# Patient Record
Sex: FEMALE | Race: BLACK OR AFRICAN AMERICAN | Employment: UNEMPLOYED | URBAN - METROPOLITAN AREA
[De-identification: names, ages, dates, MRNs, and addresses within clinical notes are randomized per-mention and may not be internally consistent; named-entity substitution may affect disease eponyms.]

---

## 2017-01-02 ENCOUNTER — HOME HEALTH ADMISSION (OUTPATIENT)
Dept: HOME HEALTH SERVICES | Facility: HOME HEALTH | Age: 52
End: 2017-01-02
Payer: COMMERCIAL

## 2017-01-04 ENCOUNTER — HOME CARE VISIT (OUTPATIENT)
Dept: SCHEDULING | Facility: HOME HEALTH | Age: 52
End: 2017-01-04
Payer: COMMERCIAL

## 2017-01-04 PROCEDURE — G0299 HHS/HOSPICE OF RN EA 15 MIN: HCPCS

## 2017-01-04 PROCEDURE — 400013 HH SOC

## 2017-01-05 ENCOUNTER — HOME CARE VISIT (OUTPATIENT)
Dept: SCHEDULING | Facility: HOME HEALTH | Age: 52
End: 2017-01-05
Payer: COMMERCIAL

## 2017-01-05 VITALS
HEIGHT: 66 IN | TEMPERATURE: 97 F | WEIGHT: 236 LBS | RESPIRATION RATE: 20 BRPM | OXYGEN SATURATION: 98 % | DIASTOLIC BLOOD PRESSURE: 82 MMHG | HEART RATE: 82 BPM | BODY MASS INDEX: 37.93 KG/M2 | SYSTOLIC BLOOD PRESSURE: 118 MMHG

## 2017-01-05 PROCEDURE — G0151 HHCP-SERV OF PT,EA 15 MIN: HCPCS

## 2017-01-06 ENCOUNTER — HOME CARE VISIT (OUTPATIENT)
Dept: SCHEDULING | Facility: HOME HEALTH | Age: 52
End: 2017-01-06
Payer: COMMERCIAL

## 2017-01-06 VITALS
DIASTOLIC BLOOD PRESSURE: 76 MMHG | OXYGEN SATURATION: 90 % | SYSTOLIC BLOOD PRESSURE: 112 MMHG | HEART RATE: 99 BPM | TEMPERATURE: 98 F

## 2017-01-06 PROCEDURE — G0152 HHCP-SERV OF OT,EA 15 MIN: HCPCS

## 2017-01-10 ENCOUNTER — HOME CARE VISIT (OUTPATIENT)
Dept: SCHEDULING | Facility: HOME HEALTH | Age: 52
End: 2017-01-10
Payer: COMMERCIAL

## 2017-01-10 VITALS — SYSTOLIC BLOOD PRESSURE: 114 MMHG | DIASTOLIC BLOOD PRESSURE: 72 MMHG | OXYGEN SATURATION: 98 % | HEART RATE: 80 BPM

## 2017-01-10 PROCEDURE — G0158 HHC OT ASSISTANT EA 15: HCPCS

## 2017-01-10 PROCEDURE — G0157 HHC PT ASSISTANT EA 15: HCPCS

## 2017-01-11 ENCOUNTER — HOME CARE VISIT (OUTPATIENT)
Dept: SCHEDULING | Facility: HOME HEALTH | Age: 52
End: 2017-01-11
Payer: COMMERCIAL

## 2017-01-11 VITALS
OXYGEN SATURATION: 97 % | HEART RATE: 89 BPM | SYSTOLIC BLOOD PRESSURE: 116 MMHG | TEMPERATURE: 96.7 F | DIASTOLIC BLOOD PRESSURE: 78 MMHG | RESPIRATION RATE: 18 BRPM

## 2017-01-11 VITALS — HEART RATE: 87 BPM | OXYGEN SATURATION: 98 % | DIASTOLIC BLOOD PRESSURE: 80 MMHG | SYSTOLIC BLOOD PRESSURE: 117 MMHG

## 2017-01-11 PROCEDURE — G0299 HHS/HOSPICE OF RN EA 15 MIN: HCPCS

## 2017-01-12 ENCOUNTER — HOME CARE VISIT (OUTPATIENT)
Dept: SCHEDULING | Facility: HOME HEALTH | Age: 52
End: 2017-01-12
Payer: COMMERCIAL

## 2017-01-12 PROCEDURE — G0157 HHC PT ASSISTANT EA 15: HCPCS

## 2017-01-13 ENCOUNTER — HOME CARE VISIT (OUTPATIENT)
Dept: SCHEDULING | Facility: HOME HEALTH | Age: 52
End: 2017-01-13
Payer: COMMERCIAL

## 2017-01-13 VITALS
OXYGEN SATURATION: 96 % | SYSTOLIC BLOOD PRESSURE: 126 MMHG | TEMPERATURE: 98.4 F | HEART RATE: 83 BPM | DIASTOLIC BLOOD PRESSURE: 82 MMHG

## 2017-01-13 PROCEDURE — G0158 HHC OT ASSISTANT EA 15: HCPCS

## 2017-01-13 PROCEDURE — G0299 HHS/HOSPICE OF RN EA 15 MIN: HCPCS

## 2017-01-16 ENCOUNTER — HOME CARE VISIT (OUTPATIENT)
Dept: SCHEDULING | Facility: HOME HEALTH | Age: 52
End: 2017-01-16
Payer: COMMERCIAL

## 2017-01-16 VITALS
DIASTOLIC BLOOD PRESSURE: 82 MMHG | HEART RATE: 83 BPM | SYSTOLIC BLOOD PRESSURE: 128 MMHG | OXYGEN SATURATION: 96 % | RESPIRATION RATE: 17 BRPM | TEMPERATURE: 98.4 F

## 2017-01-16 VITALS — HEART RATE: 88 BPM | SYSTOLIC BLOOD PRESSURE: 123 MMHG | OXYGEN SATURATION: 98 % | DIASTOLIC BLOOD PRESSURE: 66 MMHG

## 2017-01-16 PROCEDURE — G0158 HHC OT ASSISTANT EA 15: HCPCS

## 2017-01-17 ENCOUNTER — HOME CARE VISIT (OUTPATIENT)
Dept: SCHEDULING | Facility: HOME HEALTH | Age: 52
End: 2017-01-17
Payer: COMMERCIAL

## 2017-01-17 VITALS — HEART RATE: 78 BPM | SYSTOLIC BLOOD PRESSURE: 130 MMHG | DIASTOLIC BLOOD PRESSURE: 92 MMHG | OXYGEN SATURATION: 98 %

## 2017-01-17 PROCEDURE — G0162 HHC RN E&M PLAN SVS, 15 MIN: HCPCS

## 2017-01-17 PROCEDURE — G0157 HHC PT ASSISTANT EA 15: HCPCS

## 2017-01-18 VITALS
DIASTOLIC BLOOD PRESSURE: 80 MMHG | OXYGEN SATURATION: 98 % | SYSTOLIC BLOOD PRESSURE: 138 MMHG | HEART RATE: 79 BPM | RESPIRATION RATE: 18 BRPM | TEMPERATURE: 97.8 F

## 2017-01-19 ENCOUNTER — HOME CARE VISIT (OUTPATIENT)
Dept: HOME HEALTH SERVICES | Facility: HOME HEALTH | Age: 52
End: 2017-01-19
Payer: COMMERCIAL

## 2017-01-20 ENCOUNTER — HOME CARE VISIT (OUTPATIENT)
Dept: SCHEDULING | Facility: HOME HEALTH | Age: 52
End: 2017-01-20
Payer: COMMERCIAL

## 2017-01-20 ENCOUNTER — OFFICE VISIT (OUTPATIENT)
Dept: CARDIOLOGY CLINIC | Age: 52
End: 2017-01-20

## 2017-01-20 ENCOUNTER — HOME CARE VISIT (OUTPATIENT)
Dept: HOME HEALTH SERVICES | Facility: HOME HEALTH | Age: 52
End: 2017-01-20
Payer: COMMERCIAL

## 2017-01-20 VITALS
HEIGHT: 66 IN | SYSTOLIC BLOOD PRESSURE: 118 MMHG | OXYGEN SATURATION: 98 % | WEIGHT: 228 LBS | DIASTOLIC BLOOD PRESSURE: 80 MMHG | BODY MASS INDEX: 36.64 KG/M2 | HEART RATE: 81 BPM

## 2017-01-20 DIAGNOSIS — Q21.12 PFO (PATENT FORAMEN OVALE): Primary | ICD-10-CM

## 2017-01-20 DIAGNOSIS — M79.89 LEG SWELLING: ICD-10-CM

## 2017-01-20 DIAGNOSIS — E78.5 HYPERLIPIDEMIA, UNSPECIFIED HYPERLIPIDEMIA TYPE: ICD-10-CM

## 2017-01-20 DIAGNOSIS — E66.9 OBESITY, UNSPECIFIED OBESITY SEVERITY, UNSPECIFIED OBESITY TYPE: ICD-10-CM

## 2017-01-20 PROCEDURE — G0152 HHCP-SERV OF OT,EA 15 MIN: HCPCS

## 2017-01-20 PROCEDURE — G0157 HHC PT ASSISTANT EA 15: HCPCS

## 2017-01-20 NOTE — PROGRESS NOTES
HISTORY OF PRESENT ILLNESS  Winston Leavitt is a 46 y.o. female. HPI    Patient presents for a follow-up office visit. She was admitted to the hospital earlier this year for symptoms concerning for an acute CVA, with symptoms of right-sided facial droop slurred speech and focal deficits of her right upper and lower extremity. As result she underwent emergent TPA administration. However, she then underwent a MRI of her brain the following day which was essentially normal demonstrating no evidence for an acute CVA. No significant cerebral vascular abnormalities. She also underwent an echocardiogram which incidentally found a small patent foramen ovale with a tiny  right to left shunt on Valsalva maneuver only. The etiology of her symptoms were never quite figured out, but felt to be less likely related to an acute CVA. Patient returns today for a  post hospital visit to discuss the finding of her PFO. She complains of intermittent dizziness and exertional shortness of breath, but states the symptoms have been present for months to years. She denies any chest pain, no orthopnea, no PND, leg swelling. No palpitations, or frandy syncope. Past Medical History   Diagnosis Date    Asthma     Back pain with radiation     Cardiac echocardiogram, ltd study 12/29/2016     EF 55-60%. No WMA. Right to left atrial septal shunt suggests PFO. Similar to study of 10/18/16.  Cardiovascular LE venous duplex 10/18/2016     No DVT bilaterally.  Contact dermatitis and other eczema, due to unspecified cause     CTS (carpal tunnel syndrome)     HSV-2 (herpes simplex virus 2) infection     Hypercholesterolemia     Hypothyroidism     Ill-defined condition      Vertigo    L4-L5 disc bulge     Leg swelling     Migraines     Positive PPD, treated     S/P lumbar fusion 1/13/2016     1.  L4-5 bilateral hemilaminotomy, medial facetectomy, foraminotomy, diskectomy L4-5. 2. Transforaminal lumbar interbody fusion with PEEK cage and demineralized bone graft L4-L5 posterolateral fusion. 3. Segmental spinal instrumentation, DePuy Expedium type L4-L5. 01/13/2016 By Dr. Princess Jessica Barraza        Current Outpatient Prescriptions   Medication Sig Dispense Refill    potassium chloride SR (K-TAB) 20 mEq tablet Take 20 mEq by mouth daily.  pravastatin (PRAVACHOL) 80 mg tablet Take 80 mg by mouth daily.  aspirin 81 mg chewable tablet Take 1 Tab by mouth daily. 30 Tab 3    etodolac (LODINE) 300 mg capsule TAKE 1 CAPSULE BY MOUTH THREE TIMES DAILY AS NEEDED FOR PAIN WITH FOOD  2    levothyroxine (SYNTHROID) 50 mcg tablet TAKE 1 TABLET BY MOUTH EVERY DAY  3    cholecalciferol (VITAMIN D3) 1,000 unit cap Take 2,000 Units by mouth daily.  multivitamin (ONE A DAY) tablet Take 1 Tab by mouth daily.  meclizine (ANTIVERT) 25 mg tablet Take 25 mg by mouth daily as needed. 3    butalbital-acetaminophen-caffeine (FIORICET) -40 mg per tablet Take 1 Tab by mouth two (2) times a day.  DULoxetine (CYMBALTA) 60 mg capsule Take 60 mg by mouth nightly.  topiramate (TOPAMAX) 100 mg tablet Take  by mouth two (2) times a day.  albuterol (PROVENTIL, VENTOLIN) 90 mcg/Actuation inhaler Take 2 Puffs by inhalation every six (6) hours as needed. Allergies   Allergen Reactions    Skelaxin [Metaxalone] Other (comments)     jittery      Social History   Substance Use Topics    Smoking status: Never Smoker    Smokeless tobacco: Never Used    Alcohol use No         Review of Systems   Constitutional: Negative for chills, fever and weight loss. HENT: Negative for nosebleeds. Eyes: Negative for blurred vision and double vision. Respiratory: Positive for shortness of breath. Negative for cough and wheezing. Cardiovascular: Negative for chest pain, palpitations, orthopnea, claudication, leg swelling and PND. Gastrointestinal: Negative for abdominal pain, heartburn, nausea and vomiting.    Genitourinary: Negative for dysuria and hematuria. Musculoskeletal: Negative for falls and myalgias. Skin: Negative for rash. Neurological: Positive for dizziness. Negative for focal weakness and headaches. Endo/Heme/Allergies: Does not bruise/bleed easily. Psychiatric/Behavioral: Negative for substance abuse. Visit Vitals    /80    Pulse 81    Ht 5' 6\" (1.676 m)    Wt 103.4 kg (228 lb)    SpO2 98%    BMI 36.8 kg/m2       Physical Exam   Constitutional: She is oriented to person, place, and time. She appears well-developed and well-nourished. HENT:   Head: Normocephalic and atraumatic. Eyes: Conjunctivae are normal.   Neck: Neck supple. No JVD present. Carotid bruit is not present. Cardiovascular: Normal rate, regular rhythm, S1 normal, S2 normal and normal pulses. Exam reveals no gallop. No murmur heard. Pulmonary/Chest: Effort normal and breath sounds normal. She has no wheezes. She has no rales. Abdominal: Soft. Bowel sounds are normal. There is no tenderness. Musculoskeletal: She exhibits no edema. Neurological: She is alert and oriented to person, place, and time. Skin: Skin is warm and dry. EKG: Normal sinus rhythm, normal axis, normal QTc interval, no ST/T wave abnormalites concerning for ischemia. ASSESSMENT and PLAN  Encounter Diagnoses   Name Primary?  PFO (patent foramen ovale) Yes    Leg swelling     Hyperlipidemia, unspecified hyperlipidemia type     Obesity, unspecified obesity severity, unspecified obesity type      Small patent foramen ovale. This was an incidental finding on echocardiogram and had been noted on a prior echocardiogram.  This is unlikely related to her neurological symptoms. This is present in 15-20% of the general population. No indication for further cardiac workup at this time. Dyslipidemia. Patient remains on pravastatin. This is followed by her PCP. Obesity.   Patient was encouraged to try and lose weight with lifestyle modification. Patient can follow-up as needed.

## 2017-01-20 NOTE — PROGRESS NOTES
1. Have you been to the ER, urgent care clinic since your last visit? Hospitalized since your last visit? Yes, 12/28/16 - 1/2/17 for stroke     2. Have you seen or consulted any other health care providers outside of the [facility] since your last visit? Include any pap smears or colon screening.   No

## 2017-01-22 VITALS — DIASTOLIC BLOOD PRESSURE: 89 MMHG | OXYGEN SATURATION: 98 % | HEART RATE: 87 BPM | SYSTOLIC BLOOD PRESSURE: 125 MMHG

## 2017-01-24 VITALS — HEART RATE: 81 BPM | OXYGEN SATURATION: 98 % | SYSTOLIC BLOOD PRESSURE: 132 MMHG | DIASTOLIC BLOOD PRESSURE: 76 MMHG

## 2017-01-25 ENCOUNTER — HOME CARE VISIT (OUTPATIENT)
Dept: SCHEDULING | Facility: HOME HEALTH | Age: 52
End: 2017-01-25
Payer: COMMERCIAL

## 2017-01-25 VITALS — SYSTOLIC BLOOD PRESSURE: 122 MMHG | DIASTOLIC BLOOD PRESSURE: 82 MMHG | HEART RATE: 84 BPM | OXYGEN SATURATION: 98 %

## 2017-01-25 PROCEDURE — G0157 HHC PT ASSISTANT EA 15: HCPCS

## 2017-01-27 ENCOUNTER — HOME CARE VISIT (OUTPATIENT)
Dept: SCHEDULING | Facility: HOME HEALTH | Age: 52
End: 2017-01-27
Payer: COMMERCIAL

## 2017-01-27 VITALS — OXYGEN SATURATION: 96 % | SYSTOLIC BLOOD PRESSURE: 138 MMHG | DIASTOLIC BLOOD PRESSURE: 98 MMHG | HEART RATE: 78 BPM

## 2017-01-27 PROCEDURE — G0157 HHC PT ASSISTANT EA 15: HCPCS

## 2017-01-31 ENCOUNTER — HOME CARE VISIT (OUTPATIENT)
Dept: SCHEDULING | Facility: HOME HEALTH | Age: 52
End: 2017-01-31
Payer: COMMERCIAL

## 2017-01-31 VITALS — HEART RATE: 78 BPM | OXYGEN SATURATION: 98 % | SYSTOLIC BLOOD PRESSURE: 130 MMHG | DIASTOLIC BLOOD PRESSURE: 90 MMHG

## 2017-01-31 PROCEDURE — G0157 HHC PT ASSISTANT EA 15: HCPCS

## 2017-02-02 ENCOUNTER — HOME CARE VISIT (OUTPATIENT)
Dept: SCHEDULING | Facility: HOME HEALTH | Age: 52
End: 2017-02-02
Payer: COMMERCIAL

## 2017-02-02 VITALS — HEART RATE: 79 BPM | DIASTOLIC BLOOD PRESSURE: 60 MMHG | SYSTOLIC BLOOD PRESSURE: 125 MMHG | OXYGEN SATURATION: 98 %

## 2017-02-02 PROCEDURE — G0151 HHCP-SERV OF PT,EA 15 MIN: HCPCS

## 2017-02-13 ENCOUNTER — OFFICE VISIT (OUTPATIENT)
Dept: ORTHOPEDIC SURGERY | Age: 52
End: 2017-02-13

## 2017-02-13 VITALS
TEMPERATURE: 98.2 F | OXYGEN SATURATION: 98 % | HEIGHT: 66 IN | BODY MASS INDEX: 36.64 KG/M2 | DIASTOLIC BLOOD PRESSURE: 81 MMHG | HEART RATE: 84 BPM | RESPIRATION RATE: 18 BRPM | WEIGHT: 228 LBS | SYSTOLIC BLOOD PRESSURE: 125 MMHG

## 2017-02-13 DIAGNOSIS — M54.16 LUMBAR RADICULOPATHY, RIGHT: Primary | ICD-10-CM

## 2017-02-13 RX ORDER — ATORVASTATIN CALCIUM 80 MG/1
TABLET, FILM COATED ORAL
Refills: 2 | COMMUNITY
Start: 2017-01-02 | End: 2017-05-08

## 2017-02-13 RX ORDER — KETOROLAC TROMETHAMINE 15 MG/ML
60 INJECTION, SOLUTION INTRAMUSCULAR; INTRAVENOUS ONCE
Qty: 1 VIAL | Refills: 0
Start: 2017-02-13 | End: 2017-02-13

## 2017-02-13 RX ORDER — METHYLPREDNISOLONE 4 MG/1
TABLET ORAL
Qty: 21 TAB | Refills: 0 | Status: SHIPPED | OUTPATIENT
Start: 2017-02-13 | End: 2017-03-22 | Stop reason: ALTCHOICE

## 2017-02-13 NOTE — PROGRESS NOTES
[address], [address]  [place] [address] [facility]  Phone: [phone]  Fax: [phone]        Yadira Zhou  : 1965  PCP: Cyril Starr MD    PROGRESS NOTE      ASSESSMENT AND PLAN    Yadira was seen today for back pain. Diagnoses and all orders for this visit:    Lumbar radiculopathy, right  -     MRI LUMB SPINE W WO CONT; Future  -     methylPREDNISolone (MEDROL DOSEPACK) 4 mg tablet; Per dose pack instructions  -     KETOROLAC TROMETHAMINE INJ  -     ketorolac (TORADOL) 15 mg/mL soln injection; 4 mL by IntraMUSCular route once for 1 dose. -     THER/PROPH/DIAG INJECTION, SUBCUT/IM    1. Lumbar spine MRI. 2. Rx for MDP. Informed to not take Lodine while taking MDP. May start again after completing Lodine. 3. Continue Lodine and Topamax. 4. Given fall prevention information. Follow-up Disposition: Not on File      HISTORY OF PRESENT ILLNESS  Sarahi Miller is a 46 y.o. female. Pt presents to the office for a f/u visit for low back pain. Pt had TLIF/PLIF at L4-5 in 2016. Pt is a year s/p fusion. Pt was doing well until she had a stroke in 2016. Pt has weakness in her RLE from her stroke. She has been in physical therapy that has improved her weakness mildly but notes that her pain began to increase. Pt has mild speech issues. She reports that she will experience shaking in her RLE since her stroke. Pt presents in a wheelchair due to her weakness and pain. Her pain is so severe that she has been limited in what she is able to do. Pt notes that she also experiences pain near her lymph nodes. No saddle paresthesia. Pt takes Topamax 100 mg BID. Pt takes Lodine 300 mg TID PRN. Has not had negative side effects with MDP. Pt takes Cymbalta 60 mg QHS. Pt denies any recent GI ulcers, bleeds or renal dysfucntion. Denies persistent fevers, chills, weight changes, neurogenic bowel or bladder symptoms.  Pt denies recent ED visits or hospitalizations. Pt has not seen a Neurologist.     Pt was admitted for stroke-like symptoms and was discharged with a diagnosis of complex migraine vs TIA. She had an MRI of the brain that showed no infarct. Pain Assessment  2/13/2017   Location of Pain Back   Location Modifiers -   Severity of Pain 6   Quality of Pain Aching   Quality of Pain Comment NUMBNESS, TINGLING, WEAKNESS   Duration of Pain Persistent   Frequency of Pain Constant   Date Pain First Started -   Aggravating Factors Other (Comment); Walking;Standing   Aggravating Factors Comment PAIN IS CONSTANT   Limiting Behavior Some   Relieving Factors Rest;Elevation   Relieving Factors Comment -   Result of Injury No       PAST MEDICAL HISTORY   Past Medical History   Diagnosis Date    Asthma     Back pain with radiation     Cardiac echocardiogram, ltd study 12/29/2016     EF 55-60%. No WMA. Right to left atrial septal shunt suggests PFO. Similar to study of 10/18/16.  Cardiovascular LE venous duplex 10/18/2016     No DVT bilaterally.  Contact dermatitis and other eczema, due to unspecified cause     CTS (carpal tunnel syndrome)     HSV-2 (herpes simplex virus 2) infection     Hypercholesterolemia     Hypothyroidism     Ill-defined condition      Vertigo    L4-L5 disc bulge     Leg swelling     Migraines     Positive PPD, treated     S/P lumbar fusion 1/13/2016     1. L4-5 bilateral hemilaminotomy, medial facetectomy, foraminotomy, diskectomy L4-5. 2. Transforaminal lumbar interbody fusion with PEEK cage and demineralized bone graft L4-L5 posterolateral fusion. 3. Segmental spinal instrumentation, DePuy Expedium type L4-L5. 01/13/2016 By Dr. Janna Khanna Sciatica        Past Surgical History   Procedure Laterality Date    Hx gyn       partial hysterectomy due to abnormal pap    Hx lumbar laminectomy  01-13-16     L4/5   .       MEDICATIONS    Current Outpatient Prescriptions   Medication Sig Dispense Refill    atorvastatin (LIPITOR) 80 mg tablet TAKE 1 TABLET BY MOUTH NIGHTLY  2    potassium chloride SR (K-TAB) 20 mEq tablet Take 20 mEq by mouth daily.  pravastatin (PRAVACHOL) 80 mg tablet Take 80 mg by mouth daily.  aspirin 81 mg chewable tablet Take 1 Tab by mouth daily. 30 Tab 3    etodolac (LODINE) 300 mg capsule TAKE 1 CAPSULE BY MOUTH THREE TIMES DAILY AS NEEDED FOR PAIN WITH FOOD  2    levothyroxine (SYNTHROID) 50 mcg tablet TAKE 1 TABLET BY MOUTH EVERY DAY  3    cholecalciferol (VITAMIN D3) 1,000 unit cap Take 2,000 Units by mouth daily.  multivitamin (ONE A DAY) tablet Take 1 Tab by mouth daily.  meclizine (ANTIVERT) 25 mg tablet Take 25 mg by mouth daily as needed. 3    butalbital-acetaminophen-caffeine (FIORICET) -40 mg per tablet Take 1 Tab by mouth two (2) times a day.  DULoxetine (CYMBALTA) 60 mg capsule Take 60 mg by mouth nightly.  topiramate (TOPAMAX) 100 mg tablet Take  by mouth two (2) times a day.  albuterol (PROVENTIL, VENTOLIN) 90 mcg/Actuation inhaler Take 2 Puffs by inhalation every six (6) hours as needed.           ALLERGIES  Allergies   Allergen Reactions    Skelaxin [Metaxalone] Other (comments)     jittery          SOCIAL HISTORY    Social History     Social History    Marital status:      Spouse name: N/A    Number of children: N/A    Years of education: N/A     Occupational History    unemployed      long term disability     Social History Main Topics    Smoking status: Never Smoker    Smokeless tobacco: Never Used    Alcohol use No    Drug use: No    Sexual activity: Not on file      Comment: Hysterectomy     Other Topics Concern    Not on file     Social History Narrative       FAMILY HISTORY  Family History   Problem Relation Age of Onset    Diabetes Mother     Elevated Lipids Mother     Hypertension Mother     Cancer Father      pancreatic    Diabetes Sister     Hypertension Sister     Diabetes Brother     Diabetes Daughter     Hypertension Daughter        REVIEW OF SYSTEMS  Review of Systems   Constitutional: Negative for chills, fever and weight loss. Respiratory: Negative for shortness of breath. Cardiovascular: Negative for chest pain. Gastrointestinal: Negative for constipation. Negative for fecal incontinence   Genitourinary: Negative for dysuria. Negative for urinary incontinence   Musculoskeletal:        Per HPI   Skin: Negative for rash. Neurological: Positive for focal weakness. Negative for dizziness, tingling, tremors and headaches. Endo/Heme/Allergies: Does not bruise/bleed easily. Psychiatric/Behavioral: The patient does not have insomnia. PHYSICAL EXAMINATION  Visit Vitals    /81    Pulse 84    Temp 98.2 °F (36.8 °C) (Oral)    Resp 18    Ht 5' 6\" (1.676 m)    Wt 228 lb (103.4 kg)    SpO2 98%    BMI 36.8 kg/m2         Accompanied by other. Constitutional:  Well developed, well nourished, in no acute distress. Psychiatric: Affect and mood are appropriate. Integumentary: No rashes or abrasions noted on exposed areas. Cardiovascular/Peripheral Vascular: Intact l pulses. No peripheral edema is noted. Lymphatic:  No evidence of lymphedema. No cervical lymphadenopathy. SPINE/MUSCULOSKELETAL EXAM    Lumbar spine:  No rash, ecchymosis, or gross obliquity. No fasciculations. No focal atrophy is noted. Tenderness to palpation on the RT at L4-5. Tenderness to palpation at the RT sciatic notch. SI joints non-tender. Trochanters non tender. Sensation grossly intact to light touch. MOTOR:        Biceps  Triceps Deltoids Wrist Ext Wrist Flex Hand Intrin   Right 4/5 4/5 4/5 4/5 4/5 4/5   Left 4/5 4/5 4/5 4/5 4/5 4/5         Hip Flex  Quads Hamstrings Ankle DF EHL Ankle PF   Right 4/5 4/5 4/5 4/5 4/5 4/5   Left +4/5 +4/5 +4/5 +4/5 +4/5 +4/5     DTRs are hypoactive biceps, triceps, brachioradialis, patella, and Achilles. Pt presents in wheelchair. Written by Lindsay Lazaro, as dictated by Herminio Beard MD.    Ms. Delbert Cruz may have a reminder for a \"due or due soon\" health maintenance. I have asked that she contact her primary care provider for follow-up on this health maintenance.

## 2017-02-13 NOTE — MR AVS SNAPSHOT
Visit Information Date & Time Provider Department Dept. Phone Encounter #  
 2/13/2017  3:00  [address], MD 4 [facility], [address] [facility] [phone] [id number] Upcoming Health Maintenance Date Due Hepatitis C Screening 1965 Pneumococcal 19-64 Medium Risk (1 of 1 - PPSV23) 7/1/1984 PAP AKA CERVICAL CYTOLOGY 7/1/1986 DTaP/Tdap/Td series (1 - Tdap) 5/7/1999 FOBT Q 1 YEAR AGE 50-75 7/1/2015 INFLUENZA AGE 9 TO ADULT 8/1/2016 BREAST CANCER SCRN MAMMOGRAM 12/22/2016 Allergies as of 2/13/2017  Review Complete On: 2/13/2017 By: Noe Rubin MD  
  
 Severity Noted Reaction Type Reactions Skelaxin [Metaxalone]  05/06/2010    Other (comments)  
 jittery Current Immunizations  Reviewed on 5/6/2010 Name Date  
 TD Vaccine 5/6/1999 Not reviewed this visit You Were Diagnosed With   
  
 Codes Comments Lumbar radiculopathy, right    -  Primary ICD-10-CM: M54.16 
ICD-9-CM: 724.4 Vitals BP Pulse Temp Resp Height(growth percentile) Weight(growth percentile) 125/81 84 98.2 °F (36.8 °C) (Oral) 18 5' 6\" (1.676 m) 228 lb (103.4 kg) SpO2 BMI OB Status Smoking Status 98% 36.8 kg/m2 Hysterectomy Never Smoker BMI and BSA Data Body Mass Index Body Surface Area  
 36.8 kg/m 2 2.19 m 2 Preferred Pharmacy Pharmacy Name Phone [id number] [address] [address] Your Updated Medication List  
  
   
This list is accurate as of: 2/13/17  4:35 PM.  Always use your most recent med list.  
  
  
  
  
 albuterol 90 mcg/actuation inhaler Commonly known as:  Georgette Wilcox Take 2 Puffs by inhalation every six (6) hours as needed. aspirin 81 mg chewable tablet Take 1 Tab by mouth daily. atorvastatin 80 mg tablet Commonly known as:  LIPITOR  
TAKE 1 TABLET BY MOUTH NIGHTLY  
  
 CYMBALTA 60 mg capsule Generic drug:  DULoxetine Take 60 mg by mouth nightly. etodolac 300 mg capsule Commonly known as:  LODINE  
TAKE 1 CAPSULE BY MOUTH THREE TIMES DAILY AS NEEDED FOR PAIN WITH FOOD FIORICET -40 mg per tablet Generic drug:  butalbital-acetaminophen-caffeine Take 1 Tab by mouth two (2) times a day.  
  
 ketorolac 15 mg/mL Soln injection Commonly known as:  TORADOL  
4 mL by IntraMUSCular route once for 1 dose. levothyroxine 50 mcg tablet Commonly known as:  SYNTHROID  
TAKE 1 TABLET BY MOUTH EVERY DAY  
  
 meclizine 25 mg tablet Commonly known as:  ANTIVERT Take 25 mg by mouth daily as needed. methylPREDNISolone 4 mg tablet Commonly known as:  Brannon Mais Per dose pack instructions  
  
 multivitamin tablet Commonly known as:  ONE A DAY Take 1 Tab by mouth daily. potassium chloride SR 20 mEq tablet Commonly known as:  K-TAB Take 20 mEq by mouth daily. pravastatin 80 mg tablet Commonly known as:  PRAVACHOL Take 80 mg by mouth daily. TOPAMAX 100 mg tablet Generic drug:  topiramate Take  by mouth two (2) times a day. VITAMIN D3 1,000 unit Cap Generic drug:  cholecalciferol Take 2,000 Units by mouth daily. Prescriptions Sent to Pharmacy Refills  
 methylPREDNISolone (MEDROL DOSEPACK) 4 mg tablet 0 Sig: Per dose pack instructions Class: Normal  
 Pharmacy: [id number]- [place]iah Collins, [address] [place] [address] #: [phone] We Performed the Following KETOROLAC TROMETHAMINE INJ [ [facility]] [place] THER/PROPH/DIAG INJECTION, SUBCUT/IM [id number] CPT(R)] To-Do List   
 02/13/2017 Imaging:  MRI LUMB SPINE W WO CONT Referral Information Referral ID Referred By Referred To  
  
 [id number] Sugar Irizarry Not Available Visits Status Start Date End Date 1 New Request 2/13/17 2/13/18  If your referral has a status of pending review or denied, additional information will be sent to support the outcome of this decision. Patient Instructions MyChart Activation Thank you for requesting access to Myo[employer]. Please follow the instructions below to securely access and download your online medical record. MyoScience allows you to send messages to your doctor, view your test results, renew your prescriptions, schedule appointments, and more. How Do I Sign Up? 1. In your internet browser, go to [email or web address] 
2. Click on the First Time User? Click Here link in the Sign In box. You will be redirect to the New Member Sign Up page. 3. Enter your MyoScience Access Code exactly as it appears below. You will not need to use this code after youve completed the sign-up process. If you do not sign up before the expiration date, you must request a new code. MyoScience Access Code: [email or web address] Expires: 3/8/2017 11:34 AM (This is the date your MyoScience access code will ) 4. Enter the last four digits of your Social Security Number (xxxx) and Date of Birth (mm/dd/yyyy) as indicated and click Submit. You will be taken to the next sign-up page. 5. Create a MyoScience ID. This will be your MyoScience login ID and cannot be changed, so think of one that is secure and easy to remember. 6. Create a MyoScience password. You can change your password at any time. 7. Enter your Password Reset Question and Answer. This can be used at a later time if you forget your password. 8. Enter your e-mail address. You will receive e-mail notification when new information is available in [address] 9. Click Sign Up. You can now view and download portions of your medical record. 10. Click the Download Summary menu link to download a portable copy of your medical information. Additional Information If you have questions, please visit the Frequently Asked Questions section of the MyoScience website at [email or web address] Remember, MyChart is NOT to be used for urgent needs. For medical emergencies, dial 911. Preventing Falls: Care Instructions Your Care Instructions Getting around your home safely can be a challenge if you have injuries or health problems that make it easy for you to fall. Loose rugs and furniture in walkways are among the dangers for many older people who have problems walking or who have poor eyesight. People who have conditions such as arthritis, osteoporosis, or dementia also have to be careful not to fall. You can make your home safer with a few simple measures. Follow-up care is a key part of your treatment and safety. Be sure to make and go to all appointments, and call your doctor if you are having problems. It's also a good idea to know your test results and keep a list of the medicines you take. How can you care for yourself at home? Taking care of yourself · You may get dizzy if you do not drink enough water. To prevent dehydration, drink plenty of fluids, enough so that your urine is light yellow or clear like water. Choose water and other caffeine-free clear liquids. If you have kidney, heart, or liver disease and have to limit fluids, talk with your doctor before you increase the amount of fluids you drink. · Exercise regularly to improve your strength, muscle tone, and balance. Walk if you can. Swimming may be a good choice if you cannot walk easily. · Have your vision and hearing checked each year or any time you notice a change. If you have trouble seeing and hearing, you might not be able to avoid objects and could lose your balance. · Know the side effects of the medicines you take. Ask your doctor or pharmacist whether the medicines you take can affect your balance. Sleeping pills or sedatives can affect your balance. · Limit the amount of alcohol you drink. Alcohol can impair your balance and other senses.  
· Ask your doctor whether calluses or corns on your feet need to be removed. If you wear loose-fitting shoes because of calluses or corns, you can lose your balance and fall. · Talk to your doctor if you have numbness in your feet. Preventing falls at home · Remove raised doorway thresholds, throw rugs, and clutter. Repair loose carpet or raised areas in the floor. · Move furniture and electrical cords to keep them out of walking paths. · Use nonskid floor wax, and wipe up spills right away, especially on ceramic tile floors. · If you use a walker or cane, put rubber tips on it. If you use crutches, clean the bottoms of them regularly with an abrasive pad, such as steel wool. · Keep your house well lit, especially [place] [address], and outside walkways. Use night-lights in areas such as hallways and bathrooms. Add extra light switches or use remote switches (such as switches that go on or off when you clap your hands) to make it easier to turn lights on if you have to get up during the night. · Install sturdy handrails on stairways. · Move items in your cabinets so that the things you use a lot are on the lower shelves (about waist level). · Keep a cordless phone and a flashlight with new batteries by your bed. If possible, put a phone in each of the main rooms of your house, or carry a cell phone in case you fall and cannot reach a phone. Or, you can wear a device around your neck or wrist. You push a button that sends a signal for help. · Wear low-heeled shoes that fit well and give your feet good support. Use footwear with nonskid soles. Check the heels and soles of your shoes for wear. Repair or replace worn heels or soles. · Do not wear socks without shoes on wood floors. · Walk on the grass when the sidewalks are slippery. If you live in an area that gets snow and ice in the winter, sprinkle salt on slippery steps and sidewalks. Preventing falls in the bath · Install grab bars and nonskid mats inside and outside your shower or tub and near the toilet and sinks. · Use shower chairs and bath benches. · Use a hand-held shower head that will allow you to sit while showering. · Get into a tub or shower by putting the weaker leg in first. Get out of a tub or shower with your strong side first. 
· Repair loose toilet seats and consider installing a raised toilet seat to make getting on and off the toilet easier. · Keep your bathroom door unlocked while you are in the shower. Where can you learn more? Go to http://leesa-jean carlos.info/. Enter [phone] [id number] [phone] in the search box to learn more about \"Preventing Falls: Care Instructions. \" Current as of: August 4, 2016 Content Version: 11.1 © 1265-5896 [employer]. Care instructions adapted under license by vocaltap (which disclaims liability or warranty for this information). If you have questions about a medical condition or this instruction, always ask your healthcare professional. [place] [address] any warranty or liability for your use of this information. Introducing [facility] & HE[facility]! Eboni Abbasi introduces [employer] patient portal. Now you can access parts of your medical record, email your doctor's office, and request medication refills online. 1. In your internet browser, go to [email or web address] 2. Click on the First Time User? Click Here link in the Sign In box. You will see the New Member Sign Up page. 3. Enter your [employer] Access Code exactly as it appears below. You will not need to use this code after youve completed the sign-up process. If you do not sign up before the expiration date, you must request a new code. · [employer] Access Code: [email or web address] Expires: 3/8/2017 11:34 AM 
 
4. Enter the last four digits of your Social Security Number (xxxx) and Date of Birth (mm/dd/yyyy) as indicated and click Submit. You will be taken to the next sign-up page. 5. Create a CloudGenix ID. This will be your CloudGenix login ID and cannot be changed, so think of one that is secure and easy to remember. 6. Create a CloudGenix password. You can change your password at any time. 7. Enter your Password Reset Question and Answer. This can be used at a later time if you forget your password. 8. Enter your e-mail address. You will receive e-mail notification when new information is available in [address] 9. Click Sign Up. You can now view and download portions of your medical record. 10. Click the Download Summary menu link to download a portable copy of your medical information. If you have questions, please visit the Frequently Asked Questions section of the CloudGenix website. Remember, CloudGenix is NOT to be used for urgent needs. For medical emergencies, dial 911. Now available from your iPhone and Android! Please provide this summary of care documentation to your next provider. Your primary care clinician is listed as [phone] y [phone]. If you have any questions after today's visit, please call [phone].

## 2017-02-13 NOTE — PROGRESS NOTES
TOM ENTERED PER DR. Luis A Padgett AS DOCUMENTED ON BLUE SHEET:MRI LUMBAR W WO CONTRAST FOR INCREASED LOW BACK PAIN AND RIGHT LOWER EXT WEAKNESS; MDP; TORADOL 60 MG IM X 1 NOW. After obtaining consent, and per orders of Dr. Luis A Padgett, injection of TORADOL 60 MG given by Jayce Enriquez LPN. Patient instructed to report any adverse reaction to me immediately. PT TOLERATED WELL AND WITHOUT INCIDENT.

## 2017-02-13 NOTE — PATIENT INSTRUCTIONS
"[employer] Activation    Thank you for requesting access to "[employer]. Please follow the instructions below to securely access and download your online medical record. "[employer] allows you to send messages to your doctor, view your test results, renew your prescriptions, schedule appointments, and more. How Do I Sign Up? 1. In your internet browser, go to [email or web address]  2. Click on the First Time User? Click Here link in the Sign In box. You will be redirect to the New Member Sign Up page. 3. Enter your "[employer] Access Code exactly as it appears below. You will not need to use this code after youve completed the sign-up process. If you do not sign up before the expiration date, you must request a new code. "[employer] Access Code: [email or web address]  Expires: 3/8/2017 11:34 AM (This is the date your "[employer] access code will )    4. Enter the last four digits of your Social Security Number (xxxx) and Date of Birth (mm/dd/yyyy) as indicated and click Submit. You will be taken to the next sign-up page. 5. Create a "[employer] ID. This will be your "[employer] login ID and cannot be changed, so think of one that is secure and easy to remember. 6. Create a "[employer] password. You can change your password at any time. 7. Enter your Password Reset Question and Answer. This can be used at a later time if you forget your password. 8. Enter your e-mail address. You will receive e-mail notification when new information is available in [address] 9. Click Sign Up. You can now view and download portions of your medical record. 10. Click the Download Summary menu link to download a portable copy of your medical information. Additional Information    If you have questions, please visit the Frequently Asked Questions section of the "[employer] website at [email or web address] Luc[employer] [email or web address][employer] [email or web address] Remember, "[employer] is NOT to be used for urgent needs. For medical emergencies, dial 911.          Preventing Falls: Care Instructions  Your Care Instructions  Getting around your home safely can be a challenge if you have injuries or health problems that make it easy for you to fall. Loose rugs and furniture in walkways are among the dangers for many older people who have problems walking or who have poor eyesight. People who have conditions such as arthritis, osteoporosis, or dementia also have to be careful not to fall. You can make your home safer with a few simple measures. Follow-up care is a key part of your treatment and safety. Be sure to make and go to all appointments, and call your doctor if you are having problems. It's also a good idea to know your test results and keep a list of the medicines you take. How can you care for yourself at home? Taking care of yourself  · You may get dizzy if you do not drink enough water. To prevent dehydration, drink plenty of fluids, enough so that your urine is light yellow or clear like water. Choose water and other caffeine-free clear liquids. If you have kidney, heart, or liver disease and have to limit fluids, talk with your doctor before you increase the amount of fluids you drink. · Exercise regularly to improve your strength, muscle tone, and balance. Walk if you can. Swimming may be a good choice if you cannot walk easily. · Have your vision and hearing checked each year or any time you notice a change. If you have trouble seeing and hearing, you might not be able to avoid objects and could lose your balance. · Know the side effects of the medicines you take. Ask your doctor or pharmacist whether the medicines you take can affect your balance. Sleeping pills or sedatives can affect your balance. · Limit the amount of alcohol you drink. Alcohol can impair your balance and other senses. · Ask your doctor whether calluses or corns on your feet need to be removed. If you wear loose-fitting shoes because of calluses or corns, you can lose your balance and fall.   · Talk to your doctor if you have numbness in your feet. Preventing falls at home  · Remove raised doorway thresholds, throw rugs, and clutter. Repair loose carpet or raised areas in the floor. · Move furniture and electrical cords to keep them out of walking paths. · Use nonskid floor wax, and wipe up spills right away, especially on ceramic tile floors. · If you use a walker or cane, put rubber tips on it. If you use crutches, clean the bottoms of them regularly with an abrasive pad, such as steel wool. · Keep your house well lit, especially [facility], and outside walkways. Use night-lights in areas such as hallways and bathrooms. Add extra light switches or use remote switches (such as switches that go on or off when you clap your hands) to make it easier to turn lights on if you have to get up during the night. · Install sturdy handrails on stairways. · Move items in your cabinets so that the things you use a lot are on the lower shelves (about waist level). · Keep a cordless phone and a flashlight with new batteries by your bed. If possible, put a phone in each of the main rooms of your house, or carry a cell phone in case you fall and cannot reach a phone. Or, you can wear a device around your neck or wrist. You push a button that sends a signal for help. · Wear low-heeled shoes that fit well and give your feet good support. Use footwear with nonskid soles. Check the heels and soles of your shoes for wear. Repair or replace worn heels or soles. · Do not wear socks without shoes on wood floors. · Walk on the grass when the sidewalks are slippery. If you live in an area that gets snow and ice in the winter, sprinkle salt on slippery steps and sidewalks. Preventing falls in the bath  · Install grab bars and nonskid mats inside and outside your shower or tub and near the toilet and sinks. · Use shower chairs and bath benches. · Use a hand-held shower head that will allow you to sit while showering.   · Get into a tub or shower by putting the weaker leg in first. Get out of a tub or shower with your strong side first.  · Repair loose toilet seats and consider installing a raised toilet seat to make getting on and off the toilet easier. · Keep your bathroom door unlocked while you are in the shower. Where can you learn more? Go to http://leesa-jean calros.info/. Enter [phone] in the search box to learn more about \"Preventing Falls: Care Instructions. \"  Current as of: August 4, 2016  Content Version: 11.1  © 2450-9794 [employer]. Care instructions adapted under license by DoubleMap (which disclaims liability or warranty for this information). If you have questions about a medical condition or this instruction, always ask your healthcare professional. [address] any warranty or liability for your use of this information.

## 2017-02-22 ENCOUNTER — HOSPITAL ENCOUNTER (OUTPATIENT)
Dept: MRI IMAGING | Age: 52
Discharge: HOME | End: 2017-02-22
Attending: PHYSICAL MEDICINE & REHABILITATION
Payer: COMMERCIAL

## 2017-02-22 VITALS — WEIGHT: 233 LBS | BODY MASS INDEX: 37.61 KG/M2

## 2017-02-22 DIAGNOSIS — M54.16 LUMBAR RADICULOPATHY, RIGHT: ICD-10-CM

## 2017-02-22 LAB — CREAT UR-MCNC: 1 MG/DL (ref 0.6–1.3)

## 2017-02-22 PROCEDURE — 74011250636 HC RX REV CODE- 250/636: Performed by: PHYSICAL MEDICINE & REHABILITATION

## 2017-02-22 PROCEDURE — A9585 GADOBUTROL INJECTION: HCPCS | Performed by: PHYSICAL MEDICINE & REHABILITATION

## 2017-02-22 PROCEDURE — 82565 ASSAY OF CREATININE: CPT

## 2017-02-22 PROCEDURE — 72158 MRI LUMBAR SPINE W/O & W/DYE: CPT

## 2017-02-22 RX ADMIN — GADOBUTROL 10 ML: 604.72 INJECTION INTRAVENOUS at 15:22

## 2017-03-14 ENCOUNTER — OFFICE VISIT (OUTPATIENT)
Dept: NEUROLOGY | Age: 52
End: 2017-03-14

## 2017-03-14 VITALS
OXYGEN SATURATION: 97 % | TEMPERATURE: 99 F | SYSTOLIC BLOOD PRESSURE: 126 MMHG | BODY MASS INDEX: 37.73 KG/M2 | DIASTOLIC BLOOD PRESSURE: 81 MMHG | RESPIRATION RATE: 20 BRPM | HEIGHT: 66 IN | WEIGHT: 234.8 LBS | HEART RATE: 88 BPM

## 2017-03-14 DIAGNOSIS — R29.90 NEUROLOGICAL SYMPTOMS: ICD-10-CM

## 2017-03-14 DIAGNOSIS — G43.711 INTRACTABLE CHRONIC MIGRAINE WITHOUT AURA AND WITH STATUS MIGRAINOSUS: Primary | ICD-10-CM

## 2017-03-14 RX ORDER — TOPIRAMATE 25 MG/1
25 TABLET ORAL 2 TIMES DAILY
Qty: 60 TAB | Refills: 4 | Status: SHIPPED | OUTPATIENT
Start: 2017-03-14 | End: 2017-03-31 | Stop reason: DRUGHIGH

## 2017-03-14 RX ORDER — DOCUSATE SODIUM 100 MG/1
100 CAPSULE, LIQUID FILLED ORAL 2 TIMES DAILY
COMMUNITY
End: 2018-12-17

## 2017-03-14 RX ORDER — FUROSEMIDE 20 MG/1
TABLET ORAL DAILY
Status: ON HOLD | COMMUNITY
End: 2018-07-14 | Stop reason: CLARIF

## 2017-03-14 RX ORDER — METHYLPREDNISOLONE 4 MG/1
TABLET ORAL
Qty: 1 DOSE PACK | Refills: 0 | Status: SHIPPED | OUTPATIENT
Start: 2017-03-14 | End: 2017-03-22 | Stop reason: ALTCHOICE

## 2017-03-14 NOTE — PROGRESS NOTES
Eboni Castañeda is a 46 y.o., right handed female, who has a history of hypercholesterolemia, asthma who comes in complaining of headaches. She's had headaches for now less than 4-5 years. She's been having some form of migraine when she was 24years of age. At first she was able to take OTC medications for the headaches and they would go away. About 5 years ago the headaches became resistant to OTC therapy. She was placed on Topamax and eventually Fioricet in combination that seemed to help with the pain. Typically the pain starts in the occipital region and radiates forward. There's no nausea or vomiting. The pain typically lasts until she takes a nap for 45 minutes. She was getting the pain about 1-2 times per week. The pain in characterized as a throbbing sensation in the back and then front of her head. There's no focal motor or sensory changes. No vision changes, lose of speech. She gets a warning with some tingling of her head just prior to the onset of headache. She might take a Fioricet at the onset of the headache. Currently she complains of a 3 days long headache that just doesn't seem to want to break. Typically the headaches are a 10/10 in intensity. She has been taking the Fioricet at least 2 times daily for the last 3 months. She did present to [facility] in December of 2016 with stroke like symptoms on the right. She had an MRI brain that was negative for any acute CVA. Social History; , lives with her daughter. Disabled from chronic back problems. No tobacco, alcohol    Family History; her mother had diabetes, hypercholesterolemia, hypertension. Father had pancreatic cancer. Sister with diabetes and hypertension brother with diabetes. Daughter with hypertension. Current Outpatient Prescriptions   Medication Sig Dispense Refill    furosemide (LASIX) 20 mg tablet Take  by mouth daily.       docusate sodium (COLACE) 100 mg capsule Take 100 mg by mouth two (2) times a day.  FERROUS SULFATE PO Take 325 mg by mouth.  BUDESONIDE/FORMOTEROL FUMARATE (SYMBICORT IN) Take  by inhalation.  potassium chloride SR (K-TAB) 20 mEq tablet Take 20 mEq by mouth daily.  pravastatin (PRAVACHOL) 80 mg tablet Take 80 mg by mouth daily.  aspirin 81 mg chewable tablet Take 1 Tab by mouth daily. 30 Tab 3    etodolac (LODINE) 300 mg capsule TAKE 1 CAPSULE BY MOUTH THREE TIMES DAILY AS NEEDED FOR PAIN WITH FOOD  2    levothyroxine (SYNTHROID) 50 mcg tablet TAKE 1 TABLET BY MOUTH EVERY DAY  3    cholecalciferol (VITAMIN D3) 1,000 unit cap Take 2,000 Units by mouth daily.  multivitamin (ONE A DAY) tablet Take 1 Tab by mouth daily.  meclizine (ANTIVERT) 25 mg tablet Take 25 mg by mouth daily as needed. 3    butalbital-acetaminophen-caffeine (FIORICET) -40 mg per tablet Take 1 Tab by mouth two (2) times a day.  DULoxetine (CYMBALTA) 60 mg capsule Take 60 mg by mouth nightly.  topiramate (TOPAMAX) 100 mg tablet Take  by mouth two (2) times a day.  albuterol (PROVENTIL, VENTOLIN) 90 mcg/Actuation inhaler Take 2 Puffs by inhalation every six (6) hours as needed.  atorvastatin (LIPITOR) 80 mg tablet TAKE 1 TABLET BY MOUTH NIGHTLY  2    methylPREDNISolone (MEDROL DOSEPACK) 4 mg tablet Per dose pack instructions 21 Tab 0       Past Medical History:   Diagnosis Date    Asthma     Back pain with radiation     Cardiac echocardiogram, [facility] study 12/29/2016    EF 55-60%. No WMA. Right to left atrial septal shunt suggests PFO. Similar to study of 10/18/16.  Cardiovascular LE venous duplex 10/18/2016    No DVT bilaterally.     Contact dermatitis and other eczema, due to unspecified cause     CTS (carpal tunnel syndrome)     HSV-2 (herpes simplex virus 2) infection     Hypercholesterolemia     Hypothyroidism     Ill-defined condition     Vertigo    L4-L5 disc bulge     Leg swelling     Migraines     Positive PPD, treated     S/P lumbar fusion 1/13/2016    1. L4-5 bilateral hemilaminotomy, medial facetectomy, foraminotomy, diskectomy L4-5. 2. Transforaminal lumbar interbody fusion with PEEK cage and demineralized bone graft L4-L5 posterolateral fusion. 3. Segmental spinal instrumentation, DePuy Expedium type L4-L5. 01/13/2016 By Dr. Roberto Carlos Rodriguez Sciatica        Past Surgical History:   Procedure Laterality Date    HX GYN      partial hysterectomy due to abnormal pap    HX LUMBAR LAMINECTOMY  01-13-16    L4/5       Allergies   Allergen Reactions    Skelaxin [Metaxalone] Other (comments)     jittery       Patient Active Problem List   Diagnosis Code    Asthma J45.909    Hyperlipidemia E78.5    Eczema L30.9    Back pain with radiation M54.9    Spinal stenosis, lumbar region, without neurogenic claudication M48.06    Lumbar stenosis M48.06    S/P lumbar fusion Z98.1    Acute right-sided low back pain with sciatica M54.40    Neurological symptoms R29.90    Ischemic stroke (HCC) I63.9    Migraines G43.909    Leg swelling M79.89    Hypothyroidism E03.9    CTS (carpal tunnel syndrome) G56.00    Hypercholesterolemia E78.00    Ill-defined condition R69    L4-L5 disc bulge M51.26    Sciatica M54.30         Review of Systems: she has chronic low back pain, severe today.     As above otherwise 11 point review of systems negative including;   Constitutional no fever or chills  Skin denies rash or itching  HENT  Denies tinnitus, hearing lose  Eyes denies diplopia vision lose  Respiratory denies shortness of breath  Cardiovascular denies chest pain, dyspnea on exertion  Gastrointestinal denies nausea, vomiting, diarrhea, constipation  Genitourinary denies incontinence  Musculoskeletal denies joint pain or swelling  Endocrine denies weight change  Hematology denies easy bruising or bleeding   Neurological as above in HPI      PHYSICAL EXAMINATION:      VITAL SIGNS:    Visit Vitals    /81    Pulse 88    Temp 99 °F (37.2 °C) (Oral)    Resp 20    Ht 5' 6\" (1.676 m)    Wt 106.5 kg (234 lb 12.8 oz)    SpO2 97%    BMI 37.9 kg/m2       GENERAL: The patient is well developed, well nourished, and in no apparent distress. EXTREMITIES: No clubbing, cyanosis, or edema is identified. Pulses 2+ and symmetrical.  Muscle tone is normal.  HEAD:   Ear, nose, and throat appear to be without trauma. The patient is normocephalic. NEUROLOGIC EXAMINATION    MENTAL STATUS: The patient is awake, alert, and oriented x 4. Fund of knowledge is adequate. Speech is fluent and memory appears to be intact, both long and short term. CRANIAL NERVES: II - Visual fields are full to confrontation. Funduscopic examination reveals flat disks bilaterally. Pupils are both 3 mm and briskly reactive to light and accommodation. III, IV, VI - Extraocular movements are intact and there is no nystagmus. V - Facial sensation is intact to pinprick and light touch. VII - Face is symmetrical.   VIII - Hearing is present. IX, X, XII- Palate rises symmetrically. Gag is present. Tongue is in the midline. XI - Shoulder shrugging and head turning intact  MOTOR:  The patient is has a slight bit of weakness throughout the right side but without any drift. Fine finger movements are symmetrical.  Isolated motor group testing 4+/5 strength on the right compared to the left. Tone is normal.  Sensory examination is intact to pinprick, light touch and position sense testing. Reflexes are 2+ and symmetrical. Plantars are down going. Cerebellar examination reveals no gross ataxia or dysmetria. Gait is abnormal she's very antalgic, complaining of pain in the back and using a cane for balance. Final result (Exam End: 12/29/2016  5:18 PM) Open    Study Result   MRI of brain, without and with gadolinium contrast:           INDICATION:     Slurred speech.  Weakness in right side.     Acute ischemic stroke.     Status post TPA.           TECHNIQUE:     Sagittal, axial and coronal multisequence MR images of brain are obtained using  T1 and T2 contrast informations, including diffusion imaging, FLAIR imaging, T2  started individual imaging and gadolinium contrast with intravenous Gadavist  contrast. Intravenous gadolinium contrast has been administered for MRA of neck. Then postcontrast images of brain have been obtained.     COMPARISON STUDY: CT scan of brain on 12/20/2016 and 10/0/15.           FINDINGS:     On the CT scan of brain dated 12/28/2016, there was no evidence of intracranial  hemorrhages.     On current study, on the gradient echo images, there is no evidence of  intracerebral or extra-axial hemorrhages.     On the diffusion images, there is no evidence of acute infarction, acute  ischemic process or restricted diffusion in brain.     On the FLAIR and T2-weighted images, there is no definable focal abnormality in  both cerebral hemispheres, basal ganglia structures of both sides, brainstem, or  in cerebellum. The cerebral ventricles are of normal size, without midline  shift.     With intravenous administration of gadolinium contrast, there is no abnormal  intra-axial or extra-axial enhancement. There is no evidence of intracranial  mass lesion or mass effect.     There is no demonstrable abnormality in sella, suprasellar cistern, both orbits,  or in the IACs and CP angles of both sides.     Study demonstrates signal voids, indicating blood flow in all major intracranial  arteries.     Visualized paranasal sinuses are clear except for a small, about 1 cm size  retention cyst in the lower medial portion of right maxillary sinus.           ------------------------------     IMPRESSION  IMPRESSION:           On the diffusion images, there is no evidence of acute infarction or acute  ischemic process in brain.     There is no definable focal abnormality in cerebrum, cerebellum or in brainstem.   No definable old or subacute infarction in brain.     With intravenous gadolinium contrast, no abnormal enhancement. No evidence of  intracranial mass lesion or mass effect. I have reviewed the above imagines myself. CBC:   Lab Results   Component Value Date/Time    WBC 8.2 01/02/2017 03:08 AM    RBC 4.52 01/02/2017 03:08 AM    HGB 10.7 01/02/2017 03:08 AM    HCT 33.7 01/02/2017 03:08 AM    PLATELET 115 75/38/9455 03:08 AM     BMP:   Lab Results   Component Value Date/Time    Glucose 100 01/02/2017 03:08 AM    Sodium 138 01/02/2017 03:08 AM    Potassium 4.0 01/02/2017 03:08 AM    Chloride 107 01/02/2017 03:08 AM    CO2 23 01/02/2017 03:08 AM    BUN 12 01/02/2017 03:08 AM    Creatinine 0.98 01/02/2017 03:08 AM    Calcium 9.2 01/02/2017 03:08 AM     CMP:   Lab Results   Component Value Date/Time    Glucose 100 01/02/2017 03:08 AM    Sodium 138 01/02/2017 03:08 AM    Potassium 4.0 01/02/2017 03:08 AM    Chloride 107 01/02/2017 03:08 AM    CO2 23 01/02/2017 03:08 AM    BUN 12 01/02/2017 03:08 AM    Creatinine 0.98 01/02/2017 03:08 AM    Calcium 9.2 01/02/2017 03:08 AM    Anion gap 8 01/02/2017 03:08 AM    BUN/Creatinine ratio 12 01/02/2017 03:08 AM    Alk. phosphatase 100 12/30/2016 04:00 AM    Protein, total 7.5 12/30/2016 04:00 AM    Albumin 3.1 12/30/2016 04:00 AM    Globulin 4.4 12/30/2016 04:00 AM    A-G Ratio 0.7 12/30/2016 04:00 AM     Coagulation:   Lab Results   Component Value Date/Time    Prothrombin time 12.3 12/28/2016 08:55 PM    INR 0.9 12/28/2016 08:55 PM     Cardiac markers:   Lab Results   Component Value Date/Time     12/28/2016 08:55 PM    CK-MB Index CANNOT BE CALCULATED 12/28/2016 08:55 PM          Impression: Status migrainosus in this patient in this patient with what appears to be a combination of migraine headache and analgesic rebound headaches. She also has symptoms of right sided weakness but nothing on recent MRI to speak of stroke and a non-anatomical exam.    Plan:  Will try a Medrol Dosepak for the current migraine. Will increase the Topamax from 100 mg BID to 125 mg BID. Told her to avoid the use of OTC analgesics and decrease to no more than 3 -4 pills per week the use of Fioricet. Return here in 2 weeks.

## 2017-03-14 NOTE — COMMUNICATION BODY
Sharyn Hatchet is a 46 y.o., right handed female, who has a history of hypercholesterolemia, asthma who comes in complaining of headaches. She's had headaches for now less than 4-5 years. She's been having some form of migraine when she was 24years of age. At first she was able to take OTC medications for the headaches and they would go away. About 5 years ago the headaches became resistant to OTC therapy. She was placed on Topamax and eventually Fioricet in combination that seemed to help with the pain. Typically the pain starts in the occipital region and radiates forward. There's no nausea or vomiting. The pain typically lasts until she takes a nap for 45 minutes. She was getting the pain about 1-2 times per week. The pain in characterized as a throbbing sensation in the back and then front of her head. There's no focal motor or sensory changes. No vision changes, lose of speech. She gets a warning with some tingling of her head just prior to the onset of headache. She might take a Fioricet at the onset of the headache. Currently she complains of a 3 days long headache that just doesn't seem to want to break. Typically the headaches are a 10/10 in intensity. She has been taking the Fioricet at least 2 times daily for the last 3 months. She did present to [address] [place] [address] in December of 2016 with stroke like symptoms on the right. She had an MRI brain that was negative for any acute CVA. Social History; , lives with her daughter. Disabled from chronic back problems. No tobacco, alcohol    Family History; her mother had diabetes, hypercholesterolemia, hypertension. Father had pancreatic cancer. Sister with diabetes and hypertension brother with diabetes. Daughter with hypertension. Current Outpatient Prescriptions   Medication Sig Dispense Refill    furosemide (LASIX) 20 mg tablet Take  by mouth daily.       docusate sodium (COLACE) 100 mg capsule Take 100 mg by mouth two (2) times a day.  FERROUS SULFATE PO Take 325 mg by mouth.  BUDESONIDE/FORMOTEROL FUMARATE (SYMBICORT IN) Take  by inhalation.  potassium chloride SR (K-TAB) 20 mEq tablet Take 20 mEq by mouth daily.  pravastatin (PRAVACHOL) 80 mg tablet Take 80 mg by mouth daily.  aspirin 81 mg chewable tablet Take 1 Tab by mouth daily. 30 Tab 3    etodolac (LODINE) 300 mg capsule TAKE 1 CAPSULE BY MOUTH THREE TIMES DAILY AS NEEDED FOR PAIN WITH FOOD  2    levothyroxine (SYNTHROID) 50 mcg tablet TAKE 1 TABLET BY MOUTH EVERY DAY  3    cholecalciferol (VITAMIN D3) 1,000 unit cap Take 2,000 Units by mouth daily.  multivitamin (ONE A DAY) tablet Take 1 Tab by mouth daily.  meclizine (ANTIVERT) 25 mg tablet Take 25 mg by mouth daily as needed. 3    butalbital-acetaminophen-caffeine (FIORICET) -40 mg per tablet Take 1 Tab by mouth two (2) times a day.  DULoxetine (CYMBALTA) 60 mg capsule Take 60 mg by mouth nightly.  topiramate (TOPAMAX) 100 mg tablet Take  by mouth two (2) times a day.  albuterol (PROVENTIL, VENTOLIN) 90 mcg/Actuation inhaler Take 2 Puffs by inhalation every six (6) hours as needed.  atorvastatin (LIPITOR) 80 mg tablet TAKE 1 TABLET BY MOUTH NIGHTLY  2    methylPREDNISolone (MEDROL DOSEPACK) 4 mg tablet Per dose pack instructions 21 Tab 0       Past Medical History:   Diagnosis Date    Asthma     Back pain with radiation     Cardiac echocardiogram, [facility] 12/29/2016    EF 55-60%. No WMA. Right to left atrial septal shunt suggests PFO. Similar to study of 10/18/16.  Cardiovascular LE venous duplex 10/18/2016    No DVT bilaterally.     Contact dermatitis and other eczema, due to unspecified cause     CTS (carpal tunnel syndrome)     HSV-2 (herpes simplex virus 2) infection     Hypercholesterolemia     Hypothyroidism     Ill-defined condition     Vertigo    L4-L5 disc bulge     Leg swelling     Migraines     Positive PPD, treated     S/P lumbar fusion 1/13/2016    1. L4-5 bilateral hemilaminotomy, medial facetectomy, foraminotomy, diskectomy L4-5. 2. Transforaminal lumbar interbody fusion with PEEK cage and demineralized bone graft L4-L5 posterolateral fusion. 3. Segmental spinal instrumentation, DePuy Expedium type L4-L5. 01/13/2016 By Dr. Kamar Gonzalez Sciatica        Past Surgical History:   Procedure Laterality Date    HX GYN      partial hysterectomy due to abnormal pap    HX LUMBAR LAMINECTOMY  01-13-16    L4/5       Allergies   Allergen Reactions    Skelaxin [Metaxalone] Other (comments)     jittery       Patient Active Problem List   Diagnosis Code    Asthma J45.909    Hyperlipidemia E78.5    Eczema L30.9    Back pain with radiation M54.9    Spinal stenosis, lumbar region, without neurogenic claudication M48.06    Lumbar stenosis M48.06    S/P lumbar fusion Z98.1    Acute right-sided low back pain with sciatica M54.40    Neurological symptoms R29.90    Ischemic stroke (HCC) I63.9    Migraines G43.909    Leg swelling M79.89    Hypothyroidism E03.9    CTS (carpal tunnel syndrome) G56.00    Hypercholesterolemia E78.00    Ill-defined condition R69    L4-L5 disc bulge M51.26    Sciatica M54.30         Review of Systems: she has chronic low back pain, severe today.     As above otherwise 11 point review of systems negative including;   Constitutional no fever or chills  Skin denies rash or itching  HENT  Denies tinnitus, hearing lose  Eyes denies diplopia vision lose  Respiratory denies shortness of breath  Cardiovascular denies chest pain, dyspnea on exertion  Gastrointestinal denies nausea, vomiting, diarrhea, constipation  Genitourinary denies incontinence  Musculoskeletal denies joint pain or swelling  Endocrine denies weight change  Hematology denies easy bruising or bleeding   Neurological as above in HPI      PHYSICAL EXAMINATION:      VITAL SIGNS:    Visit Vitals    /81    Pulse 88    Temp 99 °F (37.2 °C) (Oral)    Resp 20    Ht 5' 6\" (1.676 m)    Wt 106.5 kg (234 lb 12.8 oz)    SpO2 97%    BMI 37.9 kg/m2       GENERAL: The patient is well developed, well nourished, and in no apparent distress. EXTREMITIES: No clubbing, cyanosis, or edema is identified. Pulses 2+ and symmetrical.  Muscle tone is normal.  HEAD:   Ear, nose, and throat appear to be without trauma. The patient is normocephalic. NEUROLOGIC EXAMINATION    MENTAL STATUS: The patient is awake, alert, and oriented x 4. Fund of knowledge is adequate. Speech is fluent and memory appears to be intact, both long and short term. CRANIAL NERVES: II - Visual fields are full to confrontation. Funduscopic examination reveals flat disks bilaterally. Pupils are both 3 mm and briskly reactive to light and accommodation. III, IV, VI - Extraocular movements are intact and there is no nystagmus. V - Facial sensation is intact to pinprick and light touch. VII - Face is symmetrical.   VIII - Hearing is present. IX, X, XII- Palate rises symmetrically. Gag is present. Tongue is in the midline. XI - Shoulder shrugging and head turning intact  MOTOR:  The patient is has a slight bit of weakness throughout the right side but without any drift. Fine finger movements are symmetrical.  Isolated motor group testing 4+/5 strength on the right compared to the left. Tone is normal.  Sensory examination is intact to pinprick, light touch and position sense testing. Reflexes are 2+ and symmetrical. Plantars are down going. Cerebellar examination reveals no gross ataxia or dysmetria. Gait is abnormal she's very antalgic, complaining of pain in the back and using a cane for balance. Final result (Exam End: 12/29/2016  5:18 PM) Open    Study Result   MRI of brain, without and with gadolinium contrast:           INDICATION:     Slurred speech.  Weakness in right side.     Acute ischemic stroke.     Status post TPA.           TECHNIQUE:     Sagittal, axial and coronal multisequence MR images of brain are obtained using  T1 and T2 contrast informations, including diffusion imaging, FLAIR imaging, T2  started individual imaging and gadolinium contrast with intravenous Gadavist  contrast. Intravenous gadolinium contrast has been administered for MRA of neck. Then postcontrast images of brain have been obtained.     COMPARISON STUDY: CT scan of brain on 12/20/2016 and 10/0/15.           FINDINGS:     On the CT scan of brain dated 12/28/2016, there was no evidence of intracranial  hemorrhages.     On current study, on the gradient echo images, there is no evidence of  intracerebral or extra-axial hemorrhages.     On the diffusion images, there is no evidence of acute infarction, acute  ischemic process or restricted diffusion in brain.     On the FLAIR and T2-weighted images, there is no definable focal abnormality in  both cerebral hemispheres, basal ganglia structures of both sides, brainstem, or  in cerebellum. The cerebral ventricles are of normal size, without midline  shift.     With intravenous administration of gadolinium contrast, there is no abnormal  intra-axial or extra-axial enhancement. There is no evidence of intracranial  mass lesion or mass effect.     There is no demonstrable abnormality in sella, suprasellar cistern, both orbits,  or in the IACs and CP angles of both sides.     Study demonstrates signal voids, indicating blood flow in all major intracranial  arteries.     Visualized paranasal sinuses are clear except for a small, about 1 cm size  retention cyst in the lower medial portion of right maxillary sinus.           ------------------------------     IMPRESSION  IMPRESSION:           On the diffusion images, there is no evidence of acute infarction or acute  ischemic process in brain.     There is no definable focal abnormality in cerebrum, cerebellum or in brainstem.   No definable old or subacute infarction in brain.     With intravenous gadolinium contrast, no abnormal enhancement. No evidence of  intracranial mass lesion or mass effect. I have reviewed the above imagines myself. CBC:   Lab Results   Component Value Date/Time    WBC 8.2 01/02/2017 03:08 AM    RBC 4.52 01/02/2017 03:08 AM    HGB 10.7 01/02/2017 03:08 AM    HCT 33.7 01/02/2017 03:08 AM    PLATELET 567 49/92/7520 03:08 AM     BMP:   Lab Results   Component Value Date/Time    Glucose 100 01/02/2017 03:08 AM    Sodium 138 01/02/2017 03:08 AM    Potassium 4.0 01/02/2017 03:08 AM    Chloride 107 01/02/2017 03:08 AM    CO2 23 01/02/2017 03:08 AM    BUN 12 01/02/2017 03:08 AM    Creatinine 0.98 01/02/2017 03:08 AM    Calcium 9.2 01/02/2017 03:08 AM     CMP:   Lab Results   Component Value Date/Time    Glucose 100 01/02/2017 03:08 AM    Sodium 138 01/02/2017 03:08 AM    Potassium 4.0 01/02/2017 03:08 AM    Chloride 107 01/02/2017 03:08 AM    CO2 23 01/02/2017 03:08 AM    BUN 12 01/02/2017 03:08 AM    Creatinine 0.98 01/02/2017 03:08 AM    Calcium 9.2 01/02/2017 03:08 AM    Anion gap 8 01/02/2017 03:08 AM    BUN/Creatinine ratio 12 01/02/2017 03:08 AM    Alk. phosphatase 100 12/30/2016 04:00 AM    Protein, total 7.5 12/30/2016 04:00 AM    Albumin 3.1 12/30/2016 04:00 AM    Globulin 4.4 12/30/2016 04:00 AM    A-G Ratio 0.7 12/30/2016 04:00 AM     Coagulation:   Lab Results   Component Value Date/Time    Prothrombin time 12.3 12/28/2016 08:55 PM    INR 0.9 12/28/2016 08:55 PM     Cardiac markers:   Lab Results   Component Value Date/Time     12/28/2016 08:55 PM    CK-MB Index CANNOT BE CALCULATED 12/28/2016 08:55 PM          Impression: Status migrainosus in this patient in this patient with what appears to be a combination of migraine headache and analgesic rebound headaches. She also has symptoms of right sided weakness but nothing on recent MRI to speak of stroke and a non-anatomical exam.    Plan:  Will try a Medrol Dosepak for the current migraine. Will increase the Topamax from 100 mg BID to 125 mg BID. Told her to avoid the use of OTC analgesics and decrease to no more than 3 -4 pills per week the use of Fioricet. Return here in 2 weeks.

## 2017-03-14 NOTE — MR AVS SNAPSHOT
Visit Information Date & Time Provider Department Dept. Phone Encounter #  
 3/14/2017  3:00 PM Liane Rincon MD [address] [place] [address] [phone] [id number] Follow-up Instructions Return in about 2 weeks (around 3/28/2017). Follow-up and Disposition History Your Appointments 3/22/2017  3:00 PM  
DIAG TEST F/U with Nona Rea MD  
[facility] Appt Note: MRI F/U Merged with [facility]  
 [address] [facility] [id number] [phone]  
  
   
 [address] [facility] [address] Upcoming Health Maintenance Date Due Hepatitis C Screening 1965 Pneumococcal 19-64 Medium Risk (1 of 1 - PPSV23) 7/1/1984 PAP AKA CERVICAL CYTOLOGY 7/1/1986 DTaP/Tdap/Td series (1 - Tdap) 5/7/1999 FOBT Q 1 YEAR AGE 50-75 7/1/2015 INFLUENZA AGE 9 TO ADULT 8/1/2016 BREAST CANCER SCRN MAMMOGRAM 12/22/2016 Allergies as of 3/14/2017  Review Complete On: 3/14/2017 By: Liane Rincon MD  
  
 Severity Noted Reaction Type Reactions Skelaxin [Metaxalone]  05/06/2010    Other (comments)  
 jittery Current Immunizations  Reviewed on 5/6/2010 Name Date  
 TD Vaccine 5/6/1999 Not reviewed this visit You Were Diagnosed With   
  
 Codes Comments Intractable chronic migraine without aura and with status migrainosus    -  Primary ICD-10-CM: G20.309 ICD-9-CM: 346.73 Neurological symptoms     ICD-10-CM: R29.90 ICD-9-CM: 781.99 Vitals BP Pulse Temp Resp Height(growth percentile) Weight(growth percentile) 126/81 88 99 °F (37.2 °C) (Oral) 20 5' 6\" (1.676 m) 234 lb 12.8 oz (106.5 kg) SpO2 BMI OB Status Smoking Status 97% 37.9 kg/m2 Hysterectomy Never Smoker BMI and BSA Data Body Mass Index Body Surface Area  
 37.9 kg/m 2 2.23 m 2 Preferred Pharmacy Pharmacy Name Phone [facility][id number]rano, [address] [place] [address] Your Updated Medication List  
  
   
This list is accurate as of: 3/14/17  3:55 PM.  Always use your most recent med list.  
  
  
  
  
 albuterol 90 mcg/actuation inhaler Commonly known as:  Silke Kassi Take 2 Puffs by inhalation every six (6) hours as needed. aspirin 81 mg chewable tablet Take 1 Tab by mouth daily. atorvastatin 80 mg tablet Commonly known as:  LIPITOR  
TAKE 1 TABLET BY MOUTH NIGHTLY  
  
 CYMBALTA 60 mg capsule Generic drug:  DULoxetine Take 60 mg by mouth nightly. docusate sodium 100 mg capsule Commonly known as:  Doc Vanessa Take 100 mg by mouth two (2) times a day. etodolac 300 mg capsule Commonly known as:  LODINE  
TAKE 1 CAPSULE BY MOUTH THREE TIMES DAILY AS NEEDED FOR PAIN WITH FOOD FERROUS SULFATE PO Take 325 mg by mouth. FIORICET -40 mg per tablet Generic drug:  butalbital-acetaminophen-caffeine Take 1 Tab by mouth two (2) times a day. furosemide 20 mg tablet Commonly known as:  LASIX Take  by mouth daily. levothyroxine 50 mcg tablet Commonly known as:  SYNTHROID  
TAKE 1 TABLET BY MOUTH EVERY DAY  
  
 meclizine 25 mg tablet Commonly known as:  ANTIVERT Take 25 mg by mouth daily as needed. * methylPREDNISolone 4 mg tablet Commonly known as:  Tiki Cambric Per dose pack instructions * methylPREDNISolone 4 mg tablet Commonly known as:  Tiki Cambric As directed  
  
 multivitamin tablet Commonly known as:  ONE A DAY Take 1 Tab by mouth daily. potassium chloride SR 20 mEq tablet Commonly known as:  K-TAB Take 20 mEq by mouth daily. pravastatin 80 mg tablet Commonly known as:  PRAVACHOL Take 80 mg by mouth daily. SYMBICORT IN Take  by inhalation. * TOPAMAX 100 mg tablet Generic drug:  topiramate Take  by mouth two (2) times a day. * topiramate 25 mg tablet Commonly known as:  TOPAMAX Take 1 Tab by mouth two (2) times a day. Indications: MIGRAINE PREVENTION  
  
 VITAMIN D3 1,000 unit Cap Generic drug:  cholecalciferol Take 2,000 Units by mouth daily. * Notice: This list has 4 medication(s) that are the same as other medications prescribed for you. Read the directions carefully, and ask your doctor or other care provider to review them with you. Prescriptions Sent to Pharmacy Refills  
 topiramate (TOPAMAX) 25 mg tablet 4 Sig: Take 1 Tab by mouth two (2) times a day. Indications: MIGRAINE PREVENTION Class: Normal  
 Pharmacy: [facility]/pharmacy #[id number]- [address], [address] Ph #: [phone] Route: Oral  
 methylPREDNISolone (MEDROL DOSEPACK) 4 mg tablet 0 Sig: As directed Class: Normal  
 Pharmacy: [facility]/pharmacy #[id number]- [address], [address] Ph #: [phone] Follow-up Instructions Return in about 2 weeks (around 3/28/2017). Please provide this summary of care documentation to your next provider. Your primary care clinician is listed as 130 y [phone]. If you have any questions after today's visit, please call [phone].

## 2017-03-14 NOTE — LETTER
3/14/2017 3:49 PM 
 
Patient:  Mar Menchaca YOB: 1965 Date of Visit: 3/14/2017 Dear MD Reji Gil [address] [place] [id number] VIA Facsimile: [phone] 
 : Thank you for referring Ms. Mar Menchaca to me for evaluation/treatment. Below are the relevant portions of my assessment and plan of care. Mar Menchaca is a 46 y.o., right handed female, who has a history of hypercholesterolemia, asthma who comes in complaining of headaches. She's had headaches for now less than 4-5 years. She's been having some form of migraine when she was 24years of age. At first she was able to take OTC medications for the headaches and they would go away. About 5 years ago the headaches became resistant to OTC therapy. She was placed on Topamax and eventually Fioricet in combination that seemed to help with the pain. Typically the pain starts in the occipital region and radiates forward. There's no nausea or vomiting. The pain typically lasts until she takes a nap for 45 minutes. She was getting the pain about 1-2 times per week. The pain in characterized as a throbbing sensation in the back and then front of her head. There's no focal motor or sensory changes. No vision changes, lose of speech. She gets a warning with some tingling of her head just prior to the onset of headache. She might take a Fioricet at the onset of the headache. Currently she complains of a 3 days long headache that just doesn't seem to want to break. Typically the headaches are a 10/10 in intensity. She has been taking the Fioricet at least 2 times daily for the last 3 months. She did present to [facility] in December of 2016 with stroke like symptoms on the right. She had an MRI brain that was negative for any acute CVA. Social History; , lives with her daughter. Disabled from chronic back problems. No tobacco, alcohol Family History; her mother had diabetes, hypercholesterolemia, hypertension. Father had pancreatic cancer. Sister with diabetes and hypertension brother with diabetes. Daughter with hypertension. Current Outpatient Prescriptions Medication Sig Dispense Refill  furosemide (LASIX) 20 mg tablet Take  by mouth daily.  docusate sodium (COLACE) 100 mg capsule Take 100 mg by mouth two (2) times a day.  FERROUS SULFATE PO Take 325 mg by mouth.  BUDESONIDE/FORMOTEROL FUMARATE (SYMBICORT IN) Take  by inhalation.  potassium chloride SR (K-TAB) 20 mEq tablet Take 20 mEq by mouth daily.  pravastatin (PRAVACHOL) 80 mg tablet Take 80 mg by mouth daily.  aspirin 81 mg chewable tablet Take 1 Tab by mouth daily. 30 Tab 3  
 etodolac (LODINE) 300 mg capsule TAKE 1 CAPSULE BY MOUTH THREE TIMES DAILY AS NEEDED FOR PAIN WITH FOOD  2  
 levothyroxine (SYNTHROID) 50 mcg tablet TAKE 1 TABLET BY MOUTH EVERY DAY  3  
 cholecalciferol (VITAMIN D3) 1,000 unit cap Take 2,000 Units by mouth daily.  multivitamin (ONE A DAY) tablet Take 1 Tab by mouth daily.  meclizine (ANTIVERT) 25 mg tablet Take 25 mg by mouth daily as needed. 3  
 butalbital-acetaminophen-caffeine (FIORICET) -40 mg per tablet Take 1 Tab by mouth two (2) times a day.  DULoxetine (CYMBALTA) 60 mg capsule Take 60 mg by mouth nightly.  topiramate (TOPAMAX) 100 mg tablet Take  by mouth two (2) times a day.  albuterol (PROVENTIL, VENTOLIN) 90 mcg/Actuation inhaler Take 2 Puffs by inhalation every six (6) hours as needed.  atorvastatin (LIPITOR) 80 mg tablet TAKE 1 TABLET BY MOUTH NIGHTLY  2  
 methylPREDNISolone (MEDROL DOSEPACK) 4 mg tablet Per dose pack instructions 21 Tab 0 Past Medical History:  
Diagnosis Date  Asthma  Back pain with radiation  Cardiac echocardiogram, [facility] study 12/29/2016 EF 55-60%. No WMA. Right to left atrial septal shunt suggests PFO. Similar to study of 10/18/16.  Cardiovascular LE venous duplex 10/18/2016 No DVT bilaterally.  Contact dermatitis and other eczema, due to unspecified cause  CTS (carpal tunnel syndrome)  HSV-2 (herpes simplex virus 2) infection  Hypercholesterolemia  Hypothyroidism  Ill-defined condition Vertigo  L4-L5 disc bulge  Leg swelling  Migraines  Positive PPD, treated  S/P lumbar fusion 1/13/2016 1. L4-5 bilateral hemilaminotomy, medial facetectomy, foraminotomy, diskectomy L4-5. 2. Transforaminal lumbar interbody fusion with PEEK cage and demineralized bone graft L4-L5 posterolateral fusion. 3. Segmental spinal instrumentation, DePuy Expedium type L4-L5. 01/13/2016 By Dr. Tatum Bales  Sciatica Past Surgical History:  
Procedure Laterality Date  HX GYN    
 partial hysterectomy due to abnormal pap  HX LUMBAR LAMINECTOMY  01-13-16 L4/5 Allergies Allergen Reactions  Skelaxin [Metaxalone] Other (comments)  
  jittery Patient Active Problem List  
Diagnosis Code  Asthma J45.909  Hyperlipidemia E78.5  Eczema L30.9  Back pain with radiation M54.9  Spinal stenosis, lumbar region, without neurogenic claudication M48.06  
 Lumbar stenosis M48.06  
 S/P lumbar fusion Z98.1  Acute right-sided low back pain with sciatica M54.40  Neurological symptoms R29.90  
 Ischemic stroke (HCC) I63.9  Migraines I39.592  Leg swelling M79.89  
 Hypothyroidism E03.9  CTS (carpal tunnel syndrome) G56.00  
 Hypercholesterolemia E78.00  Ill-defined condition R69  
 L4-L5 disc bulge M51.26  
 Sciatica M54.30 Review of Systems: she has chronic low back pain, severe today. As above otherwise 11 point review of systems negative including;  
Constitutional no fever or chills Skin denies rash or itching HENT  Denies tinnitus, hearing lose Eyes denies diplopia vision lose Respiratory denies shortness of breath Cardiovascular denies chest pain, dyspnea on exertion Gastrointestinal denies nausea, vomiting, diarrhea, constipation Genitourinary denies incontinence Musculoskeletal denies joint pain or swelling Endocrine denies weight change Hematology denies easy bruising or bleeding Neurological as above in HPI PHYSICAL EXAMINATION:   
 
VITAL SIGNS:   
Visit Vitals  /81  Pulse 88  Temp 99 °F (37.2 °C) (Oral)  Resp 20  
 Ht 5' 6\" (1.676 m)  Wt 106.5 kg (234 lb 12.8 oz)  SpO2 97%  BMI 37.9 kg/m2 GENERAL: The patient is well developed, well nourished, and in no apparent distress. EXTREMITIES: No clubbing, cyanosis, or edema is identified. Pulses 2+ and symmetrical.  Muscle tone is normal. 
HEAD:   Ear, nose, and throat appear to be without trauma. The patient is normocephalic. NEUROLOGIC EXAMINATION 
 
MENTAL STATUS: The patient is awake, alert, and oriented x 4. Fund of knowledge is adequate. Speech is fluent and memory appears to be intact, both long and short term. CRANIAL NERVES: II  Visual fields are full to confrontation. Funduscopic examination reveals flat disks bilaterally. Pupils are both 3 mm and briskly reactive to light and accommodation. III, IV, VI  Extraocular movements are intact and there is no nystagmus. V  Facial sensation is intact to pinprick and light touch. VII  Face is symmetrical.  
VIII - Hearing is present. IX, X, [address] rises symmetrically. Gag is present. Tongue is in the midline. XI - Shoulder shrugging and head turning intact MOTOR:  The patient is has a slight bit of weakness throughout the right side but without any drift. Fine finger movements are symmetrical.  Isolated motor group testing 4+/5 strength on the right compared to the left. Tone is normal.  Sensory examination is intact to pinprick, light touch and position sense testing.   Reflexes are 2+ and symmetrical. Plantars are down going. Cerebellar examination reveals no gross ataxia or dysmetria. Gait is abnormal she's very antalgic, complaining of pain in the back and using a cane for balance. Final result (Exam End: 12/29/2016  5:18 PM) Open Study Result MRI of brain, without and with gadolinium contrast: 
  
  
  
INDICATION: 
  
Slurred speech. Weakness in right side. 
  
Acute ischemic stroke. 
  
Status post TPA.   
  
  
TECHNIQUE: 
  
Sagittal, axial and coronal multisequence MR images of brain are obtained using T1 and T2 contrast informations, including diffusion imaging, FLAIR imaging, T2 
started individual imaging and gadolinium contrast with intravenous Gadavist 
contrast. Intravenous gadolinium contrast has been administered for MRA of neck. Then postcontrast images of brain have been obtained. 
  
COMPARISON STUDY: CT scan of brain on 12/20/2016 and 10/0/15. 
  
  
  
FINDINGS: 
  
On the CT scan of brain dated 12/28/2016, there was no evidence of intracranial 
hemorrhages. 
  
On current study, on the gradient echo images, there is no evidence of 
intracerebral or extra-axial hemorrhages. 
  
On the diffusion images, there is no evidence of acute infarction, acute 
ischemic process or restricted diffusion in brain. 
  
On the FLAIR and T2-weighted images, there is no definable focal abnormality in 
both cerebral hemispheres, basal ganglia structures of both sides, brainstem, or 
in cerebellum. The cerebral ventricles are of normal size, without midline 
shift. 
  
With intravenous administration of gadolinium contrast, there is no abnormal 
intra-axial or extra-axial enhancement.  There is no evidence of intracranial 
mass lesion or mass effect. 
  
There is no demonstrable abnormality in sella, suprasellar cistern, both orbits, 
or in the IACs and CP angles of both sides. 
  
Study demonstrates signal voids, indicating blood flow in all major intracranial 
arteries. 
  
 Visualized paranasal sinuses are clear except for a small, about 1 cm size 
retention cyst in the lower medial portion of right maxillary sinus. 
  
  
  
------------------------------ 
  
IMPRESSION IMPRESSION: 
  
  
  
On the diffusion images, there is no evidence of acute infarction or acute 
ischemic process in brain. 
  
There is no definable focal abnormality in cerebrum, cerebellum or in brainstem. No definable old or subacute infarction in brain. 
  
With intravenous gadolinium contrast, no abnormal enhancement. No evidence of 
intracranial mass lesion or mass effect. I have reviewed the above imagines myself. CBC:  
Lab Results Component Value Date/Time WBC 8.2 01/02/2017 03:08 AM  
 RBC 4.52 01/02/2017 03:08 AM  
 HGB 10.7 01/02/2017 03:08 AM  
 HCT 33.7 01/02/2017 03:08 AM  
 PLATELET 063 01/41/5919 03:08 AM  
 
BMP:  
Lab Results Component Value Date/Time Glucose 100 01/02/2017 03:08 AM  
 Sodium 138 01/02/2017 03:08 AM  
 Potassium 4.0 01/02/2017 03:08 AM  
 Chloride 107 01/02/2017 03:08 AM  
 CO2 23 01/02/2017 03:08 AM  
 BUN 12 01/02/2017 03:08 AM  
 Creatinine 0.98 01/02/2017 03:08 AM  
 Calcium 9.2 01/02/2017 03:08 AM  
 
CMP:  
Lab Results Component Value Date/Time Glucose 100 01/02/2017 03:08 AM  
 Sodium 138 01/02/2017 03:08 AM  
 Potassium 4.0 01/02/2017 03:08 AM  
 Chloride 107 01/02/2017 03:08 AM  
 CO2 23 01/02/2017 03:08 AM  
 BUN 12 01/02/2017 03:08 AM  
 Creatinine 0.98 01/02/2017 03:08 AM  
 Calcium 9.2 01/02/2017 03:08 AM  
 Anion gap 8 01/02/2017 03:08 AM  
 BUN/Creatinine ratio 12 01/02/2017 03:08 AM  
 Alk. phosphatase 100 12/30/2016 04:00 AM  
 Protein, total 7.5 12/30/2016 04:00 AM  
 Albumin 3.1 12/30/2016 04:00 AM  
 Globulin 4.4 12/30/2016 04:00 AM  
 A-G Ratio 0.7 12/30/2016 04:00 AM  
 
Coagulation:  
Lab Results Component Value Date/Time Prothrombin time 12.3 12/28/2016 08:55 PM  
 INR 0.9 12/28/2016 08:55 PM  
 
Cardiac markers: Lab Results Component Value Date/Time  12/28/2016 08:55 PM  
 CK-MB Index CANNOT BE CALCULATED 12/28/2016 08:55 PM  
  
 
 
Impression: Status migrainosus in this patient in this patient with what appears to be a combination of migraine headache and analgesic rebound headaches. She also has symptoms of right sided weakness but nothing on recent MRI to speak of stroke and a non-anatomical exam. 
 
Plan: Will try a Medrol Dosepak for the current migraine. Will increase the Topamax from 100 mg BID to 125 mg BID. Told her to avoid the use of OTC analgesics and decrease to no more than 3 -4 pills per week the use of Fioricet. Return here in 2 weeks. If you have questions, please do not hesitate to call me. I look forward to following Ms. Zhou along with you.  
 
 
 
Sincerely, 
 
 
Merary Morin MD

## 2017-03-22 ENCOUNTER — OFFICE VISIT (OUTPATIENT)
Dept: ORTHOPEDIC SURGERY | Age: 52
End: 2017-03-22

## 2017-03-22 VITALS
SYSTOLIC BLOOD PRESSURE: 118 MMHG | HEART RATE: 92 BPM | DIASTOLIC BLOOD PRESSURE: 77 MMHG | WEIGHT: 230.8 LBS | OXYGEN SATURATION: 97 % | RESPIRATION RATE: 18 BRPM | TEMPERATURE: 98.2 F | BODY MASS INDEX: 37.09 KG/M2 | HEIGHT: 66 IN

## 2017-03-22 DIAGNOSIS — M51.27 PROTRUSION OF INTERVERTEBRAL DISC OF LUMBOSACRAL REGION: ICD-10-CM

## 2017-03-22 DIAGNOSIS — G89.29 CHRONIC RIGHT SI JOINT PAIN: Primary | ICD-10-CM

## 2017-03-22 DIAGNOSIS — M53.3 CHRONIC RIGHT SI JOINT PAIN: Primary | ICD-10-CM

## 2017-03-22 RX ORDER — ACETAMINOPHEN AND CODEINE PHOSPHATE 300; 30 MG/1; MG/1
TABLET ORAL
Qty: 30 TAB | Refills: 0 | Status: SHIPPED | OUTPATIENT
Start: 2017-03-22 | End: 2017-05-08 | Stop reason: SDUPTHER

## 2017-03-22 RX ORDER — TRAMADOL HYDROCHLORIDE 50 MG/1
TABLET ORAL
Qty: 21 TAB | Refills: 0 | Status: SHIPPED | OUTPATIENT
Start: 2017-03-22 | End: 2017-03-22 | Stop reason: SINTOL

## 2017-03-22 NOTE — PROGRESS NOTES
[address]  [address], [address]  [place], [facility] [address]  Phone: [phone]  Fax: [phone]        Yadira Zhou  : 1965  PCP: Long Mhaoney MD    PROGRESS NOTE      ASSESSMENT AND PLAN    Yadira was seen today for back pain. Diagnoses and all orders for this visit:    Chronic right SI joint pain  -     SCHEDULE SURGERY  -     REFERRAL TO NEUROLOGY  -     acetaminophen-codeine (TYLENOL-CODEINE #3) 300-30 mg per tablet; 1 tab PO Q4-6 hours as needed for pain  1 week supply    Protrusion of intervertebral disc of lumbosacral region R  -     REFERRAL TO NEUROLOGY  -     acetaminophen-codeine (TYLENOL-CODEINE #3) 300-30 mg per tablet; 1 tab PO Q4-6 hours as needed for pain  1 week supply    Other orders  -     Discontinue: traMADol (ULTRAM) 50 mg tablet; One-half to one tablet po qd-tid prn pain  ONE WEEK SUPPLY  Indications: NEUROPATHIC PAIN    1. Continue Lodine and Topamax. 2. Set up for RT SI joint block. 3. Advised pt to consult with Dr. Leo Shirley for peripheral neuropathy. 4. Unable to tolerate Tramadol. 5. Rx for Tylenol #3.   6. Given care instructions for sciatica. Follow-up Disposition:  Return in about 1 month (around 2017). HISTORY OF PRESENT ILLNESS  Marsha Stewart is a 46 y.o. female. Pt presents to the office for a f/u visit for back pain. Last visit pt was sent to have a lumbar spine MRI. Images reviewed with the pt. Pt continues to have back pain. She reports that she is doing a little better as Dr. Leo Shirley gave her another MDP that has relieved some of her pain. Pt consults with Dr. Leo Shirley for migraines. Pt states that her back pain radiates into her RLE. She has been experiencing paresthesia in her bilateral feet. She will sometimes drag her RLE with ambulation. She is unable to sleep at night, unsure why. Pt denies any saddle paresthesia. Pt takes Topamax 125 mg BID.  She has been on this increased dose for a week now. Pt takes Lodine 300 mg TID PRN. Pt takes Cymbalta 60 mg QHS. Pt has had no benefit from Toradol injection. Denies persistent fevers, chills, weight changes, neurogenic bowel or bladder symptoms. Pt denies recent ED visits or hospitalizations. Pt has had a previous TLIF/PLIF at L4-5 in 1/2016. Pain Assessment  3/22/2017   Location of Pain Back   Severity of Pain 3   Quality of Pain Aching   Quality of Pain Comment numbness   Duration of Pain -   Frequency of Pain Constant   Aggravating Factors Bending   Aggravating Factors Comment sitting   Limiting Behavior -   Relieving Factors Rest   Result of Injury -               MRI Results (most recent):    Results from Hospital Encounter encounter on 02/22/17   MRI LUMB SPINE W WO CONT   Narrative EXAM:  MRI LUMB SPINE W WO CONT    INDICATION:   INCREASED LOW BACK PAIN; right lumbar radiculopathy    COMPARISON: MRI lumbar spine 9/3/2015    TECHNIQUE:   MR imaging of the lumbar spine was performed with sagittal T1, T2, STIR;  axial  T1, T2. Patient received 10 mL Gadavist intravenously. Postcontrast axial and  sagittal T1 images obtained. FINDINGS:  Interval posterior lateral fusion L4/L5 with pedicular screws and rods. There is  also bilateral facetectomy hemilaminotomy. Susceptibility artifact mildly  limiting evaluation at this level. Grade 1 anterolisthesis L4 on L5. Otherwise  normal lumbar spine alignment. There is also interval transforaminal lumbar  interbody fusion with interbody disc spacer at L4/L5. Remainder of disc space  heights are maintained and hydrated. Vertebral body heights are normal.  No  suspicious bone marrow lesion or infiltrative bone marrow process. Small 1.3 cm  osseous hemangioma at T12 vertebral body. The conus medullaris is normal in  signal intensity terminating at L1/2 level. Correlation of sagittal and axial images demonstrates the following:    T12/L1:  The spinal canal and neuroforamina are widely patent.     L1/2: No significant disc pathology. The spinal canal and neural foramina are  widely patent. L2/3:  No significant disc pathology. Mild facet and ligamentum hypertrophy. The  spinal canal and neural foramina are widely patent. L3/4:  Mild central disc protrusion. Mild facet and ligamentum hypertrophy. No  central canal stenosis. Mild foraminal stenosis. L4/5:  Interbody and posterolateral fusion. No recurrent or residual disc  herniation. Widely patent central canal. No foraminal stenosis. There is  enhancing fibrosis at the right lateral epidural space and at the posterior  aspect of the right proximal foraminal L4 nerve root. L5/S1:  Perhaps tiny right subarticular/foraminal protrusion without nerve  impingement. Moderate facet hypertrophy. No central canal stenosis. Mild  proximal foraminal stenosis due to facet hypertrophy. No incidental abnormality in the partially imaged retroperitoneal structures. Impression IMPRESSION:      1. Interval posterior and transforaminal interbody fusion at L4/5. Interval  decompression of the central canal and foramina. No recurrent or residual disc  herniation.  -Mild expected right lateral epidural fibrosis at this level.  -No high-grade central canal or foraminal stenosis at any level. 2. Perhaps tiny L5/S1 subarticular/foraminal protrusion but no nerve  impingement. Moderate facet arthrosis at this level. 3. Mild L3/4 central disc protrusion. PAST MEDICAL HISTORY   Past Medical History:   Diagnosis Date    Asthma     Back pain with radiation     Cardiac echocardiogram, [facility] study 12/29/2016    EF 55-60%. No WMA. Right to left atrial septal shunt suggests PFO. Similar to study of 10/18/16.  Cardiovascular LE venous duplex 10/18/2016    No DVT bilaterally.     Contact dermatitis and other eczema, due to unspecified cause     CTS (carpal tunnel syndrome)     HSV-2 (herpes simplex virus 2) infection     Hypercholesterolemia  Hypothyroidism     Ill-defined condition     Vertigo    L4-L5 disc bulge     Leg swelling     Migraines     Positive PPD, treated     S/P lumbar fusion 1/13/2016    1. L4-5 bilateral hemilaminotomy, medial facetectomy, foraminotomy, diskectomy L4-5. 2. Transforaminal lumbar interbody fusion with PEEK cage and demineralized bone graft L4-L5 posterolateral fusion. 3. Segmental spinal instrumentation, DePuy Expedium type L4-L5. 01/13/2016 By Dr. Neris Geller Sciatica        Past Surgical History:   Procedure Laterality Date    HX GYN      partial hysterectomy due to abnormal pap    HX LUMBAR LAMINECTOMY  01-13-16    L4/5   . MEDICATIONS    Current Outpatient Prescriptions   Medication Sig Dispense Refill    furosemide (LASIX) 20 mg tablet Take  by mouth daily.  docusate sodium (COLACE) 100 mg capsule Take 100 mg by mouth two (2) times a day.  FERROUS SULFATE PO Take 325 mg by mouth.  BUDESONIDE/FORMOTEROL FUMARATE (SYMBICORT IN) Take  by inhalation.  topiramate (TOPAMAX) 25 mg tablet Take 1 Tab by mouth two (2) times a day. Indications: MIGRAINE PREVENTION 60 Tab 4    atorvastatin (LIPITOR) 80 mg tablet TAKE 1 TABLET BY MOUTH NIGHTLY  2    potassium chloride SR (K-TAB) 20 mEq tablet Take 20 mEq by mouth daily.  pravastatin (PRAVACHOL) 80 mg tablet Take 80 mg by mouth daily.  aspirin 81 mg chewable tablet Take 1 Tab by mouth daily. 30 Tab 3    etodolac (LODINE) 300 mg capsule TAKE 1 CAPSULE BY MOUTH THREE TIMES DAILY AS NEEDED FOR PAIN WITH FOOD  2    levothyroxine (SYNTHROID) 50 mcg tablet TAKE 1 TABLET BY MOUTH EVERY DAY  3    cholecalciferol (VITAMIN D3) 1,000 unit cap Take 2,000 Units by mouth daily.  multivitamin (ONE A DAY) tablet Take 1 Tab by mouth daily.  meclizine (ANTIVERT) 25 mg tablet Take 25 mg by mouth daily as needed. 3    butalbital-acetaminophen-caffeine (FIORICET) -40 mg per tablet Take 1 Tab by mouth two (2) times a day.       DULoxetine (CYMBALTA) 60 mg capsule Take 60 mg by mouth nightly.  topiramate (TOPAMAX) 100 mg tablet Take  by mouth two (2) times a day.  albuterol (PROVENTIL, VENTOLIN) 90 mcg/Actuation inhaler Take 2 Puffs by inhalation every six (6) hours as needed.  methylPREDNISolone (MEDROL DOSEPACK) 4 mg tablet As directed 1 Dose Pack 0    methylPREDNISolone (MEDROL DOSEPACK) 4 mg tablet Per dose pack instructions 21 Tab 0        ALLERGIES  Allergies   Allergen Reactions    Skelaxin [Metaxalone] Other (comments)     jittery          SOCIAL HISTORY    Social History     Social History    Marital status:      Spouse name: N/A    Number of children: N/A    Years of education: N/A     Occupational History    unemployed      long term disability     Social History Main Topics    Smoking status: Never Smoker    Smokeless tobacco: Never Used    Alcohol use No    Drug use: No    Sexual activity: Not on file      Comment: Hysterectomy     Other Topics Concern    Not on file     Social History Narrative       FAMILY HISTORY  Family History   Problem Relation Age of Onset    Diabetes Mother     Elevated Lipids Mother     Hypertension Mother     Cancer Father      pancreatic    Diabetes Sister     Hypertension Sister     Diabetes Brother     Diabetes Daughter     Hypertension Daughter        REVIEW OF SYSTEMS  Review of Systems   Constitutional: Negative for chills, fever and weight loss. Respiratory: Negative for shortness of breath. Cardiovascular: Negative for chest pain. Gastrointestinal: Negative for constipation. Negative for fecal incontinence   Genitourinary: Negative for dysuria. Negative for urinary incontinence   Musculoskeletal:        Per HPI   Skin: Negative for rash. Neurological: Positive for tingling and focal weakness. Negative for dizziness, tremors and headaches. Endo/Heme/Allergies: Does not bruise/bleed easily.    Psychiatric/Behavioral: The patient has insomnia. PHYSICAL EXAMINATION  Visit Vitals    /77    Pulse 92    Temp 98.2 °F (36.8 °C) (Oral)    Resp 18    Ht 5' 6\" (1.676 m)    Wt 230 lb 12.8 oz (104.7 kg)    SpO2 97%    BMI 37.25 kg/m2         Accompanied by self. Constitutional:  Well developed, well nourished, in no acute distress. Psychiatric: Affect and mood are appropriate. Integumentary: No rashes or abrasions noted on exposed areas. Cardiovascular/Peripheral Vascular: Intact l pulses. No peripheral edema is noted. Lymphatic:  No evidence of lymphedema. No cervical lymphadenopathy. SPINE/MUSCULOSKELETAL EXAM    Lumbar spine:  No rash, ecchymosis, or gross obliquity. No fasciculations. No focal atrophy is noted. .Tenderness to palpation of L4-5. No tenderness to palpation at the sciatic notch. SI joints non-tender. Trochanters non tender. Sensation grossly intact to light touch. MOTOR:       Hip Flex  Quads Hamstrings Ankle DF EHL Ankle PF   Right +4/5 +4/5 +4/5 4/5 +4/5 +4/5   Left +4/5 +4/5 +4/5 +4/5 +4/5 +4/5       Straight Leg raise negative. Ambulation with single point cane. Written by Abeba Pepe, as dictated by Estephania Connelly MD.    Ms. Chritsiano Wolfe may have a reminder for a \"due or due soon\" health maintenance. I have asked that she contact her primary care provider for follow-up on this health maintenance.

## 2017-03-22 NOTE — PATIENT INSTRUCTIONS
Sciatica: Care Instructions  Your Care Instructions    Sciatica (say \"vci-JM-up-kuh\") is an irritation of one of the sciatic nerves, which come from the spinal cord in the lower back. The sciatic nerves and their branches extend down through the buttock to the foot. Sciatica can develop when an injured disc in the back presses against a spinal nerve root. Its main symptom is pain, numbness, or weakness that is often worse in the leg or foot than in the back. Sciatica often will improve and go away with time. Early treatment usually includes medicines and exercises to relieve pain. Follow-up care is a key part of your treatment and safety. Be sure to make and go to all appointments, and call your doctor if you are having problems. It's also a good idea to know your test results and keep a list of the medicines you take. How can you care for yourself at home? · Take pain medicines exactly as directed. ¨ If the doctor gave you a prescription medicine for pain, take it as prescribed. ¨ If you are not taking a prescription pain medicine, ask your doctor if you can take an over-the-counter medicine. · Use heat or ice to relieve pain. ¨ To apply heat, put a warm water bottle, heating pad set on low, or warm cloth on your back. Do not go to sleep with a heating pad on your skin. ¨ To use ice, put ice or a cold pack on the area for 10 to 20 minutes at a time. Put a thin cloth between the ice and your skin. · Avoid sitting if possible, unless it feels better than standing. · Alternate lying down with short walks. Increase your walking distance as you are able to without making your symptoms worse. · Do not do anything that makes your symptoms worse. When should you call for help? Call 911 anytime you think you may need emergency care. For example, call if:  · You are unable to move a leg at all.   Call your doctor now or seek immediate medical care if:  · You have new or worse symptoms in your legs or buttocks. Symptoms may include:  ¨ Numbness or tingling. ¨ Weakness. ¨ Pain. · You lose bladder or bowel control. Watch closely for changes in your health, and be sure to contact your doctor if:  · You are not getting better as expected. Where can you learn more? Go to http://leesa-jean carlos.info/. Enter [phone] in the search box to learn more about \"Sciatica: Care Instructions. \"  Current as of: May 23, 2016  Content Version: 11.1  © 8812-2577 [employer]. Care instructions adapted under license by [employer] (which disclaims liability or warranty for this information). If you have questions about a medical condition or this instruction, always ask your healthcare professional. [address] any warranty or liability for your use of this information.

## 2017-03-31 ENCOUNTER — OFFICE VISIT (OUTPATIENT)
Dept: NEUROLOGY | Age: 52
End: 2017-03-31

## 2017-03-31 VITALS
WEIGHT: 233.4 LBS | BODY MASS INDEX: 37.51 KG/M2 | SYSTOLIC BLOOD PRESSURE: 142 MMHG | RESPIRATION RATE: 14 BRPM | DIASTOLIC BLOOD PRESSURE: 90 MMHG | HEART RATE: 92 BPM | HEIGHT: 66 IN | OXYGEN SATURATION: 98 % | TEMPERATURE: 98.9 F

## 2017-03-31 DIAGNOSIS — G43.711 INTRACTABLE CHRONIC MIGRAINE WITHOUT AURA AND WITH STATUS MIGRAINOSUS: Primary | ICD-10-CM

## 2017-03-31 DIAGNOSIS — R29.90 NEUROLOGICAL SYMPTOMS: ICD-10-CM

## 2017-03-31 RX ORDER — TOPIRAMATE 50 MG/1
50 TABLET, FILM COATED ORAL 2 TIMES DAILY
Qty: 60 TAB | Refills: 6 | Status: SHIPPED | OUTPATIENT
Start: 2017-03-31 | End: 2017-04-17 | Stop reason: SDUPTHER

## 2017-03-31 NOTE — MR AVS SNAPSHOT
Visit Information Date & Time Provider Department Dept. Phone Encounter #  
 3/31/2017  3:40 PM Elvia Thornton, [address] [phone] [id number] Follow-up Instructions Return in about 3 weeks (around 4/21/2017). Follow-up and Disposition History Your Appointments 3/31/2017  3:40 PM  
Follow Up with Elvia Thornton MD  
[address] [place] [address] Appt Note: 2wk f/u; pt confirmed [address] [facility] [id number]  
[phone]  
  
   
 [place] [id number] 4/17/2017  9:00 AM  
Follow Up with Elvia Thornton MD  
[address] [place] [address]) Appt Note: 3 week [address] [facility] [id number]  
[phone]  
  
    
 5/8/2017  3:00 PM  
Follow Up with Noe Rubin MD  
[facility] ([facility]) Appt Note: 1 MO BLOCK FU; SHEN 4/18/17  
 [address] [facility] [address]  
[phone]  
  
   
 [address] [facility] [address] Upcoming Health Maintenance Date Due Hepatitis C Screening 1965 Pneumococcal 19-64 Medium Risk (1 of 1 - PPSV23) 7/1/1984 PAP AKA CERVICAL CYTOLOGY 7/1/1986 DTaP/Tdap/Td series (1 - Tdap) 5/7/1999 FOBT Q 1 YEAR AGE 50-75 7/1/2015 INFLUENZA AGE 9 TO ADULT 8/1/2016 BREAST CANCER SCRN MAMMOGRAM 12/22/2016 Allergies as of 3/31/2017  Review Complete On: 3/31/2017 By: Frank Sandres LPN Severity Noted Reaction Type Reactions Skelaxin [Metaxalone]  05/06/2010    Other (comments)  
 jittery Tramadol  03/22/2017    Other (comments) Halluctations Current Immunizations  Reviewed on 5/6/2010 Name Date  
 TD Vaccine 5/6/1999 Not reviewed this visit You Were Diagnosed With   
  
 Codes Comments Intractable chronic migraine without aura and with status migrainosus    -  Primary ICD-10-CM: W46.713 ICD-9-CM: 346.73 Neurological symptoms     ICD-10-CM: R29.90 ICD-9-CM: 781.99 Vitals BP Pulse Temp Resp Height(growth percentile) Weight(growth percentile) 142/90 (BP 1 Location: Left arm, BP Patient Position: Sitting) 92 98.9 °F (37.2 °C) (Oral) 14 5' 6\" (1.676 m) 233 lb 6.4 oz (105.9 kg) SpO2 BMI OB Status Smoking Status 98% 37.67 kg/m2 Hysterectomy Never Smoker Vitals History BMI and BSA Data Body Mass Index Body Surface Area  
 37.67 kg/m 2 2.22 m 2 Preferred Pharmacy Pharmacy Name Phone [id number], [address] [place] [address] Your Updated Medication List  
  
   
This list is accurate as of: 3/31/17  3:24 PM.  Always use your most recent med list.  
  
  
  
  
 acetaminophen-codeine 300-30 mg per tablet Commonly known as:  TYLENOL-CODEINE #3  
1 tab PO Q4-6 hours as needed for pain 1 week supply  
  
 albuterol 90 mcg/actuation inhaler Commonly known as:  [place] Sandra Take 2 Puffs by inhalation every six (6) hours as needed. aspirin 81 mg chewable tablet Take 1 Tab by mouth daily. atorvastatin 80 mg tablet Commonly known as:  LIPITOR  
TAKE 1 TABLET BY MOUTH NIGHTLY  
  
 CYMBALTA 60 mg capsule Generic drug:  DULoxetine Take 60 mg by mouth nightly. docusate sodium 100 mg capsule Commonly known as:  [place] Lass Take 100 mg by mouth two (2) times a day. etodolac 300 mg capsule Commonly known as:  LODINE  
TAKE 1 CAPSULE BY MOUTH THREE TIMES DAILY AS NEEDED FOR PAIN WITH FOOD FERROUS SULFATE PO Take 325 mg by mouth. FIORICET -40 mg per tablet Generic drug:  butalbital-acetaminophen-caffeine Take 1 Tab by mouth two (2) times a day. furosemide 20 mg tablet Commonly known as:  LASIX Take  by mouth daily. levothyroxine 50 mcg tablet Commonly known as:  SYNTHROID  
TAKE 1 TABLET BY MOUTH EVERY DAY  
  
 meclizine 25 mg tablet Commonly known as:  ANTIVERT Take 25 mg by mouth daily as needed. multivitamin tablet Commonly known as:  ONE A DAY Take 1 Tab by mouth daily. potassium chloride SR 20 mEq tablet Commonly known as:  K-TAB Take 20 mEq by mouth daily. pravastatin 80 mg tablet Commonly known as:  PRAVACHOL Take 80 mg by mouth daily. SYMBICORT IN Take  by inhalation. * TOPAMAX 100 mg tablet Generic drug:  topiramate Take  by mouth two (2) times a day. * topiramate 50 mg tablet Commonly known as:  TOPAMAX Take 1 Tab by mouth two (2) times a day. Indications: MIGRAINE PREVENTION  
  
 VITAMIN D3 1,000 unit Cap Generic drug:  cholecalciferol Take 2,000 Units by mouth daily. * Notice: This list has 2 medication(s) that are the same as other medications prescribed for you. Read the directions carefully, and ask your doctor or other care provider to review them with you. Prescriptions Sent to Pharmacy Refills  
 topiramate (TOPAMAX) 50 mg tablet 6 Sig: Take 1 Tab by mouth two (2) times a day. Indications: MIGRAINE PREVENTION Class: Normal  
 Pharmacy: [id number]- [address] #: [phone] Route: Oral  
  
Follow-up Instructions Return in about 3 weeks (around 4/21/2017). Please provide this summary of care documentation to your next provider. Your primary care clinician is listed as Barry Felix. If you have any questions after today's visit, please call [phone].

## 2017-03-31 NOTE — PROGRESS NOTES
Re:  Yadira Zhou,Follow up visit     3/31/2017 3:12 PM    SSN: [id number]    Subjective: Tanisha Hillman returns for follow up of her headaches. The headaches are still present, but much less intense. She still has a daily headaches, but also has decreased her use of Fioricet to 1 every other day down from 2 per day. No side effects witht he increase in Topamax dosing. Medications:    Current Outpatient Prescriptions   Medication Sig Dispense Refill    acetaminophen-codeine (TYLENOL-CODEINE #3) 300-30 mg per tablet 1 tab PO Q4-6 hours as needed for pain  1 week supply 30 Tab 0    furosemide (LASIX) 20 mg tablet Take  by mouth daily.  docusate sodium (COLACE) 100 mg capsule Take 100 mg by mouth two (2) times a day.  FERROUS SULFATE PO Take 325 mg by mouth.  BUDESONIDE/FORMOTEROL FUMARATE (SYMBICORT IN) Take  by inhalation.  topiramate (TOPAMAX) 25 mg tablet Take 1 Tab by mouth two (2) times a day. Indications: MIGRAINE PREVENTION 60 Tab 4    atorvastatin (LIPITOR) 80 mg tablet TAKE 1 TABLET BY MOUTH NIGHTLY  2    potassium chloride SR (K-TAB) 20 mEq tablet Take 20 mEq by mouth daily.  pravastatin (PRAVACHOL) 80 mg tablet Take 80 mg by mouth daily.  aspirin 81 mg chewable tablet Take 1 Tab by mouth daily. 30 Tab 3    etodolac (LODINE) 300 mg capsule TAKE 1 CAPSULE BY MOUTH THREE TIMES DAILY AS NEEDED FOR PAIN WITH FOOD  2    levothyroxine (SYNTHROID) 50 mcg tablet TAKE 1 TABLET BY MOUTH EVERY DAY  3    cholecalciferol (VITAMIN D3) 1,000 unit cap Take 2,000 Units by mouth daily.  multivitamin (ONE A DAY) tablet Take 1 Tab by mouth daily.  meclizine (ANTIVERT) 25 mg tablet Take 25 mg by mouth daily as needed. 3    butalbital-acetaminophen-caffeine (FIORICET) -40 mg per tablet Take 1 Tab by mouth two (2) times a day.  DULoxetine (CYMBALTA) 60 mg capsule Take 60 mg by mouth nightly.       topiramate (TOPAMAX) 100 mg tablet Take  by mouth two (2) times a day.  albuterol (PROVENTIL, VENTOLIN) 90 mcg/Actuation inhaler Take 2 Puffs by inhalation every six (6) hours as needed. Vital signs:    Visit Vitals    /90 (BP 1 Location: Left arm, BP Patient Position: Sitting)    Pulse 92    Temp 98.9 °F (37.2 °C) (Oral)    Resp 14    Ht 5' 6\" (1.676 m)    Wt 105.9 kg (233 lb 6.4 oz)    SpO2 98%    BMI 37.67 kg/m2       Review of Systems:   As above otherwise 11 point review of systems negative including;   Constitutional no fever or chills  Skin denies rash or itching  HEENT  Denies tinnitus, hearing lose  Eyes denies diplopia vision lose  Respiratory denies sortness of breath  Cardiovascular denies chest pain, dyspnea on exertion  Gastrointestinal denies nausea, vomiting, diarrhea, constipation  Genitourinary denies incontinence  Musculoskeletal denies joint pain or swelling  Endocrine denies weight change  Hematology denies easy bruising or bleeding   Neurological as above in HPI      Patient Active Problem List   Diagnosis Code    Asthma J45.909    Hyperlipidemia E78.5    Eczema L30.9    Back pain with radiation M54.9    Spinal stenosis, lumbar region, without neurogenic claudication M48.06    Lumbar stenosis M48.06    S/P lumbar fusion Z98.1    Acute right-sided low back pain with sciatica M54.40    Neurological symptoms R29.90    Ischemic stroke (HCC) I63.9    Migraines G43.909    Leg swelling M79.89    Hypothyroidism E03.9    CTS (carpal tunnel syndrome) G56.00    Hypercholesterolemia E78.00    Ill-defined condition R69    L4-L5 disc bulge M51.26    Sciatica M54.30         Objective: The patient is awake, alert, and oriented x 4. Fund of knowledge is adequate. Speech is fluent and memory is intact. Cranial Nerves: II - Visual fields are full to confrontation. III, IV, VI - Extraocular movements are intact. There is no nystagmus. V - Facial sensation is intact to pinprick.   VII - Face is symmetrical.  VIII - Hearing is present. IX, X, XII - Palate is symmetrical.   XI - Shoulder shrugging and head turning intact  Motor: The patient moves all four limbs fairly well and symmetrically. Tone is normal. Reflexes are 2+ and symmetrical. Plantars are down going. Gait is normal.  CBC:   Lab Results   Component Value Date/Time    WBC 8.2 01/02/2017 03:08 AM    RBC 4.52 01/02/2017 03:08 AM    HGB 10.7 01/02/2017 03:08 AM    HCT 33.7 01/02/2017 03:08 AM    PLATELET 499 58/41/5000 03:08 AM     BMP:   Lab Results   Component Value Date/Time    Glucose 100 01/02/2017 03:08 AM    Sodium 138 01/02/2017 03:08 AM    Potassium 4.0 01/02/2017 03:08 AM    Chloride 107 01/02/2017 03:08 AM    CO2 23 01/02/2017 03:08 AM    BUN 12 01/02/2017 03:08 AM    Creatinine 0.98 01/02/2017 03:08 AM    Calcium 9.2 01/02/2017 03:08 AM     CMP:   Lab Results   Component Value Date/Time    Glucose 100 01/02/2017 03:08 AM    Sodium 138 01/02/2017 03:08 AM    Potassium 4.0 01/02/2017 03:08 AM    Chloride 107 01/02/2017 03:08 AM    CO2 23 01/02/2017 03:08 AM    BUN 12 01/02/2017 03:08 AM    Creatinine 0.98 01/02/2017 03:08 AM    Calcium 9.2 01/02/2017 03:08 AM    Anion gap 8 01/02/2017 03:08 AM    BUN/Creatinine ratio 12 01/02/2017 03:08 AM    Alk. phosphatase 100 12/30/2016 04:00 AM    Protein, total 7.5 12/30/2016 04:00 AM    Albumin 3.1 12/30/2016 04:00 AM    Globulin 4.4 12/30/2016 04:00 AM    A-G Ratio 0.7 12/30/2016 04:00 AM     Coagulation:   Lab Results   Component Value Date/Time    Prothrombin time 12.3 12/28/2016 08:55 PM    INR 0.9 12/28/2016 08:55 PM     Cardiac markers:   Lab Results   Component Value Date/Time     12/28/2016 08:55 PM    CK-MB Index CANNOT BE CALCULATED 12/28/2016 08:55 PM       Assessment:  Migraine headaches, now resolving status migrainosis as well as analgesic rebound headaches in this patient who has risk factors including life long headaches. Plan: Will bump the Topamax to 150 mg BID. Return here in 3 weeks. Sincerely,        Nury Haines.  Sen Villarreal M.D.

## 2017-03-31 NOTE — LETTER
3/31/2017 3:20 PM 
 
Patient:  Aston Khanna YOB: 1965 Date of Visit: 3/31/2017 Dear MD Reji Trent [address] [place] [id number] VIA Facsimile: [phone] 
 : Thank you for referring Ms. Aston Khanna to me for evaluation/treatment. Below are the relevant portions of my assessment and plan of care. Re:  Yadira Zhou,Follow up visit     3/31/2017 3:12 PM 
 
SSN: [id number] Subjective: Aston Khanna returns for follow up of her headaches. The headaches are still present, but much less intense. She still has a daily headaches, but also has decreased her use of Fioricet to 1 every other day down from 2 per day. No side effects witht he increase in Topamax dosing. Medications:   
Current Outpatient Prescriptions Medication Sig Dispense Refill  acetaminophen-codeine (TYLENOL-CODEINE #3) 300-30 mg per tablet 1 tab PO Q4-6 hours as needed for pain 1 week supply 30 Tab 0  
 furosemide (LASIX) 20 mg tablet Take  by mouth daily.  docusate sodium (COLACE) 100 mg capsule Take 100 mg by mouth two (2) times a day.  FERROUS SULFATE PO Take 325 mg by mouth.  BUDESONIDE/FORMOTEROL FUMARATE (SYMBICORT IN) Take  by inhalation.  topiramate (TOPAMAX) 25 mg tablet Take 1 Tab by mouth two (2) times a day. Indications: MIGRAINE PREVENTION 60 Tab 4  
 atorvastatin (LIPITOR) 80 mg tablet TAKE 1 TABLET BY MOUTH NIGHTLY  2  
 potassium chloride SR (K-TAB) 20 mEq tablet Take 20 mEq by mouth daily.  pravastatin (PRAVACHOL) 80 mg tablet Take 80 mg by mouth daily.  aspirin 81 mg chewable tablet Take 1 Tab by mouth daily. 30 Tab 3  
 etodolac (LODINE) 300 mg capsule TAKE 1 CAPSULE BY MOUTH THREE TIMES DAILY AS NEEDED FOR PAIN WITH FOOD  2  
 levothyroxine (SYNTHROID) 50 mcg tablet TAKE 1 TABLET BY MOUTH EVERY DAY  3  
 cholecalciferol (VITAMIN D3) 1,000 unit cap Take 2,000 Units by mouth daily.  multivitamin (ONE A DAY) tablet Take 1 Tab by mouth daily.  meclizine (ANTIVERT) 25 mg tablet Take 25 mg by mouth daily as needed. 3  
 butalbital-acetaminophen-caffeine (FIORICET) -40 mg per tablet Take 1 Tab by mouth two (2) times a day.  DULoxetine (CYMBALTA) 60 mg capsule Take 60 mg by mouth nightly.  topiramate (TOPAMAX) 100 mg tablet Take  by mouth two (2) times a day.  albuterol (PROVENTIL, VENTOLIN) 90 mcg/Actuation inhaler Take 2 Puffs by inhalation every six (6) hours as needed. Vital signs:   
Visit Vitals  /90 (BP 1 Location: Left arm, BP Patient Position: Sitting)  Pulse 92  Temp 98.9 °F (37.2 °C) (Oral)  Resp 14  
 Ht 5' 6\" (1.676 m)  Wt 105.9 kg (233 lb 6.4 oz)  SpO2 98%  BMI 37.67 kg/m2 Review of Systems: As above otherwise 11 point review of systems negative including;  
Constitutional no fever or chills Skin denies rash or itching HEENT  Denies tinnitus, hearing lose Eyes denies diplopia vision lose Respiratory denies sortness of breath Cardiovascular denies chest pain, dyspnea on exertion Gastrointestinal denies nausea, vomiting, diarrhea, constipation Genitourinary denies incontinence Musculoskeletal denies joint pain or swelling Endocrine denies weight change Hematology denies easy bruising or bleeding Neurological as above in HPI Patient Active Problem List  
Diagnosis Code  Asthma J45.909  Hyperlipidemia E78.5  Eczema L30.9  Back pain with radiation M54.9  Spinal stenosis, lumbar region, without neurogenic claudication M48.06  
 Lumbar stenosis M48.06  
 S/P lumbar fusion Z98.1  Acute right-sided low back pain with sciatica M54.40  Neurological symptoms R29.90  
 Ischemic stroke (HCC) I63.9  Migraines S94.705  Leg swelling M79.89  
 Hypothyroidism E03.9  CTS (carpal tunnel syndrome) G56.00  
 Hypercholesterolemia E78.00  Ill-defined condition R69  
  L4-L5 disc bulge M51.26  
 Sciatica M54.30 Objective: The patient is awake, alert, and oriented x 4. Fund of knowledge is adequate. Speech is fluent and memory is intact. Cranial Nerves: II  Visual fields are full to confrontation. III, IV, VI  Extraocular movements are intact. There is no nystagmus. V  Facial sensation is intact to pinprick. VII  Face is symmetrical.  VIII - Hearing is present. IX, X, XII  Palate is symmetrical.   XI - Shoulder shrugging and head turning intact Motor: The patient moves all four limbs fairly well and symmetrically. Tone is normal. Reflexes are 2+ and symmetrical. Plantars are down going. Gait is normal. 
CBC:  
Lab Results Component Value Date/Time WBC 8.2 01/02/2017 03:08 AM  
 RBC 4.52 01/02/2017 03:08 AM  
 HGB 10.7 01/02/2017 03:08 AM  
 HCT 33.7 01/02/2017 03:08 AM  
 PLATELET 827 35/76/1190 03:08 AM  
 
BMP:  
Lab Results Component Value Date/Time Glucose 100 01/02/2017 03:08 AM  
 Sodium 138 01/02/2017 03:08 AM  
 Potassium 4.0 01/02/2017 03:08 AM  
 Chloride 107 01/02/2017 03:08 AM  
 CO2 23 01/02/2017 03:08 AM  
 BUN 12 01/02/2017 03:08 AM  
 Creatinine 0.98 01/02/2017 03:08 AM  
 Calcium 9.2 01/02/2017 03:08 AM  
 
CMP:  
Lab Results Component Value Date/Time Glucose 100 01/02/2017 03:08 AM  
 Sodium 138 01/02/2017 03:08 AM  
 Potassium 4.0 01/02/2017 03:08 AM  
 Chloride 107 01/02/2017 03:08 AM  
 CO2 23 01/02/2017 03:08 AM  
 BUN 12 01/02/2017 03:08 AM  
 Creatinine 0.98 01/02/2017 03:08 AM  
 Calcium 9.2 01/02/2017 03:08 AM  
 Anion gap 8 01/02/2017 03:08 AM  
 BUN/Creatinine ratio 12 01/02/2017 03:08 AM  
 Alk. phosphatase 100 12/30/2016 04:00 AM  
 Protein, total 7.5 12/30/2016 04:00 AM  
 Albumin 3.1 12/30/2016 04:00 AM  
 Globulin 4.4 12/30/2016 04:00 AM  
 A-G Ratio 0.7 12/30/2016 04:00 AM  
 
Coagulation:  
Lab Results Component Value Date/Time  Prothrombin time 12.3 12/28/2016 08:55 PM  
 INR 0.9 12/28/2016 08:55 PM  
 
 Cardiac markers:  
Lab Results Component Value Date/Time  12/28/2016 08:55 PM  
 CK-MB Index CANNOT BE CALCULATED 12/28/2016 08:55 PM  
 
 
Assessment:  Migraine headaches, now resolving status migrainosis as well as analgesic rebound headaches in this patient who has risk factors including life long headaches. Plan: Will bump the Topamax to 150 mg BID. Return here in 3 weeks. Sincerely, 
 
 
 
Serenity Davis. Christos Pop M.D. If you have questions, please do not hesitate to call me. I look forward to following Ms. Zhou along with you.  
 
 
 
Sincerely, 
 
 
Kaylin Farmer MD

## 2017-03-31 NOTE — COMMUNICATION BODY
Re:  Yadira Zhou,Follow up visit     3/31/2017 3:12 PM    SSN: [id number]    Subjective: Jamel Martini returns for follow up of her headaches. The headaches are still present, but much less intense. She still has a daily headaches, but also has decreased her use of Fioricet to 1 every other day down from 2 per day. No side effects witht he increase in Topamax dosing. Medications:    Current Outpatient Prescriptions   Medication Sig Dispense Refill    acetaminophen-codeine (TYLENOL-CODEINE #3) 300-30 mg per tablet 1 tab PO Q4-6 hours as needed for pain  1 week supply 30 Tab 0    furosemide (LASIX) 20 mg tablet Take  by mouth daily.  docusate sodium (COLACE) 100 mg capsule Take 100 mg by mouth two (2) times a day.  FERROUS SULFATE PO Take 325 mg by mouth.  BUDESONIDE/FORMOTEROL FUMARATE (SYMBICORT IN) Take  by inhalation.  topiramate (TOPAMAX) 25 mg tablet Take 1 Tab by mouth two (2) times a day. Indications: MIGRAINE PREVENTION 60 Tab 4    atorvastatin (LIPITOR) 80 mg tablet TAKE 1 TABLET BY MOUTH NIGHTLY  2    potassium chloride SR (K-TAB) 20 mEq tablet Take 20 mEq by mouth daily.  pravastatin (PRAVACHOL) 80 mg tablet Take 80 mg by mouth daily.  aspirin 81 mg chewable tablet Take 1 Tab by mouth daily. 30 Tab 3    etodolac (LODINE) 300 mg capsule TAKE 1 CAPSULE BY MOUTH THREE TIMES DAILY AS NEEDED FOR PAIN WITH FOOD  2    levothyroxine (SYNTHROID) 50 mcg tablet TAKE 1 TABLET BY MOUTH EVERY DAY  3    cholecalciferol (VITAMIN D3) 1,000 unit cap Take 2,000 Units by mouth daily.  multivitamin (ONE A DAY) tablet Take 1 Tab by mouth daily.  meclizine (ANTIVERT) 25 mg tablet Take 25 mg by mouth daily as needed. 3    butalbital-acetaminophen-caffeine (FIORICET) -40 mg per tablet Take 1 Tab by mouth two (2) times a day.  DULoxetine (CYMBALTA) 60 mg capsule Take 60 mg by mouth nightly.       topiramate (TOPAMAX) 100 mg tablet Take  by mouth two (2) times a day.  albuterol (PROVENTIL, VENTOLIN) 90 mcg/Actuation inhaler Take 2 Puffs by inhalation every six (6) hours as needed. Vital signs:    Visit Vitals    /90 (BP 1 Location: Left arm, BP Patient Position: Sitting)    Pulse 92    Temp 98.9 °F (37.2 °C) (Oral)    Resp 14    Ht 5' 6\" (1.676 m)    Wt 105.9 kg (233 lb 6.4 oz)    SpO2 98%    BMI 37.67 kg/m2       Review of Systems:   As above otherwise 11 point review of systems negative including;   Constitutional no fever or chills  Skin denies rash or itching  HEENT  Denies tinnitus, hearing lose  Eyes denies diplopia vision lose  Respiratory denies sortness of breath  Cardiovascular denies chest pain, dyspnea on exertion  Gastrointestinal denies nausea, vomiting, diarrhea, constipation  Genitourinary denies incontinence  Musculoskeletal denies joint pain or swelling  Endocrine denies weight change  Hematology denies easy bruising or bleeding   Neurological as above in HPI      Patient Active Problem List   Diagnosis Code    Asthma J45.909    Hyperlipidemia E78.5    Eczema L30.9    Back pain with radiation M54.9    Spinal stenosis, lumbar region, without neurogenic claudication M48.06    Lumbar stenosis M48.06    S/P lumbar fusion Z98.1    Acute right-sided low back pain with sciatica M54.40    Neurological symptoms R29.90    Ischemic stroke (HCC) I63.9    Migraines G43.909    Leg swelling M79.89    Hypothyroidism E03.9    CTS (carpal tunnel syndrome) G56.00    Hypercholesterolemia E78.00    Ill-defined condition R69    L4-L5 disc bulge M51.26    Sciatica M54.30         Objective: The patient is awake, alert, and oriented x 4. Fund of knowledge is adequate. Speech is fluent and memory is intact. Cranial Nerves: II - Visual fields are full to confrontation. III, IV, VI - Extraocular movements are intact. There is no nystagmus. V - Facial sensation is intact to pinprick.   VII - Face is symmetrical.  VIII - Hearing is present. IX, X, XII - Palate is symmetrical.   XI - Shoulder shrugging and head turning intact  Motor: The patient moves all four limbs fairly well and symmetrically. Tone is normal. Reflexes are 2+ and symmetrical. Plantars are down going. Gait is normal.  CBC:   Lab Results   Component Value Date/Time    WBC 8.2 01/02/2017 03:08 AM    RBC 4.52 01/02/2017 03:08 AM    HGB 10.7 01/02/2017 03:08 AM    HCT 33.7 01/02/2017 03:08 AM    PLATELET 441 07/14/2491 03:08 AM     BMP:   Lab Results   Component Value Date/Time    Glucose 100 01/02/2017 03:08 AM    Sodium 138 01/02/2017 03:08 AM    Potassium 4.0 01/02/2017 03:08 AM    Chloride 107 01/02/2017 03:08 AM    CO2 23 01/02/2017 03:08 AM    BUN 12 01/02/2017 03:08 AM    Creatinine 0.98 01/02/2017 03:08 AM    Calcium 9.2 01/02/2017 03:08 AM     CMP:   Lab Results   Component Value Date/Time    Glucose 100 01/02/2017 03:08 AM    Sodium 138 01/02/2017 03:08 AM    Potassium 4.0 01/02/2017 03:08 AM    Chloride 107 01/02/2017 03:08 AM    CO2 23 01/02/2017 03:08 AM    BUN 12 01/02/2017 03:08 AM    Creatinine 0.98 01/02/2017 03:08 AM    Calcium 9.2 01/02/2017 03:08 AM    Anion gap 8 01/02/2017 03:08 AM    BUN/Creatinine ratio 12 01/02/2017 03:08 AM    Alk. phosphatase 100 12/30/2016 04:00 AM    Protein, total 7.5 12/30/2016 04:00 AM    Albumin 3.1 12/30/2016 04:00 AM    Globulin 4.4 12/30/2016 04:00 AM    A-G Ratio 0.7 12/30/2016 04:00 AM     Coagulation:   Lab Results   Component Value Date/Time    Prothrombin time 12.3 12/28/2016 08:55 PM    INR 0.9 12/28/2016 08:55 PM     Cardiac markers:   Lab Results   Component Value Date/Time     12/28/2016 08:55 PM    CK-MB Index CANNOT BE CALCULATED 12/28/2016 08:55 PM       Assessment:  Migraine headaches, now resolving status migrainosis as well as analgesic rebound headaches in this patient who has risk factors including life long headaches. Plan: Will bump the Topamax to 150 mg BID. Return here in 3 weeks. Sincerely,        Hannah Escobar.  Long Mallory M.D.

## 2017-04-17 ENCOUNTER — OFFICE VISIT (OUTPATIENT)
Dept: NEUROLOGY | Age: 52
End: 2017-04-17

## 2017-04-17 VITALS
DIASTOLIC BLOOD PRESSURE: 82 MMHG | BODY MASS INDEX: 37.03 KG/M2 | RESPIRATION RATE: 14 BRPM | HEIGHT: 66 IN | HEART RATE: 84 BPM | TEMPERATURE: 98.8 F | OXYGEN SATURATION: 97 % | WEIGHT: 230.4 LBS | SYSTOLIC BLOOD PRESSURE: 122 MMHG

## 2017-04-17 DIAGNOSIS — G43.711 INTRACTABLE CHRONIC MIGRAINE WITHOUT AURA AND WITH STATUS MIGRAINOSUS: Primary | ICD-10-CM

## 2017-04-17 RX ORDER — TOPIRAMATE 50 MG/1
50 TABLET, FILM COATED ORAL 2 TIMES DAILY
Qty: 60 TAB | Refills: 6 | Status: SHIPPED | OUTPATIENT
Start: 2017-04-17 | End: 2017-08-14 | Stop reason: SDUPTHER

## 2017-04-17 RX ORDER — TOPIRAMATE 100 MG/1
100 TABLET, FILM COATED ORAL 2 TIMES DAILY
Qty: 60 TAB | Refills: 6 | Status: SHIPPED | OUTPATIENT
Start: 2017-04-17 | End: 2017-08-14 | Stop reason: SDUPTHER

## 2017-04-17 NOTE — MR AVS SNAPSHOT
Visit Information Date & Time Provider Department Dept. Phone Encounter #  
 4/17/2017  9:00 AM Dmitri Moeller, [address] [phone] [id number] Follow-up Instructions Return in about 3 months (around 7/17/2017). Follow-up and Disposition History Your Appointments 5/8/2017  3:00 PM  
Follow Up with Lloyd Hagen MD  
[facility] ([facility]) Appt Note: 1 MO BLOCK FU; SHEN 4/18/17  
 [address] [facility] [address]  
[phone]  
  
   
 [address] [facility] [address] Upcoming Health Maintenance Date Due Hepatitis C Screening 1965 Pneumococcal 19-64 Medium Risk (1 of 1 - PPSV23) 7/1/1984 PAP AKA CERVICAL CYTOLOGY 7/1/1986 DTaP/Tdap/Td series (1 - Tdap) 5/7/1999 FOBT Q 1 YEAR AGE 50-75 7/1/2015 INFLUENZA AGE 9 TO ADULT 8/1/2016 BREAST CANCER SCRN MAMMOGRAM 12/22/2016 Allergies as of 4/17/2017  Review Complete On: 4/17/2017 By: Dmitri Moeller MD  
  
 Severity Noted Reaction Type Reactions Skelaxin [Metaxalone]  05/06/2010    Other (comments)  
 jittery Tramadol  03/22/2017    Other (comments) Halluctations Current Immunizations  Reviewed on 5/6/2010 Name Date  
 TD Vaccine 5/6/1999 Not reviewed this visit You Were Diagnosed With   
  
 Codes Comments Intractable chronic migraine without aura and with status migrainosus    -  Primary ICD-10-CM: Z80.199 ICD-9-CM: 346.73 Vitals BP Pulse Temp Resp Height(growth percentile) Weight(growth percentile) 122/82 (BP 1 Location: Left arm, BP Patient Position: Sitting) 84 98.8 °F (37.1 °C) (Oral) 14 5' 6\" (1.676 m) 230 lb 6.4 oz (104.5 kg) SpO2 BMI OB Status Smoking Status 97% 37.19 kg/m2 Hysterectomy Never Smoker Vitals History BMI and BSA Data  Body Mass Index Body Surface Area  
 37.19 kg/m 2 2.21 m 2  
  
  
 Preferred Pharmacy Pharmacy Name Phone [facility] #[id number]- [place] A[place]as [address] Your Updated Medication List  
  
   
This list is accurate as of: 4/17/17  9:38 AM.  Always use your most recent med list.  
  
  
  
  
 acetaminophen-codeine 300-30 mg per tablet Commonly known as:  TYLENOL-CODEINE #3  
1 tab PO Q4-6 hours as needed for pain 1 week supply  
  
 albuterol 90 mcg/actuation inhaler Commonly known as:  Zetta Salix Take 2 Puffs by inhalation every six (6) hours as needed. aspirin 81 mg chewable tablet Take 1 Tab by mouth daily. atorvastatin 80 mg tablet Commonly known as:  LIPITOR  
TAKE 1 TABLET BY MOUTH NIGHTLY  
  
 CYMBALTA 60 mg capsule Generic drug:  DULoxetine Take 60 mg by mouth nightly. docusate sodium 100 mg capsule Commonly known as:  Evalee Dinning Take 100 mg by mouth two (2) times a day. etodolac 300 mg capsule Commonly known as:  LODINE  
TAKE 1 CAPSULE BY MOUTH THREE TIMES DAILY AS NEEDED FOR PAIN WITH FOOD FERROUS SULFATE PO Take 325 mg by mouth. FIORICET -40 mg per tablet Generic drug:  butalbital-acetaminophen-caffeine Take 1 Tab by mouth two (2) times a day. furosemide 20 mg tablet Commonly known as:  LASIX Take  by mouth daily. levothyroxine 50 mcg tablet Commonly known as:  SYNTHROID  
TAKE 1 TABLET BY MOUTH EVERY DAY  
  
 meclizine 25 mg tablet Commonly known as:  ANTIVERT Take 25 mg by mouth daily as needed. multivitamin tablet Commonly known as:  ONE A DAY Take 1 Tab by mouth daily. potassium chloride SR 20 mEq tablet Commonly known as:  K-TAB Take 20 mEq by mouth daily. pravastatin 80 mg tablet Commonly known as:  PRAVACHOL Take 80 mg by mouth daily. SYMBICORT IN Take  by inhalation. * topiramate 50 mg tablet Commonly known as:  TOPAMAX Take 1 Tab by mouth two (2) times a day. Indications: MIGRAINE PREVENTION  
  
 * topiramate 100 mg tablet Commonly known as:  TOPAMAX Take 1 Tab by mouth two (2) times a day. Indications: MIGRAINE PREVENTION  
  
 VITAMIN D3 1,000 unit Cap Generic drug:  cholecalciferol Take 2,000 Units by mouth daily. * Notice: This list has 2 medication(s) that are the same as other medications prescribed for you. Read the directions carefully, and ask your doctor or other care provider to review them with you. Prescriptions Sent to Pharmacy Refills  
 topiramate (TOPAMAX) 50 mg tablet 6 Sig: Take 1 Tab by mouth two (2) times a day. Indications: MIGRAINE PREVENTION Class: Normal  
 Pharmacy: [facility]/pharmacy #[id number] #: [phone] Route: Oral  
 topiramate (TOPAMAX) 100 mg tablet 6 Sig: Take 1 Tab by mouth two (2) times a day. Indications: MIGRAINE PREVENTION Class: Normal  
 Pharmacy: [facility]/pharmacy #[id number] [address] #: [phone] Route: Oral  
  
Follow-up Instructions Return in about 3 months (around 7/17/2017). Please provide this summary of care documentation to your next provider. Your primary care clinician is listed as [phone]. If you have any questions after today's visit, please call [phone].

## 2017-04-17 NOTE — PROGRESS NOTES
Re:  Yadira Zhou,Follow up visit     4/17/2017 9:28 AM      SSN: [id number]    Subjective: Robi Ronquillo returns for follow up of her headaches. She's actually been headache free for the last 2 weeks. She's feeling great, no apparent side effects with the Topamax. She's going to the pain clinic for her back pain. Medications:    Current Outpatient Prescriptions   Medication Sig Dispense Refill    topiramate (TOPAMAX) 50 mg tablet Take 1 Tab by mouth two (2) times a day. Indications: MIGRAINE PREVENTION 60 Tab 6    acetaminophen-codeine (TYLENOL-CODEINE #3) 300-30 mg per tablet 1 tab PO Q4-6 hours as needed for pain  1 week supply 30 Tab 0    furosemide (LASIX) 20 mg tablet Take  by mouth daily.  BUDESONIDE/FORMOTEROL FUMARATE (SYMBICORT IN) Take  by inhalation.  atorvastatin (LIPITOR) 80 mg tablet TAKE 1 TABLET BY MOUTH NIGHTLY  2    potassium chloride SR (K-TAB) 20 mEq tablet Take 20 mEq by mouth daily.  pravastatin (PRAVACHOL) 80 mg tablet Take 80 mg by mouth daily.  aspirin 81 mg chewable tablet Take 1 Tab by mouth daily. 30 Tab 3    etodolac (LODINE) 300 mg capsule TAKE 1 CAPSULE BY MOUTH THREE TIMES DAILY AS NEEDED FOR PAIN WITH FOOD  2    levothyroxine (SYNTHROID) 50 mcg tablet TAKE 1 TABLET BY MOUTH EVERY DAY  3    cholecalciferol (VITAMIN D3) 1,000 unit cap Take 2,000 Units by mouth daily.  multivitamin (ONE A DAY) tablet Take 1 Tab by mouth daily.  meclizine (ANTIVERT) 25 mg tablet Take 25 mg by mouth daily as needed. 3    butalbital-acetaminophen-caffeine (FIORICET) -40 mg per tablet Take 1 Tab by mouth two (2) times a day.  DULoxetine (CYMBALTA) 60 mg capsule Take 60 mg by mouth nightly.  topiramate (TOPAMAX) 100 mg tablet Take  by mouth two (2) times a day.  docusate sodium (COLACE) 100 mg capsule Take 100 mg by mouth two (2) times a day.  FERROUS SULFATE PO Take 325 mg by mouth.       albuterol (PROVENTIL, VENTOLIN) 90 mcg/Actuation inhaler Take 2 Puffs by inhalation every six (6) hours as needed. Vital signs:    Visit Vitals    /82 (BP 1 Location: Left arm, BP Patient Position: Sitting)    Pulse 84    Temp 98.8 °F (37.1 °C) (Oral)    Resp 14    Ht 5' 6\" (1.676 m)    Wt 104.5 kg (230 lb 6.4 oz)    SpO2 97%    BMI 37.19 kg/m2       Review of Systems:   As above otherwise 11 point review of systems negative including;   Constitutional no fever or chills  Skin denies rash or itching  HEENT  Denies tinnitus, hearing lose  Eyes denies diplopia vision lose  Respiratory denies sortness of breath  Cardiovascular denies chest pain, dyspnea on exertion  Gastrointestinal denies nausea, vomiting, diarrhea, constipation  Genitourinary denies incontinence  Musculoskeletal denies joint pain or swelling  Endocrine denies weight change  Hematology denies easy bruising or bleeding   Neurological as above in HPI      Patient Active Problem List   Diagnosis Code    Asthma J45.909    Hyperlipidemia E78.5    Eczema L30.9    Back pain with radiation M54.9    Spinal stenosis, lumbar region, without neurogenic claudication M48.06    Lumbar stenosis M48.06    S/P lumbar fusion Z98.1    Acute right-sided low back pain with sciatica M54.40    Neurological symptoms R29.90    Ischemic stroke (HCC) I63.9    Migraines G43.909    Leg swelling M79.89    Hypothyroidism E03.9    CTS (carpal tunnel syndrome) G56.00    Hypercholesterolemia E78.00    Ill-defined condition R69    L4-L5 disc bulge M51.26    Sciatica M54.30         Objective: The patient is awake, alert, and oriented x 4. Fund of knowledge is adequate. Speech is fluent and memory is intact. Cranial Nerves: II - Visual fields are full to confrontation. III, IV, VI - Extraocular movements are intact. There is no nystagmus. V - Facial sensation is intact to pinprick. VII - Face is symmetrical.  VIII - Hearing is present.   IX, X, XII - Palate is symmetrical.   XI - Shoulder shrugging and head turning intact  Motor: The patient has a mild degree of right hemiparesis, both arm and leg about 4+/5. Tone is normal. Reflexes are 2+ and symmetrical. Plantars are down going. Gait is antalgic, limping off of the right leg. CBC:   Lab Results   Component Value Date/Time    WBC 8.2 01/02/2017 03:08 AM    RBC 4.52 01/02/2017 03:08 AM    HGB 10.7 01/02/2017 03:08 AM    HCT 33.7 01/02/2017 03:08 AM    PLATELET 448 61/25/9022 03:08 AM     BMP:   Lab Results   Component Value Date/Time    Glucose 100 01/02/2017 03:08 AM    Sodium 138 01/02/2017 03:08 AM    Potassium 4.0 01/02/2017 03:08 AM    Chloride 107 01/02/2017 03:08 AM    CO2 23 01/02/2017 03:08 AM    BUN 12 01/02/2017 03:08 AM    Creatinine 0.98 01/02/2017 03:08 AM    Calcium 9.2 01/02/2017 03:08 AM     CMP:   Lab Results   Component Value Date/Time    Glucose 100 01/02/2017 03:08 AM    Sodium 138 01/02/2017 03:08 AM    Potassium 4.0 01/02/2017 03:08 AM    Chloride 107 01/02/2017 03:08 AM    CO2 23 01/02/2017 03:08 AM    BUN 12 01/02/2017 03:08 AM    Creatinine 0.98 01/02/2017 03:08 AM    Calcium 9.2 01/02/2017 03:08 AM    Anion gap 8 01/02/2017 03:08 AM    BUN/Creatinine ratio 12 01/02/2017 03:08 AM    Alk. phosphatase 100 12/30/2016 04:00 AM    Protein, total 7.5 12/30/2016 04:00 AM    Albumin 3.1 12/30/2016 04:00 AM    Globulin 4.4 12/30/2016 04:00 AM    A-G Ratio 0.7 12/30/2016 04:00 AM     Coagulation:   Lab Results   Component Value Date/Time    Prothrombin time 12.3 12/28/2016 08:55 PM    INR 0.9 12/28/2016 08:55 PM     Cardiac markers:   Lab Results   Component Value Date/Time     12/28/2016 08:55 PM    CK-MB Index CANNOT BE CALCULATED 12/28/2016 08:55 PM       Assessment:  Migraine headaches, now resolving status migrainosis as well as analgesic rebound headaches in this patient who has risk factors including life long headaches. Plan: Will continue the Topamax to 150 mg BID.  Return here in 3 months. Sincerely,        Theoplis Brisk.  Augustine Dutton M.D.

## 2017-04-17 NOTE — COMMUNICATION BODY
Re:  Yadira Zhou,Follow up visit     4/17/2017 9:28 AM      SSN: [id number]    Subjective: Hank Richard returns for follow up of her headaches. She's actually been headache free for the last 2 weeks. She's feeling great, no apparent side effects with the Topamax. She's going to the pain clinic for her back pain. Medications:    Current Outpatient Prescriptions   Medication Sig Dispense Refill    topiramate (TOPAMAX) 50 mg tablet Take 1 Tab by mouth two (2) times a day. Indications: MIGRAINE PREVENTION 60 Tab 6    acetaminophen-codeine (TYLENOL-CODEINE #3) 300-30 mg per tablet 1 tab PO Q4-6 hours as needed for pain  1 week supply 30 Tab 0    furosemide (LASIX) 20 mg tablet Take  by mouth daily.  BUDESONIDE/FORMOTEROL FUMARATE (SYMBICORT IN) Take  by inhalation.  atorvastatin (LIPITOR) 80 mg tablet TAKE 1 TABLET BY MOUTH NIGHTLY  2    potassium chloride SR (K-TAB) 20 mEq tablet Take 20 mEq by mouth daily.  pravastatin (PRAVACHOL) 80 mg tablet Take 80 mg by mouth daily.  aspirin 81 mg chewable tablet Take 1 Tab by mouth daily. 30 Tab 3    etodolac (LODINE) 300 mg capsule TAKE 1 CAPSULE BY MOUTH THREE TIMES DAILY AS NEEDED FOR PAIN WITH FOOD  2    levothyroxine (SYNTHROID) 50 mcg tablet TAKE 1 TABLET BY MOUTH EVERY DAY  3    cholecalciferol (VITAMIN D3) 1,000 unit cap Take 2,000 Units by mouth daily.  multivitamin (ONE A DAY) tablet Take 1 Tab by mouth daily.  meclizine (ANTIVERT) 25 mg tablet Take 25 mg by mouth daily as needed. 3    butalbital-acetaminophen-caffeine (FIORICET) -40 mg per tablet Take 1 Tab by mouth two (2) times a day.  DULoxetine (CYMBALTA) 60 mg capsule Take 60 mg by mouth nightly.  topiramate (TOPAMAX) 100 mg tablet Take  by mouth two (2) times a day.  docusate sodium (COLACE) 100 mg capsule Take 100 mg by mouth two (2) times a day.  FERROUS SULFATE PO Take 325 mg by mouth.       albuterol (PROVENTIL, VENTOLIN) 90 mcg/Actuation inhaler Take 2 Puffs by inhalation every six (6) hours as needed. Vital signs:    Visit Vitals    /82 (BP 1 Location: Left arm, BP Patient Position: Sitting)    Pulse 84    Temp 98.8 °F (37.1 °C) (Oral)    Resp 14    Ht 5' 6\" (1.676 m)    Wt 104.5 kg (230 lb 6.4 oz)    SpO2 97%    BMI 37.19 kg/m2       Review of Systems:   As above otherwise 11 point review of systems negative including;   Constitutional no fever or chills  Skin denies rash or itching  HEENT  Denies tinnitus, hearing lose  Eyes denies diplopia vision lose  Respiratory denies sortness of breath  Cardiovascular denies chest pain, dyspnea on exertion  Gastrointestinal denies nausea, vomiting, diarrhea, constipation  Genitourinary denies incontinence  Musculoskeletal denies joint pain or swelling  Endocrine denies weight change  Hematology denies easy bruising or bleeding   Neurological as above in HPI      Patient Active Problem List   Diagnosis Code    Asthma J45.909    Hyperlipidemia E78.5    Eczema L30.9    Back pain with radiation M54.9    Spinal stenosis, lumbar region, without neurogenic claudication M48.06    Lumbar stenosis M48.06    S/P lumbar fusion Z98.1    Acute right-sided low back pain with sciatica M54.40    Neurological symptoms R29.90    Ischemic stroke (HCC) I63.9    Migraines G43.909    Leg swelling M79.89    Hypothyroidism E03.9    CTS (carpal tunnel syndrome) G56.00    Hypercholesterolemia E78.00    Ill-defined condition R69    L4-L5 disc bulge M51.26    Sciatica M54.30         Objective: The patient is awake, alert, and oriented x 4. Fund of knowledge is adequate. Speech is fluent and memory is intact. Cranial Nerves: II - Visual fields are full to confrontation. III, IV, VI - Extraocular movements are intact. There is no nystagmus. V - Facial sensation is intact to pinprick. VII - Face is symmetrical.  VIII - Hearing is present.   IX, X, XII - Palate is symmetrical.   XI - Shoulder shrugging and head turning intact  Motor: The patient has a mild degree of right hemiparesis, both arm and leg about 4+/5. Tone is normal. Reflexes are 2+ and symmetrical. Plantars are down going. Gait is antalgic, limping off of the right leg. CBC:   Lab Results   Component Value Date/Time    WBC 8.2 01/02/2017 03:08 AM    RBC 4.52 01/02/2017 03:08 AM    HGB 10.7 01/02/2017 03:08 AM    HCT 33.7 01/02/2017 03:08 AM    PLATELET 871 97/73/4980 03:08 AM     BMP:   Lab Results   Component Value Date/Time    Glucose 100 01/02/2017 03:08 AM    Sodium 138 01/02/2017 03:08 AM    Potassium 4.0 01/02/2017 03:08 AM    Chloride 107 01/02/2017 03:08 AM    CO2 23 01/02/2017 03:08 AM    BUN 12 01/02/2017 03:08 AM    Creatinine 0.98 01/02/2017 03:08 AM    Calcium 9.2 01/02/2017 03:08 AM     CMP:   Lab Results   Component Value Date/Time    Glucose 100 01/02/2017 03:08 AM    Sodium 138 01/02/2017 03:08 AM    Potassium 4.0 01/02/2017 03:08 AM    Chloride 107 01/02/2017 03:08 AM    CO2 23 01/02/2017 03:08 AM    BUN 12 01/02/2017 03:08 AM    Creatinine 0.98 01/02/2017 03:08 AM    Calcium 9.2 01/02/2017 03:08 AM    Anion gap 8 01/02/2017 03:08 AM    BUN/Creatinine ratio 12 01/02/2017 03:08 AM    Alk. phosphatase 100 12/30/2016 04:00 AM    Protein, total 7.5 12/30/2016 04:00 AM    Albumin 3.1 12/30/2016 04:00 AM    Globulin 4.4 12/30/2016 04:00 AM    A-G Ratio 0.7 12/30/2016 04:00 AM     Coagulation:   Lab Results   Component Value Date/Time    Prothrombin time 12.3 12/28/2016 08:55 PM    INR 0.9 12/28/2016 08:55 PM     Cardiac markers:   Lab Results   Component Value Date/Time     12/28/2016 08:55 PM    CK-MB Index CANNOT BE CALCULATED 12/28/2016 08:55 PM       Assessment:  Migraine headaches, now resolving status migrainosis as well as analgesic rebound headaches in this patient who has risk factors including life long headaches. Plan: Will continue the Topamax to 150 mg BID.  Return here in 3 months. Sincerely,        Alexander Ross.  Will Aguillon M.D.

## 2017-04-17 NOTE — LETTER
4/17/2017 9:35 AM 
 
Patient:  Tomas YOB: 1965 Date of Visit: 4/17/2017 Dear MD Reji Escamilla [address] [place] [id number] VIA Facsimile: [phone] 
 : Thank you for referring Ms. Marin to me for evaluation/treatment. Below are the relevant portions of my assessment and plan of care. Re:  Yadira Zhou,Follow up visit     4/17/2017 9:28 AM 
 
 
SSN: [id number] Subjective: Tomas returns for follow up of her headaches. She's actually been headache free for the last 2 weeks. She's feeling great, no apparent side effects with the Topamax. She's going to the pain clinic for her back pain. Medications:   
Current Outpatient Prescriptions Medication Sig Dispense Refill  topiramate (TOPAMAX) 50 mg tablet Take 1 Tab by mouth two (2) times a day. Indications: MIGRAINE PREVENTION 60 Tab 6  
 acetaminophen-codeine (TYLENOL-CODEINE #3) 300-30 mg per tablet 1 tab PO Q4-6 hours as needed for pain 1 week supply 30 Tab 0  
 furosemide (LASIX) 20 mg tablet Take  by mouth daily.  BUDESONIDE/FORMOTEROL FUMARATE (SYMBICORT IN) Take  by inhalation.  atorvastatin (LIPITOR) 80 mg tablet TAKE 1 TABLET BY MOUTH NIGHTLY  2  
 potassium chloride SR (K-TAB) 20 mEq tablet Take 20 mEq by mouth daily.  pravastatin (PRAVACHOL) 80 mg tablet Take 80 mg by mouth daily.  aspirin 81 mg chewable tablet Take 1 Tab by mouth daily. 30 Tab 3  
 etodolac (LODINE) 300 mg capsule TAKE 1 CAPSULE BY MOUTH THREE TIMES DAILY AS NEEDED FOR PAIN WITH FOOD  2  
 levothyroxine (SYNTHROID) 50 mcg tablet TAKE 1 TABLET BY MOUTH EVERY DAY  3  
 cholecalciferol (VITAMIN D3) 1,000 unit cap Take 2,000 Units by mouth daily.  multivitamin (ONE A DAY) tablet Take 1 Tab by mouth daily.  meclizine (ANTIVERT) 25 mg tablet Take 25 mg by mouth daily as needed.   3  
 butalbital-acetaminophen-caffeine (FIORICET) -40 mg per tablet Take 1 Tab by mouth two (2) times a day.  DULoxetine (CYMBALTA) 60 mg capsule Take 60 mg by mouth nightly.  topiramate (TOPAMAX) 100 mg tablet Take  by mouth two (2) times a day.  docusate sodium (COLACE) 100 mg capsule Take 100 mg by mouth two (2) times a day.  FERROUS SULFATE PO Take 325 mg by mouth.  albuterol (PROVENTIL, VENTOLIN) 90 mcg/Actuation inhaler Take 2 Puffs by inhalation every six (6) hours as needed. Vital signs:   
Visit Vitals  /82 (BP 1 Location: Left arm, BP Patient Position: Sitting)  Pulse 84  Temp 98.8 °F (37.1 °C) (Oral)  Resp 14  
 Ht 5' 6\" (1.676 m)  Wt 104.5 kg (230 lb 6.4 oz)  SpO2 97%  BMI 37.19 kg/m2 Review of Systems: As above otherwise 11 point review of systems negative including;  
Constitutional no fever or chills Skin denies rash or itching HEENT  Denies tinnitus, hearing lose Eyes denies diplopia vision lose Respiratory denies sortness of breath Cardiovascular denies chest pain, dyspnea on exertion Gastrointestinal denies nausea, vomiting, diarrhea, constipation Genitourinary denies incontinence Musculoskeletal denies joint pain or swelling Endocrine denies weight change Hematology denies easy bruising or bleeding Neurological as above in HPI Patient Active Problem List  
Diagnosis Code  Asthma J45.909  Hyperlipidemia E78.5  Eczema L30.9  Back pain with radiation M54.9  Spinal stenosis, lumbar region, without neurogenic claudication M48.06  
 Lumbar stenosis M48.06  
 S/P lumbar fusion Z98.1  Acute right-sided low back pain with sciatica M54.40  Neurological symptoms R29.90  
 Ischemic stroke (HCC) I63.9  Migraines S08.605  Leg swelling M79.89  
 Hypothyroidism E03.9  CTS (carpal tunnel syndrome) G56.00  
 Hypercholesterolemia E78.00  Ill-defined condition R69  
 L4-L5 disc bulge M51.26  
 Sciatica M54.30 Objective: The patient is awake, alert, and oriented x 4. Fund of knowledge is adequate. Speech is fluent and memory is intact. Cranial Nerves: II  Visual fields are full to confrontation. III, IV, VI  Extraocular movements are intact. There is no nystagmus. V  Facial sensation is intact to pinprick. VII  Face is symmetrical.  VIII - Hearing is present. IX, X, XII  Palate is symmetrical.   XI - Shoulder shrugging and head turning intact Motor: The patient has a mild degree of right hemiparesis, both arm and leg about 4+/5. Tone is normal. Reflexes are 2+ and symmetrical. Plantars are down going. Gait is antalgic, limping off of the right leg. CBC:  
Lab Results Component Value Date/Time WBC 8.2 01/02/2017 03:08 AM  
 RBC 4.52 01/02/2017 03:08 AM  
 HGB 10.7 01/02/2017 03:08 AM  
 HCT 33.7 01/02/2017 03:08 AM  
 PLATELET 893 15/22/1283 03:08 AM  
 
BMP:  
Lab Results Component Value Date/Time Glucose 100 01/02/2017 03:08 AM  
 Sodium 138 01/02/2017 03:08 AM  
 Potassium 4.0 01/02/2017 03:08 AM  
 Chloride 107 01/02/2017 03:08 AM  
 CO2 23 01/02/2017 03:08 AM  
 BUN 12 01/02/2017 03:08 AM  
 Creatinine 0.98 01/02/2017 03:08 AM  
 Calcium 9.2 01/02/2017 03:08 AM  
 
CMP:  
Lab Results Component Value Date/Time Glucose 100 01/02/2017 03:08 AM  
 Sodium 138 01/02/2017 03:08 AM  
 Potassium 4.0 01/02/2017 03:08 AM  
 Chloride 107 01/02/2017 03:08 AM  
 CO2 23 01/02/2017 03:08 AM  
 BUN 12 01/02/2017 03:08 AM  
 Creatinine 0.98 01/02/2017 03:08 AM  
 Calcium 9.2 01/02/2017 03:08 AM  
 Anion gap 8 01/02/2017 03:08 AM  
 BUN/Creatinine ratio 12 01/02/2017 03:08 AM  
 Alk. phosphatase 100 12/30/2016 04:00 AM  
 Protein, total 7.5 12/30/2016 04:00 AM  
 Albumin 3.1 12/30/2016 04:00 AM  
 Globulin 4.4 12/30/2016 04:00 AM  
 A-G Ratio 0.7 12/30/2016 04:00 AM  
 
Coagulation:  
Lab Results Component Value Date/Time  Prothrombin time 12.3 12/28/2016 08:55 PM  
 INR 0.9 12/28/2016 08:55 PM  
 
 Cardiac markers:  
Lab Results Component Value Date/Time  12/28/2016 08:55 PM  
 CK-MB Index CANNOT BE CALCULATED 12/28/2016 08:55 PM  
 
 
Assessment:  Migraine headaches, now resolving status migrainosis as well as analgesic rebound headaches in this patient who has risk factors including life long headaches. Plan: Will continue the Topamax to 150 mg BID. Return here in 3 months. Sincerely, 
 
 
 
Mickey Ahumada. Mili Nj M.D. If you have questions, please do not hesitate to call me. I look forward to following Ms. Zhou along with you.  
 
 
 
Sincerely, 
 
 
Aram Camp MD

## 2017-04-18 ENCOUNTER — HOSPITAL ENCOUNTER (OUTPATIENT)
Age: 52
Setting detail: OUTPATIENT SURGERY
Discharge: HOME | End: 2017-04-18
Attending: PHYSICAL MEDICINE & REHABILITATION | Admitting: PHYSICAL MEDICINE & REHABILITATION
Payer: COMMERCIAL

## 2017-04-18 ENCOUNTER — APPOINTMENT (OUTPATIENT)
Dept: GENERAL RADIOLOGY | Age: 52
End: 2017-04-18
Attending: PHYSICAL MEDICINE & REHABILITATION
Payer: COMMERCIAL

## 2017-04-18 VITALS
DIASTOLIC BLOOD PRESSURE: 77 MMHG | HEIGHT: 66 IN | OXYGEN SATURATION: 98 % | SYSTOLIC BLOOD PRESSURE: 120 MMHG | HEART RATE: 77 BPM | TEMPERATURE: 98 F | RESPIRATION RATE: 18 BRPM | WEIGHT: 230 LBS | BODY MASS INDEX: 36.96 KG/M2

## 2017-04-18 PROCEDURE — 74011250636 HC RX REV CODE- 250/636

## 2017-04-18 PROCEDURE — 74011636320 HC RX REV CODE- 636/320

## 2017-04-18 PROCEDURE — 74011000250 HC RX REV CODE- 250

## 2017-04-18 PROCEDURE — 76010000009 HC PAIN MGT 0 TO 30 MIN PROC: Performed by: PHYSICAL MEDICINE & REHABILITATION

## 2017-04-18 PROCEDURE — 74011250637 HC RX REV CODE- 250/637: Performed by: PHYSICAL MEDICINE & REHABILITATION

## 2017-04-18 RX ORDER — DEXAMETHASONE SODIUM PHOSPHATE 100 MG/10ML
INJECTION INTRAMUSCULAR; INTRAVENOUS AS NEEDED
Status: DISCONTINUED | OUTPATIENT
Start: 2017-04-18 | End: 2017-04-18 | Stop reason: HOSPADM

## 2017-04-18 RX ORDER — DIAZEPAM 5 MG/1
2.5-1 TABLET ORAL ONCE
Status: COMPLETED | OUTPATIENT
Start: 2017-04-18 | End: 2017-04-18

## 2017-04-18 RX ORDER — LIDOCAINE HYDROCHLORIDE 10 MG/ML
INJECTION, SOLUTION EPIDURAL; INFILTRATION; INTRACAUDAL; PERINEURAL AS NEEDED
Status: DISCONTINUED | OUTPATIENT
Start: 2017-04-18 | End: 2017-04-18 | Stop reason: HOSPADM

## 2017-04-18 RX ADMIN — DIAZEPAM 10 MG: 5 TABLET ORAL at 12:40

## 2017-04-18 NOTE — DISCHARGE INSTRUCTIONS
[facility]   ([place])  Dr. Ksenia Richter, Dr. Angelika Elam, Dr. London Class not drive a car, operate heavy machinery or dangerous equipment for 24 hours. * Activity as tolerated; rest for the remainder of the day. * Resume pre-block medications including those for your family doctor. * Do not drink alcoholic beverages for 24 hours. Alcohol and the medications you have received may interact and cause an adverse reaction. * You may feel better this evening and worse tomorrow, as the numbing medications wears off and the steroid has yet to begin to work. After 48 hrs the steroid should begin to release bringing you relief. * You may shower this evening and remove any bandages. * Avoid hot tubs and heating pads for 24 hours. You may use cold packs on the procedure site as tolerated for the first 24 hours. * If a headache develops, drink plenty of fluids and rest.  Take over the counter medications for headache if needed. If the headache continues longer than 24 hours, call MD at the [address] [place] [address]. [phone]    * Continue taking pain medications as needed. * You may resume your regular diet if tolerated. Otherwise, start with sips of water and advance slowly. * If Diabetic: check your blood sugar three times a day for the next 3 days. If your sugar is greater than 300 call your family doctor. If your sugar is greater than 400, have someone transport you to the nearest Emergency Room. * If you experience any of the following problems, Please Call the [address] [place] [address] at [phone].         * Shortness of Breath    * Fever of 101 or higher    * Nausea / Vomiting    * Severe Headache    * Weakness or numbness in arms or legs that is not      resolving    * Prolonged increase in pain greater than 4 days      DISCHARGE SUMMARY from Nurse      PATIENT INSTRUCTIONS:    After oral sedation, for 24 hours or while taking prescription Narcotics:  · Limit your activities  · Do not drive and operate hazardous machinery  · Do not make important personal or business decisions  · Do  not drink alcoholic beverages  · If you have not urinated within 8 hours after discharge, please contact your surgeon on call. Report the following to your surgeon:  · Excessive pain, swelling, redness or odor of or around the surgical area  · Temperature over 101  · Nausea and vomiting lasting longer than 4 hours or if unable to take medications  · Any signs of decreased circulation or nerve impairment to extremity: change in color, persistent  numbness, tingling, coldness or increase pain  · Any questions            What to do at Home:  Recommended activity: Activity as tolerated, NO DRIVING FOR 12 Hours post injection          *  Please give a list of your current medications to your Primary Care Provider. *  Please update this list whenever your medications are discontinued, doses are      changed, or new medications (including over-the-counter products) are added. *  Please carry medication information at all times in case of emergency situations. These are general instructions for a healthy lifestyle:    No smoking/ No tobacco products/ Avoid exposure to second hand smoke    Surgeon General's Warning:  Quitting smoking now greatly reduces serious risk to your health. Obesity, smoking, and sedentary lifestyle greatly increases your risk for illness    A healthy diet, regular physical exercise & weight monitoring are important for maintaining a healthy lifestyle    You may be retaining fluid if you have a history of heart failure or if you experience any of the following symptoms:  Weight gain of 3 pounds or more overnight or 5 pounds in a week, increased swelling in our hands or feet or shortness of breath while lying flat in bed.   Please call your doctor as soon as you notice any of these symptoms; do not wait until your next office visit. Recognize signs and symptoms of STROKE:    F-face looks uneven    A-arms unable to move or move unevenly    S-speech slurred or non-existent    T-time-call 911 as soon as signs and symptoms begin-DO NOT go       Back to bed or wait to see if you get better-TIME IS BRAIN.

## 2017-04-18 NOTE — H&P (VIEW-ONLY)
[address]  [address], [address]  [place], [facility] [address]  Phone: [phone]  Fax: [phone]        Yadira Zhou  : 1965  PCP: Citlalli Franco MD    PROGRESS NOTE      ASSESSMENT AND PLAN    Yadira was seen today for back pain. Diagnoses and all orders for this visit:    Chronic right SI joint pain  -     SCHEDULE SURGERY  -     REFERRAL TO NEUROLOGY  -     acetaminophen-codeine (TYLENOL-CODEINE #3) 300-30 mg per tablet; 1 tab PO Q4-6 hours as needed for pain  1 week supply    Protrusion of intervertebral disc of lumbosacral region R  -     REFERRAL TO NEUROLOGY  -     acetaminophen-codeine (TYLENOL-CODEINE #3) 300-30 mg per tablet; 1 tab PO Q4-6 hours as needed for pain  1 week supply    Other orders  -     Discontinue: traMADol (ULTRAM) 50 mg tablet; One-half to one tablet po qd-tid prn pain  ONE WEEK SUPPLY  Indications: NEUROPATHIC PAIN    1. Continue Lodine and Topamax. 2. Set up for RT SI joint block. 3. Advised pt to consult with Dr. Ward aVng for peripheral neuropathy. 4. Unable to tolerate Tramadol. 5. Rx for Tylenol #3.   6. Given care instructions for sciatica. Follow-up Disposition:  Return in about 1 month (around 2017). HISTORY OF PRESENT ILLNESS  Sussy Farley is a 46 y.o. female. Pt presents to the office for a f/u visit for back pain. Last visit pt was sent to have a lumbar spine MRI. Images reviewed with the pt. Pt continues to have back pain. She reports that she is doing a little better as Dr. Ward Vang gave her another MDP that has relieved some of her pain. Pt consults with Dr. Ward Vang for migraines. Pt states that her back pain radiates into her RLE. She has been experiencing paresthesia in her bilateral feet. She will sometimes drag her RLE with ambulation. She is unable to sleep at night, unsure why. Pt denies any saddle paresthesia. Pt takes Topamax 125 mg BID.  She has been on this increased dose for a week now. Pt takes Lodine 300 mg TID PRN. Pt takes Cymbalta 60 mg QHS. Pt has had no benefit from Toradol injection. Denies persistent fevers, chills, weight changes, neurogenic bowel or bladder symptoms. Pt denies recent ED visits or hospitalizations. Pt has had a previous TLIF/PLIF at L4-5 in 1/2016. Pain Assessment  3/22/2017   Location of Pain Back   Severity of Pain 3   Quality of Pain Aching   Quality of Pain Comment numbness   Duration of Pain -   Frequency of Pain Constant   Aggravating Factors Bending   Aggravating Factors Comment sitting   Limiting Behavior -   Relieving Factors Rest   Result of Injury -               MRI Results (most recent):    Results from Hospital Encounter encounter on 02/22/17   MRI LUMB SPINE W WO CONT   Narrative EXAM:  MRI LUMB SPINE W WO CONT    INDICATION:   INCREASED LOW BACK PAIN; right lumbar radiculopathy    COMPARISON: MRI lumbar spine 9/3/2015    TECHNIQUE:   MR imaging of the lumbar spine was performed with sagittal T1, T2, STIR;  axial  T1, T2. Patient received 10 mL Gadavist intravenously. Postcontrast axial and  sagittal T1 images obtained. FINDINGS:  Interval posterior lateral fusion L4/L5 with pedicular screws and rods. There is  also bilateral facetectomy hemilaminotomy. Susceptibility artifact mildly  limiting evaluation at this level. Grade 1 anterolisthesis L4 on L5. Otherwise  normal lumbar spine alignment. There is also interval transforaminal lumbar  interbody fusion with interbody disc spacer at L4/L5. Remainder of disc space  heights are maintained and hydrated. Vertebral body heights are normal.  No  suspicious bone marrow lesion or infiltrative bone marrow process. Small 1.3 cm  osseous hemangioma at T12 vertebral body. The conus medullaris is normal in  signal intensity terminating at L1/2 level. Correlation of sagittal and axial images demonstrates the following:    T12/L1:  The spinal canal and neuroforamina are widely patent.     L1/2: No significant disc pathology. The spinal canal and neural foramina are  widely patent. L2/3:  No significant disc pathology. Mild facet and ligamentum hypertrophy. The  spinal canal and neural foramina are widely patent. L3/4:  Mild central disc protrusion. Mild facet and ligamentum hypertrophy. No  central canal stenosis. Mild foraminal stenosis. L4/5:  Interbody and posterolateral fusion. No recurrent or residual disc  herniation. Widely patent central canal. No foraminal stenosis. There is  enhancing fibrosis at the right lateral epidural space and at the posterior  aspect of the right proximal foraminal L4 nerve root. L5/S1:  Perhaps tiny right subarticular/foraminal protrusion without nerve  impingement. Moderate facet hypertrophy. No central canal stenosis. Mild  proximal foraminal stenosis due to facet hypertrophy. No incidental abnormality in the partially imaged retroperitoneal structures. Impression IMPRESSION:      1. Interval posterior and transforaminal interbody fusion at L4/5. Interval  decompression of the central canal and foramina. No recurrent or residual disc  herniation.  -Mild expected right lateral epidural fibrosis at this level.  -No high-grade central canal or foraminal stenosis at any level. 2. Perhaps tiny L5/S1 subarticular/foraminal protrusion but no nerve  impingement. Moderate facet arthrosis at this level. 3. Mild L3/4 central disc protrusion. PAST MEDICAL HISTORY   Past Medical History:   Diagnosis Date    Asthma     Back pain with radiation     Cardiac echocardiogram, [facility] study 12/29/2016    EF 55-60%. No WMA. Right to left atrial septal shunt suggests PFO. Similar to study of 10/18/16.  Cardiovascular LE venous duplex 10/18/2016    No DVT bilaterally.     Contact dermatitis and other eczema, due to unspecified cause     CTS (carpal tunnel syndrome)     HSV-2 (herpes simplex virus 2) infection     Hypercholesterolemia  Hypothyroidism     Ill-defined condition     Vertigo    L4-L5 disc bulge     Leg swelling     Migraines     Positive PPD, treated     S/P lumbar fusion 1/13/2016    1. L4-5 bilateral hemilaminotomy, medial facetectomy, foraminotomy, diskectomy L4-5. 2. Transforaminal lumbar interbody fusion with PEEK cage and demineralized bone graft L4-L5 posterolateral fusion. 3. Segmental spinal instrumentation, DePuy Expedium type L4-L5. 01/13/2016 By Dr. Jose R Garcia Sciatica        Past Surgical History:   Procedure Laterality Date    HX GYN      partial hysterectomy due to abnormal pap    HX LUMBAR LAMINECTOMY  01-13-16    L4/5   . MEDICATIONS    Current Outpatient Prescriptions   Medication Sig Dispense Refill    furosemide (LASIX) 20 mg tablet Take  by mouth daily.  docusate sodium (COLACE) 100 mg capsule Take 100 mg by mouth two (2) times a day.  FERROUS SULFATE PO Take 325 mg by mouth.  BUDESONIDE/FORMOTEROL FUMARATE (SYMBICORT IN) Take  by inhalation.  topiramate (TOPAMAX) 25 mg tablet Take 1 Tab by mouth two (2) times a day. Indications: MIGRAINE PREVENTION 60 Tab 4    atorvastatin (LIPITOR) 80 mg tablet TAKE 1 TABLET BY MOUTH NIGHTLY  2    potassium chloride SR (K-TAB) 20 mEq tablet Take 20 mEq by mouth daily.  pravastatin (PRAVACHOL) 80 mg tablet Take 80 mg by mouth daily.  aspirin 81 mg chewable tablet Take 1 Tab by mouth daily. 30 Tab 3    etodolac (LODINE) 300 mg capsule TAKE 1 CAPSULE BY MOUTH THREE TIMES DAILY AS NEEDED FOR PAIN WITH FOOD  2    levothyroxine (SYNTHROID) 50 mcg tablet TAKE 1 TABLET BY MOUTH EVERY DAY  3    cholecalciferol (VITAMIN D3) 1,000 unit cap Take 2,000 Units by mouth daily.  multivitamin (ONE A DAY) tablet Take 1 Tab by mouth daily.  meclizine (ANTIVERT) 25 mg tablet Take 25 mg by mouth daily as needed. 3    butalbital-acetaminophen-caffeine (FIORICET) -40 mg per tablet Take 1 Tab by mouth two (2) times a day.       DULoxetine (CYMBALTA) 60 mg capsule Take 60 mg by mouth nightly.  topiramate (TOPAMAX) 100 mg tablet Take  by mouth two (2) times a day.  albuterol (PROVENTIL, VENTOLIN) 90 mcg/Actuation inhaler Take 2 Puffs by inhalation every six (6) hours as needed.  methylPREDNISolone (MEDROL DOSEPACK) 4 mg tablet As directed 1 Dose Pack 0    methylPREDNISolone (MEDROL DOSEPACK) 4 mg tablet Per dose pack instructions 21 Tab 0        ALLERGIES  Allergies   Allergen Reactions    Skelaxin [Metaxalone] Other (comments)     jittery          SOCIAL HISTORY    Social History     Social History    Marital status:      Spouse name: N/A    Number of children: N/A    Years of education: N/A     Occupational History    unemployed      long term disability     Social History Main Topics    Smoking status: Never Smoker    Smokeless tobacco: Never Used    Alcohol use No    Drug use: No    Sexual activity: Not on file      Comment: Hysterectomy     Other Topics Concern    Not on file     Social History Narrative       FAMILY HISTORY  Family History   Problem Relation Age of Onset    Diabetes Mother     Elevated Lipids Mother     Hypertension Mother     Cancer Father      pancreatic    Diabetes Sister     Hypertension Sister     Diabetes Brother     Diabetes Daughter     Hypertension Daughter        REVIEW OF SYSTEMS  Review of Systems   Constitutional: Negative for chills, fever and weight loss. Respiratory: Negative for shortness of breath. Cardiovascular: Negative for chest pain. Gastrointestinal: Negative for constipation. Negative for fecal incontinence   Genitourinary: Negative for dysuria. Negative for urinary incontinence   Musculoskeletal:        Per HPI   Skin: Negative for rash. Neurological: Positive for tingling and focal weakness. Negative for dizziness, tremors and headaches. Endo/Heme/Allergies: Does not bruise/bleed easily.    Psychiatric/Behavioral: The patient has insomnia. PHYSICAL EXAMINATION  Visit Vitals    /77    Pulse 92    Temp 98.2 °F (36.8 °C) (Oral)    Resp 18    Ht 5' 6\" (1.676 m)    Wt 230 lb 12.8 oz (104.7 kg)    SpO2 97%    BMI 37.25 kg/m2         Accompanied by self. Constitutional:  Well developed, well nourished, in no acute distress. Psychiatric: Affect and mood are appropriate. Integumentary: No rashes or abrasions noted on exposed areas. Cardiovascular/Peripheral Vascular: Intact l pulses. No peripheral edema is noted. Lymphatic:  No evidence of lymphedema. No cervical lymphadenopathy. SPINE/MUSCULOSKELETAL EXAM    Lumbar spine:  No rash, ecchymosis, or gross obliquity. No fasciculations. No focal atrophy is noted. .Tenderness to palpation of L4-5. No tenderness to palpation at the sciatic notch. SI joints non-tender. Trochanters non tender. Sensation grossly intact to light touch. MOTOR:       Hip Flex  Quads Hamstrings Ankle DF EHL Ankle PF   Right +4/5 +4/5 +4/5 4/5 +4/5 +4/5   Left +4/5 +4/5 +4/5 +4/5 +4/5 +4/5       Straight Leg raise negative. Ambulation with single point cane. Written by Em Elliott, as dictated by Fernando Rick MD.    Ms. Edmund Robertson may have a reminder for a \"due or due soon\" health maintenance. I have asked that she contact her primary care provider for follow-up on this health maintenance.

## 2017-04-18 NOTE — PROCEDURES
Procedure Note    Patient Name: Garr Mcardle    Date of Procedure: April 18, 2017    Preoperative Diagnosis:Sacroiliitis    Post Operative Diagnosis: same    Procedure: SI Joint Injection, Intra-articular and extra-articular - Unilateral  right    Consent: Informed consent was obtained prior to the procedure. The patient was given the opportunity to ask questions regarding the procedure and its associated risks. In addition to the potential risks associated with the procedure itself, the patient was informed both verbally and in writing of potential side effects of the use of corticosteroids. The patient appeared to comprehend the informed consent and desired to have the procedure perfored. Procedure: The patient was placed in the prone position on the flouroscopy table and the back was prepped and draped in the usual sterile manner. After local Lidocaine 1% infiltration, a #22 gauge spinal needle was then advanced to lie within the Sacroiliac Joint. Yes a small amount of Isovue was used to confirm placement, no vascular uptake was identified. A total of 30 mg of Dexamethasone  and 5 ml of Lidocaine was introduced in and around the joint. The injection area was cleaned and bandaids applied. No excessive bleeding was noted. Patient dressed and was discharged to home with instructions. Discussion:  The patient tolerated the procedure well. Violaceous discoloration (hematoma ?) noted at lumbar incision prior to procedure.         Jacque Guerrero MD  April 18, 2017

## 2017-04-18 NOTE — INTERVAL H&P NOTE
H&P Update: Marely Henderson was seen and briefly examined. Taking Lodine, low risk procedure, ok to continue. History and physical has been reviewed.   There have been no significant clinical changes since the completion of the originally dated History and Physical.    Signed By: Laila Boss MD     April 18, 2017 1:16 PM

## 2017-05-08 ENCOUNTER — OFFICE VISIT (OUTPATIENT)
Dept: ORTHOPEDIC SURGERY | Age: 52
End: 2017-05-08

## 2017-05-08 ENCOUNTER — TELEPHONE (OUTPATIENT)
Dept: ORTHOPEDIC SURGERY | Age: 52
End: 2017-05-08

## 2017-05-08 VITALS
TEMPERATURE: 98.5 F | DIASTOLIC BLOOD PRESSURE: 74 MMHG | WEIGHT: 233 LBS | HEART RATE: 92 BPM | BODY MASS INDEX: 37.45 KG/M2 | SYSTOLIC BLOOD PRESSURE: 127 MMHG | RESPIRATION RATE: 18 BRPM | HEIGHT: 66 IN | OXYGEN SATURATION: 98 %

## 2017-05-08 DIAGNOSIS — M51.27 PROTRUSION OF INTERVERTEBRAL DISC OF LUMBOSACRAL REGION: ICD-10-CM

## 2017-05-08 DIAGNOSIS — M53.3 CHRONIC RIGHT SI JOINT PAIN: ICD-10-CM

## 2017-05-08 DIAGNOSIS — R29.898 RIGHT LEG WEAKNESS: Primary | ICD-10-CM

## 2017-05-08 DIAGNOSIS — G89.29 CHRONIC RIGHT SI JOINT PAIN: ICD-10-CM

## 2017-05-08 RX ORDER — ACETAMINOPHEN AND CODEINE PHOSPHATE 300; 30 MG/1; MG/1
TABLET ORAL
Qty: 30 TAB | Refills: 0 | Status: SHIPPED | OUTPATIENT
Start: 2017-05-08 | End: 2018-06-25 | Stop reason: SDUPTHER

## 2017-05-08 NOTE — PROGRESS NOTES
[address]  [address], [address]  [place], [address] [place] [address] Street  Phone: [phone]  Fax: [phone]        Yadira Zhou  : 1965  PCP: Maverick Ulloa MD    PROGRESS NOTE      ASSESSMENT AND PLAN    Yadira was seen today for back pain. Diagnoses and all orders for this visit:    Right leg weakness  Comments:  multifactorial  Orders:  -     EMG ONE EXTREMITY LOWER RT; Future  -     REFERRAL TO PHYSICAL THERAPY    Chronic right SI joint pain  -     acetaminophen-codeine (TYLENOL-CODEINE #3) 300-30 mg per tablet; 1 tab PO Q4-6 hours as needed for pain  1 week supply  -     REFERRAL TO PHYSICAL THERAPY    Protrusion of intervertebral disc of lumbosacral region R  -     acetaminophen-codeine (TYLENOL-CODEINE #3) 300-30 mg per tablet; 1 tab PO Q4-6 hours as needed for pain  1 week supply  -     REFERRAL TO PHYSICAL THERAPY    1. EMG of RLE evaluate for progressive weakness with tremor. 2. Continue Topamax and Tylenol #3. 3. Referral to physical therapy. 4. Given fall prevention information. Activity as tolerated. Follow-up Disposition:  Return for emg f/u. HISTORY OF PRESENT ILLNESS  Marely Henderson is a 46 y.o. female. Pt presents to the office for a f/u visit for back pain. Pt underwent a RT SI joint block 2 weeks ago. Last visit she was referred to neurology and given a small Rx for Tylenol #3. Pt continues to have back pain. Pt reports that she has tremors in her BLE, R>L, when she stands. Pt reports that her back has been painful and tender. She notes that her leg will shake as if they will give out on her. Her pain radiates into her RLE. She has intermittent paresthesia in her RLE with standing. She notes that since her stroke, her RLE symptoms have not gotten better. Her stroke was in 2016. She had surgery a year ago and notes that her RLE pain never went away after her surgery.  She notes that the shaking in her RLE has been getting worse. She has consulted with Dr. Nicanor Reece and is being treated for her migraines but not for the tremor in her leg. Dr. Andres Elizabeth notes indicate migraine equivalent rather than CVA. She denies any saddle paresthesia. Pt is on Topamax 150 mg qD which helps her migraines. She takes Tylenol #3 PRN. Pt denies any dizziness, confusion, uncontrolled constipation, and cravings due to controlled substances. Denies persistent fevers, chills, weight changes, neurogenic bowel or bladder symptoms. Pt denies recent ED visits or hospitalizations. PMHx of lumbar laminectomy L4-5. Pain Assessment  5/8/2017   Location of Pain Back   Severity of Pain 5   Quality of Pain Aching   Quality of Pain Comment -   Duration of Pain Persistent   Frequency of Pain Constant   Aggravating Factors Standing;Walking   Aggravating Factors Comment -   Limiting Behavior Some   Relieving Factors Rest   Result of Injury No       PAST MEDICAL HISTORY   Past Medical History:   Diagnosis Date    Asthma     Back pain with radiation     Cardiac echocardiogram, [facility] study 12/29/2016    EF 55-60%. No WMA. Right to left atrial septal shunt suggests PFO. Similar to study of 10/18/16.  Cardiovascular LE venous duplex 10/18/2016    No DVT bilaterally.  Contact dermatitis and other eczema, due to unspecified cause     CTS (carpal tunnel syndrome)     HSV-2 (herpes simplex virus 2) infection     Hypercholesterolemia     Hypothyroidism     Ill-defined condition     Vertigo    L4-L5 disc bulge     Leg swelling     Migraines     Positive PPD, treated     S/P lumbar fusion 1/13/2016    1. L4-5 bilateral hemilaminotomy, medial facetectomy, foraminotomy, diskectomy L4-5. 2. Transforaminal lumbar interbody fusion with PEEK cage and demineralized bone graft L4-L5 posterolateral fusion.  3. Segmental spinal instrumentation, DePuy Expedium type L4-L5. 01/13/2016 By Dr. Rashida Cook Stroke [facility])     12/2016       Past Surgical History: Procedure Laterality Date    HX GYN      partial hysterectomy due to abnormal pap    HX LUMBAR LAMINECTOMY  01-13-16    L4/5   . MEDICATIONS      Current Outpatient Prescriptions   Medication Sig Dispense Refill    acetaminophen-codeine (TYLENOL-CODEINE #3) 300-30 mg per tablet 1 tab PO Q4-6 hours as needed for pain  1 week supply 30 Tab 0    topiramate (TOPAMAX) 50 mg tablet Take 1 Tab by mouth two (2) times a day. Indications: MIGRAINE PREVENTION 60 Tab 6    topiramate (TOPAMAX) 100 mg tablet Take 1 Tab by mouth two (2) times a day. Indications: MIGRAINE PREVENTION 60 Tab 6    furosemide (LASIX) 20 mg tablet Take  by mouth daily.  docusate sodium (COLACE) 100 mg capsule Take 100 mg by mouth two (2) times a day.  FERROUS SULFATE PO Take 325 mg by mouth.  potassium chloride SR (K-TAB) 20 mEq tablet Take 20 mEq by mouth daily.  pravastatin (PRAVACHOL) 80 mg tablet Take 80 mg by mouth daily.  etodolac (LODINE) 300 mg capsule TAKE 1 CAPSULE BY MOUTH THREE TIMES DAILY AS NEEDED FOR PAIN WITH FOOD  2    levothyroxine (SYNTHROID) 50 mcg tablet TAKE 1 TABLET BY MOUTH EVERY DAY  3    cholecalciferol (VITAMIN D3) 1,000 unit cap Take 2,000 Units by mouth daily.  meclizine (ANTIVERT) 25 mg tablet Take 25 mg by mouth daily as needed. 3    butalbital-acetaminophen-caffeine (FIORICET) -40 mg per tablet Take 1 Tab by mouth two (2) times a day.  DULoxetine (CYMBALTA) 60 mg capsule Take 60 mg by mouth nightly.           ALLERGIES    Allergies   Allergen Reactions    Skelaxin [Metaxalone] Other (comments)     jittery    Tramadol Other (comments)     Halluctations          SOCIAL HISTORY    Social History     Social History    Marital status:      Spouse name: N/A    Number of children: N/A    Years of education: N/A     Occupational History    unemployed      long term disability     Social History Main Topics    Smoking status: Never Smoker    Smokeless tobacco: Never Used    Alcohol use No    Drug use: No    Sexual activity: Not on file      Comment: Hysterectomy     Other Topics Concern    Not on file     Social History Narrative       FAMILY HISTORY    Family History   Problem Relation Age of Onset    Diabetes Mother     Elevated Lipids Mother     Hypertension Mother     Cancer Father      pancreatic    Diabetes Sister     Hypertension Sister     Diabetes Brother     Diabetes Daughter     Hypertension Daughter        REVIEW OF SYSTEMS  Review of Systems   Constitutional: Negative for chills, fever and weight loss. Respiratory: Negative for shortness of breath. Cardiovascular: Negative for chest pain. Gastrointestinal: Negative for constipation. Negative for fecal incontinence   Genitourinary: Negative for dysuria. Negative for urinary incontinence   Musculoskeletal:        Per HPI   Skin: Negative for rash. Neurological: Positive for focal weakness. Negative for dizziness, tingling, tremors and headaches. Endo/Heme/Allergies: Does not bruise/bleed easily. Psychiatric/Behavioral: The patient does not have insomnia. PHYSICAL EXAMINATION  Visit Vitals    /74    Pulse 92    Temp 98.5 °F (36.9 °C) (Oral)    Resp 18    Ht 5' 6\" (1.676 m)    Wt 233 lb (105.7 kg)    SpO2 98%    BMI 37.61 kg/m2         Accompanied by family member. Constitutional:  Well developed, well nourished, in [address]. Psychiatric: Affect is blunted  Integumentary: No rashes or abrasions noted on exposed areas. Cardiovascular/Peripheral Vascular: Intact l pulses. No peripheral edema is noted. Lymphatic:  No evidence of lymphedema. No cervical lymphadenopathy. SPINE/MUSCULOSKELETAL EXAM    Lumbar spine:  No rash, ecchymosis, or gross obliquity. No fasciculations. No focal atrophy is noted. Tenderness to palpation L4-5. Tenderness to palpation of RT sciatic notch. SI joints non-tender. Trochanters non tender.       Sensation grossly intact to light touch. MOTOR:       Hip Flex  Quads Hamstrings Ankle DF EHL Ankle PF   Right 4/5 4/5 4/5 4/5 4/5 4/5   Left +4/5 +4/5 +4/5 +4/5 +4/5 +4/5       Straight Leg raise + on the Rt at 45 degrees. Pt presents to the office in wheelchair. Written by Breonna Mejia, as dictated by Juan Steward MD.    I, Dr. Juan Stewadr MD, confirm that all documentation is accurate. Ms. Thom Lafleur may have a reminder for a \"due or due soon\" health maintenance. I have asked that she contact her primary care provider for follow-up on this health maintenance.

## 2017-05-08 NOTE — MR AVS SNAPSHOT
Visit Information Date & Time Provider Department Dept. Phone Encounter #  
 5/8/2017  3:00  [address],  [facility], [address] [facility] [phone] Follow-up Instructions Return for emg f/u. Your Appointments 6/2/2017 11:00 AM  
EMG with Heather Carranza MD  
[facility] [address]) Appt Note: Dr. Isidra Arambula and notes in cc  
 [facility] [address] [facility] [address] [phone]  
  
   
 [facility] [address] 7/17/2017  9:40 AM  
Follow Up with Heather Carranza MD  
[facility] [address]) Appt Note: 3mon f/u  
 [address] [facility] [address] [facility] [address] [phone]  
  
   
 [facility] [address] Upcoming Health Maintenance Date Due Hepatitis C Screening 1965 Pneumococcal 19-64 Medium Risk (1 of 1 - PPSV23) 7/1/1984 PAP AKA CERVICAL CYTOLOGY 7/1/1986 DTaP/Tdap/Td series (1 - Tdap) 5/7/1999 FOBT Q 1 YEAR AGE 50-75 7/1/2015 BREAST CANCER SCRN MAMMOGRAM 12/22/2016 INFLUENZA AGE 9 TO ADULT 8/1/2017 Allergies as of 5/8/2017  Review Complete On: 5/8/2017 By: Noe Rubin MD  
  
 Severity Noted Reaction Type Reactions Skelaxin [Metaxalone]  05/06/2010    Other (comments)  
 jittery Tramadol  03/22/2017    Other (comments) Halluctations Current Immunizations  Reviewed on 5/6/2010 Name Date  
 TD Vaccine 5/6/1999 Not reviewed this visit You Were Diagnosed With   
  
 Codes Comments Right leg weakness    -  Primary ICD-10-CM: R29.898 ICD-9-CM: 729.89 Chronic right SI joint pain     ICD-10-CM: M53.3, G89.29 ICD-9-CM: 724.6, 338.29 Protrusion of intervertebral disc of lumbosacral region     ICD-10-CM: M51.27 ICD-9-CM: 722.10 Vitals BP Pulse Temp Resp Height(growth percentile) Weight(growth percentile) 127/74 92 98.5 °F (36.9 °C) (Oral) 18 5' 6\" (1.676 m) 233 lb (105.7 kg) SpO2 BMI OB Status Smoking Status 98% 37.61 kg/m2 Hysterectomy Never Smoker BMI and BSA Data Body Mass Index Body Surface Area  
 37.61 kg/m 2 2.22 m 2 Preferred Pharmacy Pharmacy Name Phone [id number] [place], [address] [place] [address] Your Updated Medication List  
  
   
This list is accurate as of: 5/8/17  4:06 PM.  Always use your most recent med list.  
  
  
  
  
 acetaminophen-codeine 300-30 mg per tablet Commonly known as:  TYLENOL-CODEINE #3  
1 tab PO Q4-6 hours as needed for pain 1 week supply CYMBALTA 60 mg capsule Generic drug:  DULoxetine Take 60 mg by mouth nightly. docusate sodium 100 mg capsule Commonly known as:  Estephanie Ehrich Take 100 mg by mouth two (2) times a day. etodolac 300 mg capsule Commonly known as:  LODINE  
TAKE 1 CAPSULE BY MOUTH THREE TIMES DAILY AS NEEDED FOR PAIN WITH FOOD FERROUS SULFATE PO Take 325 mg by mouth. FIORICET -40 mg per tablet Generic drug:  butalbital-acetaminophen-caffeine Take 1 Tab by mouth two (2) times a day. furosemide 20 mg tablet Commonly known as:  LASIX Take  by mouth daily. levothyroxine 50 mcg tablet Commonly known as:  SYNTHROID  
TAKE 1 TABLET BY MOUTH EVERY DAY  
  
 meclizine 25 mg tablet Commonly known as:  ANTIVERT Take 25 mg by mouth daily as needed. potassium chloride SR 20 mEq tablet Commonly known as:  K-TAB Take 20 mEq by mouth daily. pravastatin 80 mg tablet Commonly known as:  PRAVACHOL Take 80 mg by mouth daily. * topiramate 50 mg tablet Commonly known as:  TOPAMAX Take 1 Tab by mouth two (2) times a day. Indications: MIGRAINE PREVENTION  
  
 * topiramate 100 mg tablet Commonly known as:  TOPAMAX Take 1 Tab by mouth two (2) times a day.  Indications: MIGRAINE PREVENTION  
  
 VITAMIN D3 1,000 unit Cap Generic drug:  cholecalciferol Take 2,000 Units by mouth daily. * Notice: This list has 2 medication(s) that are the same as other medications prescribed for you. Read the directions carefully, and ask your doctor or other care provider to review them with you. Prescriptions Printed Refills  
 acetaminophen-codeine (TYLENOL-CODEINE #3) 300-30 mg per tablet 0 Si tab PO Q4-6 hours as needed for pain 1 week supply Class: Print We Performed the Following REFERRAL TO PHYSICAL THERAPY [WHS81 Custom] Comments:  
 Eval/Treat LBP/RLE Weakness/tremors. Follow-up Instructions Return for emg f/u. To-Do List   
 05/15/2017 Neurology:  EMG ONE EXTREMITY LOWER RT Referral Information Referral ID Referred By Referred To  
  
 [id number] Palmer Camera Not Available Visits Status Start Date End Date 1 New Request 17 If your referral has a status of pending review or denied, additional information will be sent to support the outcome of this decision. Referral ID Referred By Referred To  
 [id number] Palmer Camera Not Available Visits Status Start Date End Date 1 New Request 17 If your referral has a status of pending review or denied, additional information will be sent to support the outcome of this decision. Patient Instructions Preventing Falls: Care Instructions Your Care Instructions Getting around your home safely can be a challenge if you have injuries or health problems that make it easy for you to fall. Loose rugs and furniture in walkways are among the dangers for many older people who have problems walking or who have poor eyesight. People who have conditions such as arthritis, osteoporosis, or dementia also have to be careful not to fall. You can make your home safer with a few simple measures. Follow-up care is a key part of your treatment and safety. Be sure to make and go to all appointments, and call your doctor if you are having problems. It's also a good idea to know your test results and keep a list of the medicines you take. How can you care for yourself at home? Taking care of yourself · You may get dizzy if you do not drink enough water. To prevent dehydration, drink plenty of fluids, enough so that your urine is light yellow or clear like water. Choose water and other caffeine-free clear liquids. If you have kidney, heart, or liver disease and have to limit fluids, talk with your doctor before you increase the amount of fluids you drink. · Exercise regularly to improve your strength, muscle tone, and balance. Walk if you can. Swimming may be a good choice if you cannot walk easily. · Have your vision and hearing checked each year or any time you notice a change. If you have trouble seeing and hearing, you might not be able to avoid objects and could lose your balance. · Know the side effects of the medicines you take. Ask your doctor or pharmacist whether the medicines you take can affect your balance. Sleeping pills or sedatives can affect your balance. · Limit the amount of alcohol you drink. Alcohol can impair your balance and other senses. · Ask your doctor whether calluses or corns on your feet need to be removed. If you wear loose-fitting shoes because of calluses or corns, you can lose your balance and fall. · Talk to your doctor if you have numbness in your feet. Preventing falls at home · Remove raised doorway thresholds, throw rugs, and clutter. Repair loose carpet or raised areas in the floor. · Move furniture and electrical cords to keep them out of walking paths. · Use nonskid floor wax, and wipe up spills right away, especially on ceramic tile floors. · If you use a walker or cane, put rubber tips on it.  If you use crutches, clean the bottoms of them regularly with an abrasive pad, such as steel wool. · Keep your house well lit, especially Catheline Penta, and outside walkways. Use night-lights in areas such as hallways and bathrooms. Add extra light switches or use remote switches (such as switches that go on or off when you clap your hands) to make it easier to turn lights on if you have to get up during the night. · Install sturdy handrails on stairways. · Move items in your cabinets so that the things you use a lot are on the lower shelves (about waist level). · Keep a cordless phone and a flashlight with new batteries by your bed. If possible, put a phone in each of the main rooms of your house, or carry a cell phone in case you fall and cannot reach a phone. Or, you can wear a device around your neck or wrist. You push a button that sends a signal for help. · Wear low-heeled shoes that fit well and give your feet good support. Use footwear with nonskid soles. Check the heels and soles of your shoes for wear. Repair or replace worn heels or soles. · Do not wear socks without shoes on wood floors. · Walk on the grass when the sidewalks are slippery. If you live in an area that gets snow and ice in the winter, sprinkle salt on slippery steps and sidewalks. Preventing falls in the bath · Install grab bars and nonskid mats inside and outside your shower or tub and near the toilet and sinks. · Use shower chairs and bath benches. · Use a hand-held shower head that will allow you to sit while showering. · Get into a tub or shower by putting the weaker leg in first. Get out of a tub or shower with your strong side first. 
· Repair loose toilet seats and consider installing a raised toilet seat to make getting on and off the toilet easier. · Keep your bathroom door unlocked while you are in the shower. Where can you learn more? Go to http://leesa-jean carlos.info/. Enter [phone] in the search box to learn more about \"Preventing Falls: Care Instructions. \" Current as of: August 4, 2016 Content Version: 11.2 © 2913-1955 Last [employer], [employer]. Care instructions adapted under license by [employer] (which disclaims liability or warranty for this information). If you have questions about a medical condition or this instruction, always ask your healthcare professional. [place] [address] any warranty or liability for your use of this information. Please provide this summary of care documentation to your next provider. Your primary care clinician is listed as [phone]. If you have any questions after today's visit, please call [phone].

## 2017-05-08 NOTE — PATIENT INSTRUCTIONS

## 2017-05-12 ENCOUNTER — HOSPITAL ENCOUNTER (OUTPATIENT)
Dept: PHYSICAL THERAPY | Age: 52
Discharge: HOME | End: 2017-05-12
Payer: COMMERCIAL

## 2017-05-12 PROCEDURE — 97163 PT EVAL HIGH COMPLEX 45 MIN: CPT

## 2017-05-12 PROCEDURE — 97110 THERAPEUTIC EXERCISES: CPT
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
## 2017-05-15 ENCOUNTER — HOSPITAL ENCOUNTER (OUTPATIENT)
Dept: PHYSICAL THERAPY | Age: 52
Discharge: HOME | End: 2017-05-15
Payer: COMMERCIAL

## 2017-05-15 PROCEDURE — 97110 THERAPEUTIC EXERCISES: CPT

## 2017-05-15 NOTE — PROGRESS NOTES
PT DAILY TREATMENT NOTE     Patient Name: Marely Henderson  Date:5/15/2017  : 1965  [x]  Patient  Verified  Payor: Esdras Han / Plan: Nick Teresa / Product Type: DANIEL /    In time:102  Out time:145  Total Treatment Time (min): 43  Visit #: 2 of     Treatment Area: Sacrococcygeal disorders, not elsewhere classified [M53.3]  Other chronic pain [G89.29]  Other intervertebral disc displacement, lumbosacral region [M51.27]  Unsteadiness on feet [R26.81]    SUBJECTIVE  Pain Level (0-10 scale): 0/10  Any medication changes, allergies to medications, adverse drug reactions, diagnosis change, or new procedure performed?: [x] No    [] Yes (see summary sheet for update)  Subjective functional status/changes:   [] No changes reported  Pt stated that she has no pain, but her R low back spasms sometimes    OBJECTIVE    Modality rationale: decrease pain and increase tissue extensibility to improve the patients ability to increase ease with ADLs   Min Type Additional Details    [] Estim:  []Unatt       []IFC  []Premod                        []Other:  []w/ice   []w/heat  Position:  Location:    [] Estim: []Att    []TENS instruct  []NMES                    []Other:  []w/US   []w/ice   []w/heat  Position:  Location:    []  Traction: [] Cervical       []Lumbar                       [] Prone          []Supine                       []Intermittent   []Continuous Lbs:  [] before manual  [] after manual    []  Ultrasound: []Continuous   [] Pulsed                           []1MHz   []3MHz W/cm2:  Location:    []  Iontophoresis with dexamethasone         Location: [] Take home patch   [] In clinic   10 []  Ice     [x]  heat  []  Ice massage  []  Laser   []  Anodyne Position:seated  Location:low back    []  Laser with stim  []  Other:  Position:  Location:    []  Vasopneumatic Device Pressure:       [] lo [] med [] hi   Temperature: [] lo [] med [] hi   [x] Skin assessment post-treatment:  [x]intact []redness- no adverse reaction    []redness  adverse reaction:     33 min Therapeutic Exercise:  [x] See flow sheet :   Rationale: increase ROM and increase strength to improve the patients ability to increase ease with ADLs    With   [x] TE   [] TA   [] neuro   [] other: Patient Education: [x] Review HEP    [] Progressed/Changed HEP based on:   [] positioning   [] body mechanics   [] transfers   [] heat/ice application    [] other:      Other Objective/Functional Measures:   Pt had tremors with TB hip abduction and heel raises  Needs HOB raised for supine exercises     Pain Level (0-10 scale) post treatment: 0/10    ASSESSMENT/Changes in Function:   Initiated exercises today per flow sheet. Pt reported being compliant with HEP. Pt put forth good effort with all exercises    Patient will continue to benefit from skilled PT services to address functional mobility deficits, address ROM deficits, address strength deficits and address imbalance/dizziness to attain remaining goals. [x]  See Plan of Care  []  See progress note/recertification  []  See Discharge Summary         Progress towards goals / Updated goals:  Short Term Goals: To be accomplished in 1-2 weeks:  1. Patient will be ind and compliant with HEP 1-2x/day to increase ease with ADLs. Goal met. 5/15/17  2. Patient will maintain standing balance on floor with EO and feet shoulder width apart for 20 sec without LOB to increase safety with standing activities. Long Term Goals: To be accomplished in 4 weeks:  1. Patient will improve FOTO to at least 42 to assist with return to PLOF. 2. Patient will perform 3 sit to stand tranfers with SBA to increase safety with getting in/out of car. 3. Patient will report avg pain no more then 3/10 to increase ease with household management.   4. Patient will ambulate 200 feet in clinic with [address] with SBA to increase safety with home navigation     PLAN  []  Upgrade activities as tolerated     [x]  Continue plan of care  []  Update interventions per flow sheet       []  Discharge due to:_  []  Other:_      Shonda Lehman, MANNIE 5/15/2017  1:14 PM    Future Appointments  Date Time Provider Renata Anahy   5/17/2017 3:00 PM Shonda Lehman PTA [facility] [address]   5/23/2017 1:00 PM Ana Villanueva, PT [id number] [address]   5/25/2017 2:00 PM Shonda Lehman PTA [facility] [address]   5/30/2017 2:30 PM Shonda Lehman PTA [facility] [address]   6/1/2017 2:30 PM Shonda Lehman PTA [facility] [address]   6/2/2017 11:00 AM Brady Davis  [facility]   6/6/2017 1:30 PM Shonda Lehman, PTA [facility] [address]   6/8/2017 11:00 AM Ana Villanueva, PT [id number] [address]   6/13/2017 1:00 PM Shonda Lehman PTA [facility] [address]   6/15/2017 11:30 AM Ana Villanueva, PT IUDXLLY [address]   6/21/2017 11:00 AM Jessica Alegre  [address] 7/17/2017 9:40 AM Brady Davis  [facility]

## 2017-05-17 ENCOUNTER — HOSPITAL ENCOUNTER (OUTPATIENT)
Dept: PHYSICAL THERAPY | Age: 52
Discharge: HOME | End: 2017-05-17
Payer: COMMERCIAL

## 2017-05-17 PROCEDURE — 97110 THERAPEUTIC EXERCISES: CPT

## 2017-05-17 NOTE — PROGRESS NOTES
PT DAILY TREATMENT NOTE     Patient Name: David Marinelli  Date:2017  : 1965  [x]  Patient  Verified  Payor: Yong Wilson / Plan: Thomas Guardian / Product Type: DANIEL /    In time:313  Out time:346  Total Treatment Time (min): 33  Visit #: 3 of     Treatment Area: Sacrococcygeal disorders, not elsewhere classified [M53.3]  Other chronic pain [G89.29]  Other intervertebral disc displacement, lumbosacral region [M51.27]  Unsteadiness on feet [R26.81]    SUBJECTIVE  Pain Level (0-10 scale): 6/10  Any medication changes, allergies to medications, adverse drug reactions, diagnosis change, or new procedure performed?: [x] No    [] Yes (see summary sheet for update)  Subjective functional status/changes:   [] No changes reported  Pt stated that she is having a bad day today because of the heat    OBJECTIVE    33 min Therapeutic Exercise:  [x] See flow sheet :   Rationale: increase ROM and increase strength to improve the patients ability to increase ease with ADLs    With   [x] TE   [] TA   [] neuro   [] other: Patient Education: [x] Review HEP    [] Progressed/Changed HEP based on:   [] positioning   [] body mechanics   [] transfers   [] heat/ice application    [] other:      Other Objective/Functional Measures:   Pt had significant difficulty trying to stand today and was very fatigued 2* heat  Needed to be brought back to the gym in a WC  Only performed seated exercises in the [facility] today  Pt had some increase in low back pain with seated exercises     Pain Level (0-10 scale) post treatment: 5/10    ASSESSMENT/Changes in Function:   Pt is making minimal progress toward goals. Pt cont with intermittent significant pain levels in her low back.  Pt has no been able to perform any standing exercises in therapy as of yet due to fatigue    Patient will continue to benefit from skilled PT services to modify and progress therapeutic interventions, address functional mobility deficits, address ROM deficits and address strength deficits to attain remaining goals. [x]  See Plan of Care  []  See progress note/recertification  []  See Discharge Summary         Progress towards goals / Updated goals:  Short Term Goals: To be accomplished in 1-2 weeks:  1. Patient will be ind and compliant with HEP 1-2x/day to increase ease with ADLs. Goal met. 5/15/17  2. Patient will maintain standing balance on floor with EO and feet shoulder width apart for 20 sec without LOB to increase safety with standing activities. Long Term Goals: To be accomplished in 4 weeks:  1. Patient will improve FOTO to at least 42 to assist with return to PLOF. 2. Patient will perform 3 sit to stand tranfers with SBA to increase safety with getting in/out of car. 3. Patient will report avg pain no more then 3/10 to increase ease with household management.   4. Patient will ambulate 200 feet in clinic with [address] with SBA to increase safety with home navigation     PLAN  []  Upgrade activities as tolerated     [x]  Continue plan of care  []  Update interventions per flow sheet       []  Discharge due to:_  []  Other:_      Nathalie Gaming PTA 5/17/2017  3:07 PM    Future Appointments  Date Time Provider Renata Higginbotham   5/23/2017 1:00 PM Chantal Walker, PT [facility]   5/25/2017 2:00 PM Nathalie Gaming, PTA [facility]   5/30/2017 2:30 PM Nathalie Gaming, PTA [id number] [facility]   6/1/2017 2:30 PM Ntahalie Gaming, [facility]   6/2/2017 11:00 AM Mariposa Ross  [facility]   6/6/2017 1:30 PM Nathalie Gaming, PTA [facility]   6/8/2017 11:00 AM Chantal Walker, PT [id number] [facility]   6/13/2017 1:00 PM Nathalie Gaming, PTA [facility]   6/15/2017 11:30 AM Chantal Walker, PT [id number] [facility]   6/21/2017 11:00 AM Jessica Hutchins  [address]   7/17/2017 9:40 AM Mariposa Ross  [facility]

## 2017-05-23 ENCOUNTER — HOSPITAL ENCOUNTER (OUTPATIENT)
Dept: PHYSICAL THERAPY | Age: 52
Discharge: HOME | End: 2017-05-23
Payer: COMMERCIAL

## 2017-05-23 PROCEDURE — 97110 THERAPEUTIC EXERCISES: CPT

## 2017-05-23 NOTE — PROGRESS NOTES
PT DAILY TREATMENT NOTE     Patient Name: Kartik Hylton  Date:2017  : 1965  [x]  Patient  Verified  Payor: Chocogustavo Fabians / Plan: Hina Mercer / Product Type: DANIEL /    In time:104  Out time:140  Total Treatment Time (min): 36  Visit #: 4 of     Treatment Area: Sacrococcygeal disorders, not elsewhere classified [M53.3]  Other chronic pain [G89.29]  Other intervertebral disc displacement, lumbosacral region [M51.27]  Unsteadiness on feet [R26.81]    SUBJECTIVE  Pain Level (0-10 scale): 6/10 Right LS. Any medication changes, allergies to medications, adverse drug reactions, diagnosis change, or new procedure performed?: [x] No    [] Yes (see summary sheet for update)  Subjective functional status/changes:   [] No changes reported  Reports today is not a good day because of weather. Reported she had a spinning dizziness last night when she turned to the right.   OBJECTIVE    Modality rationale: decrease pain to improve the patients ability to ease with ADL's   Min Type Additional Details    [] Estim:  []Unatt       []IFC  []Premod                        []Other:  []w/ice   []w/heat  Position:  Location:    [] Estim: []Att    []TENS instruct  []NMES                    []Other:  []w/US   []w/ice   []w/heat  Position:  Location:    []  Traction: [] Cervical       []Lumbar                       [] Prone          []Supine                       []Intermittent   []Continuous Lbs:  [] before manual  [] after manual    []  Ultrasound: []Continuous   [] Pulsed                           []1MHz   []3MHz W/cm2:  Location:    []  Iontophoresis with dexamethasone         Location: [] Take home patch   [] In clinic   10 []  Ice     [x]  heat  []  Ice massage  []  Laser   []  Anodyne Position:seated  Location:LS    []  Laser with stim  []  Other:  Position:  Location:    []  Vasopneumatic Device Pressure:       [] lo [] med [] hi   Temperature: [] lo [] med [] hi   [] Skin assessment post-treatment:  []intact []redness- no adverse reaction    []redness  adverse reaction:     26 min Therapeutic Exercise:  [] See flow sheet :   Rationale: increase ROM, increase strength and improve coordination to improve the patients ability to ease with Ambulation        With   [] TE   [] TA   [] neuro   [] other: Patient Education: [x] Review HEP    [] Progressed/Changed HEP based on:   [] positioning   [] body mechanics   [] transfers   [] heat/ice application    [] other:      Other Objective/Functional Measures: left weight shift was observed during sitting ex's. Pt was rocking back and forth to ease LS tension. LE Tremors observed during LAQ on right. Pt required rest breaks during LAQ. Hallpike moshe test was performed and was negative bilaterally. Pain Level (0-10 scale) post treatment: 4/10    ASSESSMENT/Changes in Function: Limited progress made in therapy so far. Poor tolerance to ex's. Pt reported pain to her right LS throughout today's session. Pt reported she was treated for dizziness and had positive results. Patient will continue to benefit from skilled PT services to modify and progress therapeutic interventions, address functional mobility deficits, address ROM deficits, address strength deficits, analyze and address soft tissue restrictions, analyze and cue movement patterns, analyze and modify body mechanics/ergonomics, assess and modify postural abnormalities, address imbalance/dizziness and instruct in home and community integration to attain remaining goals. []  See Plan of Care  []  See progress note/recertification  []  See Discharge Summary         Progress towards goals / Updated goals:  Short Term Goals: To be accomplished in 1-2 weeks:  1. Patient will be ind and compliant with HEP 1-2x/day to increase ease with ADLs. Goal met. 5/15/17  2. Patient will maintain standing balance on floor with EO and feet shoulder width apart for 20 sec without LOB to increase safety with standing activities.    Long Term Goals: To be accomplished in 4 weeks:  1. Patient will improve FOTO to at least 42 to assist with return to PLOF. 2. Patient will perform 3 sit to stand tranfers with SBA to increase safety with getting in/out of car. 3. Patient will report avg pain no more then 3/10 to increase ease with household management.   4. Patient will ambulate 200 feet in clinic with [address] with SBA to increase safety with home navigation        PLAN  []  Upgrade activities as tolerated     [x]  Continue plan of care  []  Update interventions per flow sheet       []  Discharge due to:_  []  Other:_      Sandor Lemon, PT 5/23/2017  1:12 PM    Future Appointments  Date Time Provider Renata Higginbotham   5/25/2017 2:00 PM Kevin Ocampo PTA [facility]   5/30/2017 2:30 PM Kevin Ocampo PTA [facility]   6/1/2017 2:30 PM Kevin Ocamop PTA [facility]   6/2/2017 11:00 AM July Das  [facility]   6/6/2017 1:30 PM Kevin Ocampo PTA [facility]   6/8/2017 11:00 AM Sandor Lemon, PT [id number] [facility]   6/13/2017 1:00 PM Kevin Ocampo PTA [facility]   6/15/2017 11:30 AM Sandor Lemon, PT [id number] [facility]   6/21/2017 11:00 AM Jessica Tomlin  [address]   7/17/2017 9:40 AM July Das  [facility]

## 2017-05-30 ENCOUNTER — HOSPITAL ENCOUNTER (OUTPATIENT)
Dept: PHYSICAL THERAPY | Age: 52
Discharge: HOME | End: 2017-05-30
Payer: COMMERCIAL

## 2017-05-30 PROCEDURE — 97110 THERAPEUTIC EXERCISES: CPT

## 2017-05-30 NOTE — PROGRESS NOTES
PT DAILY TREATMENT NOTE     Patient Name: Clementine Daniel  Date:2017  : 1965  [x]  Patient  Verified  Payor: Arun Low / Plan: Tayo Hussein / Product Type: DANIEL /    In time:235  Out time:301  Total Treatment Time (min): 26  Visit #: 5 of     Treatment Area: Sacrococcygeal disorders, not elsewhere classified [M53.3]  Other chronic pain [G89.29]  Other intervertebral disc displacement, lumbosacral region [M51.27]  Unsteadiness on feet [R26.81]    SUBJECTIVE  Pain Level (0-10 scale): 0/10  Any medication changes, allergies to medications, adverse drug reactions, diagnosis change, or new procedure performed?: [x] No    [] Yes (see summary sheet for update)  Subjective functional status/changes:   [] No changes reported  Pt stated that she is doing well today    OBJECTIVE    26 min Therapeutic Exercise:  [x] See flow sheet :   Rationale: increase ROM and increase strength to improve the patients ability to increase ease with ADLs    With   [x] TE   [] TA   [] neuro   [] other: Patient Education: [x] Review HEP    [] Progressed/Changed HEP based on:   [] positioning   [] body mechanics   [] transfers   [] heat/ice application    [] other:      Other Objective/Functional Measures:   Pt had no difficulty with ambulation today, had no tremors with walking or during exercises  No wheel chair needed today, pt amb to and from the waiting area without difficulty, but was CGA  No complaint of pain with exercises     Pain Level (0-10 scale) post treatment: 0/10    ASSESSMENT/Changes in Function:   Pt is slowly progressing toward goals. Pt had minimal difficulty with sit to stand transfers today. No pain when before or after session. Pt cont to ambulate with [facility]    Patient will continue to benefit from skilled PT services to modify and progress therapeutic interventions, address functional mobility deficits, address ROM deficits and address strength deficits to attain remaining goals.      [x]  See Plan of Care  []  See progress note/recertification  []  See Discharge Summary         Progress towards goals / Updated goals:  Short Term Goals: To be accomplished in 1-2 weeks:  1. Patient will be ind and compliant with HEP 1-2x/day to increase ease with ADLs. Goal met. 5/15/17  2. Patient will maintain standing balance on floor with EO and feet shoulder width apart for 20 sec without LOB to increase safety with standing activities. Long Term Goals: To be accomplished in 4 weeks:  1. Patient will improve FOTO to at least 42 to assist with return to PLOF. 2. Patient will perform 3 sit to stand tranfers with SBA to increase safety with getting in/out of car. 3. Patient will report avg pain no more then 3/10 to increase ease with household management.   4. Patient will ambulate 200 feet in clinic with [facility] with SBA to increase safety with home navigation     PLAN  []  Upgrade activities as tolerated     [x]  Continue plan of care  []  Update interventions per flow sheet       []  Discharge due to:_  []  Other:_      Jean-Paul Finch PTA 5/30/2017  2:39 PM    Future Appointments  Date Time Provider Renata Higginbotham   6/1/2017 2:30 PM Jean-Paul Finch PTA [facility]   6/2/2017 11:00 AM Griselda Wright  [facility]   6/6/2017 1:30 PM Jean-Paul Finch PTA [facility]   6/8/2017 11:00 AM Eleazar Horner, PT [id number] [facility]   6/13/2017 1:00 PM Jean-Paul Finch PTA [facility]   6/15/2017 11:30 AM Eleazar Horner, PT [id number] [facility]   6/21/2017 11:00 AM Jessica Chery  [address]   7/17/2017 9:40 AM Griselda Wright  [facility]

## 2017-06-01 ENCOUNTER — HOSPITAL ENCOUNTER (OUTPATIENT)
Dept: PHYSICAL THERAPY | Age: 52
Discharge: HOME | End: 2017-06-01
Payer: COMMERCIAL

## 2017-06-01 PROCEDURE — 97110 THERAPEUTIC EXERCISES: CPT

## 2017-06-01 NOTE — PROGRESS NOTES
PT DAILY TREATMENT NOTE     Patient Name: Clementine Daniel  Date:2017  : 1965  [x]  Patient  Verified  Payor: Arun Low / Plan: Tayo Hussein / Product Type: DANIEL /    In time:230  Out time:306  Total Treatment Time (min): 36  Visit #: 6 of     Treatment Area: Sacrococcygeal disorders, not elsewhere classified [M53.3]  Other chronic pain [G89.29]  Other intervertebral disc displacement, lumbosacral region [M51.27]  Unsteadiness on feet [R26.81]    SUBJECTIVE  Pain Level (0-10 scale): 0/10  Any medication changes, allergies to medications, adverse drug reactions, diagnosis change, or new procedure performed?: [x] No    [] Yes (see summary sheet for update)  Subjective functional status/changes:   [] No changes reported  Pt reported that she is shaky today as she has no eaten all day    OBJECTIVE    36 min Therapeutic Exercise:  [x] See flow sheet :   Rationale: increase ROM and increase strength to improve the patients ability to increase ease with ADLs    With   [x] TE   [] TA   [] neuro   [] other: Patient Education: [x] Review HEP    [] Progressed/Changed HEP based on:   [] positioning   [] body mechanics   [] transfers   [] heat/ice application    [] other:      Other Objective/Functional Measures:   Pt was given juice and peanut butter crackers as she has had nothing to eat all day  Pt has some increased pain in R knee with getting onto the mat table  Pt uses good form with exercises, but is very slow    Pain Level (0-10 scale) post treatment: 0/10    ASSESSMENT/Changes in Function:   Pt cont to slowly progress toward goals. Pt has difficulty with sit to stand transfers. Pt cont to have tremors which cause potential fall risk. Cont to use SPC with ambulation.  Pt had no reported pain for the past few visits    Patient will continue to benefit from skilled PT services to modify and progress therapeutic interventions, address functional mobility deficits, address ROM deficits and address strength deficits to attain remaining goals. [x]  See Plan of Care  []  See progress note/recertification  []  See Discharge Summary         Progress towards goals / Updated goals:  Short Term Goals: To be accomplished in 1-2 weeks:  1. Patient will be ind and compliant with HEP 1-2x/day to increase ease with ADLs. Goal met. 5/15/17  2. Patient will maintain standing balance on floor with EO and feet shoulder width apart for 20 sec without LOB to increase safety with standing activities. Long Term Goals: To be accomplished in 4 weeks:  1. Patient will improve FOTO to at least 42 to assist with return to PLOF. Progressing. Increased from 30 to 36. 6/1/17  2. Patient will perform 3 sit to stand tranfers with SBA to increase safety with getting in/out of car. 3. Patient will report avg pain no more then 3/10 to increase ease with household management. Progressing. Pt has had no pain for the past 2 visits. 6/1/17    4.  Patient will ambulate 200 feet in clinic with [facility] with SBA to increase safety with home navigation     PLAN  []  Upgrade activities as tolerated     [x]  Continue plan of care  []  Update interventions per flow sheet       []  Discharge due to:_  []  Other:_      Marilyn Quintero PTA 6/1/2017  2:56 PM    Future Appointments  Date Time Provider Renata Herzogi   6/2/2017 11:00 AM Amanda Garcia  E [facility]   6/6/2017 1:30 PM Marilyn Quintero PTA [id number]   6/8/2017 11:00 AM Yokasta LEE [facility]   6/13/2017 1:00 PM Marilyn Quintero PTA [facility]   6/15/2017 11:30 AM Marv Perdue, PT [facility]   6/21/2017 11:00 AM Jessica Alvarado  [address]   7/17/2017 9:40 AM Amanda Garcia  E [facility]

## 2017-06-02 ENCOUNTER — OFFICE VISIT (OUTPATIENT)
Dept: NEUROLOGY | Age: 52
End: 2017-06-02

## 2017-06-02 DIAGNOSIS — M54.31 SCIATICA OF RIGHT SIDE: ICD-10-CM

## 2017-06-02 DIAGNOSIS — G43.711 INTRACTABLE CHRONIC MIGRAINE WITHOUT AURA AND WITH STATUS MIGRAINOSUS: Primary | ICD-10-CM

## 2017-06-02 NOTE — PROGRESS NOTES
[address], [address]  [phone]      [address]    Neurophysiology Report      Patient: Yazmin Vogel     ID: [id number] Physician: Natasha George. Santiago Najera MD   Gender: Female Ref Phys: Shreya Powell MD   Handedness:      Study Date: June 2, 2017         Patient History: Radiating from her back for better than 20 years. Recent laminectomy with no help. On brief exam she has weakness throughout the entire right leg. Sensation is intact to pinprick.       Nerve Conduction Studies  Anti Sensory Summary Table     Stim Site NR Peak (ms) Norm Peak (ms) O-P Amp (µV) Norm O-P Amp Dist (cm) Delbert (m/s)   Left Sural Anti Sensory (Ankle)   Calf    4.0 <4.4 3.5 >6.0 14.0 43.8   Site 2    4.0  2.7      Right Sural Anti Sensory (Ankle)   Calf    3.9 <4.4 6.0 >6.0 14.0 45.2   Site 2    4.0  5.4      Site 3    4.0  4.9        Motor Summary Table     Stim Site NR Onset (ms) Norm Onset (ms) O-P Amp (mV) Norm O-P Amp Dist (cm) Delbert (m/s) Norm Delbert (m/s)   Left Peroneal Motor (EDB)   Ankle    3.1 <6.5 2.6 >2.0 33.0 45.2 >44   Fibula (Head)    10.4  1.6       Right Peroneal Motor (EDB)   Ankle    3.6 <6.5 3.0 >2.0 38.0 55.9 >44   Fibula (Head)    10.4  1.8       Left Tibial Motor (Abd Arndt Brev)   Ankle    4.8 <5.8 4.7 >4.0 43.0 66.2 >41   Knee    11.3  5.1       Right Tibial Motor (Abd Arndt Brev)   Ankle    4.3 <5.8 4.1 >4.0 42.0 57.5 >41   Knee    11.6  1.7           EMG     Side Muscle Nerve Root Ins Act Fibs Psw Fasc Amp Dur Poly Recrt Int Reg Proffer Comment   Right AntTibialis Dp Br Fibular L4-5 Nml Nml Nml None Nml Nml 0 Nml Nml    Right MedGastroc   Nml Nml Nml None Nml Nml 0 Nml 50%    Right VastusMed Femoral L2-4 Nml Nml Nml None Nml Nml 0 Nml Nml    Right BicepsFemS Sciatic L5-S1 Nml Nml Nml None Nml Nml 0 Nml Nml    Right ExtHallLong Dp Br Fibular L5, S1 Nml Nml Nml None Nml Nml 0 Nml Nml    Right Lumbo Parasp Up Rami L1-2 Nml Nml Nml          Right Lumbo Parasp Mid Rami L3-4 Nml Nml Nml          Right Lumbo Parasp Low Rami L5-S1 Nml Nml Nml            NCS/EMG FINDINGS:     Evaluation of the Left peroneal motor, the Right peroneal motor, the Left tibial motor, the Right tibial motor, the Left sural sensory, and the Right sural sensory nerves were unremarkable. INTERPRETATION: This was a mildly abnormal nerve conduction and EMG study showing it to be some slowing in the right tibial nerve motor action potentials which may indicate a mild sciatic neuropathy on the right side. This 59-year-old woman has had pain in her right leg      ___________________________  Natasha Dies.  Santiago Najera MD          Waveforms:                [facility]  OFFICE PROCEDURE PROGRESS NOTE        Chart reviewed for the following:   Indy Dawkins MD, have reviewed the History, Physical and updated the Allergic reactions for [address] performed immediately prior to start of procedure:   Indy Dawkins MD, have performed the following reviews on [facility] prior to the start of the procedure:            * Patient was identified by name and date of birth   * Agreement on procedure being performed was verified  * Risks and Benefits explained to the patient  * Procedure site verified and marked as necessary  * Patient was positioned for comfort  * Consent was signed and verified     Time: 1:16 PM    Date of procedure: 6/2/2017    Procedure performed by:  Matty Randhawa MD    Provider assisted by: Mary Kate Bolaños     Patient assisted by: self    How tolerated by patient: tolerated the procedure well with no complications    Post Procedural Pain Scale: 0 - No Hurt    Comments: none

## 2017-06-02 NOTE — COMMUNICATION BODY
[facility]  [address] [facility] [address], [address]  [phone]      [address]    Neurophysiology Report      Patient: Alexei Jaeger     ID: [id number] Physician: Jennifer Lugo. Taylor Kelley MD   Gender: Female Ref Phys: Jaz Boyd MD   Handedness:      Study Date: June 2, 2017         Patient History: Radiating from her back for better than 20 years. Recent laminectomy with no help. On brief exam she has weakness throughout the entire right leg. Sensation is intact to pinprick.       Nerve Conduction Studies  Anti Sensory Summary Table     Stim Site NR Peak (ms) Norm Peak (ms) O-P Amp (µV) Norm O-P Amp Dist (cm) Delbert (m/s)   Left Sural Anti Sensory (Ankle)   Calf    4.0 <4.4 3.5 >6.0 14.0 43.8   Site 2    4.0  2.7      Right Sural Anti Sensory (Ankle)   Calf    3.9 <4.4 6.0 >6.0 14.0 45.2   Site 2    4.0  5.4      Site 3    4.0  4.9        Motor Summary Table     Stim Site NR Onset (ms) Norm Onset (ms) O-P Amp (mV) Norm O-P Amp Dist (cm) Delbert (m/s) Norm Delbert (m/s)   Left Peroneal Motor (EDB)   Ankle    3.1 <6.5 2.6 >2.0 33.0 45.2 >44   Fibula (Head)    10.4  1.6       Right Peroneal Motor (EDB)   Ankle    3.6 <6.5 3.0 >2.0 38.0 55.9 >44   Fibula (Head)    10.4  1.8       Left Tibial Motor (Abd Arndt Brev)   Ankle    4.8 <5.8 4.7 >4.0 43.0 66.2 >41   Knee    11.3  5.1       Right Tibial Motor (Abd Arndt Brev)   Ankle    4.3 <5.8 4.1 >4.0 42.0 57.5 >41   Knee    11.6  1.7           EMG     Side Muscle Nerve Root Ins Act Fibs Psw Fasc Amp Dur Poly Recrt Int Chaka Anderson Comment   Right AntTibialis Dp Br Fibular L4-5 Nml Nml Nml None Nml Nml 0 Nml Nml    Right MedGastroc   Nml Nml Nml None Nml Nml 0 Nml 50%    Right VastusMed Femoral L2-4 Nml Nml Nml None Nml Nml 0 Nml Nml    Right BicepsFemS Sciatic L5-S1 Nml Nml Nml None Nml Nml 0 Nml Nml    Right ExtHallLong Dp Br Fibular L5, S1 Nml Nml Nml None Nml Nml 0 Nml Nml    Right Lumbo Parasp Up Rami L1-2 Nml Nml Nml          Right Lumbo Parasp Mid Rami L3-4 Nml Nml Nml          Right Lumbo Parasp Low Rami L5-S1 Nml Nml Nml            NCS/EMG FINDINGS:     Evaluation of the Left peroneal motor, the Right peroneal motor, the Left tibial motor, the Right tibial motor, the Left sural sensory, and the Right sural sensory nerves were unremarkable. INTERPRETATION: This was a mildly abnormal nerve conduction and EMG study showing it to be some slowing in the right tibial nerve motor action potentials which may indicate a mild sciatic neuropathy on the right side. This 59-year-old woman has had pain in her right leg      ___________________________  Iris Roes.  Devaughn Gardner MD          Waveforms:

## 2017-06-02 NOTE — LETTER
6/2/2017 1:17 PM 
 
Patient:  Garr Mcardle YOB: 1965 Date of Visit: 6/2/2017 Dear MD [address] [facility] [address] VIA Facsimile: [phone] [facility] [address], MD 
[address] [facility] [address] VIA In Basket 
 : Thank you for referring Ms. Garr Mcardle to me for evaluation/treatment. Below are the relevant portions of my assessment and plan of care. [facility] [address], [address] 
[phone]      [facility] Neurophysiology Report Patient: Mague Dickson ID: [id number] Physician: Narayan Anna. Tank Nuñez MD  
Gender: Female Ref Phys: Gemma Andres MD  
Handedness:     
Study Date: June 2, 2017 Patient History: Radiating from her back for better than 20 years. Recent laminectomy with no help. On brief exam she has weakness throughout the entire right leg. Sensation is intact to pinprick. Nerve Conduction Studies Anti Sensory Summary Table Stim Site NR Peak (ms) Norm Peak (ms) O-P Amp (µV) Norm O-P Amp Dist (cm) Delbert (m/s) Left Sural Anti Sensory (Ankle) Calf    4.0 <4.4 3.5 >6.0 14.0 43.8 Site 2    4.0  2.7 Right Sural Anti Sensory (Ankle) Calf    3.9 <4.4 6.0 >6.0 14.0 45.2 Site 2    4.0  5.4 Site 3    4.0  4.9 Motor Summary Table Stim Site NR Onset (ms) Norm Onset (ms) O-P Amp (mV) Norm O-P Amp Dist (cm) Delbert (m/s) Norm Delbert (m/s) Left Peroneal Motor (EDB) Ankle    3.1 <6.5 2.6 >2.0 33.0 45.2 >44 Fibula (Head)    10.4  1.6 Right Peroneal Motor (EDB) Ankle    3.6 <6.5 3.0 >2.0 38.0 55.9 >44 Fibula (Head)    10.4  1.8 Left Tibial Motor (Abd Santacruz) Ankle    4.8 <5.8 4.7 >4.0 43.0 66.2 >41 Knee    11.3  5.1 Right Tibial Motor (Abd Santacruz) Ankle    4.3 <5.8 4.1 >4.0 42.0 57.5 >41 Knee    11.6  1.7 EMG  Side Muscle Nerve Root Ins Act Fibs Psw Fasc Amp Dur Poly Recrt Int Millie Conway Comment Right AntTibialis Dp Br Fibular L4-5 Nml Nml Nml None Nml Nml 0 Nml Nml Right MedGastroc   Nml Nml Nml None Nml Nml 0 Nml 50% Right VastusMed Femoral L2-4 Nml Nml Nml None Nml Nml 0 Nml Nml Right BicepsFemS Sciatic L5-S1 Nml Nml Nml None Nml Nml 0 Nml Nml Right ExtHallLong Dp Br Fibular L5, S1 Nml Nml Nml None Nml Nml 0 Nml Nml Right Lumbo Parasp Up Rami L1-2 Nml Nml Nml Right Lumbo Parasp Mid Rami L3-4 Nml Nml Nml Right Lumbo Parasp Low Rami L5-S1 Nml Nml Nml NCS/EMG FINDINGS: 
 
? Evaluation of the Left peroneal motor, the Right peroneal motor, the Left tibial motor, the Right tibial motor, the Left sural sensory, and the Right sural sensory nerves were unremarkable. INTERPRETATION: This was a mildly abnormal nerve conduction and EMG study showing it to be some slowing in the right tibial nerve motor action potentials which may indicate a mild sciatic neuropathy on the right side. This 63-year-old woman has had pain in her right leg 
 
 
___________________________ Jayde Kelley MD 
 
 
 
 
Waveforms: If you have questions, please do not hesitate to call me. I look forward to following MsNicholas Abelardo along with you.  
 
 
 
Sincerely, 
 
 
Rosalind Alonso MD

## 2017-06-06 ENCOUNTER — APPOINTMENT (OUTPATIENT)
Dept: PHYSICAL THERAPY | Age: 52
End: 2017-06-06
Payer: COMMERCIAL

## 2017-06-12 ENCOUNTER — HOSPITAL ENCOUNTER (OUTPATIENT)
Dept: PHYSICAL THERAPY | Age: 52
Discharge: HOME | End: 2017-06-12
Payer: COMMERCIAL

## 2017-06-12 PROCEDURE — 97140 MANUAL THERAPY 1/> REGIONS: CPT

## 2017-06-12 PROCEDURE — 97110 THERAPEUTIC EXERCISES: CPT

## 2017-06-12 NOTE — PROGRESS NOTES
[facility] - Gautam Marshall  [address] [facility]  [phone] fax    Physical Therapy Discharge Summary    Patient name: Rama Batres Start of Care:  17   Referral source: Steve Galindo MD : 1965   Medical/Treatment Diagnosis: Sacrococcygeal disorders, not elsewhere classified [M53.3]  Other chronic pain [G89.29]  Other intervertebral disc displacement, lumbosacral region [M51.27]  Unsteadiness on feet [R26.81] Onset Date:      Prior Hospitalization: see medical history Provider#: [id number]   Medications: Verified on Patient Summary List    Comorbidities:  Back pain- Lumbar Fusion in 2015, BMI over 30, Depression, headaches, prior surgery, Stroke- dec 2016, asthma, thyroid issues   Prior Level of Function: Patient was using a SPC and walker prior to stroke. Visits from Start of Care: 7    Missed Visits: 3    Reporting Period : 17 to 17    Summary of Care:  Goal: Patient will improve FOTO to at least 42 to assist with return to PLOF. Status at last note/certification: 30  Status at discharge: not met    Goal: Patient will perform 3 sit to stand tranfers with SBA to increase safety with getting in/out of car. Status at last note/certification: unable   Status at discharge: not met    Goal: Patient will report avg pain no more then 3/10 to increase ease with household management. Status at last note/certification:   Status at discharge: not met    Goal: Patient will ambulate 200 feet in clinic with [facility] with SBA to increase safety with home navigation   Status at last note/certification: ambulates with SPC  Status at discharge: not met    Pt. Made limited progress with physical therapy. She attended 7 visits with no significant change in her symptoms. She continues to have increased pain with sit to stand transfers and with ambulation. She continues to have decreased R LE strength. She reports compliance with her HEP. Pt. Will be D/C at this time secondary to lack of progress towards goals.        ASSESSMENT/RECOMMENDATIONS:  [x]Discontinue therapy: []Patient has reached or is progressing toward set goals      []Patient is non-compliant or has abdicated      [x]Due to lack of appreciable progress towards set goals    Cipriano Cisneros, PT 6/12/2017 3:59 PM

## 2017-06-13 ENCOUNTER — APPOINTMENT (OUTPATIENT)
Dept: PHYSICAL THERAPY | Age: 52
End: 2017-06-13
Payer: COMMERCIAL

## 2017-06-15 ENCOUNTER — APPOINTMENT (OUTPATIENT)
Dept: PHYSICAL THERAPY | Age: 52
End: 2017-06-15
Payer: COMMERCIAL

## 2017-06-21 ENCOUNTER — OFFICE VISIT (OUTPATIENT)
Dept: ORTHOPEDIC SURGERY | Age: 52
End: 2017-06-21

## 2017-06-21 VITALS
RESPIRATION RATE: 18 BRPM | HEIGHT: 66 IN | OXYGEN SATURATION: 99 % | TEMPERATURE: 98.1 F | SYSTOLIC BLOOD PRESSURE: 119 MMHG | WEIGHT: 230.4 LBS | HEART RATE: 91 BPM | BODY MASS INDEX: 37.03 KG/M2 | DIASTOLIC BLOOD PRESSURE: 73 MMHG

## 2017-06-21 DIAGNOSIS — G57.01 SCIATIC NEUROPATHY, RIGHT: Primary | Chronic | ICD-10-CM

## 2017-06-21 DIAGNOSIS — M96.1 LUMBAR POST-LAMINECTOMY SYNDROME: ICD-10-CM

## 2017-06-21 PROBLEM — G57.00 SCIATIC NEUROPATHY: Chronic | Status: ACTIVE | Noted: 2017-06-21

## 2017-06-21 RX ORDER — ACETAMINOPHEN AND CODEINE PHOSPHATE 300; 30 MG/1; MG/1
1 TABLET ORAL
Qty: 30 TAB | Refills: 1 | Status: SHIPPED | OUTPATIENT
Start: 2017-06-21 | End: 2018-06-25 | Stop reason: SDUPTHER

## 2017-06-21 RX ORDER — KETOROLAC TROMETHAMINE 30 MG/ML
60 INJECTION, SOLUTION INTRAMUSCULAR; INTRAVENOUS ONCE
Qty: 2 ML | Refills: 0
Start: 2017-06-21 | End: 2017-06-21

## 2017-06-21 NOTE — PROGRESS NOTES
RISHIB ENTERED PER DR. Taya Jose AS DOCUMENTED ON BLUE SHEET: TYLENOL #3 1 TAB PO QHS PRN SEVERE PAIN #30 RF 1; REFERRAL TO [facility]; TORADOL 60MG IM X 1 NOW. After obtaining consent, and per orders of Dr. Taya Jose, injection of TORADOL 60 MG given by Spenser Salazar LPN. Patient instructed to report any adverse reaction to me immediately. PT TOLERATED WELL AND WITHOUT INCIDENT.

## 2017-06-21 NOTE — PATIENT INSTRUCTIONS
Neuropathic Pain: Care Instructions  Your Care Instructions  Neuropathic pain is caused by pressure on or damage to your nerves. It's often simply called nerve pain. Some people feel this type of pain all the time. For others, it comes and goes. Diabetes, shingles, or an injury can cause nerve pain. Many people say the pain feels sharp, burning, or stabbing. But some people feel it as a dull ache. In some cases, it makes your skin very sensitive. So touch, pressure, and other sensations that did not hurt before may now cause pain. It's important to know that this kind of pain is real and can affect your quality of life. It's also important to know that treatment can help. Treatment includes pain medicines, exercise, and physical therapy. Medicines can help reduce the number of pain signals that travel over the nerves. This can make the painful areas less sensitive. It can also help you sleep better and improve your mood. But medicines are only one part of successful treatment. Most people do best with more than one kind of treatment. Your doctor may recommend that you try cognitive-behavioral therapy and stress management. Or, if needed, you may decide to try to quit smoking, lower your blood pressure, or better control blood sugar. These kinds of healthy changes can also make a difference. If you feel that your treatment is not working, talk to your doctor. And be sure to tell your doctor if you think you might be depressed or anxious. These are common problems that can also be treated. Follow-up care is a key part of your treatment and safety. Be sure to make and go to all appointments, and call your doctor if you are having problems. It's also a good idea to know your test results and keep a list of the medicines you take. How can you care for yourself at home? · Be safe with medicines. Read and follow all instructions on the label.   ¨ If the doctor gave you a prescription medicine for pain, take it as prescribed. ¨ If you are not taking a prescription pain medicine, ask your doctor if you can take an over-the-counter medicine. · Save hard tasks for days when you have less pain. Follow a hard task with an easy task. And remember to take breaks. · Relax, and reduce stress. You may want to try deep breathing or meditation. These can help. · Keep moving. Gentle, daily exercise can help reduce pain. Your doctor or physical therapist can tell you what type of exercise is best for you. This may include walking, swimming, and stationary biking. It may also include stretches and range-of-motion exercises. · Try heat, cold packs, and massage. · Get enough sleep. Constant pain can make you more tired. If the pain makes it hard to sleep, talk with your doctor. · Think positively. Your thoughts can affect your pain. Do fun things to distract yourself from the pain. See a movie, read a book, listen to music, or spend time with a friend. · Keep a pain diary. Try to write down how strong your pain is and what it feels like. Also try to notice and write down how your moods, thoughts, sleep, activities, and medicine affect your pain. These notes can help you and your doctor find the best ways to treat your pain. Reducing constipation caused by pain medicine  Pain medicines often cause constipation. To reduce constipation:  · Include fruits, vegetables, beans, and whole grains in your diet each day. These foods are high in fiber. · Drink plenty of fluids, enough so that your urine is light yellow or clear like water. If you have kidney, heart, or liver disease and have to limit fluids, talk with your doctor before you increase the amount of fluids you drink. · Get some exercise every day. Build up slowly to 30 to 60 minutes a day on 5 or more days of the week. · Take a fiber supplement, such as Citrucel or Metamucil, every day if needed. Read and follow all instructions on the label.   · Schedule time each day for a bowel movement. Having a daily routine may help. Take your time and do not strain when having a bowel movement. · Ask your doctor about a laxative. The goal is to have one easy bowel movement every 1 to 2 days. Do not let constipation go untreated for more than 3 days. When should you call for help? Call your doctor now or seek immediate medical care if:  · You feel sad, anxious, or hopeless for more than a few days. This could mean you are depressed. Depression is common in people who have a lot of pain. But it can be treated. · You have trouble with bowel movements, such as:  ¨ No bowel movement in 3 days. ¨ Blood in the anal area, in your stool, or on the toilet paper. ¨ Diarrhea for more than 24 hours. Watch closely for changes in your health, and be sure to contact your doctor if:  · Your pain is getting worse. · You can't sleep because of pain. · You are very worried or anxious about your pain. · You have trouble taking your pain medicine. · You have any concerns about your pain medicine or its side effects. · You have vomiting or cramps for more than 2 hours. Where can you learn more? Go to http://leesa-jean carlos.info/. Enter R927 in the search box to learn more about \"Neuropathic Pain: Care Instructions. \"  Current as of: October 14, 2016  Content Version: 11.3  © 6344-1785 [employer]. Care instructions adapted under license by [employer] (which disclaims liability or warranty for this information). If you have questions about a medical condition or this instruction, always ask your healthcare professional. [place] [address] any warranty or liability for your use of this information.

## 2017-06-21 NOTE — PROGRESS NOTES
[address], [address]  [place], [facility] [address]  Phone: [phone]  Fax: [phone]        Kathy Zhou  : 1965  PCP: Heron Damon MD    PROGRESS NOTE      ASSESSMENT AND PLAN    [address] was seen today for back pain. Diagnoses and all orders for this visit:    Sciatic neuropathy, right,  by EMG '17  -     acetaminophen-codeine (TYLENOL-CODEINE #3) 300-30 mg per tablet; Take 1 Tab by mouth nightly as needed for Pain (SEVERE PAIN). Max Daily Amount: 1 Tab.  -     REFERRAL TO PAIN MANAGEMENT  -     ketorolac tromethamine (TORADOL) 60 mg/2 mL soln; 2 mL by IntraMUSCular route once for 1 dose. -     KETOROLAC TROMETHAMINE INJ (Qty 4)  -     THER/PROPH/DIAG INJECTION, SUBCUT/IM    Lumbar post-laminectomy syndrome    1. Continue Cymbalta, Topamax, and Tylenol #3.   2. Referral to Pain Management for further management. 3. Released from spine for routine f/u.   4. Given care instructions for neuropathic pain. Follow-up Disposition:  Return if symptoms worsen or fail to improve. HISTORY OF PRESENT ILLNESS  Suma Hendricks is a 46 y.o. female. Pt presents to the office for a f/u visit for back pain and RT leg weakness. Last visit she was sent for an EMG of RLE and physical therapy. Reports PT made her worse. Her pain starts in her RT buttock and radiates down her leg. She denies any symptoms in her LLE. Pt continues to have weakness in her RLE. She notes that she has a short standing and walking tolerance. Spinal injections do not give her any benefit. She will also have tremors in her leg with standing. No saddle paresthesia. She takes Topamax 150 mg BID for migraines. She takes Cymbalta 60 mg QHS. Pt does not find any relief in her sciatic pain from her current medication regimen. She takes Tylenol #3 PRN, mainly QHS. She is unable to tolerate Tramadol as it causes her to hallucinate.  Pt denies any dizziness, confusion, uncontrolled constipation, and cravings due to controlled substances. She has not been to Pain Management before. Denies persistent fevers, chills, weight changes, neurogenic bowel or bladder symptoms. Pt denies recent ED visits or hospitalizations. Pt denies any recent GI ulcers, bleeds or renal dysfucntion. EMG from Dr. Teena Kilpatrick 6/2/17 reviewed:  INTERPRETATION: This was a mildly abnormal nerve conduction and EMG study showing it to be some slowing in the right tibial nerve motor action potentials which may indicate a mild sciatic neuropathy on the right side. This 51-year-old woman has had pain in her right leg    Pain Assessment  6/21/2017   Location of Pain Back;Leg   Location Modifiers Right;Left   Severity of Pain 7   Quality of Pain Aching   Quality of Pain Comment numbness, tingling   Duration of Pain -   Frequency of Pain Constant   Date Pain First Started -   Aggravating Factors Walking;Standing;Bending;Kneeling   Aggravating Factors Comment -   Limiting Behavior -   Relieving Factors Nothing   Relieving Factors Comment -   Result of Injury -       PAST MEDICAL HISTORY   Past Medical History:   Diagnosis Date    Asthma     Back pain with radiation     Cardiac echocardiogram, ltd study 12/29/2016    EF 55-60%. No WMA. Right to left atrial septal shunt suggests PFO. Similar to study of 10/18/16.  Cardiovascular LE venous duplex 10/18/2016    No DVT bilaterally.  Contact dermatitis and other eczema, due to unspecified cause     CTS (carpal tunnel syndrome)     HSV-2 (herpes simplex virus 2) infection     Hypercholesterolemia     Hypothyroidism     Ill-defined condition     Vertigo    L4-L5 disc bulge     Leg swelling     Migraines     Positive PPD, treated     S/P lumbar fusion 1/13/2016    1.  L4-5 bilateral hemilaminotomy, medial facetectomy, foraminotomy, diskectomy L4-5. 2. Transforaminal lumbar interbody fusion with PEEK cage and demineralized bone graft L4-L5 posterolateral fusion. 3. Segmental spinal instrumentation, DePuy Expedium type L4-L5. 01/13/2016 By Dr. Ciara Underwood     EMG '17 , mild sciatic EMG findings    Stroke [facility])     12/2016       Past Surgical History:   Procedure Laterality Date    HX GYN      partial hysterectomy due to abnormal pap    HX LUMBAR LAMINECTOMY  01-13-16    L4/5   . MEDICATIONS      Current Outpatient Prescriptions   Medication Sig Dispense Refill    acetaminophen-codeine (TYLENOL-CODEINE #3) 300-30 mg per tablet Take 1 Tab by mouth nightly as needed for Pain (SEVERE PAIN). Max Daily Amount: 1 Tab. 30 Tab 1    ketorolac tromethamine (TORADOL) 60 mg/2 mL soln 2 mL by IntraMUSCular route once for 1 dose. 2 mL 0    acetaminophen-codeine (TYLENOL-CODEINE #3) 300-30 mg per tablet 1 tab PO Q4-6 hours as needed for pain  1 week supply 30 Tab 0    topiramate (TOPAMAX) 50 mg tablet Take 1 Tab by mouth two (2) times a day. Indications: MIGRAINE PREVENTION 60 Tab 6    topiramate (TOPAMAX) 100 mg tablet Take 1 Tab by mouth two (2) times a day. Indications: MIGRAINE PREVENTION 60 Tab 6    furosemide (LASIX) 20 mg tablet Take  by mouth daily.  docusate sodium (COLACE) 100 mg capsule Take 100 mg by mouth two (2) times a day.  FERROUS SULFATE PO Take 325 mg by mouth.  potassium chloride SR (K-TAB) 20 mEq tablet Take 20 mEq by mouth daily.  pravastatin (PRAVACHOL) 80 mg tablet Take 80 mg by mouth daily.  etodolac (LODINE) 300 mg capsule TAKE 1 CAPSULE BY MOUTH THREE TIMES DAILY AS NEEDED FOR PAIN WITH FOOD  2    levothyroxine (SYNTHROID) 50 mcg tablet TAKE 1 TABLET BY MOUTH EVERY DAY  3    cholecalciferol (VITAMIN D3) 1,000 unit cap Take 2,000 Units by mouth daily.  meclizine (ANTIVERT) 25 mg tablet Take 25 mg by mouth daily as needed. 3    butalbital-acetaminophen-caffeine (FIORICET) -40 mg per tablet Take 1 Tab by mouth two (2) times a day.       DULoxetine (CYMBALTA) 60 mg capsule Take 60 mg by mouth nightly. ALLERGIES    Allergies   Allergen Reactions    Skelaxin [Metaxalone] Other (comments)     jittery    Tramadol Other (comments)     Halluctations          SOCIAL HISTORY    Social History     Social History    Marital status:      Spouse name: N/A    Number of children: N/A    Years of education: N/A     Occupational History    unemployed      long term disability     Social History Main Topics    Smoking status: Never Smoker    Smokeless tobacco: Never Used    Alcohol use No    Drug use: No    Sexual activity: Not on file      Comment: Hysterectomy     Other Topics Concern    Not on file     Social History Narrative     Social History Narrative      Problem Relation Age of Onset    Diabetes Mother     Elevated Lipids Mother     Hypertension Mother     Cancer Father      pancreatic    Diabetes Sister     Hypertension Sister     Diabetes Brother     Diabetes Daughter     Hypertension Daughter        REVIEW OF SYSTEMS  Review of Systems   Constitutional: Negative for chills, fever and weight loss. Respiratory: Negative for shortness of breath. Cardiovascular: Negative for chest pain. Gastrointestinal: Negative for constipation. Negative for fecal incontinence   Genitourinary: Negative for dysuria. Negative for urinary incontinence   Musculoskeletal:        Per HPI   Skin: Negative for rash. Neurological: Positive for tingling, tremors and focal weakness. Negative for dizziness and headaches. Endo/Heme/Allergies: Does not bruise/bleed easily. Psychiatric/Behavioral: The patient does not have insomnia. PHYSICAL EXAMINATION  Visit Vitals    /73    Pulse 91    Temp 98.1 °F (36.7 °C) (Oral)    Resp 18    Ht 5' 6\" (1.676 m)    Wt 230 lb 6.4 oz (104.5 kg)    SpO2 99%    BMI 37.19 kg/m2         Accompanied by self. Constitutional:  Well developed, well nourished, in no acute distress.    Psychiatric: Affect blunted  Integumentary: No rashes or abrasions noted on exposed areas. Cardiovascular/Peripheral Vascular: Intact l pulses. No peripheral edema is noted. Lymphatic:  No evidence of lymphedema. No cervical lymphadenopathy. SPINE/MUSCULOSKELETAL EXAM    Lumbar spine:  No rash, ecchymosis, or gross obliquity. No fasciculations. No focal atrophy is noted. TTP R at the sciatic notch. SI joints non-tender. Trochanters non tender. Diminished sensation to light touch RT diffusely of RLE. MOTOR:       Hip Flex  Quads Hamstrings Ankle DF EHL Ankle PF   Right 4/5 4/5 4/5 4/5 4/5 4/5   Left +4/5 +4/5 +4/5 +4/5 +4/5 +4/5       Ambulation with walker. Written by Robert Dempsey, as dictated by Seven Beckett MD.    I, Dr. Seven Beckett MD, confirm that all documentation is accurate. Ms. Alen Cmapbell may have a reminder for a \"due or due soon\" health maintenance. I have asked that she contact her primary care provider for follow-up on this health maintenance.

## 2017-07-02 ENCOUNTER — HOSPITAL ENCOUNTER (EMERGENCY)
Age: 52
Discharge: HOME | End: 2017-07-02
Attending: EMERGENCY MEDICINE
Payer: COMMERCIAL

## 2017-07-02 VITALS
HEART RATE: 103 BPM | TEMPERATURE: 99.5 F | SYSTOLIC BLOOD PRESSURE: 120 MMHG | OXYGEN SATURATION: 98 % | DIASTOLIC BLOOD PRESSURE: 98 MMHG | RESPIRATION RATE: 16 BRPM

## 2017-07-02 DIAGNOSIS — J45.21 MILD INTERMITTENT ASTHMA WITH ACUTE EXACERBATION: Primary | ICD-10-CM

## 2017-07-02 PROCEDURE — 74011000250 HC RX REV CODE- 250: Performed by: EMERGENCY MEDICINE

## 2017-07-02 PROCEDURE — 94640 AIRWAY INHALATION TREATMENT: CPT

## 2017-07-02 PROCEDURE — 99284 EMERGENCY DEPT VISIT MOD MDM: CPT

## 2017-07-02 PROCEDURE — 77030029684 HC NEB SM VOL KT MONA -A

## 2017-07-02 PROCEDURE — 74011250636 HC RX REV CODE- 250/636

## 2017-07-02 PROCEDURE — 74011250636 HC RX REV CODE- 250/636: Performed by: EMERGENCY MEDICINE

## 2017-07-02 RX ORDER — ALBUTEROL SULFATE 90 UG/1
1 AEROSOL, METERED RESPIRATORY (INHALATION)
Qty: 1 INHALER | Refills: 0 | Status: SHIPPED | OUTPATIENT
Start: 2017-07-02

## 2017-07-02 RX ORDER — MIDAZOLAM HYDROCHLORIDE 1 MG/ML
INJECTION, SOLUTION INTRAMUSCULAR; INTRAVENOUS
Status: DISCONTINUED
Start: 2017-07-02 | End: 2017-07-02 | Stop reason: HOSPADM

## 2017-07-02 RX ORDER — FENTANYL CITRATE 50 UG/ML
INJECTION, SOLUTION INTRAMUSCULAR; INTRAVENOUS
Status: DISCONTINUED
Start: 2017-07-02 | End: 2017-07-02 | Stop reason: HOSPADM

## 2017-07-02 RX ORDER — IPRATROPIUM BROMIDE AND ALBUTEROL SULFATE 2.5; .5 MG/3ML; MG/3ML
3 SOLUTION RESPIRATORY (INHALATION)
Status: COMPLETED | OUTPATIENT
Start: 2017-07-02 | End: 2017-07-02

## 2017-07-02 RX ADMIN — IPRATROPIUM BROMIDE AND ALBUTEROL SULFATE 3 ML: 2.5; .5 SOLUTION RESPIRATORY (INHALATION) at 18:15

## 2017-07-02 RX ADMIN — DEXAMETHASONE INTENSOL 10 MG: 1 SOLUTION, CONCENTRATE ORAL at 19:08

## 2017-07-02 NOTE — ED NOTES
Assumed care of patient from [place]. Pt is AOX4; pt is in gown, on stretcher, on monitor. Pt has family bedside. Pt is in no distress at this time. Pt has productive cough. Pt is awaiting further evaluation from MD. Pt has side rails up and call bell within reach.

## 2017-07-02 NOTE — ED PROVIDER NOTES
HPI Comments: 6:16 PM Tomas is a 46 y.o. female with a hx of Asthama, HCL, and CVA who presents to the ED c/o SOB x 3 days. She is also complaining of cough and abd pain secondary to cough. The pt states that her sx feel like an asthma attack. Pt notes that she was using her home inhaler with no relief. No other complaints or concerns at this time. PCP:  Ata Cantrell MD      The history is provided by the patient. Past Medical History:   Diagnosis Date    Asthma     Back pain with radiation     Cardiac echocardiogram, ltd study 12/29/2016    EF 55-60%. No WMA. Right to left atrial septal shunt suggests PFO. Similar to study of 10/18/16.  Cardiovascular LE venous duplex 10/18/2016    No DVT bilaterally.  Contact dermatitis and other eczema, due to unspecified cause     CTS (carpal tunnel syndrome)     HSV-2 (herpes simplex virus 2) infection     Hypercholesterolemia     Hypothyroidism     Ill-defined condition     Vertigo    L4-L5 disc bulge     Leg swelling     Migraines     Positive PPD, treated     S/P lumbar fusion 1/13/2016    1. L4-5 bilateral hemilaminotomy, medial facetectomy, foraminotomy, diskectomy L4-5. 2. Transforaminal lumbar interbody fusion with PEEK cage and demineralized bone graft L4-L5 posterolateral fusion.  3. Segmental spinal instrumentation, DePuy Expedium type L4-L5. 01/13/2016 By Dr. Rico Kingsley     EMG '17 , mild sciatic EMG findings    Stroke [facility])     12/2016       Past Surgical History:   Procedure Laterality Date    HX GYN      partial hysterectomy due to abnormal pap    HX LUMBAR LAMINECTOMY  01-13-16    L4/5         Family History:   Problem Relation Age of Onset    Diabetes Mother     Elevated Lipids Mother     Hypertension Mother     Cancer Father      pancreatic    Diabetes Sister     Hypertension Sister     Diabetes Brother     Diabetes Daughter     Hypertension Daughter        Social History     Social History  Marital status:      Spouse name: N/A    Number of children: N/A    Years of education: N/A     Occupational History    unemployed      long term disability     Social History Main Topics    Smoking status: Never Smoker    Smokeless tobacco: Never Used    Alcohol use No    Drug use: No    Sexual activity: Not on file      Comment: Hysterectomy     Other Topics Concern    Not on file     Social History Narrative         ALLERGIES: Skelaxin [metaxalone] and Tramadol    Review of Systems   Respiratory: Positive for cough and shortness of breath. Gastrointestinal: Positive for abdominal pain. All other systems reviewed and are negative. There were no vitals filed for this visit. Physical Exam   Constitutional: She is oriented to person, place, and time. She appears well-developed. HENT:   Head: Normocephalic and atraumatic. Eyes: EOM are normal. Pupils are equal, round, and reactive to light. Neck: Normal range of motion. Neck supple. Cardiovascular: Normal rate, regular rhythm and normal heart sounds. Exam reveals no friction rub. No murmur heard. Pulmonary/Chest: Effort normal and breath sounds normal. No respiratory distress. She has no wheezes. Abdominal: Soft. She exhibits no distension. There is no tenderness. There is no rebound and no guarding. Musculoskeletal: Normal range of motion. Neurological: She is alert and oriented to person, place, and time. Skin: Skin is warm and dry. Psychiatric: She has a normal mood and affect. Her behavior is normal. Thought content normal.        MDM  Number of Diagnoses or Management Options  Mild intermittent asthma with acute exacerbation:   Diagnosis management comments: 46year old female presents with asthm exacerbation x3 days. Given neb and steroids; feeling better will d/c   No steroid rx; will  Give 1 dose decadron.      ED Course       Procedures    SCRIBE ATTESTATION STATEMENT  Documented by: Nanda Cornejo Jez scribing for, and in the presence of, Mady Estrella MD 07/02/17 6:20 PM     Signed by: Tye Rodriguez, 07/02/17 6:20 PM     PROVIDER ATTESTATION STATEMENT  I personally performed the services described in the documentation, reviewed the documentation, as recorded by the scribe in my presence, and it accurately and completely records my words and actions.   Mady Estrella MD

## 2017-07-02 NOTE — ED TRIAGE NOTES
PT pulled out of family vehicle, daughter stating Lia Joe mom is having an asthma attack\", Ax4, labored and tachypneic, no wheezes ausculated, patient states she has been having difficulty breathing x3 days and has been using her home inhaler but \"not working\"

## 2017-07-02 NOTE — DISCHARGE INSTRUCTIONS

## 2017-07-03 NOTE — ED NOTES
Reviewed DC instructions with patient. Pt verbalized an understanding. Pt left ED with 1 prescription. Pt instructed to follow up with PCP this week. Pt signed paper DC instructions; RN placed in scan bin. Pt left ED with no distress noted. Pt ambulated to waiting room. Pt left ED with family and all belongings.

## 2017-07-23 ENCOUNTER — HOSPITAL ENCOUNTER (EMERGENCY)
Age: 52
Discharge: HOME | End: 2017-07-23
Attending: EMERGENCY MEDICINE | Admitting: EMERGENCY MEDICINE
Payer: COMMERCIAL

## 2017-07-23 ENCOUNTER — APPOINTMENT (OUTPATIENT)
Dept: GENERAL RADIOLOGY | Age: 52
End: 2017-07-23
Attending: EMERGENCY MEDICINE
Payer: COMMERCIAL

## 2017-07-23 VITALS
BODY MASS INDEX: 36.96 KG/M2 | DIASTOLIC BLOOD PRESSURE: 43 MMHG | OXYGEN SATURATION: 99 % | HEART RATE: 98 BPM | RESPIRATION RATE: 26 BRPM | WEIGHT: 230 LBS | TEMPERATURE: 99 F | HEIGHT: 66 IN | SYSTOLIC BLOOD PRESSURE: 105 MMHG

## 2017-07-23 DIAGNOSIS — J20.9 ACUTE BRONCHITIS, UNSPECIFIED ORGANISM: Primary | ICD-10-CM

## 2017-07-23 LAB
ALBUMIN SERPL BCP-MCNC: 3.3 G/DL (ref 3.4–5)
ALBUMIN/GLOB SERPL: 0.7 {RATIO} (ref 0.8–1.7)
ALP SERPL-CCNC: 117 U/L (ref 45–117)
ALT SERPL-CCNC: 19 U/L (ref 13–56)
ANION GAP BLD CALC-SCNC: 7 MMOL/L (ref 3–18)
AST SERPL W P-5'-P-CCNC: 15 U/L (ref 15–37)
BASOPHILS # BLD AUTO: 0 K/UL (ref 0–0.06)
BASOPHILS # BLD: 1 % (ref 0–2)
BILIRUB SERPL-MCNC: 0.3 MG/DL (ref 0.2–1)
BUN SERPL-MCNC: 16 MG/DL (ref 7–18)
BUN/CREAT SERPL: 14 (ref 12–20)
CALCIUM SERPL-MCNC: 8.7 MG/DL (ref 8.5–10.1)
CHLORIDE SERPL-SCNC: 109 MMOL/L (ref 100–108)
CO2 SERPL-SCNC: 25 MMOL/L (ref 21–32)
CREAT SERPL-MCNC: 1.11 MG/DL (ref 0.6–1.3)
DIFFERENTIAL METHOD BLD: ABNORMAL
EOSINOPHIL # BLD: 0.2 K/UL (ref 0–0.4)
EOSINOPHIL NFR BLD: 2 % (ref 0–5)
ERYTHROCYTE [DISTWIDTH] IN BLOOD BY AUTOMATED COUNT: 15.1 % (ref 11.6–14.5)
GLOBULIN SER CALC-MCNC: 4.6 G/DL (ref 2–4)
GLUCOSE SERPL-MCNC: 107 MG/DL (ref 74–99)
HCT VFR BLD AUTO: 36.5 % (ref 35–45)
HGB BLD-MCNC: 11.7 G/DL (ref 12–16)
LYMPHOCYTES # BLD AUTO: 51 % (ref 21–52)
LYMPHOCYTES # BLD: 4.4 K/UL (ref 0.9–3.6)
MCH RBC QN AUTO: 23.4 PG (ref 24–34)
MCHC RBC AUTO-ENTMCNC: 32.1 G/DL (ref 31–37)
MCV RBC AUTO: 73 FL (ref 74–97)
MONOCYTES # BLD: 0.8 K/UL (ref 0.05–1.2)
MONOCYTES NFR BLD AUTO: 10 % (ref 3–10)
NEUTS SEG # BLD: 3 K/UL (ref 1.8–8)
NEUTS SEG NFR BLD AUTO: 36 % (ref 40–73)
PLATELET # BLD AUTO: 314 K/UL (ref 135–420)
PMV BLD AUTO: 10.3 FL (ref 9.2–11.8)
POTASSIUM SERPL-SCNC: 3.8 MMOL/L (ref 3.5–5.5)
PROT SERPL-MCNC: 7.9 G/DL (ref 6.4–8.2)
RBC # BLD AUTO: 5 M/UL (ref 4.2–5.3)
SODIUM SERPL-SCNC: 141 MMOL/L (ref 136–145)
WBC # BLD AUTO: 8.3 K/UL (ref 4.6–13.2)

## 2017-07-23 PROCEDURE — 74011000250 HC RX REV CODE- 250: Performed by: EMERGENCY MEDICINE

## 2017-07-23 PROCEDURE — 71020 XR CHEST PA LAT: CPT

## 2017-07-23 PROCEDURE — 80053 COMPREHEN METABOLIC PANEL: CPT | Performed by: EMERGENCY MEDICINE

## 2017-07-23 PROCEDURE — 77030029684 HC NEB SM VOL KT MONA -A

## 2017-07-23 PROCEDURE — 96374 THER/PROPH/DIAG INJ IV PUSH: CPT

## 2017-07-23 PROCEDURE — 94640 AIRWAY INHALATION TREATMENT: CPT

## 2017-07-23 PROCEDURE — 85025 COMPLETE CBC W/AUTO DIFF WBC: CPT | Performed by: EMERGENCY MEDICINE

## 2017-07-23 PROCEDURE — 74011250636 HC RX REV CODE- 250/636: Performed by: EMERGENCY MEDICINE

## 2017-07-23 PROCEDURE — 99284 EMERGENCY DEPT VISIT MOD MDM: CPT

## 2017-07-23 RX ORDER — PREDNISONE 50 MG/1
50 TABLET ORAL DAILY
Qty: 5 TAB | Refills: 0 | Status: SHIPPED | OUTPATIENT
Start: 2017-07-23 | End: 2017-07-28

## 2017-07-23 RX ORDER — IPRATROPIUM BROMIDE AND ALBUTEROL SULFATE 2.5; .5 MG/3ML; MG/3ML
3 SOLUTION RESPIRATORY (INHALATION) ONCE
Status: COMPLETED | OUTPATIENT
Start: 2017-07-23 | End: 2017-07-23

## 2017-07-23 RX ORDER — HYDROCODONE POLISTIREX AND CHLORPHENIRAMINE POLISTIREX 10; 8 MG/5ML; MG/5ML
5 SUSPENSION, EXTENDED RELEASE ORAL
Qty: 60 ML | Refills: 0 | Status: SHIPPED | OUTPATIENT
Start: 2017-07-23 | End: 2018-06-25 | Stop reason: ALTCHOICE

## 2017-07-23 RX ADMIN — IPRATROPIUM BROMIDE AND ALBUTEROL SULFATE 3 ML: .5; 3 SOLUTION RESPIRATORY (INHALATION) at 10:34

## 2017-07-23 RX ADMIN — METHYLPREDNISOLONE SODIUM SUCCINATE 125 MG: 125 INJECTION, POWDER, FOR SOLUTION INTRAMUSCULAR; INTRAVENOUS at 10:34

## 2017-07-23 NOTE — ED PROVIDER NOTES
HPI Comments: 10:23 AM Brittany Freeman is a 46 y.o. female w/ hx of stroke, and asthma who presents to the ED c/o SOB. Pt states that she has been using her inhaler every 4 hrs and as needed w/ no relief. Pt also c/o productive cough w/ yellow purulence, and decreased appetite. Pt's sx have been present for 2 weeks. Pt has tried OTC Alkaseltzer and Theraflu w/ no relief. Pt denies fever, n/v, and urinary sx. Pt has no other sx or complaints. Past Medical History:   Diagnosis Date    Asthma     Back pain with radiation     Cardiac echocardiogram, ltd study 12/29/2016    EF 55-60%. No WMA. Right to left atrial septal shunt suggests PFO. Similar to study of 10/18/16.  Cardiovascular LE venous duplex 10/18/2016    No DVT bilaterally.  Contact dermatitis and other eczema, due to unspecified cause     CTS (carpal tunnel syndrome)     HSV-2 (herpes simplex virus 2) infection     Hypercholesterolemia     Hypothyroidism     Ill-defined condition     Vertigo    L4-L5 disc bulge     Leg swelling     Migraines     Positive PPD, treated     S/P lumbar fusion 1/13/2016    1. L4-5 bilateral hemilaminotomy, medial facetectomy, foraminotomy, diskectomy L4-5. 2. Transforaminal lumbar interbody fusion with PEEK cage and demineralized bone graft L4-L5 posterolateral fusion.  3. Segmental spinal instrumentation, DePuy Expedium type L4-L5. 01/13/2016 By Dr. Carolyne Barcenas     EMG '17 , mild sciatic EMG findings    Stroke [facility])     12/2016       Past Surgical History:   Procedure Laterality Date    HX GYN      partial hysterectomy due to abnormal pap    HX LUMBAR LAMINECTOMY  01-13-16    L4/5         Family History:   Problem Relation Age of Onset    Diabetes Mother     Elevated Lipids Mother     Hypertension Mother     Cancer Father      pancreatic    Diabetes Sister     Hypertension Sister     Diabetes Brother     Diabetes Daughter     Hypertension Daughter        Social History     Social History    Marital status:      Spouse name: N/A    Number of children: N/A    Years of education: N/A     Occupational History    unemployed      long term disability     Social History Main Topics    Smoking status: Never Smoker    Smokeless tobacco: Never Used    Alcohol use No    Drug use: No    Sexual activity: Not on file      Comment: Hysterectomy     Other Topics Concern    Not on file     Social History Narrative         ALLERGIES: Skelaxin [metaxalone] and Tramadol    Review of Systems   Constitutional: Positive for appetite change. Negative for chills, fatigue, fever and unexpected weight change. HENT: Negative for congestion and rhinorrhea. Respiratory: Positive for cough and shortness of breath. Negative for chest tightness. Cardiovascular: Negative for chest pain, palpitations and leg swelling. Gastrointestinal: Negative for abdominal pain, nausea and vomiting. Genitourinary: Negative for dysuria. Musculoskeletal: Negative for back pain. Skin: Negative for rash. Neurological: Negative for dizziness and weakness. Psychiatric/Behavioral: The patient is not nervous/anxious. All other systems reviewed and are negative. Vitals:    07/23/17 1002   BP: 122/80   Pulse: 98   Resp: 26   Temp: 99 °F (37.2 °C)   SpO2: 96%   Weight: 104.3 kg (230 lb)   Height: 5' 6\" (1.676 m)            Physical Exam   Constitutional: She is oriented to person, place, and time. She appears well-developed and well-nourished. Non-toxic appearance. She does not have a sickly appearance. She does not appear ill. No distress. HENT:   Head: Normocephalic and atraumatic. Mouth/Throat: Oropharynx is clear and moist. No oropharyngeal exudate. Eyes: Conjunctivae and EOM are normal. Pupils are equal, round, and reactive to light. No scleral icterus. Neck: Normal range of motion. Neck supple. No hepatojugular reflux and no JVD present. No tracheal deviation present. No thyromegaly present. Cardiovascular: Normal rate, regular rhythm, S1 normal, S2 normal, normal heart sounds, intact distal pulses and normal pulses. Exam reveals no gallop, no S3 and no S4. No murmur heard. Pulses:       Radial pulses are 2+ on the right side, and 2+ on the left side. Dorsalis pedis pulses are 2+ on the right side, and 2+ on the left side. Pulmonary/Chest: Effort normal. No accessory muscle usage. No respiratory distress. She has no decreased breath sounds. She has no wheezes. She has no rhonchi. She has rales in the right lower field and the left lower field. Abdominal: Soft. Normal appearance and bowel sounds are normal. She exhibits no distension and no mass. There is no hepatosplenomegaly. There is no tenderness. There is no rigidity, no rebound, no guarding, no CVA tenderness, no tenderness at McBurney's point and negative Redman's sign. Musculoskeletal: Normal range of motion. She exhibits no edema or tenderness. Strength 5/5 throughout    Lymphadenopathy:        Head (right side): No submental, no submandibular, no preauricular and no occipital adenopathy present. Head (left side): No submental, no submandibular, no preauricular and no occipital adenopathy present. She has no cervical adenopathy. Right: No supraclavicular adenopathy present. Left: No supraclavicular adenopathy present. Neurological: She is alert and oriented to person, place, and time. She has normal strength and normal reflexes. She is not disoriented. No cranial nerve deficit or sensory deficit. Coordination and gait normal. GCS eye subscore is 4. GCS verbal subscore is 5. GCS motor subscore is 6. Grossly intact    Skin: Skin is warm, dry and intact. No rash noted. She is not diaphoretic. Psychiatric: She has a normal mood and affect. Her speech is normal and behavior is normal. Judgment and thought content normal. Cognition and memory are normal.   Nursing note and vitals reviewed. MDM  Number of Diagnoses or Management Options  Acute bronchitis, unspecified organism:     ED Course       Procedures    Labs essentially normal. Chest X-Ray showed No acute process. 12:36 PM 7/23/2017    I have reassessed the patient. Patient is feeling better. Patient will be prescribed prednisone and continue using albuterol inhalers. Patient was discharge in stable condition. Patient is to return to emergency department if any new or worsening condition. Scribe Attestation:   I, Jesse Brice, am scribing for and in the presence of Princess Ady DO on this day 07/23/17 at 10:23 AM   yenifer Springer    Provider Attestation:  I personally performed the services described in the documentation, reviewed the documentation, as recorded by the scribe in my presence, and it accurately and completely records my words and actions.   Woodrow Tillman DO. 10:23 AM      Signed by: Yenifer Springer, 10:23 AM

## 2017-07-23 NOTE — ED NOTES
PT c/o  Shortness of breath lasting 2 weeks, lung sounds clear, RR 18 even unlabored, family at bedside, safety intact, will continue to monitor

## 2017-07-23 NOTE — DISCHARGE INSTRUCTIONS
Bronchitis: Care Instructions  Your Care Instructions    Bronchitis is inflammation of the bronchial tubes, which carry air to the lungs. The tubes swell and produce mucus, or phlegm. The mucus and inflamed bronchial tubes make you cough. You may have trouble breathing. Most cases of bronchitis are caused by viruses like those that cause colds. Antibiotics usually do not help and they may be harmful. Bronchitis usually develops rapidly and lasts about 2 to 3 weeks in otherwise healthy people. Follow-up care is a key part of your treatment and safety. Be sure to make and go to all appointments, and call your doctor if you are having problems. It's also a good idea to know your test results and keep a list of the medicines you take. How can you care for yourself at home? · Take all medicines exactly as prescribed. Call your doctor if you think you are having a problem with your medicine. · Get some extra rest.  · Take an over-the-counter pain medicine, such as acetaminophen (Tylenol), ibuprofen (Advil, Motrin), or naproxen (Aleve) to reduce fever and relieve body aches. Read and follow all instructions on the label. · Do not take two or more pain medicines at the same time unless the doctor told you to. Many pain medicines have acetaminophen, which is Tylenol. Too much acetaminophen (Tylenol) can be harmful. · Take an over-the-counter cough medicine that contains dextromethorphan to help quiet a dry, hacking cough so that you can sleep. Avoid cough medicines that have more than one active ingredient. Read and follow all instructions on the label. · Breathe moist air from a humidifier, hot shower, or sink filled with hot water. The heat and moisture will thin mucus so you can cough it out. · Do not smoke. Smoking can make bronchitis worse. If you need help quitting, talk to your doctor about stop-smoking programs and medicines. These can increase your chances of quitting for good.   When should you call for help? Call 911 anytime you think you may need emergency care. For example, call if:  · You have severe trouble breathing. Call your doctor now or seek immediate medical care if:  · You have new or worse trouble breathing. · You cough up dark brown or bloody mucus (sputum). · You have a new or higher fever. · You have a new rash. Watch closely for changes in your health, and be sure to contact your doctor if:  · You cough more deeply or more often, especially if you notice more mucus or a change in the color of your mucus. · You are not getting better as expected. Where can you learn more? Go to http://leesa-jean carlos.info/. Enter H333 in the search box to learn more about \"Bronchitis: Care Instructions. \"  Current as of: March 25, 2017  Content Version: 11.3  © 0648-5713 [employer]. Care instructions adapted under license by [employer] (which disclaims liability or warranty for this information). If you have questions about a medical condition or this instruction, always ask your healthcare professional. [address] any warranty or liability for your use of this information.

## 2017-07-23 NOTE — ED TRIAGE NOTES
Patient to ED with c/o cough and SOB x 2 weeks, no relief with inhalers. Ambulatory on arrival. Allergies reviewed. Denies any fever.

## 2017-07-23 NOTE — ED NOTES
I have reviewed discharge instructions with the patient. The patient verbalized understanding. Discharge medications reviewed with patient and appropriate educational materials and side effects teaching were provided. Patient armband removed and shredded.  Paper copy signed

## 2017-08-14 ENCOUNTER — OFFICE VISIT (OUTPATIENT)
Dept: NEUROLOGY | Age: 52
End: 2017-08-14

## 2017-08-14 VITALS
WEIGHT: 235.6 LBS | BODY MASS INDEX: 37.86 KG/M2 | HEART RATE: 97 BPM | TEMPERATURE: 98.9 F | OXYGEN SATURATION: 97 % | DIASTOLIC BLOOD PRESSURE: 78 MMHG | HEIGHT: 66 IN | SYSTOLIC BLOOD PRESSURE: 112 MMHG | RESPIRATION RATE: 12 BRPM

## 2017-08-14 DIAGNOSIS — G43.711 INTRACTABLE CHRONIC MIGRAINE WITHOUT AURA AND WITH STATUS MIGRAINOSUS: ICD-10-CM

## 2017-08-14 RX ORDER — TOPIRAMATE 100 MG/1
100 TABLET, FILM COATED ORAL 2 TIMES DAILY
Qty: 60 TAB | Refills: 6 | Status: SHIPPED | OUTPATIENT
Start: 2017-08-14 | End: 2018-12-17

## 2017-08-14 RX ORDER — TOPIRAMATE 50 MG/1
50 TABLET, FILM COATED ORAL 2 TIMES DAILY
Qty: 60 TAB | Refills: 6 | Status: SHIPPED | OUTPATIENT
Start: 2017-08-14 | End: 2019-03-15

## 2017-08-14 NOTE — MR AVS SNAPSHOT
Visit Information Date & Time Provider Department Dept. Phone Encounter #  
 8/14/2017  3:40 PM Ki Steen [facility] [phone] [id number] Follow-up Instructions Return in about 6 months (around 2/14/2018). Follow-up and Disposition History Your Appointments 8/15/2017 10:30 AM  
Office Visit with [facility] EDUC CLASS [facility] ([facility] SCHEDULING) Appt Note: [id number] [place] [address]  
[phone] Rosy Renner 1348 [id number]  
  
    
 2/14/2018  1:40 PM  
Follow Up with Ki Steen MD  
[facility]) Appt Note: 6mon f/u  
 [address] [id number] [phone]  
  
   
 [place] [id number] Upcoming Health Maintenance Date Due Hepatitis C Screening 1965 Pneumococcal 19-64 Medium Risk (1 of 1 - PPSV23) 7/1/1984 PAP AKA CERVICAL CYTOLOGY 7/1/1986 DTaP/Tdap/Td series (1 - Tdap) 5/7/1999 FOBT Q 1 YEAR AGE 50-75 7/1/2015 BREAST CANCER SCRN MAMMOGRAM 12/22/2016 INFLUENZA AGE 9 TO ADULT 8/1/2017 Allergies as of 8/14/2017  Review Complete On: 8/14/2017 By: Jovanna Broussard LPN Severity Noted Reaction Type Reactions Skelaxin [Metaxalone]  05/06/2010    Other (comments)  
 jittery Tramadol  03/22/2017    Other (comments) Halluctations Current Immunizations  Reviewed on 5/6/2010 Name Date  
 TD Vaccine 5/6/1999 Not reviewed this visit You Were Diagnosed With   
  
 Codes Comments Intractable chronic migraine without aura and with status migrainosus     ICD-10-CM: K46.958 ICD-9-CM: 346.73 Vitals BP Pulse Temp Resp Height(growth percentile) Weight(growth percentile)  112/78 (BP 1 Location: Left arm, BP Patient Position: Sitting) 97 98.9 °F (37.2 °C) (Oral) 12 5' 6\" (1.676 m) 235 lb 9.6 oz (106.9 kg) SpO2 BMI OB Status Smoking Status 97% 38.03 kg/m2 Hysterectomy Never Smoker Vitals History BMI and BSA Data Body Mass Index Body Surface Area 38.03 kg/m 2 2.23 m 2 Preferred Pharmacy Pharmacy Name Phone [facility]/PHARMACY #[id number]- Dionicio Meza, [address] Your Updated Medication List  
  
   
This list is accurate as of: 8/14/17  4:06 PM.  Always use your most recent med list.  
  
  
  
  
 * acetaminophen-codeine 300-30 mg per tablet Commonly known as:  TYLENOL-CODEINE #3  
1 tab PO Q4-6 hours as needed for pain 1 week supply * acetaminophen-codeine 300-30 mg per tablet Commonly known as:  TYLENOL-CODEINE #3 Take 1 Tab by mouth nightly as needed for Pain (SEVERE PAIN). Max Daily Amount: 1 Tab. albuterol 90 mcg/actuation inhaler Commonly known as:  PROVENTIL HFA, VENTOLIN HFA, PROAIR HFA Take 1 Puff by inhalation every four (4) hours as needed for Wheezing. chlorpheniramine-HYDROcodone 10-8 mg/5 mL suspension Commonly known as:  Sherrilee Stain ER Take 5 mL by mouth every twelve (12) hours as needed for Cough. Max Daily Amount: 10 mL. CYMBALTA 60 mg capsule Generic drug:  DULoxetine Take 60 mg by mouth nightly. docusate sodium 100 mg capsule Commonly known as:  Mahala Hirsch Take 100 mg by mouth two (2) times a day. etodolac 300 mg capsule Commonly known as:  LODINE  
TAKE 1 CAPSULE BY MOUTH THREE TIMES DAILY AS NEEDED FOR PAIN WITH FOOD FERROUS SULFATE PO Take 325 mg by mouth. FIORICET -40 mg per tablet Generic drug:  butalbital-acetaminophen-caffeine Take 1 Tab by mouth two (2) times a day. furosemide 20 mg tablet Commonly known as:  LASIX Take  by mouth daily. levothyroxine 50 mcg tablet Commonly known as:  SYNTHROID  
TAKE 1 TABLET BY MOUTH EVERY DAY  
  
 meclizine 25 mg tablet Commonly known as:  ANTIVERT Take 25 mg by mouth daily as needed. potassium chloride SR 20 mEq tablet Commonly known as:  K-TAB Take 20 mEq by mouth daily. pravastatin 80 mg tablet Commonly known as:  PRAVACHOL Take 80 mg by mouth daily. * topiramate 50 mg tablet Commonly known as:  TOPAMAX Take 1 Tab by mouth two (2) times a day. Indications: MIGRAINE PREVENTION  
  
 * topiramate 100 mg tablet Commonly known as:  TOPAMAX Take 1 Tab by mouth two (2) times a day. Indications: MIGRAINE PREVENTION  
  
 VITAMIN D3 1,000 unit Cap Generic drug:  cholecalciferol Take 2,000 Units by mouth daily. * Notice: This list has 4 medication(s) that are the same as other medications prescribed for you. Read the directions carefully, and ask your doctor or other care provider to review them with you. Prescriptions Sent to Pharmacy Refills  
 topiramate (TOPAMAX) 50 mg tablet 6 Sig: Take 1 Tab by mouth two (2) times a day. Indications: MIGRAINE PREVENTION Class: Normal  
 Pharmacy: [facility]/pharmacy #[id number]- Ronald Beltran, [address] [facility] Ph #: [phone] Route: Oral  
 topiramate (TOPAMAX) 100 mg tablet 6 Sig: Take 1 Tab by mouth two (2) times a day. Indications: MIGRAINE PREVENTION Class: Normal  
 Pharmacy: [facility]/pharmacy #[id number]- Ronald Beltran, [address] [facility] Ph #: [phone] Route: Oral  
  
Follow-up Instructions Return in about 6 months (around 2/14/2018). To-Do List   
 08/17/2017 9:30 AM  
  Appointment with BENJAMIN PARADA at [address] [place] [address] ([phone]) PAYMENT  For Non-Medicare patients - $15.00 will be collected from you at the time of your exam.  You will be billed $35.00 from the reading [facility].   OUTSIDE FILMS  - Any outside films related to the study being scheduled should be brought with you on the day of the exam. If this cannot be done there may be a delay in the reading of the study. MEDICATIONS  - Patient must bring a complete list of all medications currently taking to include prescriptions, over-the-counter meds, herbals, vitamins & any dietary supplements  GENERAL INSTRUCTIONS  - On the day of your exam do not use any bath powder, deodorant or lotions on the armpit area. -Tenderness of breasts may cause an increase of discomfort during procedure. If you are experiencing breast tenderness on the day of your appointment and would like to reschedule, please call [phone]. Please provide this summary of care documentation to your next provider. Your primary care clinician is listed as [phone]. If you have any questions after today's visit, please call [phone].

## 2017-08-14 NOTE — LETTER
8/14/2017 4:00 PM 
 
Patient:  Enma Olmstead YOB: 1965 Date of Visit: 8/14/2017 Dear MD Reji Jean [address] [place] [address] VIA Facsimile: [phone] 
 : Thank you for referring Ms. Enma Olmstead to me for evaluation/treatment. Below are the relevant portions of my assessment and plan of care. Re:  Yadira Zhou,Follow up visit     8/14/2017 3:57 PM 
 
 
SSN: [id number] Subjective: Enma Olmstead returns for follow up of her headaches. She's actually been headache free for the last 3 months. She's feeling great, no apparent side effects with the Topamax. She's going to the pain clinic for her back and right leg pain. Medications:   
Current Outpatient Prescriptions Medication Sig Dispense Refill  chlorpheniramine-HYDROcodone (TUSSIONEX PENNKINETIC ER) 10-8 mg/5 mL suspension Take 5 mL by mouth every twelve (12) hours as needed for Cough. Max Daily Amount: 10 mL. 60 mL 0  
 albuterol (PROVENTIL HFA, VENTOLIN HFA, PROAIR HFA) 90 mcg/actuation inhaler Take 1 Puff by inhalation every four (4) hours as needed for Wheezing. 1 Inhaler 0  
 acetaminophen-codeine (TYLENOL-CODEINE #3) 300-30 mg per tablet Take 1 Tab by mouth nightly as needed for Pain (SEVERE PAIN). Max Daily Amount: 1 Tab. 30 Tab 1  
 acetaminophen-codeine (TYLENOL-CODEINE #3) 300-30 mg per tablet 1 tab PO Q4-6 hours as needed for pain 1 week supply 30 Tab 0  
 topiramate (TOPAMAX) 50 mg tablet Take 1 Tab by mouth two (2) times a day. Indications: MIGRAINE PREVENTION 60 Tab 6  
 topiramate (TOPAMAX) 100 mg tablet Take 1 Tab by mouth two (2) times a day. Indications: MIGRAINE PREVENTION 60 Tab 6  furosemide (LASIX) 20 mg tablet Take  by mouth daily.  docusate sodium (COLACE) 100 mg capsule Take 100 mg by mouth two (2) times a day.  FERROUS SULFATE PO Take 325 mg by mouth.  potassium chloride SR (K-TAB) 20 mEq tablet Take 20 mEq by mouth daily.  pravastatin (PRAVACHOL) 80 mg tablet Take 80 mg by mouth daily.  etodolac (LODINE) 300 mg capsule TAKE 1 CAPSULE BY MOUTH THREE TIMES DAILY AS NEEDED FOR PAIN WITH FOOD  2  
 levothyroxine (SYNTHROID) 50 mcg tablet TAKE 1 TABLET BY MOUTH EVERY DAY  3  
 cholecalciferol (VITAMIN D3) 1,000 unit cap Take 2,000 Units by mouth daily.  meclizine (ANTIVERT) 25 mg tablet Take 25 mg by mouth daily as needed. 3  
 butalbital-acetaminophen-caffeine (FIORICET) -40 mg per tablet Take 1 Tab by mouth two (2) times a day.  DULoxetine (CYMBALTA) 60 mg capsule Take 60 mg by mouth nightly. Vital signs:   
Visit Vitals  /78 (BP 1 Location: Left arm, BP Patient Position: Sitting)  Pulse 97  Temp 98.9 °F (37.2 °C) (Oral)  Resp 12  Ht 5' 6\" (1.676 m)  Wt 106.9 kg (235 lb 9.6 oz)  SpO2 97%  BMI 38.03 kg/m2 Review of Systems: As above otherwise 11 point review of systems negative including;  
Constitutional no fever or chills Skin denies rash or itching HEENT  Denies tinnitus, hearing lose Eyes denies diplopia vision lose Respiratory denies sortness of breath Cardiovascular denies chest pain, dyspnea on exertion Gastrointestinal denies nausea, vomiting, diarrhea, constipation Genitourinary denies incontinence Musculoskeletal denies joint pain or swelling Endocrine denies weight change Hematology denies easy bruising or bleeding Neurological as above in HPI Patient Active Problem List  
Diagnosis Code  Asthma J45.909  Hyperlipidemia E78.5  Eczema L30.9  Back pain with radiation M54.9  Spinal stenosis, lumbar region, without neurogenic claudication M48.06  
 Lumbar stenosis M48.06  
 S/P lumbar fusion Z98.1  Acute right-sided low back pain with sciatica M54.40  Neurological symptoms R29.90  
 Ischemic stroke (HCC) I63.9  Migraines R93.994  Leg swelling M79.89  
 Hypothyroidism E03.9  CTS (carpal tunnel syndrome) G56.00  
 Hypercholesterolemia E78.00  Ill-defined condition R69  
 L4-L5 disc bulge M51.26  
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 Fund of knowledge is adequate. Speech is fluent and memory is intact. Cranial Nerves: II  Visual fields are full to confrontation. III, IV, VI  Extraocular movements are intact. There is no nystagmus. V  Facial sensation is intact to pinprick. VII  Face is symmetrical.  VIII - Hearing is present. IX, X, XII  Palate is symmetrical.   XI - Shoulder shrugging and head turning intact Motor: The patient has a mild degree of right hemiparesis, both arm and leg about 4+/5. Tone is normal. Reflexes are 2+ and symmetrical. Plantars are down going. Gait is antalgic, limping off of the right leg. CBC:  
Lab Results Component Value Date/Time WBC 8.3 07/23/2017 10:18 AM  
 RBC 5.00 07/23/2017 10:18 AM  
 HGB 11.7 07/23/2017 10:18 AM  
 HCT 36.5 07/23/2017 10:18 AM  
 PLATELET 411 33/22/4743 10:18 AM  
 
BMP:  
Lab Results Component Value Date/Time Glucose 107 07/23/2017 10:18 AM  
 Sodium 141 07/23/2017 10:18 AM  
 Potassium 3.8 07/23/2017 10:18 AM  
 Chloride 109 07/23/2017 10:18 AM  
 CO2 25 07/23/2017 10:18 AM  
 BUN 16 07/23/2017 10:18 AM  
 Creatinine 1.11 07/23/2017 10:18 AM  
 Calcium 8.7 07/23/2017 10:18 AM  
 
CMP:  
Lab Results Component Value Date/Time Glucose 107 07/23/2017 10:18 AM  
 Sodium 141 07/23/2017 10:18 AM  
 Potassium 3.8 07/23/2017 10:18 AM  
 Chloride 109 07/23/2017 10:18 AM  
 CO2 25 07/23/2017 10:18 AM  
 BUN 16 07/23/2017 10:18 AM  
 Creatinine 1.11 07/23/2017 10:18 AM  
 Calcium 8.7 07/23/2017 10:18 AM  
 Anion gap 7 07/23/2017 10:18 AM  
 BUN/Creatinine ratio 14 07/23/2017 10:18 AM  
 Alk.  phosphatase 117 07/23/2017 10:18 AM  
 Protein, total 7.9 07/23/2017 10:18 AM  
 Albumin 3.3 07/23/2017 10:18 AM  
 Globulin 4.6 07/23/2017 10:18 AM  
 A-G Ratio 0.7 07/23/2017 10:18 AM  
 
Coagulation:  
Lab Results Component Value Date/Time Prothrombin time 12.3 12/28/2016 08:55 PM  
 INR 0.9 12/28/2016 08:55 PM  
 
Cardiac markers:  
Lab Results Component Value Date/Time  12/28/2016 08:55 PM  
 CK-MB Index CANNOT BE CALCULATED 12/28/2016 08:55 PM  
 
 
Assessment:  Migraine headaches, now resolving status migrainosis as well as analgesic rebound headaches in this patient who has risk factors including life long headaches. Plan: Will continue the Topamax to 150 mg BID. Return here in 6 months. Sincerely, 
 
 
 
Reggie Brice. Kathleen Vora M.D. If you have questions, please do not hesitate to call me. I look forward to following Ms. Zhou along with you.  
 
 
 
Sincerely, 
 
 
Britany Murdock MD

## 2017-08-14 NOTE — PROGRESS NOTES
Re:  Yadira Zhou,Follow up visit     8/14/2017 3:57 PM      SSN: [id number]    Subjective: Katie Ly returns for follow up of her headaches. She's actually been headache free for the last 3 months. She's feeling great, no apparent side effects with the Topamax. She's going to the pain clinic for her back and right leg pain. Medications:    Current Outpatient Prescriptions   Medication Sig Dispense Refill    chlorpheniramine-HYDROcodone (TUSSIONEX PENNKINETIC ER) 10-8 mg/5 mL suspension Take 5 mL by mouth every twelve (12) hours as needed for Cough. Max Daily Amount: 10 mL. 60 mL 0    albuterol (PROVENTIL HFA, VENTOLIN HFA, PROAIR HFA) 90 mcg/actuation inhaler Take 1 Puff by inhalation every four (4) hours as needed for Wheezing. 1 Inhaler 0    acetaminophen-codeine (TYLENOL-CODEINE #3) 300-30 mg per tablet Take 1 Tab by mouth nightly as needed for Pain (SEVERE PAIN). Max Daily Amount: 1 Tab. 30 Tab 1    acetaminophen-codeine (TYLENOL-CODEINE #3) 300-30 mg per tablet 1 tab PO Q4-6 hours as needed for pain  1 week supply 30 Tab 0    topiramate (TOPAMAX) 50 mg tablet Take 1 Tab by mouth two (2) times a day. Indications: MIGRAINE PREVENTION 60 Tab 6    topiramate (TOPAMAX) 100 mg tablet Take 1 Tab by mouth two (2) times a day. Indications: MIGRAINE PREVENTION 60 Tab 6    furosemide (LASIX) 20 mg tablet Take  by mouth daily.  docusate sodium (COLACE) 100 mg capsule Take 100 mg by mouth two (2) times a day.  FERROUS SULFATE PO Take 325 mg by mouth.  potassium chloride SR (K-TAB) 20 mEq tablet Take 20 mEq by mouth daily.  pravastatin (PRAVACHOL) 80 mg tablet Take 80 mg by mouth daily.  etodolac (LODINE) 300 mg capsule TAKE 1 CAPSULE BY MOUTH THREE TIMES DAILY AS NEEDED FOR PAIN WITH FOOD  2    levothyroxine (SYNTHROID) 50 mcg tablet TAKE 1 TABLET BY MOUTH EVERY DAY  3    cholecalciferol (VITAMIN D3) 1,000 unit cap Take 2,000 Units by mouth daily.       meclizine (ANTIVERT) 25 mg tablet Take 25 mg by mouth daily as needed. 3    butalbital-acetaminophen-caffeine (FIORICET) -40 mg per tablet Take 1 Tab by mouth two (2) times a day.  DULoxetine (CYMBALTA) 60 mg capsule Take 60 mg by mouth nightly. Vital signs:    Visit Vitals    /78 (BP 1 Location: Left arm, BP Patient Position: Sitting)    Pulse 97    Temp 98.9 °F (37.2 °C) (Oral)    Resp 12    Ht 5' 6\" (1.676 m)    Wt 106.9 kg (235 lb 9.6 oz)    SpO2 97%    BMI 38.03 kg/m2       Review of Systems:   As above otherwise 11 point review of systems negative including;   Constitutional no fever or chills  Skin denies rash or itching  HEENT  Denies tinnitus, hearing lose  Eyes denies diplopia vision lose  Respiratory denies sortness of breath  Cardiovascular denies chest pain, dyspnea on exertion  Gastrointestinal denies nausea, vomiting, diarrhea, constipation  Genitourinary denies incontinence  Musculoskeletal denies joint pain or swelling  Endocrine denies weight change  Hematology denies easy bruising or bleeding   Neurological as above in HPI      Patient Active Problem List   Diagnosis Code    Asthma J45.909    Hyperlipidemia E78.5    Eczema L30.9    Back pain with radiation M54.9    Spinal stenosis, lumbar region, without neurogenic claudication M48.06    Lumbar stenosis M48.06    S/P lumbar fusion Z98.1    Acute right-sided low back pain with sciatica M54.40    Neurological symptoms R29.90    Ischemic stroke (HCC) I63.9    Migraines G43.909    Leg swelling M79.89    Hypothyroidism E03.9    CTS (carpal tunnel syndrome) G56.00    Hypercholesterolemia E78.00    Ill-defined condition R69    L4-L5 disc bulge M51.26    Sciatica M54.30    Sciatic neuropathy G57.00         Objective: The patient is awake, alert, and oriented x 4. Fund of knowledge is adequate. Speech is fluent and memory is intact. Cranial Nerves: II - Visual fields are full to confrontation.   III, IV, VI - Extraocular movements are intact. There is no nystagmus. V - Facial sensation is intact to pinprick. VII - Face is symmetrical.  VIII - Hearing is present. IX, X, XII - Palate is symmetrical.   XI - Shoulder shrugging and head turning intact  Motor: The patient has a mild degree of right hemiparesis, both arm and leg about 4+/5. Tone is normal. Reflexes are 2+ and symmetrical. Plantars are down going. Gait is antalgic, limping off of the right leg. CBC:   Lab Results   Component Value Date/Time    WBC 8.3 07/23/2017 10:18 AM    RBC 5.00 07/23/2017 10:18 AM    HGB 11.7 07/23/2017 10:18 AM    HCT 36.5 07/23/2017 10:18 AM    PLATELET 639 04/92/6620 10:18 AM     BMP:   Lab Results   Component Value Date/Time    Glucose 107 07/23/2017 10:18 AM    Sodium 141 07/23/2017 10:18 AM    Potassium 3.8 07/23/2017 10:18 AM    Chloride 109 07/23/2017 10:18 AM    CO2 25 07/23/2017 10:18 AM    BUN 16 07/23/2017 10:18 AM    Creatinine 1.11 07/23/2017 10:18 AM    Calcium 8.7 07/23/2017 10:18 AM     CMP:   Lab Results   Component Value Date/Time    Glucose 107 07/23/2017 10:18 AM    Sodium 141 07/23/2017 10:18 AM    Potassium 3.8 07/23/2017 10:18 AM    Chloride 109 07/23/2017 10:18 AM    CO2 25 07/23/2017 10:18 AM    BUN 16 07/23/2017 10:18 AM    Creatinine 1.11 07/23/2017 10:18 AM    Calcium 8.7 07/23/2017 10:18 AM    Anion gap 7 07/23/2017 10:18 AM    BUN/Creatinine ratio 14 07/23/2017 10:18 AM    Alk.  phosphatase 117 07/23/2017 10:18 AM    Protein, total 7.9 07/23/2017 10:18 AM    Albumin 3.3 07/23/2017 10:18 AM    Globulin 4.6 07/23/2017 10:18 AM    A-G Ratio 0.7 07/23/2017 10:18 AM     Coagulation:   Lab Results   Component Value Date/Time    Prothrombin time 12.3 12/28/2016 08:55 PM    INR 0.9 12/28/2016 08:55 PM     Cardiac markers:   Lab Results   Component Value Date/Time     12/28/2016 08:55 PM    CK-MB Index CANNOT BE CALCULATED 12/28/2016 08:55 PM       Assessment:  Migraine headaches, now resolving status migrainosis as well as analgesic rebound headaches in this patient who has risk factors including life long headaches. Plan: Will continue the Topamax to 150 mg BID. Return here in 6 months. Sincerely,        Ayan Alonso.  Rashid Fisher M.D.

## 2017-08-14 NOTE — COMMUNICATION BODY
Re:  Yadira Zhou,Follow up visit     8/14/2017 3:57 PM      SSN: [id number]    Subjective: Dellis Councilman returns for follow up of her headaches. She's actually been headache free for the last 3 months. She's feeling great, no apparent side effects with the Topamax. She's going to the pain clinic for her back and right leg pain. Medications:    Current Outpatient Prescriptions   Medication Sig Dispense Refill    chlorpheniramine-HYDROcodone (TUSSIONEX PENNKINETIC ER) 10-8 mg/5 mL suspension Take 5 mL by mouth every twelve (12) hours as needed for Cough. Max Daily Amount: 10 mL. 60 mL 0    albuterol (PROVENTIL HFA, VENTOLIN HFA, PROAIR HFA) 90 mcg/actuation inhaler Take 1 Puff by inhalation every four (4) hours as needed for Wheezing. 1 Inhaler 0    acetaminophen-codeine (TYLENOL-CODEINE #3) 300-30 mg per tablet Take 1 Tab by mouth nightly as needed for Pain (SEVERE PAIN). Max Daily Amount: 1 Tab. 30 Tab 1    acetaminophen-codeine (TYLENOL-CODEINE #3) 300-30 mg per tablet 1 tab PO Q4-6 hours as needed for pain  1 week supply 30 Tab 0    topiramate (TOPAMAX) 50 mg tablet Take 1 Tab by mouth two (2) times a day. Indications: MIGRAINE PREVENTION 60 Tab 6    topiramate (TOPAMAX) 100 mg tablet Take 1 Tab by mouth two (2) times a day. Indications: MIGRAINE PREVENTION 60 Tab 6    furosemide (LASIX) 20 mg tablet Take  by mouth daily.  docusate sodium (COLACE) 100 mg capsule Take 100 mg by mouth two (2) times a day.  FERROUS SULFATE PO Take 325 mg by mouth.  potassium chloride SR (K-TAB) 20 mEq tablet Take 20 mEq by mouth daily.  pravastatin (PRAVACHOL) 80 mg tablet Take 80 mg by mouth daily.  etodolac (LODINE) 300 mg capsule TAKE 1 CAPSULE BY MOUTH THREE TIMES DAILY AS NEEDED FOR PAIN WITH FOOD  2    levothyroxine (SYNTHROID) 50 mcg tablet TAKE 1 TABLET BY MOUTH EVERY DAY  3    cholecalciferol (VITAMIN D3) 1,000 unit cap Take 2,000 Units by mouth daily.       meclizine (ANTIVERT) 25 mg tablet Take 25 mg by mouth daily as needed. 3    butalbital-acetaminophen-caffeine (FIORICET) -40 mg per tablet Take 1 Tab by mouth two (2) times a day.  DULoxetine (CYMBALTA) 60 mg capsule Take 60 mg by mouth nightly. Vital signs:    Visit Vitals    /78 (BP 1 Location: Left arm, BP Patient Position: Sitting)    Pulse 97    Temp 98.9 °F (37.2 °C) (Oral)    Resp 12    Ht 5' 6\" (1.676 m)    Wt 106.9 kg (235 lb 9.6 oz)    SpO2 97%    BMI 38.03 kg/m2       Review of Systems:   As above otherwise 11 point review of systems negative including;   Constitutional no fever or chills  Skin denies rash or itching  HEENT  Denies tinnitus, hearing lose  Eyes denies diplopia vision lose  Respiratory denies sortness of breath  Cardiovascular denies chest pain, dyspnea on exertion  Gastrointestinal denies nausea, vomiting, diarrhea, constipation  Genitourinary denies incontinence  Musculoskeletal denies joint pain or swelling  Endocrine denies weight change  Hematology denies easy bruising or bleeding   Neurological as above in HPI      Patient Active Problem List   Diagnosis Code    Asthma J45.909    Hyperlipidemia E78.5    Eczema L30.9    Back pain with radiation M54.9    Spinal stenosis, lumbar region, without neurogenic claudication M48.06    Lumbar stenosis M48.06    S/P lumbar fusion Z98.1    Acute right-sided low back pain with sciatica M54.40    Neurological symptoms R29.90    Ischemic stroke (HCC) I63.9    Migraines G43.909    Leg swelling M79.89    Hypothyroidism E03.9    CTS (carpal tunnel syndrome) G56.00    Hypercholesterolemia E78.00    Ill-defined condition R69    L4-L5 disc bulge M51.26    Sciatica M54.30    Sciatic neuropathy G57.00         Objective: The patient is awake, alert, and oriented x 4. Fund of knowledge is adequate. Speech is fluent and memory is intact. Cranial Nerves: II - Visual fields are full to confrontation.   III, IV, VI - Extraocular movements are intact. There is no nystagmus. V - Facial sensation is intact to pinprick. VII - Face is symmetrical.  VIII - Hearing is present. IX, X, XII - Palate is symmetrical.   XI - Shoulder shrugging and head turning intact  Motor: The patient has a mild degree of right hemiparesis, both arm and leg about 4+/5. Tone is normal. Reflexes are 2+ and symmetrical. Plantars are down going. Gait is antalgic, limping off of the right leg. CBC:   Lab Results   Component Value Date/Time    WBC 8.3 07/23/2017 10:18 AM    RBC 5.00 07/23/2017 10:18 AM    HGB 11.7 07/23/2017 10:18 AM    HCT 36.5 07/23/2017 10:18 AM    PLATELET 222 85/86/2529 10:18 AM     BMP:   Lab Results   Component Value Date/Time    Glucose 107 07/23/2017 10:18 AM    Sodium 141 07/23/2017 10:18 AM    Potassium 3.8 07/23/2017 10:18 AM    Chloride 109 07/23/2017 10:18 AM    CO2 25 07/23/2017 10:18 AM    BUN 16 07/23/2017 10:18 AM    Creatinine 1.11 07/23/2017 10:18 AM    Calcium 8.7 07/23/2017 10:18 AM     CMP:   Lab Results   Component Value Date/Time    Glucose 107 07/23/2017 10:18 AM    Sodium 141 07/23/2017 10:18 AM    Potassium 3.8 07/23/2017 10:18 AM    Chloride 109 07/23/2017 10:18 AM    CO2 25 07/23/2017 10:18 AM    BUN 16 07/23/2017 10:18 AM    Creatinine 1.11 07/23/2017 10:18 AM    Calcium 8.7 07/23/2017 10:18 AM    Anion gap 7 07/23/2017 10:18 AM    BUN/Creatinine ratio 14 07/23/2017 10:18 AM    Alk.  phosphatase 117 07/23/2017 10:18 AM    Protein, total 7.9 07/23/2017 10:18 AM    Albumin 3.3 07/23/2017 10:18 AM    Globulin 4.6 07/23/2017 10:18 AM    A-G Ratio 0.7 07/23/2017 10:18 AM     Coagulation:   Lab Results   Component Value Date/Time    Prothrombin time 12.3 12/28/2016 08:55 PM    INR 0.9 12/28/2016 08:55 PM     Cardiac markers:   Lab Results   Component Value Date/Time     12/28/2016 08:55 PM    CK-MB Index CANNOT BE CALCULATED 12/28/2016 08:55 PM       Assessment:  Migraine headaches, now resolving status migrainosis as well as analgesic rebound headaches in this patient who has risk factors including life long headaches. Plan: Will continue the Topamax to 150 mg BID. Return here in 6 months. Sincerely,        Ayan Alonso.  Rashid Fisher M.D.

## 2017-08-15 ENCOUNTER — OFFICE VISIT (OUTPATIENT)
Dept: PAIN MANAGEMENT | Age: 52
End: 2017-08-15

## 2017-08-15 DIAGNOSIS — Z71.89 COUNSELING AND COORDINATION OF CARE: Primary | ICD-10-CM

## 2017-08-15 NOTE — PROGRESS NOTES
Ms. Addy Connor attended the Education Class facilitated by Teresa Mascorro LPN. Objectives of this class are to review practice policies, protocols and the Controlled Substances Consent and Treatment Agreement, discuss \"what if\" scenarios, introduce staff, and provide an opportunity for questions and answers. Ultimately, goals for this class are for Ms. Zhou to:    · Be educated - Learn as much as possible about her pain and related treatment, our policies and the Controlled Substances Agreement. · Be responsible - Follow the providers advice regarding treatment recommendations, medications, and prescription information. · Be confident - Better manage her pain and return to a more functional lifestyle. At least 45 minutes was spent with the patient in face-to-face contact today.

## 2017-08-17 ENCOUNTER — HOSPITAL ENCOUNTER (OUTPATIENT)
Dept: MAMMOGRAPHY | Age: 52
Discharge: HOME | End: 2017-08-17
Attending: FAMILY MEDICINE
Payer: COMMERCIAL

## 2017-08-17 DIAGNOSIS — Z12.31 VISIT FOR SCREENING MAMMOGRAM: ICD-10-CM

## 2017-08-17 PROCEDURE — 77063 BREAST TOMOSYNTHESIS BI: CPT

## 2017-09-18 ENCOUNTER — OFFICE VISIT (OUTPATIENT)
Dept: PAIN MANAGEMENT | Age: 52
End: 2017-09-18

## 2017-09-18 VITALS
DIASTOLIC BLOOD PRESSURE: 75 MMHG | TEMPERATURE: 98.2 F | WEIGHT: 235 LBS | SYSTOLIC BLOOD PRESSURE: 118 MMHG | HEART RATE: 95 BPM | RESPIRATION RATE: 22 BRPM | BODY MASS INDEX: 37.93 KG/M2

## 2017-09-18 DIAGNOSIS — Z79.899 ENCOUNTER FOR LONG-TERM (CURRENT) USE OF HIGH-RISK MEDICATION: ICD-10-CM

## 2017-09-18 DIAGNOSIS — Z98.890 HISTORY OF LUMBAR SURGERY: ICD-10-CM

## 2017-09-18 DIAGNOSIS — G89.4 CHRONIC PAIN SYNDROME: ICD-10-CM

## 2017-09-18 DIAGNOSIS — M54.41 CHRONIC MIDLINE LOW BACK PAIN WITH RIGHT-SIDED SCIATICA: Primary | ICD-10-CM

## 2017-09-18 DIAGNOSIS — M51.26 PROTRUSION OF LUMBAR INTERVERTEBRAL DISC: ICD-10-CM

## 2017-09-18 DIAGNOSIS — G89.29 CHRONIC MIDLINE LOW BACK PAIN WITH RIGHT-SIDED SCIATICA: Primary | ICD-10-CM

## 2017-09-18 LAB
ALCOHOL UR POC: NORMAL
AMPHETAMINES UR POC: NEGATIVE
BARBITURATES UR POC: NEGATIVE
BENZODIAZEPINES UR POC: NEGATIVE
BUPRENORPHINE UR POC: NORMAL
CANNABINOIDS UR POC: NEGATIVE
CARISOPRODOL UR POC: NORMAL
COCAINE UR POC: NEGATIVE
FENTANYL UR POC: NORMAL
MDMA/ECSTASY UR POC: NEGATIVE
METHADONE UR POC: NEGATIVE
METHAMPHETAMINE UR POC: NEGATIVE
METHYLPHENIDATE UR POC: NEGATIVE
OPIATES UR POC: NEGATIVE
OXYCODONE UR POC: NEGATIVE
PHENCYCLIDINE UR POC: NORMAL
PROPOXYPHENE UR POC: NORMAL
TRAMADOL UR POC: NORMAL
TRICYCLICS UR POC: NEGATIVE

## 2017-09-18 RX ORDER — ASPIRIN 81 MG/1
TABLET ORAL DAILY
COMMUNITY
End: 2018-07-16

## 2017-09-18 RX ORDER — OXYCODONE AND ACETAMINOPHEN 5; 325 MG/1; MG/1
1 TABLET ORAL
Qty: 60 TAB | Refills: 0 | Status: SHIPPED | OUTPATIENT
Start: 2017-09-18 | End: 2018-06-25 | Stop reason: ALTCHOICE

## 2017-09-18 RX ORDER — NALOXONE HYDROCHLORIDE 4 MG/.1ML
SPRAY NASAL
Qty: 1 EACH | Refills: 0 | Status: SHIPPED | OUTPATIENT
Start: 2017-09-18 | End: 2022-07-23

## 2017-09-18 RX ORDER — BUDESONIDE AND FORMOTEROL FUMARATE DIHYDRATE 160; 4.5 UG/1; UG/1
2 AEROSOL RESPIRATORY (INHALATION) 2 TIMES DAILY
COMMUNITY

## 2017-09-18 NOTE — PROGRESS NOTES
HISTORY OF PRESENT ILLNESS  Jesse Bower is a 46 y.o. female. HPI Comments: New patient  Referred by spine center clinic, by Dr. Kristi Evans for pain management, low back pain. Visit survey reviewed  On the pain diagram the patient indicates midline low back pain with symptoms into right leg. The least amount of pain 8 out of 10  Greatest pain 9 out of 10  Average pain 7 out of 10  I have reviewed the listed medications on the visit survey which includes Cymbalta 60 mg, Etodolac, Topamax, Tylenol No. 3.  The patient has tried oral nonsteroidal anti-inflammatory drugs and may use these as directed by primary care physician. I have also reviewed the listed medications in the medical records and on the prescription monitoring program.  -Chief complaint, chronic, midline low back pain. Present for over 3 years. Frequently during the day and frequently during the night. Almost constant. Increases with extended walking or sitting or standing. Symptoms through the right buttock region towards the lateral right thigh, intermittent, frequent. She has worked with physical therapy and a chiropractor. She has tried injections. Injection-perform recently, she reports no benefit. She did have 1 lumbar spine surgery. She did follow-up with the surgeon. The surgeon's office, the spine center clinic referred the patient to our pain clinic. She has tried Celebrex, Robaxin, Cymbalta, tramadol, Flexeril, Vicodin, Skelaxin, Motrin, Tylenol. Tylenol with Codeine leaves her sleepy. It was prescribed, only one at night and therefore she is only using one at night. He reports side effects with tramadol. She reports that the hydrocodone cough syrup also left her sleepy. Side effects with Skelaxin. No side effects with Robaxin but she does not remember if it was helpful.  -Radiologist impression, MRI of the lumbar spine with and without contrast, 2/22/17- interval posterior and transforaminal interbody fusion L4-5. Decompression central canal and foramina. No recurrent disc herniation. Mild right lateral epidural fibrosis at this level. No high-grade central canal or foraminal stenosis. Perhaps tiny L5-S1 subarticular/foraminal protrusion but no nerve impingement. Moderate facet arthrosis at this level. Mild L3-4 central disc protrusion.  -The patient did report, significant relief of pain and symptoms after the surgery for about 3 months. Then, similar pain returned and has persisted. -Progress note from the referring physician, 6/21/17. The note indicates that the patient was diagnosed with right sciatic neuropathy, that she had electromyography testing performed in the recent past.  They were prescribing Cymbalta, Topamax, Tylenol 3 and referred the patient for pain management. Electromyography testing indicated some slowing of the right tibial nerve motor action potentials which was felt to possibly support-\"mild sciatic neuropathy on the right side\". History of lumbar spine surgery, January 2016, Dr. Luisa Jerez previous progress note indicates that they had tried a right sacroiliac joint injection. Viktoria Barillas. Prescription monitoring program reviewed. --There are only 4 prescriptions over the past year. 3 prescriptions-Tylenol 3. One prescription hydrocodone suspension. The last prescription for Tylenol No. 3 was in July. The patient  should keep track of total Tylenol intake and make sure liver function tests have been checked with primary care physician.    -opioid risk tool has been reviewed, and will be scanned. Total score--3    -The available medical record/information has been reviewed including notes from the referring physician's office.     -New patient survey reviewed and will be scanned. Please see this form for more information concerning PMH,FH,SH, and ROS. The majority of today's visit was spent counseling and coordinating care. Total visit time was greater than 60 minutes.         - Preliminary urine screen reviewed. - Additional time was spent reviewing medical records,interpreting data and educating the patient.                              -Discussing Dxes,condition and treatment options,possible side effects/risks/complications. Review of Systems   Constitutional: Positive for malaise/fatigue. Negative for chills and fever. HENT: Positive for hearing loss and tinnitus. Negative for congestion and nosebleeds. Eyes: Negative for pain and discharge. Poor vision   Respiratory: Positive for shortness of breath. Negative for cough and hemoptysis. Asthma, bronchitis    Cardiovascular: Positive for leg swelling. Negative for chest pain and palpitations. Gastrointestinal: Negative for heartburn, nausea and vomiting. Genitourinary: Negative for dysuria and urgency. Musculoskeletal: Positive for back pain. Negative for falls. Difficulty walking    Skin: Positive for itching. Negative for rash. Neurological: Positive for dizziness, tingling and headaches. Negative for focal weakness and seizures. Vertigo, imbalance   Endo/Heme/Allergies: Negative for environmental allergies. Does not bruise/bleed easily. Psychiatric/Behavioral: Negative for substance abuse and suicidal ideas. The patient has insomnia. Physical Exam   Constitutional: She appears well-developed and well-nourished. She is cooperative. She does not have a sickly appearance. HENT:   Head: Normocephalic and atraumatic. Right Ear: External ear normal. No drainage. Left Ear: External ear normal. No drainage. Nose: Nose normal.   Mouth/Throat: No oropharyngeal exudate. Eyes: Lids are normal. Right eye exhibits no discharge. Left eye exhibits no discharge. Right conjunctiva has no hemorrhage. Left conjunctiva has no hemorrhage. Pupils are equal.   Neck: Neck supple. No tracheal deviation present. No thyroid mass present. Cardiovascular: Normal rate and regular rhythm.     No murmur heard.  Pulmonary/Chest: Effort normal and breath sounds normal. No respiratory distress. Musculoskeletal:        Lumbar back: She exhibits decreased range of motion and tenderness. She exhibits no spasm. Neurological: She is alert. She has normal reflexes. No cranial nerve deficit. Slow, stiff gait with cane. She reports she has been using the cane for ambulation ever since the migraine equivalent occurred. Mild weakness throughout the right leg proximally and distally. Question of full effort being given. Normal strength left leg. Grossly normal light touch sensation and deep tendon reflexes lower extremities. No tenderness to hips. Mild tenderness right sacroiliac joint region. Negative seated straight leg raise test bilaterally   Skin: Skin is intact. No abrasion and no rash noted. She is not diaphoretic. No cyanosis. Psychiatric: Her speech is normal. Her mood appears anxious. Her affect is not angry. She is not agitated and not aggressive. Thought content is not paranoid. Cognition and memory are not impaired. She does not express inappropriate judgment. She does not exhibit a depressed mood. She expresses no suicidal ideation. Nursing note and vitals reviewed. ASSESSMENT and PLAN  Encounter Diagnoses   Name Primary?  Chronic midline low back pain with right-sided sciatica Yes    Encounter for long-term (current) use of high-risk medication     Chronic pain syndrome     History of lumbar surgery     Protrusion of lumbar intervertebral disc    1. Medications are prescribed with the objective of pain relief and improved physical and psychosocial function in this patient. (improve quality of life)  2. The patient has been counseled  on proper use of prescribed medications. 3. The patient has been counseled  about chronic medical conditions and their relationship to anxiety and depression.   4. Reviewed with patient self-help tools, home exercise, and lifestyle changes to assist the patient in self-management of symptoms. 5. Advised patient to have a primary care provider to continue care for health maintenance and general medical conditions and support for referral to specialty care as needed. 6. Reviewed with patient the treatment plan, goals of treatment plan, and limitations of treatment plan, to include the potential for side effects from medications. If side effects occur, it is the responsibility of the patient to inform the clinic so that a change in the treatment plan can be made in a safe manner. The patient is advised that stopping prescribed medication may cause an increase in symptoms and possible medication withdrawal symptoms. The patient is informed an emergency room evaluation may be necessary if this occurs. DISPOSITION: The patients condition and plan were discussed at length and all questions were answered. -Dragon medical dictation software was used for portions of this report. Unintended errors may occur. The patient was given time to ask questions today. Risk and benefits of opioids and alternative treatments reviewed. Because the patient's current regimen places him/her at increased risk for possible overdose, a prescription for naloxone nasal spray is being provided. I discussed use of Narcan with the patient. In addition, the patient will receive the Narcan instruction sheet. The patient understands that this medication is only to be used in the setting of a possible overdose and that inadvertent use of this medication could precipitate overt withdrawal.    The patient has been educated on opioids and chronic pain by myself, our staff and the formal education class.      -Follow-up 1 month  Side effects with codeine and Vicodin.   Will try Percocet 5 mg 1 twice a day as needed  She should continue the Cymbalta 60 mg once a day, prescribed by primary care physician  She may use oral nonsteroidal anti-inflammatory drugs as directed by primary care physician  She will let us know if she would like to retry physical therapy or injections in the future. I believe she is still following up with neurology clinic concerning right leg weakness, diagnosed as migraine equivalent.   She should consider gentle exercise in the water on a regular basis but would need clearance from her spine surgeon and neurologist first.

## 2017-09-18 NOTE — MR AVS SNAPSHOT
Visit Information Date & Time Provider Department Dept. Phone Encounter #  
 9/18/2017 12:30 PM Elliott Dumont MD [facility] [phone] Follow-up Instructions Return in about 1 month (around 10/18/2017). Your Appointments 2/14/2018  1:40 PM  
Follow Up with Dhruv Clark MD  
[facility] [address]) Appt Note: 6mon f/u  
 [address] [address] [phone]  
  
   
 [place] [id number] Upcoming Health Maintenance Date Due Hepatitis C Screening 1965 Pneumococcal 19-64 Medium Risk (1 of 1 - PPSV23) 7/1/1984 PAP AKA CERVICAL CYTOLOGY 7/1/1986 DTaP/Tdap/Td series (1 - Tdap) 5/7/1999 FOBT Q 1 YEAR AGE 50-75 7/1/2015 INFLUENZA AGE 9 TO ADULT 8/1/2017 BREAST CANCER SCRN MAMMOGRAM 8/17/2019 Allergies as of 9/18/2017  Review Complete On: 9/18/2017 By: Elliott Dumont MD  
  
 Severity Noted Reaction Type Reactions Skelaxin [Metaxalone]  05/06/2010    Other (comments)  
 jittery Tramadol  03/22/2017    Other (comments) Halluctations Current Immunizations  Reviewed on 5/6/2010 Name Date  
 TD Vaccine 5/6/1999 Not reviewed this visit You Were Diagnosed With   
  
 Codes Comments Encounter for long-term (current) use of high-risk medication    -  Primary ICD-10-CM: M46.291 ICD-9-CM: V58.69 Vitals BP Pulse Temp Resp Weight(growth percentile) BMI  
 118/75 95 98.2 °F (36.8 °C) 22 235 lb (106.6 kg) 37.93 kg/m2 OB Status Smoking Status Hysterectomy Never Smoker BMI and BSA Data Body Mass Index Body Surface Area  
 37.93 kg/m 2 2.23 m 2 Preferred Pharmacy Pharmacy Name Phone [phone], [address] [place] [address] Your Updated Medication List  
  
   
 This list is accurate as of: 9/18/17  1:18 PM.  Always use your most recent med list.  
  
  
  
  
 * acetaminophen-codeine 300-30 mg per tablet Commonly known as:  TYLENOL-CODEINE #3  
1 tab PO Q4-6 hours as needed for pain 1 week supply * acetaminophen-codeine 300-30 mg per tablet Commonly known as:  TYLENOL-CODEINE #3 Take 1 Tab by mouth nightly as needed for Pain (SEVERE PAIN). Max Daily Amount: 1 Tab. albuterol 90 mcg/actuation inhaler Commonly known as:  PROVENTIL HFA, VENTOLIN HFA, PROAIR HFA Take 1 Puff by inhalation every four (4) hours as needed for Wheezing. aspirin delayed-release 81 mg tablet Take  by mouth daily. chlorpheniramine-HYDROcodone 10-8 mg/5 mL suspension Commonly known as:  Wing Christen ER Take 5 mL by mouth every twelve (12) hours as needed for Cough. Max Daily Amount: 10 mL. CYMBALTA 60 mg capsule Generic drug:  DULoxetine Take 60 mg by mouth nightly. docusate sodium 100 mg capsule Commonly known as:  Leeland Galloway Take 100 mg by mouth two (2) times a day. etodolac 300 mg capsule Commonly known as:  LODINE  
TAKE 1 CAPSULE BY MOUTH THREE TIMES DAILY AS NEEDED FOR PAIN WITH FOOD FERROUS SULFATE PO Take 325 mg by mouth. FIORICET -40 mg per tablet Generic drug:  butalbital-acetaminophen-caffeine Take 1 Tab by mouth two (2) times a day. furosemide 20 mg tablet Commonly known as:  LASIX Take  by mouth daily. levothyroxine 50 mcg tablet Commonly known as:  SYNTHROID  
TAKE 1 TABLET BY MOUTH EVERY DAY  
  
 meclizine 25 mg tablet Commonly known as:  ANTIVERT Take 25 mg by mouth daily as needed. potassium chloride SR 20 mEq tablet Commonly known as:  K-TAB Take 20 mEq by mouth daily. pravastatin 80 mg tablet Commonly known as:  PRAVACHOL Take 80 mg by mouth daily. SYMBICORT 160-4.5 mcg/actuation HFA inhaler Generic drug:  budesonide-formoterol Take 2 Puffs by inhalation two (2) times a day. * topiramate 50 mg tablet Commonly known as:  TOPAMAX Take 1 Tab by mouth two (2) times a day. Indications: MIGRAINE PREVENTION  
  
 * topiramate 100 mg tablet Commonly known as:  TOPAMAX Take 1 Tab by mouth two (2) times a day. Indications: MIGRAINE PREVENTION  
  
 VITAMIN D3 1,000 unit Cap Generic drug:  cholecalciferol Take 2,000 Units by mouth daily. * Notice: This list has 4 medication(s) that are the same as other medications prescribed for you. Read the directions carefully, and ask your doctor or other care provider to review them with you. We Performed the Following AMB POC DRUG SCREEN () [ \[facility] [place] [facility]\""] DRUG SCREEN [[id number] Custom] Follow-up Instructions Return in about 1 month (around 10/18/2017). Please provide this summary of care documentation to your next provider. Your primary care clinician is listed as [phone]. If you have any questions after today's visit, please call [phone].

## 2017-10-04 ENCOUNTER — TELEPHONE (OUTPATIENT)
Dept: PAIN MANAGEMENT | Age: 52
End: 2017-10-04

## 2017-10-04 NOTE — TELEPHONE ENCOUNTER
Patient called and stated that she had sleep study she received Xanax 0.5 mg she was given only 2 pills  for her sleep study on Saturday. She called to report to report the medications yesterday but there was a phone issue.

## 2017-10-06 ENCOUNTER — HOSPITAL ENCOUNTER (OUTPATIENT)
Dept: LAB | Age: 52
Discharge: HOME | End: 2017-10-06

## 2017-10-06 LAB — SENTARA SPECIMEN COL,SENBCF: NORMAL

## 2017-10-06 PROCEDURE — 99001 SPECIMEN HANDLING PT-LAB: CPT | Performed by: PSYCHIATRY & NEUROLOGY

## 2017-10-10 ENCOUNTER — OFFICE VISIT (OUTPATIENT)
Dept: PAIN MANAGEMENT | Age: 52
End: 2017-10-10

## 2017-10-10 VITALS
TEMPERATURE: 97.7 F | WEIGHT: 235 LBS | RESPIRATION RATE: 20 BRPM | HEART RATE: 78 BPM | BODY MASS INDEX: 37.93 KG/M2 | SYSTOLIC BLOOD PRESSURE: 110 MMHG | DIASTOLIC BLOOD PRESSURE: 77 MMHG

## 2017-10-10 DIAGNOSIS — Z98.1 S/P LUMBAR FUSION: ICD-10-CM

## 2017-10-10 DIAGNOSIS — M54.41 CHRONIC MIDLINE LOW BACK PAIN WITH RIGHT-SIDED SCIATICA: Primary | ICD-10-CM

## 2017-10-10 DIAGNOSIS — Z98.890 HISTORY OF LUMBAR SURGERY: ICD-10-CM

## 2017-10-10 DIAGNOSIS — M51.26 PROTRUSION OF LUMBAR INTERVERTEBRAL DISC: ICD-10-CM

## 2017-10-10 DIAGNOSIS — M48.061 SPINAL STENOSIS, LUMBAR REGION, WITHOUT NEUROGENIC CLAUDICATION: ICD-10-CM

## 2017-10-10 DIAGNOSIS — M48.061 SPINAL STENOSIS OF LUMBAR REGION, UNSPECIFIED WHETHER NEUROGENIC CLAUDICATION PRESENT: ICD-10-CM

## 2017-10-10 DIAGNOSIS — G89.29 CHRONIC MIDLINE LOW BACK PAIN WITH RIGHT-SIDED SCIATICA: Primary | ICD-10-CM

## 2017-10-10 DIAGNOSIS — M54.9 BACK PAIN WITH RADIATION: ICD-10-CM

## 2017-10-10 DIAGNOSIS — G89.4 CHRONIC PAIN SYNDROME: ICD-10-CM

## 2017-10-10 RX ORDER — ACETAMINOPHEN AND CODEINE PHOSPHATE 300; 30 MG/1; MG/1
TABLET ORAL
Qty: 120 TAB | Refills: 1 | Status: SHIPPED | OUTPATIENT
Start: 2017-10-10 | End: 2017-12-22 | Stop reason: SDUPTHER

## 2017-10-10 NOTE — PROGRESS NOTES
Nursing Notes    Patient presents to the office today in follow-up. Patient rates her pain at 2/10 on the numerical pain scale. Reviewed medications with counts as follows:    Rx Date filled Qty Dispensed Pill Count Last Dose Short     Percocet 5/325mg 09/18/17 60 25 This am  No                                       Comments:     POC UDS was not performed in office today    Any new labs or imaging since last appointment? YES    Have you been to an emergency room (ER) or urgent care clinic since your last visit? NO            Have you been hospitalized since your last visit? NO     If yes, where, when, and reason for visit? Have you seen or consulted any other health care providers outside of the [address] [place] [address]  since your last visit? YES     If yes, where, when, and reason for visit? somnologist       HM deferred to pcp.

## 2017-10-10 NOTE — PROGRESS NOTES
HISTORY OF PRESENT ILLNESS  Eboni Castañeda is a 46 y.o. female. HPI Comments: Visit survey reviewed  Chief complaint- chronic pain syndrome-low back pain  She has been using Percocet 5 mg twice a day as needed. She feels very strongly this is leaving her sleepy during the morning after she takes a pill. She reports she is nowhere near as sleepy when she does not take a Percocet. She also uses one at night but this is before going to sleep. She remembers Herberth Keller also left her sleepy. When she use Tylenol with codeine, she only used it at night therefore does not know if it leaves her sleepy. She will stop the Percocet. Start Tylenol 3, 1 or 2, twice a day as needed. I am hopeful that will not leave her sleepy. Once again I emphasized the importance of alternative, non-opioid treatments. Emphasizing massage, yoga, exercise in the water. She reports when the weather was warmer she was swimming in an outdoor pool. I have strongly encouraged her to join a fitness center with a pool. Medication continues to help improve quality of life. Medications reviewed including risk and benefits. Recent average level of pain-2    Measurement of clinical outcome for chronic pain patient/ SPAASMS Score Card-            Information reviewed and will be scanned. Total score today-5      Review of Systems   Constitutional: Positive for malaise/fatigue. Negative for chills and fever. HENT: Positive for hearing loss and tinnitus. Negative for congestion and nosebleeds. Eyes: Negative for pain and discharge. Poor vision   Respiratory: Positive for shortness of breath. Negative for cough and hemoptysis. Asthma, bronchitis    Cardiovascular: Positive for leg swelling. Negative for chest pain and palpitations. Gastrointestinal: Negative for heartburn, nausea and vomiting. Genitourinary: Negative for dysuria and urgency. Musculoskeletal: Positive for back pain. Negative for falls. Difficulty walking    Skin: Positive for itching. Negative for rash. Neurological: Positive for dizziness, tingling and headaches. Negative for focal weakness and seizures. Vertigo, imbalance   Endo/Heme/Allergies: Negative for environmental allergies. Does not bruise/bleed easily. Psychiatric/Behavioral: Negative for substance abuse and suicidal ideas. The patient has insomnia. Physical Exam   Constitutional: She appears well-developed and well-nourished. She is cooperative. She does not have a sickly appearance. HENT:   Head: Normocephalic and atraumatic. Right Ear: External ear normal. No drainage. Left Ear: External ear normal. No drainage. Nose: Nose normal.   Eyes: Lids are normal. Right eye exhibits no discharge. Left eye exhibits no discharge. Right conjunctiva has no hemorrhage. Left conjunctiva has no hemorrhage. Neck: Neck supple. No tracheal deviation present. No thyroid mass present. Pulmonary/Chest: Effort normal. No respiratory distress. Neurological: She is alert. No cranial nerve deficit. Skin: Skin is intact. No rash noted. Psychiatric: Her speech is normal. Her affect is not angry. She does not express inappropriate judgment. Nursing note and vitals reviewed. ASSESSMENT and PLAN  Encounter Diagnoses   Name Primary?  Chronic midline low back pain with right-sided sciatica Yes    Chronic pain syndrome     History of lumbar surgery     Protrusion of lumbar intervertebral disc     Spinal stenosis of lumbar region, unspecified whether neurogenic claudication present     S/P lumbar fusion     Spinal stenosis, lumbar region, without neurogenic claudication     Back pain with radiation    No indicators for recent medication misuse.  reviewed. Pain Meds and Quality Of Life have been reviewed. Nonpharmacologic therapy and non-opioid pharmacologic therapy were considered.   If opioid therapy is prescribed, this is only if the expected benefits are anticipated to outweigh risks. Possible changes to treatment plan considered. Support/education given as needed. Today-medications are as listed. changes to medications. Follow up -- 2 months.

## 2017-10-10 NOTE — MR AVS SNAPSHOT
Visit Information Date & Time Provider Department Dept. Phone Encounter #  
 10/10/2017  9:30 AM Brooke Martinez MD [facility] [phone] Follow-up Instructions Return in about 2 months (around 12/10/2017). Follow-up and Disposition History Your Appointments 2/14/2018  1:40 PM  
Follow Up with Kevin Mishra MD  
[facility]) Appt Note: 6mon f/u  
 [address] [address]  
[phone]  
  
   
 [facility] [address] Upcoming Health Maintenance Date Due Hepatitis C Screening 1965 Pneumococcal 19-64 Medium Risk (1 of 1 - PPSV23) 7/1/1984 PAP AKA CERVICAL CYTOLOGY 7/1/1986 DTaP/Tdap/Td series (1 - Tdap) 5/7/1999 FOBT Q 1 YEAR AGE 50-75 7/1/2015 INFLUENZA AGE 9 TO ADULT 8/1/2017 BREAST CANCER SCRN MAMMOGRAM 8/17/2019 Allergies as of 10/10/2017  Review Complete On: 10/10/2017 By: Brooke Maritnez MD  
  
 Severity Noted Reaction Type Reactions Skelaxin [Metaxalone]  05/06/2010    Other (comments)  
 jittery Tramadol  03/22/2017    Other (comments) Halluctations Current Immunizations  Reviewed on 5/6/2010 Name Date  
 TD Vaccine 5/6/1999 Not reviewed this visit You Were Diagnosed With   
  
 Codes Comments Chronic midline low back pain with right-sided sciatica    -  Primary ICD-10-CM: M54.41, G89.29 ICD-9-CM: 724.2, 724.3, 338.29 Chronic pain syndrome     ICD-10-CM: G89.4 ICD-9-CM: 338.4 History of lumbar surgery     ICD-10-CM: Z98.890 ICD-9-CM: V15.29 Protrusion of lumbar intervertebral disc     ICD-10-CM: M51.26 
ICD-9-CM: 722.10 Spinal stenosis of lumbar region, unspecified whether neurogenic claudication present     ICD-10-CM: M48.061 
ICD-9-CM: 724.02 S/P lumbar fusion     ICD-10-CM: Z98.1 ICD-9-CM: V45.4 Spinal stenosis, lumbar region, without neurogenic claudication     ICD-10-CM: M48.061 
ICD-9-CM: 724.02 Back pain with radiation     ICD-10-CM: M54.9 ICD-9-CM: 724.5 Vitals BP Pulse Temp Resp Weight(growth percentile) BMI  
 110/77 78 97.7 °F (36.5 °C) 20 235 lb (106.6 kg) 37.93 kg/m2 OB Status Smoking Status Hysterectomy Never Smoker Vitals History BMI and BSA Data Body Mass Index Body Surface Area  
 37.93 kg/m 2 2.23 m 2 Preferred Pharmacy Pharmacy Name Phone [id number] [place] [facility], [address] [place] [address] Your Updated Medication List  
  
   
This list is accurate as of: 10/10/17 10:15 AM.  Always use your most recent med list.  
  
  
  
  
 * acetaminophen-codeine 300-30 mg per tablet Commonly known as:  TYLENOL-CODEINE #3  
1 tab PO Q4-6 hours as needed for pain 1 week supply * acetaminophen-codeine 300-30 mg per tablet Commonly known as:  TYLENOL-CODEINE #3 Take 1 Tab by mouth nightly as needed for Pain (SEVERE PAIN). Max Daily Amount: 1 Tab. * acetaminophen-codeine 300-30 mg per tablet Commonly known as:  TYLENOL #3  
1-2  Bid  prn  
  
 albuterol 90 mcg/actuation inhaler Commonly known as:  PROVENTIL HFA, VENTOLIN HFA, PROAIR HFA Take 1 Puff by inhalation every four (4) hours as needed for Wheezing. aspirin delayed-release 81 mg tablet Take  by mouth daily. chlorpheniramine-HYDROcodone 10-8 mg/5 mL suspension Commonly known as:  Julane Roz ER Take 5 mL by mouth every twelve (12) hours as needed for Cough. Max Daily Amount: 10 mL. CYMBALTA 60 mg capsule Generic drug:  DULoxetine Take 60 mg by mouth nightly. docusate sodium 100 mg capsule Commonly known as:  Maretta Larsen Take 100 mg by mouth two (2) times a day. etodolac 300 mg capsule Commonly known as:  LODINE  
TAKE 1 CAPSULE BY MOUTH THREE TIMES DAILY AS NEEDED FOR PAIN WITH FOOD FERROUS SULFATE PO Take 325 mg by mouth. FIORICET -40 mg per tablet Generic drug:  butalbital-acetaminophen-caffeine Take 1 Tab by mouth two (2) times a day. furosemide 20 mg tablet Commonly known as:  LASIX Take  by mouth daily. levothyroxine 50 mcg tablet Commonly known as:  SYNTHROID  
TAKE 1 TABLET BY MOUTH EVERY DAY  
  
 meclizine 25 mg tablet Commonly known as:  ANTIVERT Take 25 mg by mouth daily as needed. naloxone 4 mg/actuation nasal spray Commonly known as:  [employer] Use 1 spray intranasally into 1 nostril. Use a new Narcan nasal spray for subsequent doses and administer into alternating nostrils. May repeat every 2 to 3 minutes as needed. oxyCODONE-acetaminophen 5-325 mg per tablet Commonly known as:  PERCOCET Take 1 Tab by mouth two (2) times daily as needed for Pain. Max Daily Amount: 2 Tabs. potassium chloride SR 20 mEq tablet Commonly known as:  K-TAB Take 20 mEq by mouth daily. pravastatin 80 mg tablet Commonly known as:  PRAVACHOL Take 80 mg by mouth daily. SYMBICORT 160-4.5 mcg/actuation Hfaa Generic drug:  budesonide-formoterol Take 2 Puffs by inhalation two (2) times a day. * topiramate 50 mg tablet Commonly known as:  TOPAMAX Take 1 Tab by mouth two (2) times a day. Indications: MIGRAINE PREVENTION  
  
 * topiramate 100 mg tablet Commonly known as:  TOPAMAX Take 1 Tab by mouth two (2) times a day. Indications: MIGRAINE PREVENTION  
  
 VITAMIN D3 1,000 unit Cap Generic drug:  cholecalciferol Take 2,000 Units by mouth daily. * Notice: This list has 5 medication(s) that are the same as other medications prescribed for you. Read the directions carefully, and ask your doctor or other care provider to review them with you. Prescriptions Printed Refills  
 acetaminophen-codeine (TYLENOL #3) 300-30 mg per tablet 1 Si-2  Bid  prn Class: Print Follow-up Instructions Return in about 2 months (around 12/10/2017). Please provide this summary of care documentation to your next provider. Your primary care clinician is listed as 130 y [phone]. If you have any questions after today's visit, please call [phone].

## 2017-12-22 ENCOUNTER — OFFICE VISIT (OUTPATIENT)
Dept: PAIN MANAGEMENT | Age: 52
End: 2017-12-22

## 2017-12-22 VITALS
RESPIRATION RATE: 16 BRPM | DIASTOLIC BLOOD PRESSURE: 71 MMHG | HEIGHT: 66 IN | HEART RATE: 88 BPM | TEMPERATURE: 99.1 F | WEIGHT: 235 LBS | BODY MASS INDEX: 37.77 KG/M2 | SYSTOLIC BLOOD PRESSURE: 124 MMHG

## 2017-12-22 DIAGNOSIS — Z98.890 HISTORY OF LUMBAR SURGERY: ICD-10-CM

## 2017-12-22 DIAGNOSIS — M48.061 SPINAL STENOSIS OF LUMBAR REGION, UNSPECIFIED WHETHER NEUROGENIC CLAUDICATION PRESENT: ICD-10-CM

## 2017-12-22 DIAGNOSIS — M54.41 CHRONIC MIDLINE LOW BACK PAIN WITH RIGHT-SIDED SCIATICA: ICD-10-CM

## 2017-12-22 DIAGNOSIS — G89.29 CHRONIC MIDLINE LOW BACK PAIN WITH RIGHT-SIDED SCIATICA: ICD-10-CM

## 2017-12-22 DIAGNOSIS — G89.4 CHRONIC PAIN SYNDROME: ICD-10-CM

## 2017-12-22 DIAGNOSIS — Z79.899 ENCOUNTER FOR LONG-TERM (CURRENT) USE OF HIGH-RISK MEDICATION: Primary | ICD-10-CM

## 2017-12-22 LAB
ALCOHOL UR POC: NORMAL
AMPHETAMINES UR POC: NORMAL
BARBITURATES UR POC: NORMAL
BENZODIAZEPINES UR POC: NORMAL
BUPRENORPHINE UR POC: NORMAL
CANNABINOIDS UR POC: NORMAL
CARISOPRODOL UR POC: NORMAL
COCAINE UR POC: NORMAL
FENTANYL UR POC: NORMAL
MDMA/ECSTASY UR POC: NORMAL
METHADONE UR POC: NORMAL
METHAMPHETAMINE UR POC: NORMAL
METHYLPHENIDATE UR POC: NORMAL
OPIATES UR POC: NORMAL
OXYCODONE UR POC: NORMAL
PHENCYCLIDINE UR POC: NORMAL
PROPOXYPHENE UR POC: NORMAL
TRAMADOL UR POC: NORMAL
TRICYCLICS UR POC: NORMAL

## 2017-12-22 RX ORDER — ACETAMINOPHEN AND CODEINE PHOSPHATE 300; 30 MG/1; MG/1
TABLET ORAL
Qty: 120 TAB | Refills: 2 | Status: SHIPPED | OUTPATIENT
Start: 2017-12-22 | End: 2018-03-26 | Stop reason: SDUPTHER

## 2017-12-22 NOTE — PATIENT INSTRUCTIONS
1. Continue current plan with no evidence of addiction or diversion. Stable on current medication without adverse events. 2. Refill Tylenol No. 3.  Take 1-2 tablets up to 2 times daily as needed. 2 refills. 3. Naloxone 4 mg nasal spray for opioid induced respiratory depression emergency only. 4. Discussed risks of addiction, dependency, and opioid induced hyperalgesia.    5. Return to clinic in 3 months

## 2017-12-22 NOTE — MR AVS SNAPSHOT
Visit Information Date & Time Provider Department Dept. Phone Encounter #  
 12/22/2017  8:40 AM LIYZ Baron [address] [facility] [phone] Follow-up Instructions Return in about 3 months (around 3/22/2018). Your Appointments 2/14/2018  1:40 PM  
Follow Up with Yenifer Gunderson MD  
[address]) Appt Note: 6mon f/u  
 [address] [place] [address] [phone]  
  
   
 [email or web address] [id number] Upcoming Health Maintenance Date Due Hepatitis C Screening 1965 Pneumococcal 19-64 Medium Risk (1 of 1 - PPSV23) 7/1/1984 PAP AKA CERVICAL CYTOLOGY 7/1/1986 DTaP/Tdap/Td series (1 - Tdap) 5/7/1999 FOBT Q 1 YEAR AGE 50-75 7/1/2015 Influenza Age 5 to Adult 8/1/2017 Allergies as of 12/22/2017  Review Complete On: 12/22/2017 By: LIZY Baron Severity Noted Reaction Type Reactions Skelaxin [Metaxalone]  05/06/2010    Other (comments)  
 jittery Tramadol  03/22/2017    Other (comments) Halluctations Current Immunizations  Reviewed on 5/6/2010 Name Date  
 TD Vaccine 5/6/1999 Not reviewed this visit You Were Diagnosed With   
  
 Codes Comments Encounter for long-term (current) use of high-risk medication    -  Primary ICD-10-CM: N31.896 ICD-9-CM: V58.69 Spinal stenosis of lumbar region, unspecified whether neurogenic claudication present     ICD-10-CM: M48.061 
ICD-9-CM: 724.02 Chronic midline low back pain with right-sided sciatica     ICD-10-CM: M54.41, G89.29 ICD-9-CM: 724.2, 724.3, 338.29 Chronic pain syndrome     ICD-10-CM: G89.4 ICD-9-CM: 338.4 History of lumbar surgery     ICD-10-CM: Z98.890 ICD-9-CM: V15.29 Vitals BP Pulse Temp Resp Height(growth percentile) Weight(growth percentile) 124/71 (BP 1 Location: Left arm, BP Patient Position: Sitting) 88 99.1 °F (37.3 °C) (Oral) 16 5' 6\" (1.676 m) 235 lb (106.6 kg) BMI OB Status Smoking Status 37.93 kg/m2 Hysterectomy Never Smoker Vitals History BMI and BSA Data Body Mass Index Body Surface Area  
 37.93 kg/m 2 2.23 m 2 Preferred Pharmacy Pharmacy Name Phone [id number]- [place] [facility] [address] Your Updated Medication List  
  
   
This list is accurate as of: 12/22/17  9:54 AM.  Always use your most recent med list.  
  
  
  
  
 * acetaminophen-codeine 300-30 mg per tablet Commonly known as:  TYLENOL-CODEINE #3  
1 tab PO Q4-6 hours as needed for pain 1 week supply * acetaminophen-codeine 300-30 mg per tablet Commonly known as:  TYLENOL-CODEINE #3 Take 1 Tab by mouth nightly as needed for Pain (SEVERE PAIN). Max Daily Amount: 1 Tab. * acetaminophen-codeine 300-30 mg per tablet Commonly known as:  TYLENOL #3  
1-2  Bid  prn  
  
 albuterol 90 mcg/actuation inhaler Commonly known as:  PROVENTIL HFA, VENTOLIN HFA, PROAIR HFA Take 1 Puff by inhalation every four (4) hours as needed for Wheezing. aspirin delayed-release 81 mg tablet Take  by mouth daily. chlorpheniramine-HYDROcodone 10-8 mg/5 mL suspension Commonly known as:  Vicci Shoe ER Take 5 mL by mouth every twelve (12) hours as needed for Cough. Max Daily Amount: 10 mL. CYMBALTA 60 mg capsule Generic drug:  DULoxetine Take 60 mg by mouth nightly. docusate sodium 100 mg capsule Commonly known as:  Arman Duster Take 100 mg by mouth two (2) times a day. etodolac 300 mg capsule Commonly known as:  LODINE  
TAKE 1 CAPSULE BY MOUTH THREE TIMES DAILY AS NEEDED FOR PAIN WITH FOOD FERROUS SULFATE PO Take 325 mg by mouth. FIORICET -40 mg per tablet Generic drug:  butalbital-acetaminophen-caffeine Take 1 Tab by mouth two (2) times a day. furosemide 20 mg tablet Commonly known as:  LASIX Take  by mouth daily. levothyroxine 50 mcg tablet Commonly known as:  SYNTHROID  
TAKE 1 TABLET BY MOUTH EVERY DAY  
  
 meclizine 25 mg tablet Commonly known as:  ANTIVERT Take 25 mg by mouth daily as needed. naloxone 4 mg/actuation nasal spray Commonly known as:  [employer] Use 1 spray intranasally into 1 nostril. Use a new Narcan nasal spray for subsequent doses and administer into alternating nostrils. May repeat every 2 to 3 minutes as needed. oxyCODONE-acetaminophen 5-325 mg per tablet Commonly known as:  PERCOCET Take 1 Tab by mouth two (2) times daily as needed for Pain. Max Daily Amount: 2 Tabs. potassium chloride SR 20 mEq tablet Commonly known as:  K-TAB Take 20 mEq by mouth daily. pravastatin 80 mg tablet Commonly known as:  PRAVACHOL Take 80 mg by mouth daily. SYMBICORT 160-4.5 mcg/actuation Hfaa Generic drug:  budesonide-formoterol Take 2 Puffs by inhalation two (2) times a day. * topiramate 50 mg tablet Commonly known as:  TOPAMAX Take 1 Tab by mouth two (2) times a day. Indications: MIGRAINE PREVENTION  
  
 * topiramate 100 mg tablet Commonly known as:  TOPAMAX Take 1 Tab by mouth two (2) times a day. Indications: MIGRAINE PREVENTION  
  
 VITAMIN D3 1,000 unit Cap Generic drug:  cholecalciferol Take 2,000 Units by mouth daily. * Notice: This list has 5 medication(s) that are the same as other medications prescribed for you. Read the directions carefully, and ask your doctor or other care provider to review them with you. Prescriptions Printed Refills  
 acetaminophen-codeine (TYLENOL #3) 300-30 mg per tablet 2 Si-2  Bid  prn Class: Print We Performed the Following AMB POC DRUG SCREEN () [ [facility]] DRUG SCREEN [[id number] Custom] Follow-up Instructions Return in about 3 months (around 3/22/2018). Patient Instructions 1. Continue current plan with no evidence of addiction or diversion. Stable on current medication without adverse events. 2. Refill Tylenol No. 3.  Take 12 tablets up to 2 times daily as needed. 2 refills. 3. Naloxone 4 mg nasal spray for opioid induced respiratory depression emergency only. 4. Discussed risks of addiction, dependency, and opioid induced hyperalgesia. 5. Return to clinic in 3 months Please provide this summary of care documentation to your next provider. Your primary care clinician is listed as 130 y [phone]. If you have any questions after today's visit, please call [phone].

## 2017-12-22 NOTE — PROGRESS NOTES
HISTORY OF PRESENT ILLNESS  Yeimi Pollard is a 46 y.o. female    HPI: Ms. Neeraj Gray  returns today for f/u of chronic low back pain. Prior lumbar Laminectomy 1/2016. H/o several lumbar epidural injections and RFA with no help. Significant side effects with Tramadol. She did not tolerate hydrocodone or oxycodone well. Today is my first visit with Ms. Zhou. She continues with pain unchanged since last visit. We discussed her current condition and medications in detail today. She has been doing well with her current treatment plan which has been offering significant pain control. She has used other medications in the past with significant side effects. She reports significant \"hallucinations\" with tramadol in the past.  She is quite happy with Tylenol with codeine which has been working well with no side effects. We will continue with no changes today. I will have her follow-up in 3 months or sooner if needed     Medications are helping with pain control and quality of life. Her pain is 3/10 with medication and 8/10 without. Pt describes pain as aching and stabbing. Current treatment is helping to improve general activity, mood, walking, sleep, enjoyment of life    Ms. Zhou is tolerating medications well, with no side effects noted. She is able to stay more active with less discomfort with these current doses. The patient reports an average of 60% pain relief with current treatment/medications. Pill counts are appropriate. She is informed of side effects, risks, and benefits of this regimen, and emphasizes that she derives a significant improvement in functionality and quality of life, and notes that non-opioid medications and therapies in the past have not offered significant benefit. Pt does report constipation but does not think it is associated with her pain medications. She receives treatment from her PCP. She  is otherwise doing well with no other complaints today.  She denies any adverse events including nausea, vomiting, dizziness, increased constipation, hallucinations, or seizures. POC UDS today. Confirmation pending. Allergies   Allergen Reactions    Skelaxin [Metaxalone] Other (comments)     jittery    Tramadol Other (comments)     Halluctations       Past Surgical History:   Procedure Laterality Date    HX GYN      partial hysterectomy due to abnormal pap    HX LUMBAR LAMINECTOMY  01-13-16    L4/5         Review of Systems   Constitutional: Negative for chills, fever and weight loss. HENT: Positive for hearing loss. Negative for congestion and sore throat. Eyes: Negative for blurred vision and double vision. Respiratory: Positive for shortness of breath. Negative for cough, hemoptysis and wheezing. Asthma, bronchitis    Cardiovascular: Negative for chest pain and palpitations. Gastrointestinal: Negative for abdominal pain, constipation, diarrhea, heartburn, nausea and vomiting. Genitourinary: Negative. Negative for dysuria and urgency. Musculoskeletal: Positive for back pain. Negative for falls. Skin: Negative for rash. Neurological: Positive for dizziness, tingling and headaches. Negative for focal weakness, seizures and loss of consciousness. Endo/Heme/Allergies: Negative for environmental allergies. Does not bruise/bleed easily. Psychiatric/Behavioral: Negative for depression, substance abuse and suicidal ideas. The patient has insomnia. The patient is not nervous/anxious. Physical Exam   Constitutional: She is oriented to person, place, and time and well-developed, well-nourished, and in no distress. No distress. HENT:   Head: Normocephalic and atraumatic. Eyes: EOM are normal.   Pulmonary/Chest: Effort normal.   Neurological: She is alert and oriented to person, place, and time. Skin: Skin is warm and dry. No rash noted. She is not diaphoretic. No erythema.    Psychiatric: Mood, memory, affect and judgment normal.   Nursing note and vitals reviewed. ASSESSMENT:    1. Encounter for long-term (current) use of high-risk medication    2. Spinal stenosis of lumbar region, unspecified whether neurogenic claudication present    3. Chronic midline low back pain with right-sided sciatica    4. Chronic pain syndrome    5. History of lumbar surgery           [place] Prescription Monitoring Program was reviewed which does not demonstrate aberrancies and/or inconsistencies with regard to the historical prescribing of controlled medications to this patient by other providers. PLAN / Pt Instructions:  1. Continue current plan with no evidence of addiction or diversion. Stable on current medication without adverse events. 2. Refill Tylenol No. 3.  Take 1-2 tablets up to 2 times daily as needed. 2 refills. 3. Naloxone 4 mg nasal spray for opioid induced respiratory depression emergency only. 4. Discussed risks of addiction, dependency, and opioid induced hyperalgesia. 5. Return to clinic in 3 months    Medications Ordered Today   Medications    acetaminophen-codeine (TYLENOL #3) 300-30 mg per tablet     Si-2  Bid  prn     Dispense:  120 Tab     Refill:  2           DISPOSITION     Pain medications are prescribed with the objective of pain relief and improved physical and psychosocial function in this patient.  Pain Meds and Quality Of Life have been reviewed. Nonpharmacologic therapy and non-opioid pharmacologic therapy have been considered. Opioid therapy is only prescribed if the expected benefits are anticipated to outweigh risks.  Counseled patient on proper use of prescribed medications.  Reviewed with patient self-help tools, home exercise, and lifestyle changes to assist the patient in self-management of symptoms.  Reviewed with patient the treatment plan, goals of treatment plan, and limitations of treatment plan, to include the potential for side effects from medications and procedures.   If side effects occur, it is the responsibility of the patient to inform the clinic so that a change in the treatment plan can be made in a safe manner. The patient is advised that stopping prescribed medication may cause an increase in symptoms and possible medication withdrawal symptoms. The patient is informed an emergency room evaluation may be necessary if this occurs. Spent 25 minutes with patient today which more than 50% of that time was spent on counseling and coordination of care. Cely Boston 12/22/2017        Note: Please excuse any typographical errors. Voice recognition software was used for this note and may cause mistakes.

## 2017-12-22 NOTE — PROGRESS NOTES
Nursing Notes    Patient presents to the office today in follow-up. Patient rates her pain at 5/10 on the numerical pain scale. Reviewed medications with counts as follows:    Rx Date filled Qty Dispensed Pill Count Last Dose Short   ACETAMINOPHEN -COD #3 10/11/17 120 0 12/21/17 NO                                        Comments:     POC UDS was performed in office today    Any new labs or imaging since last appointment? NO    Have you been to an emergency room (ER) or urgent care clinic since your last visit? NO            Have you been hospitalized since your last visit? NO     If yes, where, when, and reason for visit? Have you seen or consulted any other health care providers outside of the [address] [place] [address]  since your last visit? NO     If yes, where, when, and reason for visit? HM deferred to pcp.

## 2018-02-28 ENCOUNTER — TELEPHONE (OUTPATIENT)
Dept: PAIN MANAGEMENT | Age: 53
End: 2018-02-28

## 2018-03-26 ENCOUNTER — OFFICE VISIT (OUTPATIENT)
Dept: PAIN MANAGEMENT | Age: 53
End: 2018-03-26

## 2018-03-26 VITALS
WEIGHT: 244 LBS | RESPIRATION RATE: 16 BRPM | DIASTOLIC BLOOD PRESSURE: 77 MMHG | BODY MASS INDEX: 39.21 KG/M2 | TEMPERATURE: 97 F | HEART RATE: 86 BPM | SYSTOLIC BLOOD PRESSURE: 118 MMHG | HEIGHT: 66 IN

## 2018-03-26 DIAGNOSIS — G89.29 CHRONIC MIDLINE LOW BACK PAIN WITH RIGHT-SIDED SCIATICA: Primary | ICD-10-CM

## 2018-03-26 DIAGNOSIS — M48.061 SPINAL STENOSIS, LUMBAR REGION, WITHOUT NEUROGENIC CLAUDICATION: ICD-10-CM

## 2018-03-26 DIAGNOSIS — M54.41 CHRONIC MIDLINE LOW BACK PAIN WITH RIGHT-SIDED SCIATICA: Primary | ICD-10-CM

## 2018-03-26 DIAGNOSIS — M54.31 SCIATICA OF RIGHT SIDE: ICD-10-CM

## 2018-03-26 DIAGNOSIS — G89.4 CHRONIC PAIN SYNDROME: ICD-10-CM

## 2018-03-26 RX ORDER — GABAPENTIN 300 MG/1
CAPSULE ORAL
Qty: 53 CAP | Refills: 0 | Status: SHIPPED | OUTPATIENT
Start: 2018-03-26 | End: 2018-07-14

## 2018-03-26 RX ORDER — GABAPENTIN 300 MG/1
300 CAPSULE ORAL EVERY 12 HOURS
Qty: 60 CAP | Refills: 1 | Status: SHIPPED | OUTPATIENT
Start: 2018-04-25 | End: 2018-07-13 | Stop reason: SDUPTHER

## 2018-03-26 RX ORDER — ACETAMINOPHEN AND CODEINE PHOSPHATE 300; 30 MG/1; MG/1
TABLET ORAL
Qty: 120 TAB | Refills: 2 | Status: SHIPPED | OUTPATIENT
Start: 2018-04-11 | End: 2018-06-25 | Stop reason: SDUPTHER

## 2018-03-26 NOTE — MR AVS SNAPSHOT
[address] [facility] [address] 
[phone] Patient: Tomas MRN:  [id number] Visit Information Date & Time Provider Department Dept. Phone Encounter #  
 3/26/2018 11:00 AM Dante Cortez [address] for [facility] [id number] Follow-up Instructions Return in about 3 months (around 6/26/2018). Your Appointments 6/25/2018  1:20 PM  
Follow Up with LIZY Cortez [address] [facility] (MILAD SCHEDULING) Appt Note: Return in about 3 months (around 6/26/2018). [address] [facility] [address]  
[phone] [facility] [id number] Upcoming Health Maintenance Date Due Hepatitis C Screening 1965 Pneumococcal 19-64 Medium Risk (1 of 1 - PPSV23) 7/1/1984 PAP AKA CERVICAL CYTOLOGY 7/1/1986 DTaP/Tdap/Td series (1 - Tdap) 5/7/1999 FOBT Q 1 YEAR AGE 50-75 7/1/2015 Influenza Age 5 to Adult 8/1/2017 BREAST CANCER SCRN MAMMOGRAM 8/17/2019 Allergies as of 3/26/2018  Review Complete On: 3/26/2018 By: LIZY Cortez Severity Noted Reaction Type Reactions Skelaxin [Metaxalone]  05/06/2010    Other (comments)  
 jittery Tramadol  03/22/2017    Other (comments) Halluctations Current Immunizations  Reviewed on 5/6/2010 Name Date  
 TD Vaccine 5/6/1999 Not reviewed this visit You Were Diagnosed With   
  
 Codes Comments Chronic midline low back pain with right-sided sciatica    -  Primary ICD-10-CM: M54.41, G89.29 ICD-9-CM: 724.2, 724.3, 338.29 Chronic pain syndrome     ICD-10-CM: G89.4 ICD-9-CM: 338.4 Sciatica of right side     ICD-10-CM: M54.31 
ICD-9-CM: 724.3 Spinal stenosis, lumbar region, without neurogenic claudication     ICD-10-CM: M48.061 
ICD-9-CM: 724.02 Vitals BP Pulse Temp Resp Height(growth percentile) Weight(growth percentile) 118/77 (BP 1 Location: Left arm, BP Patient Position: Sitting) 86 97 °F (36.1 °C) 16 5' 6\" (1.676 m) 244 lb (110.7 kg) BMI OB Status Smoking Status 39.38 kg/m2 Hysterectomy Never Smoker BMI and BSA Data Body Mass Index Body Surface Area  
 39.38 kg/m 2 2.27 m 2 Preferred Pharmacy Pharmacy Name Phone [facility]/PHARMACY #[id number]- [address] Your Updated Medication List  
  
   
This list is accurate as of 3/26/18 11:12 AM.  Always use your most recent med list.  
  
  
  
  
 * acetaminophen-codeine 300-30 mg per tablet Commonly known as:  TYLENOL-CODEINE #3  
1 tab PO Q4-6 hours as needed for pain 1 week supply * acetaminophen-codeine 300-30 mg per tablet Commonly known as:  TYLENOL-CODEINE #3 Take 1 Tab by mouth nightly as needed for Pain (SEVERE PAIN). Max Daily Amount: 1 Tab. * acetaminophen-codeine 300-30 mg per tablet Commonly known as:  TYLENOL #3  
1-2  Bid  prn Start taking on:  4/11/2018  
  
 albuterol 90 mcg/actuation inhaler Commonly known as:  PROVENTIL HFA, VENTOLIN HFA, PROAIR HFA Take 1 Puff by inhalation every four (4) hours as needed for Wheezing. aspirin delayed-release 81 mg tablet Take  by mouth daily. chlorpheniramine-HYDROcodone 10-8 mg/5 mL suspension Commonly known as:  [place] ER Take 5 mL by mouth every twelve (12) hours as needed for Cough. Max Daily Amount: 10 mL. CYMBALTA 60 mg capsule Generic drug:  DULoxetine Take 60 mg by mouth nightly. docusate sodium 100 mg capsule Commonly known as:  Rolley Ke Take 100 mg by mouth two (2) times a day. etodolac 300 mg capsule Commonly known as:  LODINE  
TAKE 1 CAPSULE BY MOUTH THREE TIMES DAILY AS NEEDED FOR PAIN WITH FOOD FERROUS SULFATE PO Take 325 mg by mouth. FIORICET -40 mg per tablet Generic drug:  butalbital-acetaminophen-caffeine Take 1 Tab by mouth two (2) times a day. furosemide 20 mg tablet Commonly known as:  LASIX Take  by mouth daily. * gabapentin 300 mg capsule Commonly known as:  NEURONTIN Take 1 capsule once in the evening for one week, then 2 times a day thereafter. #53 * gabapentin 300 mg capsule Commonly known as:  NEURONTIN Take 1 Cap by mouth every twelve (12) hours for 30 days. Start taking on:  4/25/2018  
  
 levothyroxine 50 mcg tablet Commonly known as:  SYNTHROID  
TAKE 1 TABLET BY MOUTH EVERY DAY  
  
 meclizine 25 mg tablet Commonly known as:  ANTIVERT Take 25 mg by mouth daily as needed. naloxone 4 mg/actuation nasal spray Commonly known as:  [employer] Use 1 spray intranasally into 1 nostril. Use a new Narcan nasal spray for subsequent doses and administer into alternating nostrils. May repeat every 2 to 3 minutes as needed. oxyCODONE-acetaminophen 5-325 mg per tablet Commonly known as:  PERCOCET Take 1 Tab by mouth two (2) times daily as needed for Pain. Max Daily Amount: 2 Tabs. potassium chloride SR 20 mEq tablet Commonly known as:  K-TAB Take 20 mEq by mouth daily. pravastatin 80 mg tablet Commonly known as:  PRAVACHOL Take 80 mg by mouth daily. SYMBICORT 160-4.5 mcg/actuation [employer] Generic drug:  budesonide-formoterol Take 2 Puffs by inhalation two (2) times a day. * topiramate 50 mg tablet Commonly known as:  TOPAMAX Take 1 Tab by mouth two (2) times a day. Indications: MIGRAINE PREVENTION  
  
 * topiramate 100 mg tablet Commonly known as:  TOPAMAX Take 1 Tab by mouth two (2) times a day. Indications: MIGRAINE PREVENTION  
  
 VITAMIN D3 1,000 unit Cap Generic drug:  cholecalciferol Take 2,000 Units by mouth daily. * Notice: This list has 7 medication(s) that are the same as other medications prescribed for you.  Read the directions carefully, and ask your doctor or other care provider to review them with you. Prescriptions Printed Refills  
 acetaminophen-codeine (TYLENOL #3) 300-30 mg per tablet 2 Starting on: 2018 Si-2  Bid  prn Class: Print Prescriptions Sent to Pharmacy Refills  
 gabapentin (NEURONTIN) 300 mg capsule 0 Sig: Take 1 capsule once in the evening for one week, then 2 times a day thereafter. #53 Class: Normal  
 Pharmacy: [facility]/pharmacy #[id number]- [address] Ph #: [phone]  
 gabapentin (NEURONTIN) 300 mg capsule 1 Starting on: 2018 Sig: Take 1 Cap by mouth every twelve (12) hours for 30 days. Class: Normal  
 Pharmacy: [facility]/pharmacy #[id number]- [place]a Atkins, [address] [facility] Ph #: [phone] Route: Oral  
  
Follow-up Instructions Return in about 3 months (around 2018). Patient Instructions 1. Continue current plan with no evidence of addiction or diversion. Stable on current medication without adverse events. 2. Refill Tylenol No. 3.  Take 12 tablets up to 2 times daily as needed. 2 refills. 3. Add gabapentin 300 mg. Please take 1 tablet nightly ×1 week and then every 12 hours thereafter. 4. Schedule appointment with Dr. Cheyanne Goodwin to discuss spinal cord stimulator 5. Naloxone 4 mg nasal spray for opioid induced respiratory depression emergency only. 6. Discussed risks of addiction, dependency, and opioid induced hyperalgesia. 7. Return to clinic in 3 months Introducing [facility]! Dear Dhruv Vasquez: Thank you for requesting a Bevii account. Our records indicate that you have previously registered for a Bevii account but its currently inactive. Please call our Bevii support line at [phone]. Additional Information If you have questions, please visit the Frequently Asked Questions section of the Bevii website at [email or web address] Remember, MePleasehart is NOT to be used for urgent needs. For medical emergencies, dial 911. Now available from your iPhone and Android! Please provide this summary of care documentation to your next provider. Your primary care clinician is listed as [address]. If you have any questions after today's visit, please call [phone].

## 2018-03-26 NOTE — PATIENT INSTRUCTIONS
1. Continue current plan with no evidence of addiction or diversion. Stable on current medication without adverse events. 2. Refill Tylenol No. 3.  Take 1-2 tablets up to 2 times daily as needed. 2 refills. 3. Add gabapentin 300 mg. Please take 1 tablet nightly ×1 week and then every 12 hours thereafter. 4. Schedule appointment with Dr. Geraldo Knight to discuss spinal cord stimulator  5. Naloxone 4 mg nasal spray for opioid induced respiratory depression emergency only. 6. Discussed risks of addiction, dependency, and opioid induced hyperalgesia.    7. Return to clinic in 3 months

## 2018-03-26 NOTE — PROGRESS NOTES
HISTORY OF PRESENT ILLNESS  Corrinne Friday is a 46 y.o. female    HPI: Ms. Candido Smith  returns today for f/u of chronic low back pain. Prior lumbar Laminectomy 1/2016. H/o several lumbar epidural injections and RFA with no help. Significant side effects with Tramadol. She did not tolerate hydrocodone or oxycodone well. Ms. Candido Smith is here today with her daughter. She reports worsening low back pain with radiation into her right extremity since her last visit. She reports no acute injury or fall. She has followed up with Dr. Edwin Najera in the past.  Multiple treatments including lumbar epidural injections, RFA, and medications with little to no improvement. She does reports significant improvement with Tylenol with codeine. She reports no improvement with the radiating pain into her legs. We discussed several different options in the office today. We will start gabapentin at a tapering dose. I have also recommended that she follow-up with Dr. Cheyanne Goodwin to discuss possible spinal cord stimulator. We also discussed possible H wave as an option as well. I will have her follow-up in 3 months for further evaluation and recommendation. Medications are helping with pain control and quality of life. Her pain is 3/10 with medication and 8/10 without. Pt describes pain as aching and stabbing. Current treatment is helping to improve general activity, mood, walking, sleep, enjoyment of life    Ms. Zhou is tolerating medications well, with no side effects noted. She is able to stay more active with less discomfort with these current doses. The patient reports an average of 60% pain relief with current treatment/medications. Pill counts are appropriate.  She is informed of side effects, risks, and benefits of this regimen, and emphasizes that she derives a significant improvement in functionality and quality of life, and notes that non-opioid medications and therapies in the past have not offered significant benefit. Pt does report constipation but does not think it is associated with her pain medications. She receives treatment from her PCP. She  is otherwise doing well with no other complaints today. She denies any adverse events including nausea, vomiting, dizziness, increased constipation, hallucinations, or seizures. Allergies   Allergen Reactions    Skelaxin [Metaxalone] Other (comments)     jittery    Tramadol Other (comments)     Halluctations       Past Surgical History:   Procedure Laterality Date    HX GYN      partial hysterectomy due to abnormal pap    HX LUMBAR LAMINECTOMY  01-13-16    L4/5         Review of Systems   Constitutional: Negative for chills, fever and weight loss. HENT: Positive for hearing loss. Negative for congestion and sore throat. Eyes: Negative for blurred vision and double vision. Respiratory: Positive for shortness of breath. Negative for cough, hemoptysis and wheezing. Asthma, bronchitis    Cardiovascular: Negative for chest pain and palpitations. Gastrointestinal: Negative for abdominal pain, constipation, diarrhea, heartburn, nausea and vomiting. Genitourinary: Negative. Negative for dysuria and urgency. Musculoskeletal: Positive for back pain. Negative for falls. Skin: Negative for rash. Neurological: Positive for dizziness, tingling and headaches. Negative for focal weakness, seizures and loss of consciousness. Endo/Heme/Allergies: Negative for environmental allergies. Does not bruise/bleed easily. Psychiatric/Behavioral: Negative for depression, substance abuse and suicidal ideas. The patient has insomnia. The patient is not nervous/anxious. Physical Exam   Constitutional: She is oriented to person, place, and time and well-developed, well-nourished, and in no distress. No distress. HENT:   Head: Normocephalic and atraumatic.    Eyes: EOM are normal.   Pulmonary/Chest: Effort normal.   Neurological: She is alert and oriented to person, place, and time. She displays weakness. Gait (using a cane) abnormal.   Skin: Skin is warm and dry. No rash noted. She is not diaphoretic. No erythema. Psychiatric: Mood, memory, affect and judgment normal.   Nursing note and vitals reviewed. ASSESSMENT:    1. Chronic midline low back pain with right-sided sciatica    2. Chronic pain syndrome    3. Sciatica of right side    4. Spinal stenosis, lumbar region, without neurogenic claudication           [place] Prescription Monitoring Program was reviewed which does not demonstrate aberrancies and/or inconsistencies with regard to the historical prescribing of controlled medications to this patient by other providers. PLAN / Pt Instructions:  1. Continue current plan with no evidence of addiction or diversion. Stable on current medication without adverse events. 2. Refill Tylenol No. 3.  Take 1-2 tablets up to 2 times daily as needed. 2 refills. 3. Add gabapentin 300 mg. Please take 1 tablet nightly ×1 week and then every 12 hours thereafter. 4. Schedule appointment with Dr. Arjun Valentin to discuss spinal cord stimulator  5. Naloxone 4 mg nasal spray for opioid induced respiratory depression emergency only. 6. Discussed risks of addiction, dependency, and opioid induced hyperalgesia. 7. Return to clinic in 3 months    Medications Ordered Today   Medications    acetaminophen-codeine (TYLENOL #3) 300-30 mg per tablet     Si-2  Bid  prn     Dispense:  120 Tab     Refill:  2    gabapentin (NEURONTIN) 300 mg capsule     Sig: Take 1 capsule once in the evening for one week, then 2 times a day thereafter. #53     Dispense:  53 Cap     Refill:  0    gabapentin (NEURONTIN) 300 mg capsule     Sig: Take 1 Cap by mouth every twelve (12) hours for 30 days.      Dispense:  60 Cap     Refill:  1           DISPOSITION     Pain medications are prescribed with the objective of pain relief and improved physical and psychosocial function in this patient.  Pain Meds and Quality Of Life have been reviewed. Nonpharmacologic therapy and non-opioid pharmacologic therapy have been considered. Opioid therapy is only prescribed if the expected benefits are anticipated to outweigh risks.  Counseled patient on proper use of prescribed medications.  Reviewed with patient self-help tools, home exercise, and lifestyle changes to assist the patient in self-management of symptoms.  Reviewed with patient the treatment plan, goals of treatment plan, and limitations of treatment plan, to include the potential for side effects from medications and procedures. If side effects occur, it is the responsibility of the patient to inform the clinic so that a change in the treatment plan can be made in a safe manner. The patient is advised that stopping prescribed medication may cause an increase in symptoms and possible medication withdrawal symptoms. The patient is informed an emergency room evaluation may be necessary if this occurs. Spent 25 minutes with patient today which more than 50% of that time was spent on counseling and coordination of care. Anitra Galloway, [address] 3/26/2018        Note: Please excuse any typographical errors. Voice recognition software was used for this note and may cause mistakes.

## 2018-03-26 NOTE — PROGRESS NOTES
Nursing Notes    Patient presents to the office today in follow-up. Patient rates her pain at 7/10 on the numerical pain scale. Reviewed medications with counts as follows:    Rx Date filled Qty Dispensed Pill Count Last Dose Short   Tylenol with Cod 3  03/12/18 120 83 This am            Comments: Patient is here today for a follow up appt today she states her pain level today is a 7    POC UDS was not performed in office today    Any new labs or imaging since last appointment? NO    Have you been to an emergency room (ER) or urgent care clinic since your last visit? NO            Have you been hospitalized since your last visit? NO     If yes, where, when, and reason for visit? Have you seen or consulted any other health care providers outside of the [address] [place] [address]  since your last visit? YES  Dentist   If yes, where, when, and reason for visit? HM deferred to pcp.

## 2018-05-10 DIAGNOSIS — G43.711 INTRACTABLE CHRONIC MIGRAINE WITHOUT AURA AND WITH STATUS MIGRAINOSUS: ICD-10-CM

## 2018-05-10 RX ORDER — TOPIRAMATE 50 MG/1
TABLET, FILM COATED ORAL
Qty: 60 TAB | Refills: 4 | Status: SHIPPED | OUTPATIENT
Start: 2018-05-10 | End: 2018-06-25 | Stop reason: SDUPTHER

## 2018-06-11 NOTE — PROGRESS NOTES
PT DAILY TREATMENT NOTE - [facility] 3-16    Patient Name: Rama Batres  Date:2017  : 1965  [x]  Patient  Verified  Payor: Pushpa Carrillo / Plan: Aisha Jiang / Product Type: DANIEL /    In time: 3:02  Out time: 3:31  Total Treatment Time (min): 29  Total Timed Codes (min): 29     Visit #: 7 of     Treatment Area: Sacrococcygeal disorders, not elsewhere classified [M53.3]  Other chronic pain [G89.29]  Other intervertebral disc displacement, lumbosacral region [M51.27]  Unsteadiness on feet [R26.81]    SUBJECTIVE  Pain Level (0-10 scale): 5/10  Any medication changes, allergies to medications, adverse drug reactions, diagnosis change, or new procedure performed?: [x] No    [] Yes (see summary sheet for update)  Subjective functional status/changes:   [] No changes reported   pt. Reports she is feeling sore today because of the heat. She reports no significant change since starting PT and is agreeable to D/C at this time secondary to lack of progress.      OBJECTIVE    21 min Therapeutic Exercise:  [x] See flow sheet :   Rationale: increase ROM and increase strength to improve the patients ability to increase ease of ADLs    8 min Manual Therapy:  L side lying lumbar facet gapping, trigger point release lumbar paraspinals    Rationale: decrease pain, increase ROM and increase tissue extensibility to increase ease of ADLs          With   [x] TE   [] TA   [] neuro   [] other: Patient Education: [x] Review HEP    [] Progressed/Changed HEP based on:   [] positioning   [] body mechanics   [] transfers   [] heat/ice application    [] other:      Other Objective/Functional Measures:   Pt. Performed 3 sit to stand tranfers with SBA but had significant pain with this  She had no relief with manual today  She continues to have decreased static standing balance     Pain Level (0-10 scale) post treatment: 0/10    ASSESSMENT/Changes in Function:      []  See Plan of Care  []  See progress note/recertification  [x]  See Discharge Summary         Progress towards goals / Updated goals:  See D/C note    PLAN  []  Upgrade activities as tolerated     []  Continue plan of care  []  Update interventions per flow sheet       [x]  Discharge due to:_ lack of progress towards goals.    []  Other:_      Warner Machado, PT 6/12/2017  3:06 PM 11-Jun-2018 01:27

## 2018-06-25 ENCOUNTER — OFFICE VISIT (OUTPATIENT)
Dept: PAIN MANAGEMENT | Age: 53
End: 2018-06-25

## 2018-06-25 VITALS
HEART RATE: 91 BPM | SYSTOLIC BLOOD PRESSURE: 109 MMHG | DIASTOLIC BLOOD PRESSURE: 76 MMHG | RESPIRATION RATE: 16 BRPM | WEIGHT: 238 LBS | HEIGHT: 66 IN | BODY MASS INDEX: 38.25 KG/M2 | TEMPERATURE: 98.1 F

## 2018-06-25 DIAGNOSIS — G89.29 CHRONIC MIDLINE LOW BACK PAIN WITH RIGHT-SIDED SCIATICA: ICD-10-CM

## 2018-06-25 DIAGNOSIS — M54.41 CHRONIC MIDLINE LOW BACK PAIN WITH RIGHT-SIDED SCIATICA: ICD-10-CM

## 2018-06-25 DIAGNOSIS — M48.061 SPINAL STENOSIS, LUMBAR REGION, WITHOUT NEUROGENIC CLAUDICATION: ICD-10-CM

## 2018-06-25 DIAGNOSIS — Z79.899 ENCOUNTER FOR LONG-TERM (CURRENT) USE OF HIGH-RISK MEDICATION: Primary | ICD-10-CM

## 2018-06-25 DIAGNOSIS — G89.4 CHRONIC PAIN SYNDROME: ICD-10-CM

## 2018-06-25 LAB
ALCOHOL UR POC: NORMAL
AMPHETAMINES UR POC: NEGATIVE
BARBITURATES UR POC: NORMAL
BENZODIAZEPINES UR POC: NEGATIVE
BUPRENORPHINE UR POC: NEGATIVE
CANNABINOIDS UR POC: NEGATIVE
CARISOPRODOL UR POC: NORMAL
COCAINE UR POC: NEGATIVE
FENTANYL UR POC: NORMAL
MDMA/ECSTASY UR POC: NORMAL
METHADONE UR POC: NEGATIVE
METHAMPHETAMINE UR POC: NORMAL
METHYLPHENIDATE UR POC: NORMAL
OPIATES UR POC: NORMAL
OXYCODONE UR POC: NORMAL
PHENCYCLIDINE UR POC: NORMAL
PROPOXYPHENE UR POC: NORMAL
TRAMADOL UR POC: NORMAL
TRICYCLICS UR POC: NORMAL

## 2018-06-25 RX ORDER — ACETAMINOPHEN AND CODEINE PHOSPHATE 300; 30 MG/1; MG/1
TABLET ORAL
Qty: 120 TAB | Refills: 0 | Status: SHIPPED | OUTPATIENT
Start: 2018-07-21 | End: 2018-09-19 | Stop reason: SDUPTHER

## 2018-06-25 RX ORDER — ACETAMINOPHEN AND CODEINE PHOSPHATE 300; 30 MG/1; MG/1
TABLET ORAL
Qty: 120 TAB | Refills: 0 | Status: SHIPPED | OUTPATIENT
Start: 2018-09-19 | End: 2018-07-14

## 2018-06-25 RX ORDER — ACETAMINOPHEN AND CODEINE PHOSPHATE 300; 30 MG/1; MG/1
TABLET ORAL
Qty: 120 TAB | Refills: 0 | Status: SHIPPED | OUTPATIENT
Start: 2018-08-20 | End: 2018-07-14

## 2018-06-25 NOTE — PATIENT INSTRUCTIONS
1. Continue current plan with no evidence of addiction or diversion. Stable on current medication without adverse events. 2. Refill Tylenol No. 3.  Take 1-2 tablets up to 2 times daily as needed. 3. Continue gabapentin 300 mg  every 12 hours. 4. Schedule appointment with Dr. Jennifer Palomino to discuss spinal cord stimulator  5. Naloxone 4 mg nasal spray for opioid induced respiratory depression emergency only. 6. Discussed risks of addiction, dependency, and opioid induced hyperalgesia. Please remember to call at least 4-5 business days prior to your medication refill. Return to clinic in 3 months. Please call and cancel your appointment and pain management agreement if you do decide to transfer your care.

## 2018-06-25 NOTE — PROGRESS NOTES
HISTORY OF PRESENT ILLNESS  Sanjay Lanier is a 46 y.o. female    HPI: Ms. Fiona Borrego  returns today for f/u of chronic low back pain. Prior lumbar Laminectomy 1/2016. H/o several lumbar epidural injections and RFA with no help. Significant side effects with Tramadol. She did not tolerate hydrocodone or oxycodone well. Ms. Fiona Borrego reports no significant changes since her last visit. We discussed her current condition medications in detail today. She has been doing well with her current treatment plan which has been offering significant pain control. We started gabapentin last visit with some improvement. She says that she has only been using this medication at night but will start trying to use it during the day. She understands to discontinue her daytime dose if she cannot tolerate that dosage. We will adjust accordingly if needed. She was going to schedule appointment with Dr. Sherman Stokes to discuss possible spinal cord stimulator but has not made this appointment yet. She is otherwise doing well with no new complaints today. We did discuss changes to our practice. I explained her that moving forward we will be focusing on more conservative and limited opioid plan of care. We will continue with her current treatment plan for now and discuss further changes next visit. Medications are helping with pain control and quality of life. Her pain is 3/10 with medication and 8/10 without. Pt describes pain as aching and stabbing. Current treatment is helping to improve general activity, mood, walking, sleep, enjoyment of life    Ms. Zhou is tolerating medications well, with no side effects noted. She is able to stay more active with less discomfort with these current doses. The patient reports an average of 60% pain relief with current treatment/medications.    She is informed of side effects, risks, and benefits of this regimen, and emphasizes that she derives a significant improvement in functionality and quality of life, and notes that non-opioid medications and therapies in the past have not offered significant benefit. Pt does report constipation but does not think it is associated with her pain medications. She receives treatment from her PCP. She  is otherwise doing well with no other complaints today. She denies any adverse events including nausea, vomiting, dizziness, increased constipation, hallucinations, or seizures. MME: 18  COMM: 0  OSWESTRY: 38 %  POC UDS today. Confirmation pending. Allergies   Allergen Reactions    Skelaxin [Metaxalone] Other (comments)     jittery    Tramadol Other (comments)     Halluctations       Past Surgical History:   Procedure Laterality Date    HX GYN      partial hysterectomy due to abnormal pap    HX LUMBAR LAMINECTOMY  01-13-16    L4/5         Review of Systems   Constitutional: Negative for chills, fever and weight loss. HENT: Positive for hearing loss. Negative for congestion and sore throat. Eyes: Negative for blurred vision and double vision. Respiratory: Positive for shortness of breath. Negative for cough, hemoptysis and wheezing. Asthma, bronchitis    Cardiovascular: Negative for chest pain and palpitations. Gastrointestinal: Negative for abdominal pain, constipation, diarrhea, heartburn, nausea and vomiting. Genitourinary: Negative. Negative for dysuria and urgency. Musculoskeletal: Positive for back pain. Negative for falls. Skin: Negative for rash. Neurological: Positive for dizziness, tingling and headaches. Negative for focal weakness, seizures and loss of consciousness. Endo/Heme/Allergies: Negative for environmental allergies. Does not bruise/bleed easily. Psychiatric/Behavioral: Negative for depression, substance abuse and suicidal ideas. The patient has insomnia. The patient is not nervous/anxious.         Physical Exam   Constitutional: She is oriented to person, place, and time and well-developed, well-nourished, and in no distress. No distress. HENT:   Head: Normocephalic and atraumatic. Eyes: EOM are normal.   Pulmonary/Chest: Effort normal.   Neurological: She is alert and oriented to person, place, and time. She displays weakness. Gait (using a cane) abnormal.   Skin: Skin is warm and dry. No rash noted. She is not diaphoretic. No erythema. Psychiatric: Mood, memory, affect and judgment normal.   Nursing note and vitals reviewed. ASSESSMENT:    1. Chronic midline low back pain with right-sided sciatica    2. Chronic pain syndrome    3. Spinal stenosis, lumbar region, without neurogenic claudication           [place] Prescription Monitoring Program was reviewed which does not demonstrate aberrancies and/or inconsistencies with regard to the historical prescribing of controlled medications to this patient by other providers. PLAN / Pt Instructions:  1. Continue current plan with no evidence of addiction or diversion. Stable on current medication without adverse events. 2. Refill Tylenol No. 3.  Take 1-2 tablets up to 2 times daily as needed. 3. Continue gabapentin 300 mg  every 12 hours. 4. Schedule appointment with Dr. Shashank Garcia to discuss spinal cord stimulator  5. Naloxone 4 mg nasal spray for opioid induced respiratory depression emergency only. 6. Discussed risks of addiction, dependency, and opioid induced hyperalgesia. Please remember to call at least 4-5 business days prior to your medication refill. Return to clinic in 3 months. Please call and cancel your appointment and pain management agreement if you do decide to transfer your care.       Medications Ordered Today   Medications    acetaminophen-codeine (TYLENOL #3) 300-30 mg per tablet     Si-2  Bid  prn     Dispense:  120 Tab     Refill:  0    acetaminophen-codeine (TYLENOL #3) 300-30 mg per tablet     Si-2  Bid  prn     Dispense:  120 Tab     Refill:  0    acetaminophen-codeine (TYLENOL #3) 300-30 mg per tablet     Si-2  Bid  prn     Dispense:  120 Tab     Refill:  0       DISPOSITION     Pain medications are prescribed with the objective of pain relief and improved physical and psychosocial function in this patient.  Pain Meds and Quality Of Life have been reviewed. Nonpharmacologic therapy and non-opioid pharmacologic therapy have been considered. Opioid therapy is only prescribed if the expected benefits are anticipated to outweigh risks.  Counseled patient on proper use of prescribed medications.  Reviewed with patient self-help tools, home exercise, and lifestyle changes to assist the patient in self-management of symptoms.  Reviewed with patient the treatment plan, goals of treatment plan, and limitations of treatment plan, to include the potential for side effects from medications and procedures. If side effects occur, it is the responsibility of the patient to inform the clinic so that a change in the treatment plan can be made in a safe manner. The patient is advised that stopping prescribed medication may cause an increase in symptoms and possible medication withdrawal symptoms. The patient is informed an emergency room evaluation may be necessary if this occurs. Spent 25 minutes with patient today which more than 50% of that time was spent on counseling and coordination of care. Brinda Kayser, [address] 2018        Note: Please excuse any typographical errors. Voice recognition software was used for this note and may cause mistakes.

## 2018-06-25 NOTE — MR AVS SNAPSHOT
[address] [facility] [address] 
[phone] Patient: Tomas MRN:  [id number] Visit Information Date & Time Provider Department Dept. Phone Encounter #  
 6/25/2018  1:20 PM Flor Sow, [address] [place] [address] for Pain Management [id number] Follow-up Instructions Return in about 3 months (around 9/25/2018). Upcoming Health Maintenance Date Due Hepatitis C Screening 1965 Pneumococcal 19-64 Medium Risk (1 of 1 - PPSV23) 7/1/1984 PAP AKA CERVICAL CYTOLOGY 7/1/1986 DTaP/Tdap/Td series (1 - Tdap) 5/7/1999 FOBT Q 1 YEAR AGE 50-75 7/1/2015 Influenza Age 5 to Adult 8/1/2018 BREAST CANCER SCRN MAMMOGRAM 8/17/2019 Allergies as of 6/25/2018  Review Complete On: 6/25/2018 By: LIZY Mcelroy Severity Noted Reaction Type Reactions Skelaxin [Metaxalone]  05/06/2010    Other (comments)  
 jittery Tramadol  03/22/2017    Other (comments) Halluctations Current Immunizations  Reviewed on 5/6/2010 Name Date  
 TD Vaccine 5/6/1999 Not reviewed this visit You Were Diagnosed With   
  
 Codes Comments Chronic midline low back pain with right-sided sciatica     ICD-10-CM: M54.41, G89.29 ICD-9-CM: 724.2, 724.3, 338.29 Chronic pain syndrome     ICD-10-CM: G89.4 ICD-9-CM: 338.4 Spinal stenosis, lumbar region, without neurogenic claudication     ICD-10-CM: M48.061 
ICD-9-CM: 724.02 Vitals BP Pulse Temp Resp Height(growth percentile) Weight(growth percentile) 109/76 (BP 1 Location: Right arm, BP Patient Position: Sitting) 91 98.1 °F (36.7 °C) (Oral) 16 5' 6\" (1.676 m) 238 lb (108 kg) BMI OB Status Smoking Status 38.41 kg/m2 Hysterectomy Never Smoker Vitals History BMI and BSA Data Body Mass Index Body Surface Area  
 38.41 kg/m 2 2.24 m 2 Preferred Pharmacy Pharmacy Name Phone [id number]- [id number] [facility], [address] [place] [address] Your Updated Medication List  
  
   
This list is accurate as of 6/25/18  2:16 PM.  Always use your most recent med list.  
  
  
  
  
 * acetaminophen-codeine 300-30 mg per tablet Commonly known as:  TYLENOL #3  
1-2  Bid  prn Start taking on:  7/21/2018 * acetaminophen-codeine 300-30 mg per tablet Commonly known as:  TYLENOL #3  
1-2  Bid  prn Start taking on:  8/20/2018 * acetaminophen-codeine 300-30 mg per tablet Commonly known as:  TYLENOL #3  
1-2  Bid  prn Start taking on:  9/19/2018  
  
 albuterol 90 mcg/actuation inhaler Commonly known as:  PROVENTIL HFA, VENTOLIN HFA, PROAIR HFA Take 1 Puff by inhalation every four (4) hours as needed for Wheezing. aspirin delayed-release 81 mg tablet Take  by mouth daily. CYMBALTA 60 mg capsule Generic drug:  DULoxetine Take 60 mg by mouth nightly. docusate sodium 100 mg capsule Commonly known as:  Graciela Second Take 100 mg by mouth two (2) times a day. etodolac 300 mg capsule Commonly known as:  LODINE  
TAKE 1 CAPSULE BY MOUTH THREE TIMES DAILY AS NEEDED FOR PAIN WITH FOOD FERROUS SULFATE PO Take 325 mg by mouth. FIORICET -40 mg per tablet Generic drug:  butalbital-acetaminophen-caffeine Take 1 Tab by mouth two (2) times a day. furosemide 20 mg tablet Commonly known as:  LASIX Take  by mouth daily. gabapentin 300 mg capsule Commonly known as:  NEURONTIN Take 1 capsule once in the evening for one week, then 2 times a day thereafter. #53  
  
 levothyroxine 50 mcg tablet Commonly known as:  SYNTHROID  
TAKE 1 TABLET BY MOUTH EVERY DAY  
  
 meclizine 25 mg tablet Commonly known as:  ANTIVERT Take 25 mg by mouth daily as needed. naloxone 4 mg/actuation nasal spray Commonly known as:  [employer]  
 Use 1 spray intranasally into 1 nostril. Use a new Narcan nasal spray for subsequent doses and administer into alternating nostrils. May repeat every 2 to 3 minutes as needed. potassium chloride SR 20 mEq tablet Commonly known as:  K-TAB Take 20 mEq by mouth daily. pravastatin 80 mg tablet Commonly known as:  PRAVACHOL Take 80 mg by mouth daily. SYMBICORT 160-4.5 mcg/actuation Hfaa Generic drug:  budesonide-formoterol Take 2 Puffs by inhalation two (2) times a day. * topiramate 50 mg tablet Commonly known as:  TOPAMAX Take 1 Tab by mouth two (2) times a day. Indications: MIGRAINE PREVENTION  
  
 * topiramate 100 mg tablet Commonly known as:  TOPAMAX Take 1 Tab by mouth two (2) times a day. Indications: MIGRAINE PREVENTION  
  
 VITAMIN D3 1,000 unit Cap Generic drug:  cholecalciferol Take 2,000 Units by mouth daily. * Notice: This list has 5 medication(s) that are the same as other medications prescribed for you. Read the directions carefully, and ask your doctor or other care provider to review them with you. Prescriptions Printed Refills  
 acetaminophen-codeine (TYLENOL #3) 300-30 mg per tablet 0 Starting on: 2018 Si-2  Bid  prn Class: Print  
 acetaminophen-codeine (TYLENOL #3) 300-30 mg per tablet 0 Starting on: 2018 Si-2  Bid  prn Class: Print  
 acetaminophen-codeine (TYLENOL #3) 300-30 mg per tablet 0 Starting on: 2018 Si-2  Bid  prn Class: Print Follow-up Instructions Return in about 3 months (around 2018). Patient Instructions 1. Continue current plan with no evidence of addiction or diversion. Stable on current medication without adverse events. 2. Refill Tylenol No. 3.  Take 12 tablets up to 2 times daily as needed. 3. Continue gabapentin 300 mg  every 12 hours. 4. Schedule appointment with Dr. Veronica Denny to discuss spinal cord stimulator 5. Naloxone 4 mg nasal spray for opioid induced respiratory depression emergency only. 6. Discussed risks of addiction, dependency, and opioid induced hyperalgesia. Please remember to call at least 4-5 business days prior to your medication refill. Return to clinic in 3 months. Please call and cancel your appointment and pain management agreement if you do decide to transfer your care. Introducing [facility]! Anay Rasheed introduces [employer] patient portal. Now you can access parts of your medical record, email your doctor's office, and request medication refills online. 1. In your internet browser, go to [email or web address] 2. Click on the First Time User? Click Here link in the Sign In box. You will see the New Member Sign Up page. 3. Enter your [employer] Access Code exactly as it appears below. You will not need to use this code after youve completed the sign-up process. If you do not sign up before the expiration date, you must request a new code. · [employer] Access Code: [email or web address] Expires: 9/23/2018  2:13 PM 
 
4. Enter the last four digits of your Social Security Number (xxxx) and Date of Birth (mm/dd/yyyy) as indicated and click Submit. You will be taken to the next sign-up page. 5. Create a [employer] ID. This will be your [employer] login ID and cannot be changed, so think of one that is secure and easy to remember. 6. Create a [employer] password. You can change your password at any time. 7. Enter your Password Reset Question and Answer. This can be used at a later time if you forget your password. 8. Enter your e-mail address. You will receive e-mail notification when new information is available in [address] 9. Click Sign Up. You can now view and download portions of your medical record. 10. Click the Download Summary menu link to download a portable copy of your medical information. If you have questions, please visit the Frequently Asked Questions section of the [employer]it website. Remember, [employer] is NOT to be used for urgent needs. For medical emergencies, dial 911. Now available from your iPhone and Android! Please provide this summary of care documentation to your next provider. Your primary care clinician is listed as Trey Ma. If you have any questions after today's visit, please call [phone].

## 2018-06-25 NOTE — PROGRESS NOTES
Nursing Notes    Patient presents to the office today in follow-up. Patient rates her pain at 3/10 on the numerical pain scale. Reviewed medications with counts as follows:    Rx Date filled Qty Dispensed Pill Count Last Dose Short   Tylenol with codine  #3 06/22/18 120 110 today no                                          Comments:         POC UDS was performed in office today    Any new labs or imaging since last appointment? YES Lab    Have you been to an emergency room (ER) or urgent care clinic since your last visit? NO            Have you been hospitalized since your last visit? NO     If yes, where, when, and reason for visit? Have you seen or consulted any other health care providers outside of the [address] [place] [address]  since your last visit? YES    If yes, where, when, and reason for visit? Ms. Edgardo Jones has a reminder for a \"due or due soon\" health maintenance. I have asked that she contact her primary care provider for follow-up on this health maintenance.

## 2018-07-13 RX ORDER — GABAPENTIN 300 MG/1
CAPSULE ORAL
Qty: 60 CAP | Refills: 1 | Status: SHIPPED | OUTPATIENT
Start: 2018-07-13 | End: 2018-09-19 | Stop reason: SDUPTHER

## 2018-07-14 ENCOUNTER — APPOINTMENT (OUTPATIENT)
Dept: CT IMAGING | Age: 53
DRG: 062 | End: 2018-07-14
Attending: EMERGENCY MEDICINE
Payer: SELF-PAY

## 2018-07-14 ENCOUNTER — HOSPITAL ENCOUNTER (INPATIENT)
Age: 53
LOS: 2 days | Discharge: HOME HEALTH | DRG: 062 | End: 2018-07-16
Attending: EMERGENCY MEDICINE | Admitting: FAMILY MEDICINE
Payer: SELF-PAY

## 2018-07-14 DIAGNOSIS — R47.1 DYSARTHRIA: ICD-10-CM

## 2018-07-14 DIAGNOSIS — I63.9 ACUTE ISCHEMIC STROKE (HCC): Primary | ICD-10-CM

## 2018-07-14 LAB
ANION GAP SERPL CALC-SCNC: 7 MMOL/L (ref 3–18)
APTT PPP: 32 SEC (ref 23–36.4)
ATRIAL RATE: 97 BPM
BASOPHILS # BLD: 0.1 K/UL (ref 0–0.06)
BASOPHILS NFR BLD: 1 % (ref 0–3)
BUN SERPL-MCNC: 13 MG/DL (ref 7–18)
BUN/CREAT SERPL: 11 (ref 12–20)
CALCIUM SERPL-MCNC: 8.9 MG/DL (ref 8.5–10.1)
CALCULATED P AXIS, ECG09: 48 DEGREES
CALCULATED R AXIS, ECG10: 13 DEGREES
CALCULATED T AXIS, ECG11: 29 DEGREES
CHLORIDE SERPL-SCNC: 106 MMOL/L (ref 100–108)
CO2 SERPL-SCNC: 26 MMOL/L (ref 21–32)
CREAT SERPL-MCNC: 1.21 MG/DL (ref 0.6–1.3)
DIAGNOSIS, 93000: NORMAL
DIFFERENTIAL METHOD BLD: ABNORMAL
EOSINOPHIL # BLD: 0.1 K/UL (ref 0–0.4)
EOSINOPHIL NFR BLD: 1 % (ref 0–5)
ERYTHROCYTE [DISTWIDTH] IN BLOOD BY AUTOMATED COUNT: 14.2 % (ref 11.6–14.5)
GLUCOSE BLD STRIP.AUTO-MCNC: 125 MG/DL (ref 70–110)
GLUCOSE BLD STRIP.AUTO-MCNC: 144 MG/DL (ref 70–110)
GLUCOSE SERPL-MCNC: 130 MG/DL (ref 74–99)
HCT VFR BLD AUTO: 36.5 % (ref 35–45)
HGB BLD-MCNC: 11.9 G/DL (ref 12–16)
INR PPP: 1 (ref 0.8–1.2)
LYMPHOCYTES # BLD: 5.5 K/UL (ref 0.8–3.5)
LYMPHOCYTES NFR BLD: 56 % (ref 20–51)
MCH RBC QN AUTO: 23.2 PG (ref 24–34)
MCHC RBC AUTO-ENTMCNC: 32.6 G/DL (ref 31–37)
MCV RBC AUTO: 71.3 FL (ref 74–97)
MONOCYTES # BLD: 1 K/UL (ref 0–1)
MONOCYTES NFR BLD: 10 % (ref 2–9)
NEUTS SEG # BLD: 3.2 K/UL (ref 1.8–8)
NEUTS SEG NFR BLD: 32 % (ref 42–75)
P-R INTERVAL, ECG05: 172 MS
PLATELET # BLD AUTO: 333 K/UL (ref 135–420)
PLATELET COMMENTS,PCOM: ABNORMAL
PMV BLD AUTO: 10.7 FL (ref 9.2–11.8)
POTASSIUM SERPL-SCNC: 4 MMOL/L (ref 3.5–5.5)
PROTHROMBIN TIME: 12.6 SEC (ref 11.5–15.2)
Q-T INTERVAL, ECG07: 362 MS
QRS DURATION, ECG06: 70 MS
QTC CALCULATION (BEZET), ECG08: 459 MS
RBC # BLD AUTO: 5.12 M/UL (ref 4.2–5.3)
RBC MORPH BLD: ABNORMAL
SODIUM SERPL-SCNC: 139 MMOL/L (ref 136–145)
VENTRICULAR RATE, ECG03: 97 BPM
WBC # BLD AUTO: 9.9 K/UL (ref 4.6–13.2)

## 2018-07-14 PROCEDURE — 85610 PROTHROMBIN TIME: CPT | Performed by: EMERGENCY MEDICINE

## 2018-07-14 PROCEDURE — 93005 ELECTROCARDIOGRAM TRACING: CPT

## 2018-07-14 PROCEDURE — 74011250637 HC RX REV CODE- 250/637: Performed by: FAMILY MEDICINE

## 2018-07-14 PROCEDURE — 99285 EMERGENCY DEPT VISIT HI MDM: CPT

## 2018-07-14 PROCEDURE — 85730 THROMBOPLASTIN TIME PARTIAL: CPT | Performed by: EMERGENCY MEDICINE

## 2018-07-14 PROCEDURE — 96365 THER/PROPH/DIAG IV INF INIT: CPT

## 2018-07-14 PROCEDURE — 74011250636 HC RX REV CODE- 250/636: Performed by: EMERGENCY MEDICINE

## 2018-07-14 PROCEDURE — 70450 CT HEAD/BRAIN W/O DYE: CPT

## 2018-07-14 PROCEDURE — 70496 CT ANGIOGRAPHY HEAD: CPT

## 2018-07-14 PROCEDURE — 65610000006 HC RM INTENSIVE CARE

## 2018-07-14 PROCEDURE — 74011250636 HC RX REV CODE- 250/636

## 2018-07-14 PROCEDURE — 94761 N-INVAS EAR/PLS OXIMETRY MLT: CPT

## 2018-07-14 PROCEDURE — 82962 GLUCOSE BLOOD TEST: CPT

## 2018-07-14 PROCEDURE — 3E03317 INTRODUCTION OF OTHER THROMBOLYTIC INTO PERIPHERAL VEIN, PERCUTANEOUS APPROACH: ICD-10-PCS | Performed by: EMERGENCY MEDICINE

## 2018-07-14 PROCEDURE — 74011000258 HC RX REV CODE- 258: Performed by: EMERGENCY MEDICINE

## 2018-07-14 PROCEDURE — 80048 BASIC METABOLIC PNL TOTAL CA: CPT | Performed by: EMERGENCY MEDICINE

## 2018-07-14 PROCEDURE — 74011000250 HC RX REV CODE- 250: Performed by: INTERNAL MEDICINE

## 2018-07-14 PROCEDURE — 85025 COMPLETE CBC W/AUTO DIFF WBC: CPT | Performed by: EMERGENCY MEDICINE

## 2018-07-14 PROCEDURE — 74011636320 HC RX REV CODE- 636/320: Performed by: EMERGENCY MEDICINE

## 2018-07-14 RX ORDER — FUROSEMIDE 20 MG/1
40 TABLET ORAL DAILY
Status: ON HOLD | COMMUNITY
End: 2021-07-21 | Stop reason: SDUPTHER

## 2018-07-14 RX ORDER — SODIUM CHLORIDE 0.9 % (FLUSH) 0.9 %
5 SYRINGE (ML) INJECTION EVERY 8 HOURS
Status: DISCONTINUED | OUTPATIENT
Start: 2018-07-14 | End: 2018-07-16 | Stop reason: HOSPADM

## 2018-07-14 RX ORDER — LABETALOL HCL 20 MG/4 ML
10 SYRINGE (ML) INTRAVENOUS
Status: DISCONTINUED | OUTPATIENT
Start: 2018-07-14 | End: 2018-07-16 | Stop reason: HOSPADM

## 2018-07-14 RX ORDER — SODIUM CHLORIDE 0.9 % (FLUSH) 0.9 %
5 SYRINGE (ML) INJECTION AS NEEDED
Status: DISCONTINUED | OUTPATIENT
Start: 2018-07-14 | End: 2018-07-16 | Stop reason: HOSPADM

## 2018-07-14 RX ORDER — SODIUM CHLORIDE 9 MG/ML
50 INJECTION, SOLUTION INTRAVENOUS ONCE
Status: COMPLETED | OUTPATIENT
Start: 2018-07-14 | End: 2018-07-14

## 2018-07-14 RX ORDER — DULOXETIN HYDROCHLORIDE 60 MG/1
60 CAPSULE, DELAYED RELEASE ORAL
Status: DISCONTINUED | OUTPATIENT
Start: 2018-07-14 | End: 2018-07-16 | Stop reason: HOSPADM

## 2018-07-14 RX ORDER — ALBUTEROL SULFATE 0.83 MG/ML
2.5 SOLUTION RESPIRATORY (INHALATION)
Status: DISCONTINUED | OUTPATIENT
Start: 2018-07-14 | End: 2018-07-16 | Stop reason: HOSPADM

## 2018-07-14 RX ORDER — LEVOTHYROXINE SODIUM 50 UG/1
50 TABLET ORAL
Status: DISCONTINUED | OUTPATIENT
Start: 2018-07-15 | End: 2018-07-16 | Stop reason: HOSPADM

## 2018-07-14 RX ORDER — ALBUTEROL SULFATE 90 UG/1
1 AEROSOL, METERED RESPIRATORY (INHALATION)
Status: DISCONTINUED | OUTPATIENT
Start: 2018-07-14 | End: 2018-07-14

## 2018-07-14 RX ORDER — ATORVASTATIN CALCIUM 40 MG/1
80 TABLET, FILM COATED ORAL
Status: DISCONTINUED | OUTPATIENT
Start: 2018-07-14 | End: 2018-07-16 | Stop reason: HOSPADM

## 2018-07-14 RX ORDER — DOCUSATE SODIUM 100 MG/1
100 CAPSULE, LIQUID FILLED ORAL 2 TIMES DAILY
Status: DISCONTINUED | OUTPATIENT
Start: 2018-07-14 | End: 2018-07-16 | Stop reason: HOSPADM

## 2018-07-14 RX ORDER — BUDESONIDE AND FORMOTEROL FUMARATE DIHYDRATE 160; 4.5 UG/1; UG/1
2 AEROSOL RESPIRATORY (INHALATION)
Status: DISCONTINUED | OUTPATIENT
Start: 2018-07-14 | End: 2018-07-16 | Stop reason: HOSPADM

## 2018-07-14 RX ADMIN — Medication 5 ML: at 22:00

## 2018-07-14 RX ADMIN — IOPAMIDOL 75 ML: 755 INJECTION, SOLUTION INTRAVENOUS at 13:22

## 2018-07-14 RX ADMIN — FAMOTIDINE 20 MG: 10 INJECTION, SOLUTION INTRAVENOUS at 21:49

## 2018-07-14 RX ADMIN — ALTEPLASE 9 MG: KIT at 13:35

## 2018-07-14 RX ADMIN — ALTEPLASE 81 MG: KIT at 13:36

## 2018-07-14 RX ADMIN — DOCUSATE SODIUM 100 MG: 100 CAPSULE, LIQUID FILLED ORAL at 21:48

## 2018-07-14 RX ADMIN — ATORVASTATIN CALCIUM 80 MG: 40 TABLET, FILM COATED ORAL at 21:47

## 2018-07-14 RX ADMIN — DULOXETINE HYDROCHLORIDE 60 MG: 30 CAPSULE, DELAYED RELEASE ORAL at 21:48

## 2018-07-14 RX ADMIN — Medication 5 ML: at 14:57

## 2018-07-14 RX ADMIN — SODIUM CHLORIDE 50 ML: 900 INJECTION, SOLUTION INTRAVENOUS at 14:34

## 2018-07-14 NOTE — ED NOTES
TRANSFER - OUT REPORT:    Verbal report given to [address] (name) on [address] [facility]  being transferred to ICU (unit) for routine progression of care       Report consisted of patients Situation, Background, Assessment and   Recommendations(SBAR). Information from the following report(s) SBAR, ED Summary, Intake/Output, MAR and Cardiac Rhythm NSR was reviewed with the receiving nurse. Lines:   Peripheral IV 07/14/18 Right Antecubital (Active)   Site Assessment Clean, dry, & intact 7/14/2018 12:45 PM   Phlebitis Assessment 0 7/14/2018 12:45 PM   Infiltration Assessment 0 7/14/2018 12:45 PM   Dressing Status Clean, dry, & intact 7/14/2018 12:45 PM   Dressing Type 4 X 4 7/14/2018 12:45 PM       Peripheral IV 07/14/18 Left Antecubital (Active)   Site Assessment Clean, dry, & intact 7/14/2018  1:00 PM   Phlebitis Assessment 0 7/14/2018  1:00 PM   Infiltration Assessment 0 7/14/2018  1:00 PM   Dressing Status Clean, dry, & intact 7/14/2018  1:00 PM   Dressing Type 4 X 4 7/14/2018  1:00 PM        Opportunity for questions and clarification was provided.       Patient transported with:   Registered Nurse

## 2018-07-14 NOTE — H&P
Admission History and Physical 
[facility] Patient: Solitario Diaz MRN: [id number]  CSN: [id number] YOB: 1965  Age: 48 y.o. Sex: female DOA: 7/14/2018 HPI:  
 
Solitario Diaz is a 48 y.o. female with PMH prior stroke and known PFO, hypothyroidism, HLD, s/p lumbar fusion, KISHOR on CPAP, depression now presenting with complaint of facial droop, slurred speech and R sided weakness. History provided by patient and family. Per the patient up until this morning she had felt at her baseline without any concerns. Per the patient she was dropped off by family members at the [facility] without any issues and then around 10:30 am she started to not feel herself and subsequently developed a facial droop and difficulty speaking. Denied any other symptoms or pertinent history. Her family immediately noticed the change in her and called EMS to bring her directly to [facility]. 
 
 
ED Course: In the ER the patient presented with an NIH score of 2. CT scan of the head showed no hemorrhage and tele-neuro was consulted. Patient met the criteria for tPA. Received the full dose without any major complications. She did have some slight bleeding of her gums that resolved. Patient with intelligible speech but was slow at times. Review of Systems Constitutional: Negative for fever, chills and diaphoresis. HENT: Negative for hearing loss and sore throat. Eyes: Negative for blurred vision and double vision. Respiratory: Negative for cough and hemoptysis. Cardiovascular: Negative for chest pain and palpitations. Gastrointestinal: Negative for nausea and vomiting. Genitourinary: Negative for dysuria and hematuria. Musculoskeletal: Negative for myalgias and joint pain. Skin: Negative for itching and rash. Neurological: Positive for facial droop, R sided weakness, slurred speech and headaches.   
Psychiatric: Negative for depression and suicidal ideas. Past Medical History:  
Diagnosis Date  Asthma  Back pain with radiation  Cardiac echocardiogram, ltd study 12/29/2016 EF 55-60%. No WMA. Right to left atrial septal shunt suggests PFO. Similar to study of 10/18/16.  Cardiovascular LE venous duplex 10/18/2016 No DVT bilaterally.  Contact dermatitis and other eczema, due to unspecified cause  CTS (carpal tunnel syndrome)  HSV-2 (herpes simplex virus 2) infection  Hypercholesterolemia  Hypothyroidism  Ill-defined condition Vertigo  L4-L5 disc bulge  Leg swelling  Migraines  Positive PPD, treated  S/P lumbar fusion 1/13/2016 1. L4-5 bilateral hemilaminotomy, medial facetectomy, foraminotomy, diskectomy L4-5. 2. Transforaminal lumbar interbody fusion with PEEK cage and demineralized bone graft L4-L5 posterolateral fusion. 3. Segmental spinal instrumentation, DePuy Expedium type L4-L5. 01/13/2016 By Dr. Haydee Estrada  Sciatica EMG '17 , mild sciatic EMG findings  Stroke [facility])   
 12/2016  Vertigo Past Surgical History:  
Procedure Laterality Date  HX GYN    
 partial hysterectomy due to abnormal pap  HX LUMBAR LAMINECTOMY  01-13-16 L4/5 Family History Problem Relation Age of Onset  Diabetes Mother  Elevated Lipids Mother  Hypertension Mother  Cancer Father   
  pancreatic  Diabetes Sister  Hypertension Sister  Diabetes Brother  Diabetes Daughter  Hypertension Daughter Social History Social History  Marital status:  Spouse name: N/A  
 Number of children: N/A  
 Years of education: N/A Occupational History  unemployed   
  long term disability Social History Main Topics  Smoking status: Never Smoker  Smokeless tobacco: Never Used  Alcohol use No  
 Drug use: No  
 Sexual activity: Not on file Comment: Hysterectomy Other Topics Concern  Not on file Social History Narrative Allergies Allergen Reactions  Skelaxin [Metaxalone] Other (comments)  
  jittery  Tramadol Other (comments) Halluctations Prior to Admission Medications Prescriptions Last Dose Informant Patient Reported? Taking? DULoxetine (CYMBALTA) 60 mg capsule   Yes No  
Sig: Take 60 mg by mouth nightly. FERROUS SULFATE PO   Yes No  
Sig: Take 325 mg by mouth. acetaminophen-codeine (TYLENOL #3) 300-30 mg per tablet   No No  
Si-2  Bid  prn  
acetaminophen-codeine (TYLENOL #3) 300-30 mg per tablet   No No  
Si-2  Bid  prn  
acetaminophen-codeine (TYLENOL #3) 300-30 mg per tablet   No No  
Si-2  Bid  prn  
albuterol (PROVENTIL HFA, VENTOLIN HFA, PROAIR HFA) 90 mcg/actuation inhaler   No No  
Sig: Take 1 Puff by inhalation every four (4) hours as needed for Wheezing. aspirin delayed-release 81 mg tablet   Yes No  
Sig: Take  by mouth daily. budesonide-formoterol (SYMBICORT) 160-4.5 mcg/actuation HFA inhaler   Yes No  
Sig: Take 2 Puffs by inhalation two (2) times a day. butalbital-acetaminophen-caffeine (FIORICET) -40 mg per tablet   Yes No  
Sig: Take 1 Tab by mouth two (2) times a day. cholecalciferol (VITAMIN D3) 1,000 unit cap   Yes No  
Sig: Take 2,000 Units by mouth daily. docusate sodium (COLACE) 100 mg capsule   Yes No  
Sig: Take 100 mg by mouth two (2) times a day. etodolac (LODINE) 300 mg capsule   Yes No  
Sig: TAKE 1 CAPSULE BY MOUTH THREE TIMES DAILY AS NEEDED FOR PAIN WITH FOOD  
furosemide (LASIX) 20 mg tablet   Yes No  
Sig: Take  by mouth daily. gabapentin (NEURONTIN) 300 mg capsule   No No  
Sig: Take 1 capsule once in the evening for one week, then 2 times a day thereafter.  #53  
gabapentin (NEURONTIN) 300 mg capsule   No No  
Sig: TAKE 1 CAPSULE BY MOUTH EVERY 12 HOURS FOR 30 DAYS  
levothyroxine (SYNTHROID) 50 mcg tablet   Yes No  
Sig: TAKE 1 TABLET BY MOUTH EVERY DAY  
meclizine (ANTIVERT) 25 mg tablet   Yes No  
Sig: Take 25 mg by mouth daily as needed. naloxone (NARCAN) 4 mg/actuation nasal spray   No No  
Sig: Use 1 spray intranasally into 1 nostril. Use a new Narcan nasal spray for subsequent doses and administer into alternating nostrils. May repeat every 2 to 3 minutes as needed. potassium chloride SR (K-TAB) 20 mEq tablet   Yes No  
Sig: Take 20 mEq by mouth daily. pravastatin (PRAVACHOL) 80 mg tablet   Yes No  
Sig: Take 80 mg by mouth daily. topiramate (TOPAMAX) 100 mg tablet   No No  
Sig: Take 1 Tab by mouth two (2) times a day. Indications: MIGRAINE PREVENTION  
topiramate (TOPAMAX) 50 mg tablet   No No  
Sig: Take 1 Tab by mouth two (2) times a day. Indications: MIGRAINE PREVENTION Facility-Administered Medications: None Physical Exam:  
 
Patient Vitals for the past 24 hrs: 
 Temp Pulse Resp BP SpO2  
07/14/18 1515 - 92 15 103/63 100 % 07/14/18 1500 - 90 19 109/53 99 % 07/14/18 1445 - 94 17 127/52 99 % 07/14/18 1430 - 98 18 93/66 98 % 07/14/18 1415 - 98 18 110/55 99 % 07/14/18 1404 - - - 120/61 -  
07/14/18 1350 - 93 22 - 100 % 07/14/18 1345 - (!) 102 18 103/57 100 % 07/14/18 1330 - 93 18 121/71 100 % 07/14/18 1315 - (!) 103 15 135/77 100 % 07/14/18 1312 98.5 °F (36.9 °C) 96 20 120/78 100 % 07/14/18 1300 - 98 19 (!) 136/102 100 % 07/14/18 1250 - - - - 100 % Physical Exam:  
General:  Alert and Responsive and in No acute distress. HEENT: Conjunctiva pink, sclera anicteric. EOMI. Pharynx moist, nonerythematous. Moist mucous membranes. Thyroid not enlarged, no nodules. No cervical, supraclavicular, occipital or submandibular lymphadenopathy. No other gross abnormalities appreciated. CV:  RRR. No murmurs, rubs, or gallops appreciated. No visible pulsations or thrills. RESP:  Unlabored breathing. Lungs clear to auscultation. No wheeze, rales, or rhonchi. Equal expansion bilaterally. ABD:  Soft, nontender, nondistended. Normoactive bowel sounds. No hepatosplenomegaly. No suprapubic tenderness. RECTAL:  Non bleeding external hemorrhoids MS:  No joint deformity or instability. No atrophy. Neuro:  5/5 strength Left upper extremities and lower extremities. R sided 3/5 strength upper and lower. Mild Pronator drift on the R. Slight deficit in R sided visual fields. R sided facial droop  A+Ox3. Ext:  Mild non pitting edema. 2+ radial and dp pulses bilaterally. Skin:  No rashes, lesions, or ulcers. Good turgor. IMAGING:  
CT Results  (Last 48 hours) 07/14/18 1402  CTA HEAD NECK W CONT Final result Impression:  IMPRESSION:  
   
[Patent intracranial and extracranial cerebral circulation.] Narrative:  CT ANGIOGRAPHY NECK AND HEAD HISTORY: Stroke like symptoms. Facial drooping, right-sided weakness, speech  
difficulty COMPARISONS: Head CT performed at the same time. TECHNIQUE: Following the uneventful administration of 100 cc Optiray-350  
intravenous contrast, thin section 1.25 mm axial images were obtained from the  
upper mediastinum through the vertex. Images were post processed off-line with  
MPR and VR segmented reformations of the intracranial and extracranial  
circulation which were created and sent to PACS for permanent archives. Additionally, thick overlapping sagittal, coronal and axial images were  
reformatted as well as 5 mm thick section images through the data set for review  
of soft tissues. Source images and reformatted images were reviewed. FINDINGS:   
   
CT SOFT TISSUE FINDINGS:  
   
The ventricles are normal in size, shape and configuration for the patient's  
stated age. No mass effect. No evidence of extra axial fluid collection. No  
enhancing abnormalities of the brain parenchyma, leptomeninges or dura. Mildly  
enlarged upper cervical lymph nodes, uncertain etiology. Mild mucosal thickening  
right maxillary antrum. [] Visualized portions of the upper lungs demonstrate no  
acute abnormalities. Degenerative changes of the cervical spine without critical  
central canal stenosis. Several periapical lucencies surrounding molar teeth. No  
acute inflammatory change. CT NECK VASCULAR FINDINGS:  
   
[The thoracic aorta is normal in caliber. Conventional arch anatomy of the great  
vessels. Innominate: Patent. Right carotid: The common carotid artery is widely patent. Medial  
retropharyngeal course of the common carotid artery. Widely patent carotid  
bifurcation. Cervical right internal carotid arteries normal in course and  
caliber and widely patent to the skull base. External carotid trunk is patent. Left carotid: The left common carotid artery is widely patent to the carotid  
bifurcation. Medial retropharyngeal course of the distal common carotid artery  
and carotid bifurcation. Cervical left internal carotid arteries otherwise  
normal in course and caliber and widely patent to the skull base. External  
carotid trunk are patent. Bilateral vertebral artery origins and cervical segments are normal in course  
and caliber and widely patent to the skull base. Bilateral subclavian arteries are widely patent.] CT INTRACRANIAL VASCULAR FINDINGS:  
   
[The distal cervical, petrous, cavernous and supraclinoid segments of the  
internal carotid arteries are widely patent. Widely patent bilateral anterior  
and middle cerebral arteries. In the posterior circulation, the vertebral  
arteries are balanced. Basilar trunk and basilar terminus are patent. Bilateral  
posterior cerebral arteries and superior cerebellar arteries are patent. Posterior communicating arteries are patent.]   
   
  
 07/14/18 1245  CT HEAD WO CONT Final result Impression:  IMPRESSION:   
   
No acute intracranial hemorrhage or mass. No convincing large territory acute infarct.  However there is questionable very  
mild loss of gray-white matter differentiation left lentiform nucleus region,  
may be exaggerated secondary to positioning. Given patient symptoms perhaps MR  
imaging may be helpful as clinically warranted. Discussed with Dr. Balwinder Jauregui by the fabi Banda at 1254 hours 7/14/2018. Narrative:  INDICATION: Delayed speech COMPARISON: 12/16 TECHNIQUE: Unenhanced CT of the head was performed using 5 mm images. Brain and  
bone windows were generated. All CT scans at this facility are performed using  
doze optimization technique as appropriate to a performed exam, to include  
automated exposure control, adjustment of the mA and/or KV according to patient  
size (including appropriate matching for site specific examinations), or use of  
iterative reconstruction technique. FINDINGS:  
   
The ventricles and sulci are normal in size, shape and configuration and  
midline. Questionable very mild loss of gray-white matter differentiation left  
lentiform nucleus region, may be exaggerated secondary to positioning. There is  
no intracranial hemorrhage, extra-axial collection, mass, mass effect or midline  
shift. The basilar cisterns are open. No large territory acute infarct is  
identified. Please note that MR is more sensitive for an acute infarct in the  
first 24-48 hours The bone windows demonstrate no acute abnormalities. The  
visualized portions of the paranasal sinuses and mastoid air cells are clear. Recent Results (from the past 12 hour(s)) CBC WITH AUTOMATED DIFF Collection Time: 07/14/18 12:40 PM  
Result Value Ref Range WBC 9.9 4.6 - 13.2 K/uL  
 RBC 5.12 4.20 - 5.30 M/uL  
 HGB 11.9 (L) 12.0 - 16.0 g/dL HCT 36.5 35.0 - 45.0 % MCV 71.3 (L) 74.0 - 97.0 FL  
 MCH 23.2 (L) 24.0 - 34.0 PG  
 MCHC 32.6 31.0 - 37.0 g/dL  
 RDW 14.2 11.6 - 14.5 % PLATELET 131 225 - 713 K/uL MPV 10.7 9.2 - 11.8 FL  
 NEUTROPHILS 32 (L) 42 - 75 % LYMPHOCYTES 56 (H) 20 - 51 % MONOCYTES 10 (H) 2 - 9 % EOSINOPHILS 1 0 - 5 % BASOPHILS 1 0 - 3 %  
 ABS. NEUTROPHILS 3.2 1.8 - 8.0 K/UL  
 ABS. LYMPHOCYTES 5.5 (H) 0.8 - 3.5 K/UL  
 ABS. MONOCYTES 1.0 0 - 1.0 K/UL  
 ABS. EOSINOPHILS 0.1 0.0 - 0.4 K/UL  
 ABS. BASOPHILS 0.1 (H) 0.0 - 0.06 K/UL  
 DF MANUAL PLATELET COMMENTS ADEQUATE PLATELETS    
 RBC COMMENTS NORMOCYTIC, NORMOCHROMIC    
PTT Collection Time: 07/14/18 12:40 PM  
Result Value Ref Range aPTT 32.0 23.0 - 36.4 SEC PROTHROMBIN TIME + INR Collection Time: 07/14/18 12:40 PM  
Result Value Ref Range Prothrombin time 12.6 11.5 - 15.2 sec INR 1.0 0.8 - 1.2 METABOLIC PANEL, BASIC Collection Time: 07/14/18 12:40 PM  
Result Value Ref Range Sodium 139 136 - 145 mmol/L Potassium 4.0 3.5 - 5.5 mmol/L Chloride 106 100 - 108 mmol/L  
 CO2 26 21 - 32 mmol/L Anion gap 7 3.0 - 18 mmol/L Glucose 130 (H) 74 - 99 mg/dL BUN 13 7.0 - 18 MG/DL Creatinine 1.21 0.6 - 1.3 MG/DL  
 BUN/Creatinine ratio 11 (L) 12 - 20 GFR est AA 56 (L) >60 ml/min/1.73m2 GFR est non-AA 47 (L) >60 ml/min/1.73m2 Calcium 8.9 8.5 - 10.1 MG/DL  
GLUCOSE, POC Collection Time: 07/14/18 12:40 PM  
Result Value Ref Range Glucose (POC) 144 (H) 70 - 110 mg/dL EKG, 12 LEAD, INITIAL Collection Time: 07/14/18 12:55 PM  
Result Value Ref Range Ventricular Rate 97 BPM  
 Atrial Rate 97 BPM  
 P-R Interval 172 ms QRS Duration 70 ms Q-T Interval 362 ms QTC Calculation (Bezet) 459 ms Calculated P Axis 48 degrees Calculated R Axis 13 degrees Calculated T Axis 29 degrees Diagnosis Normal sinus rhythm Nonspecific T wave abnormality Abnormal ECG When compared with ECG of 28-DEC-2016 21:43, No significant change was found GLUCOSE, POC Collection Time: 07/14/18 12:57 PM  
Result Value Ref Range Glucose (POC) 125 (H) 70 - 110 mg/dL Assessment/Plan:  
48 y.o. female with PMH  prior stroke and known PFO, hypothyroidism, HLD, s/p lumbar fusion, KISHOR on CPAP, now admitted with acute CVA s/p tPa. Acute CVA s/p tPA - Patient and family good historians about when symptoms began. Tolerated the dosage of tPA without any major complications. Tele-neuro recommendations reviewed. In-house neurology was consulted in ER by Noah Silva to ICU per post tPA protocol 
-repeat CT head w/o contrast 24 hours s/p tPA 
-MRI/MRA Brain and neck 
-U/S carotid arteries 
-Stat CT for any acute neurologic changes or bleeding 
-Daily CBC and BMP 
-Switch statin to Atorvastatin 80mg 
-PT/OT/ Speech therapy consults 
- HOB @ 30 degrees 
-Supplemental O2 prn 
-Permissive HTN but <180/105 
-Labetalol prn if BP elevated 
-repeat TTE 
 
H/o Patent foramen ovale -  
-repeat TTE Hypothyroidism - TSH 4/2018 was 1.14 
- Recheck TSH 
-Continue synthroid and adjust dosage if necessary HLD -  
-recheck lipid panel in AM 
-Switch to higher intensity statin KISHOR on CPAP 
-CPAP qhs settings per RT/Pulm Migraines -  
-Plan to hold Topamax Lower extremity swelling - minimal on exam 
- plan to hold furosemide for now Diet: Cardiac DVT Prophylaxis: SCD Code Status: FULL Point of Contact:  
 Name Relation Home Work Mobile  
Shekhar Mother [phone]  
  Juan Turner Daughter [phone]    
Shekhar Parent [phone]    
  Michael Calderon   [phone] Disposition and anticipated LOS: 2-3 midnights Artemio Brothers DO PGY-3 
07/14/18 8:08 PM

## 2018-07-14 NOTE — ED PROVIDER NOTES
EMERGENCY DEPARTMENT HISTORY AND PHYSICAL EXAM    12:57 PM      Date: 7/14/2018  Patient Name: Yinka Lester    History of Presenting Illness     Chief Complaint   Patient presents with    Facial Droop         History Provided By: EMS and patient's sister. Chief Complaint: Facial droop  Duration: 1 Hours  Timing:  Constant  Location: Right side of face  Quality: Weakness  Severity: Moderate  Associated Symptoms: Right sided weakness      Additional History (Context): Yinka Lester is a 48 y.o. female with a past medical history of asthma, hypercholesteremia, HSV-2, hypothyroidism, and stroke who presents with c/o right sided facial droop. Per EMS, patient was found complaining of headache and right sided facial weakness. The patient's sister notes that the patient has a history of a stroke in the past. Per patient's sister, patient at baseline usually talks clearly. The patient's sister states that the patient's shaking is new. No other complaints.     PCP: La Santamaria MD    Current Facility-Administered Medications   Medication Dose Route Frequency Provider Last Rate Last Dose    sodium chloride (NS) flush 5 mL  5 mL IntraVENous Mook Michael MD   5 mL at 07/14/18 1457    sodium chloride (NS) flush 5 mL  5 mL IntraVENous PRN Leonora Nuñez MD        labetalol (NORMODYNE;TRANDATE) injection 10 mg  10 mg IntraVENous Q10MIN PRN Leonora Nuñez MD         Current Outpatient Prescriptions   Medication Sig Dispense Refill    gabapentin (NEURONTIN) 300 mg capsule TAKE 1 CAPSULE BY MOUTH EVERY 12 HOURS FOR 30 DAYS 60 Cap 1    [START ON 7/21/2018] acetaminophen-codeine (TYLENOL #3) 300-30 mg per tablet 1-2  Bid  prn 120 Tab 0    [START ON 8/20/2018] acetaminophen-codeine (TYLENOL #3) 300-30 mg per tablet 1-2  Bid  prn 120 Tab 0    [START ON 9/19/2018] acetaminophen-codeine (TYLENOL #3) 300-30 mg per tablet 1-2  Bid  prn 120 Tab 0    gabapentin (NEURONTIN) 300 mg capsule Take 1 capsule once in the evening for one week, then 2 times a day thereafter. #53 53 Cap 0    aspirin delayed-release 81 mg tablet Take  by mouth daily.  budesonide-formoterol (SYMBICORT) 160-4.5 mcg/actuation HFA inhaler Take 2 Puffs by inhalation two (2) times a day.  naloxone (NARCAN) 4 mg/actuation nasal spray Use 1 spray intranasally into 1 nostril. Use a new Narcan nasal spray for subsequent doses and administer into alternating nostrils. May repeat every 2 to 3 minutes as needed. 1 Each 0    topiramate (TOPAMAX) 50 mg tablet Take 1 Tab by mouth two (2) times a day. Indications: MIGRAINE PREVENTION 60 Tab 6    topiramate (TOPAMAX) 100 mg tablet Take 1 Tab by mouth two (2) times a day. Indications: MIGRAINE PREVENTION 60 Tab 6    albuterol (PROVENTIL HFA, VENTOLIN HFA, PROAIR HFA) 90 mcg/actuation inhaler Take 1 Puff by inhalation every four (4) hours as needed for Wheezing. 1 Inhaler 0    furosemide (LASIX) 20 mg tablet Take  by mouth daily.  docusate sodium (COLACE) 100 mg capsule Take 100 mg by mouth two (2) times a day.  FERROUS SULFATE PO Take 325 mg by mouth.  potassium chloride SR (K-TAB) 20 mEq tablet Take 20 mEq by mouth daily.  pravastatin (PRAVACHOL) 80 mg tablet Take 80 mg by mouth daily.  etodolac (LODINE) 300 mg capsule TAKE 1 CAPSULE BY MOUTH THREE TIMES DAILY AS NEEDED FOR PAIN WITH FOOD  2    levothyroxine (SYNTHROID) 50 mcg tablet TAKE 1 TABLET BY MOUTH EVERY DAY  3    cholecalciferol (VITAMIN D3) 1,000 unit cap Take 2,000 Units by mouth daily.  meclizine (ANTIVERT) 25 mg tablet Take 25 mg by mouth daily as needed. 3    butalbital-acetaminophen-caffeine (FIORICET) -40 mg per tablet Take 1 Tab by mouth two (2) times a day.  DULoxetine (CYMBALTA) 60 mg capsule Take 60 mg by mouth nightly.          Past History     Past Medical History:  Past Medical History:   Diagnosis Date    Asthma     Back pain with radiation     Cardiac echocardiogram, [facility] study 12/29/2016    EF 55-60%. No WMA. Right to left atrial septal shunt suggests PFO. Similar to study of 10/18/16.  Cardiovascular LE venous duplex 10/18/2016    No DVT bilaterally.  Contact dermatitis and other eczema, due to unspecified cause     CTS (carpal tunnel syndrome)     HSV-2 (herpes simplex virus 2) infection     Hypercholesterolemia     Hypothyroidism     Ill-defined condition     Vertigo    L4-L5 disc bulge     Leg swelling     Migraines     Positive PPD, treated     S/P lumbar fusion 1/13/2016    1. L4-5 bilateral hemilaminotomy, medial facetectomy, foraminotomy, diskectomy L4-5. 2. Transforaminal lumbar interbody fusion with PEEK cage and demineralized bone graft L4-L5 posterolateral fusion. 3. Segmental spinal instrumentation, DePuy Expedium type L4-L5. 01/13/2016 By Dr. Richard Coburn     EMG '17 , mild sciatic EMG findings    Stroke [facility])     12/2016    Vertigo        Past Surgical History:  Past Surgical History:   Procedure Laterality Date    HX GYN      partial hysterectomy due to abnormal pap    HX LUMBAR LAMINECTOMY  01-13-16    L4/5       Family History:  Family History   Problem Relation Age of Onset    Diabetes Mother     Elevated Lipids Mother     Hypertension Mother     Cancer Father      pancreatic    Diabetes Sister     Hypertension Sister     Diabetes Brother     Diabetes Daughter     Hypertension Daughter        Social History:  Social History   Substance Use Topics    Smoking status: Never Smoker    Smokeless tobacco: Never Used    Alcohol use No       Allergies:   Allergies   Allergen Reactions    Skelaxin [Metaxalone] Other (comments)     jittery    Tramadol Other (comments)     Halluctations         Review of Systems       Review of Systems   Unable to perform ROS: Acuity of condition         Physical Exam     Visit Vitals    /63    Pulse 92    Temp 98.5 °F (36.9 °C)    Resp 15    Wt 113.1 kg (249 lb 5 oz)  SpO2 100%    BMI 40.24 kg/m2         Physical Exam   Constitutional: She is oriented to person, place, and time. She appears well-developed and well-nourished. No distress. Stuttering with R sided facial weaknes   HENT:   Head: Normocephalic and atraumatic. Right Ear: External ear normal.   Left Ear: External ear normal.   Nose: Nose normal.   Mouth/Throat: Oropharynx is clear and moist.   Eyes: Conjunctivae and EOM are normal. Pupils are equal, round, and reactive to light. No scleral icterus. Neck: Normal range of motion. Neck supple. No JVD present. No tracheal deviation present. No thyromegaly present. Cardiovascular: Normal rate, regular rhythm, normal heart sounds and intact distal pulses. Exam reveals no gallop and no friction rub. No murmur heard. Pulmonary/Chest: Effort normal and breath sounds normal. She exhibits no tenderness. Abdominal: Soft. Bowel sounds are normal. She exhibits no distension. There is no tenderness. There is no rebound and no guarding. Musculoskeletal: Normal range of motion. She exhibits no edema or tenderness. Lymphadenopathy:     She has no cervical adenopathy. Neurological: She is alert and oriented to person, place, and time. No cranial nerve deficit. Coordination normal.   Tremor noted, global weakness with mild arm drift, R sided facial droop, speech clears intermittently, NIHSS 4    Skin: Skin is warm and dry. Psychiatric: She has a normal mood and affect. Her behavior is normal. Judgment and thought content normal.   Nursing note and vitals reviewed.         Diagnostic Study Results     Labs -  Recent Results (from the past 12 hour(s))   CBC WITH AUTOMATED DIFF    Collection Time: 07/14/18 12:40 PM   Result Value Ref Range    WBC 9.9 4.6 - 13.2 K/uL    RBC 5.12 4.20 - 5.30 M/uL    HGB 11.9 (L) 12.0 - 16.0 g/dL    HCT 36.5 35.0 - 45.0 %    MCV 71.3 (L) 74.0 - 97.0 FL    MCH 23.2 (L) 24.0 - 34.0 PG    MCHC 32.6 31.0 - 37.0 g/dL    RDW 14.2 11.6 - 14.5 %    PLATELET 471 569 - 603 K/uL    MPV 10.7 9.2 - 11.8 FL    NEUTROPHILS 32 (L) 42 - 75 %    LYMPHOCYTES 56 (H) 20 - 51 %    MONOCYTES 10 (H) 2 - 9 %    EOSINOPHILS 1 0 - 5 %    BASOPHILS 1 0 - 3 %    ABS. NEUTROPHILS 3.2 1.8 - 8.0 K/UL    ABS. LYMPHOCYTES 5.5 (H) 0.8 - 3.5 K/UL    ABS. MONOCYTES 1.0 0 - 1.0 K/UL    ABS. EOSINOPHILS 0.1 0.0 - 0.4 K/UL    ABS.  BASOPHILS 0.1 (H) 0.0 - 0.06 K/UL    DF MANUAL      PLATELET COMMENTS ADEQUATE PLATELETS      RBC COMMENTS NORMOCYTIC, NORMOCHROMIC     PTT    Collection Time: 07/14/18 12:40 PM   Result Value Ref Range    aPTT 32.0 23.0 - 36.4 SEC   PROTHROMBIN TIME + INR    Collection Time: 07/14/18 12:40 PM   Result Value Ref Range    Prothrombin time 12.6 11.5 - 15.2 sec    INR 1.0 0.8 - 1.2     METABOLIC PANEL, BASIC    Collection Time: 07/14/18 12:40 PM   Result Value Ref Range    Sodium 139 136 - 145 mmol/L    Potassium 4.0 3.5 - 5.5 mmol/L    Chloride 106 100 - 108 mmol/L    CO2 26 21 - 32 mmol/L    Anion gap 7 3.0 - 18 mmol/L    Glucose 130 (H) 74 - 99 mg/dL    BUN 13 7.0 - 18 MG/DL    Creatinine 1.21 0.6 - 1.3 MG/DL    BUN/Creatinine ratio 11 (L) 12 - 20      GFR est AA 56 (L) >60 ml/min/1.73m2    GFR est non-AA 47 (L) >60 ml/min/1.73m2    Calcium 8.9 8.5 - 10.1 MG/DL   GLUCOSE, POC    Collection Time: 07/14/18 12:40 PM   Result Value Ref Range    Glucose (POC) 144 (H) 70 - 110 mg/dL   EKG, 12 LEAD, INITIAL    Collection Time: 07/14/18 12:55 PM   Result Value Ref Range    Ventricular Rate 97 BPM    Atrial Rate 97 BPM    P-R Interval 172 ms    QRS Duration 70 ms    Q-T Interval 362 ms    QTC Calculation (Bezet) 459 ms    Calculated P Axis 48 degrees    Calculated R Axis 13 degrees    Calculated T Axis 29 degrees    Diagnosis       Normal sinus rhythm  Nonspecific T wave abnormality  Abnormal ECG  When compared with ECG of 28-DEC-2016 21:43,  No significant change was found  Confirmed by Shellie Garcia ([id number]) on 7/14/2018 3:44:59 PM     GLUCOSE, POC Collection Time: 07/14/18 12:57 PM   Result Value Ref Range    Glucose (POC) 125 (H) 70 - 110 mg/dL       Radiologic Studies -   CT HEAD WO CONT   Final Result      CTA HEAD NECK W CONT    (Results Pending)     Ct Head Wo Cont    Result Date: 7/14/2018  INDICATION: Delayed speech COMPARISON: 12/16 TECHNIQUE: Unenhanced CT of the head was performed using 5 mm images. Brain and bone windows were generated. All CT scans at this facility are performed using doze optimization technique as appropriate to a performed exam, to include automated exposure control, adjustment of the mA and/or KV according to patient size (including appropriate matching for site specific examinations), or use of iterative reconstruction technique. FINDINGS: The ventricles and sulci are normal in size, shape and configuration and midline. Questionable very mild loss of gray-white matter differentiation left lentiform nucleus region, may be exaggerated secondary to positioning. There is no intracranial hemorrhage, extra-axial collection, mass, mass effect or midline shift. The basilar cisterns are open. No large territory acute infarct is identified. Please note that MR is more sensitive for an acute infarct in the first 24-48 hours The bone windows demonstrate no acute abnormalities. The visualized portions of the paranasal sinuses and mastoid air cells are clear. IMPRESSION: No acute intracranial hemorrhage or mass. No convincing large territory acute infarct. However there is questionable very mild loss of gray-white matter differentiation left lentiform nucleus region, may be exaggerated secondary to positioning. Given patient symptoms perhaps MR imaging may be helpful as clinically warranted. Discussed with Dr. Loraine Hinojosa by the fabi Banda at 1254 hours 7/14/2018. Medical Decision Making   I am the first provider for this patient.     I reviewed the vital signs, available nursing notes, past medical history, past surgical history, family history and social history. Vital Signs-Reviewed the patient's vital signs. Records Reviewed: Nursing Notes (Time of Review: 12:57 PM)    ED Course: Progress Notes, Reevaluation, and Consults:  1:17 PM  Patient was seen by Dr. Rosario Addison who states that the patient is having neurologic symptoms. After discussing with patient and family, they would like to proceed with TPA. This is the 2nd time for possible stroke in the past. Given her presentation will proceed with the TPA. 9:80 PM  Dr. Rosario Addison did not see a large vessel occlusion and would like the radiologist to review the reformation images. Will proceed with ICU admission at [facility]. 3:40 PM Consult: I discussed care with Dr. Mable Blanco (working with Dr. Anthony Beth at [address] [place] [address]). It was a standard discussion including patient history, chief complaint, available diagnostic results, and predicted treatment course. Will admit patient to the ICU. Provider Notes (Medical Decision Making): Pt is a 50yo female with a hx of chronic migraines, asthma, thyroid disease, dyslipidemia, PFO, chronic back pains presents with complaint of R sided weakness with difficulty speaking with LSN at 1030A. There was a family function today and some increase stress/emotions. Code S called on arrival and reviewed previous admission with similar symptoms given TPA and found to not have a stroke. Today she has waxing and waning symptoms and a CT that notes possible loss of gray white matter on the L. Pt was seen immediately by Dr. Rosario Addison and would like to give TPA. At this point this may be a stroke mimic but after discussing the risks and benefits of TPA with patient and sister at the bedside will proceed with TPA at the recommendation of our teleneurologist.  Will follow closely, proceed with CTA, and plan for admission.  Blake Redman DO 1:27 PM    Critical Care Time:  The services I provided to this patient were to treat and/or prevent clinically significant deterioration that could result in the failure of one or more body systems and/or organ systems due to stroke. Services included the following:  -reviewing nursing notes and old charts  -vital sign assessments  -direct patient care  -medication orders and management  -interpreting and reviewing diagnostic studies/labs  -re-evaluations  -documentation time    Aggregate critical care time was 90 minutes, which includes only time during which I was engaged in work directly related to the patient's care as described above, whether I was at bedside or elsewhere in the Emergency Department. It did not include time spent performing other reported procedures or the services of residents, students, nurses, or advance practice providers. Jmaie Palma DO    4:58 PM    Diagnosis     Clinical Impression:   1. Acute ischemic stroke ([facility] [address]    2. Dysarthria        Disposition: admit    Follow-up Information     None           Patient's Medications   Start Taking    No medications on file   Continue Taking    ACETAMINOPHEN-CODEINE (TYLENOL #3) 300-30 MG PER TABLET    1-2  Bid  prn    ACETAMINOPHEN-CODEINE (TYLENOL #3) 300-30 MG PER TABLET    1-2  Bid  prn    ACETAMINOPHEN-CODEINE (TYLENOL #3) 300-30 MG PER TABLET    1-2  Bid  prn    ALBUTEROL (PROVENTIL HFA, VENTOLIN HFA, PROAIR HFA) 90 MCG/ACTUATION INHALER    Take 1 Puff by inhalation every four (4) hours as needed for Wheezing. ASPIRIN DELAYED-RELEASE 81 MG TABLET    Take  by mouth daily. BUDESONIDE-FORMOTEROL (SYMBICORT) 160-4.5 MCG/ACTUATION HFA INHALER    Take 2 Puffs by inhalation two (2) times a day. BUTALBITAL-ACETAMINOPHEN-CAFFEINE (FIORICET) -40 MG PER TABLET    Take 1 Tab by mouth two (2) times a day. CHOLECALCIFEROL (VITAMIN D3) 1,000 UNIT CAP    Take 2,000 Units by mouth daily. DOCUSATE SODIUM (COLACE) 100 MG CAPSULE    Take 100 mg by mouth two (2) times a day.     DULOXETINE (CYMBALTA) 60 MG CAPSULE    Take 60 mg by mouth nightly. ETODOLAC (LODINE) 300 MG CAPSULE    TAKE 1 CAPSULE BY MOUTH THREE TIMES DAILY AS NEEDED FOR PAIN WITH FOOD    FERROUS SULFATE PO    Take 325 mg by mouth. FUROSEMIDE (LASIX) 20 MG TABLET    Take  by mouth daily. GABAPENTIN (NEURONTIN) 300 MG CAPSULE    Take 1 capsule once in the evening for one week, then 2 times a day thereafter. #53    GABAPENTIN (NEURONTIN) 300 MG CAPSULE    TAKE 1 CAPSULE BY MOUTH EVERY 12 HOURS FOR 30 DAYS    LEVOTHYROXINE (SYNTHROID) 50 MCG TABLET    TAKE 1 TABLET BY MOUTH EVERY DAY    MECLIZINE (ANTIVERT) 25 MG TABLET    Take 25 mg by mouth daily as needed. NALOXONE (NARCAN) 4 MG/ACTUATION NASAL SPRAY    Use 1 spray intranasally into 1 nostril. Use a new Narcan nasal spray for subsequent doses and administer into alternating nostrils. May repeat every 2 to 3 minutes as needed. POTASSIUM CHLORIDE SR (K-TAB) 20 MEQ TABLET    Take 20 mEq by mouth daily. PRAVASTATIN (PRAVACHOL) 80 MG TABLET    Take 80 mg by mouth daily. TOPIRAMATE (TOPAMAX) 100 MG TABLET    Take 1 Tab by mouth two (2) times a day. Indications: MIGRAINE PREVENTION    TOPIRAMATE (TOPAMAX) 50 MG TABLET    Take 1 Tab by mouth two (2) times a day. Indications: MIGRAINE PREVENTION   These Medications have changed    No medications on file   Stop Taking    No medications on file     _______________________________    Attestations:  Scribe Attestation     Mica Barrett acting as a scribe for and in the presence of Karly Espitia MD      July 14, 2018 at 12:57 PM       Provider Attestation:      I personally performed the services described in the documentation, reviewed the documentation, as recorded by the scribe in my presence, and it accurately and completely records my words and actions.  July 14, 2018 at 12:57 PM - Karly Espitia MD    _______________________________

## 2018-07-14 NOTE — ED NOTES
Patient transported to ct from [facility] 5656 returning at 1409.  No vitals completed at this time

## 2018-07-14 NOTE — ED NOTES
Patient gums bleeding small amount. Patient states they were bleeding prior to TPA infusion.  ICU physician aware

## 2018-07-14 NOTE — ED TRIAGE NOTES
Patient states she has facial drooping and right sided weakness. Patient stuttering when speaking. Patient up to date on immunizations and frequently visits the pain clinic for chronic back pain.  Patient last known normal 10am

## 2018-07-14 NOTE — IP AVS SNAPSHOT
[address] [address] Isaac Castillo Patient: Tomas MRN: [id number] ZBI:[id number] A check luis manuel indicates which time of day the medication should be taken. My Medications START taking these medications Instructions Each Dose to Equal  
 Morning Noon Evening Bedtime  
 aspirin 325 mg tablet Commonly known as:  ASPIRIN Start taking on:  7/17/2018 Replaces:  aspirin delayed-release 81 mg tablet Take 1 Tab by mouth daily. 325 mg  
    
  
   
   
   
  
 atorvastatin 80 mg tablet Commonly known as:  LIPITOR Take 1 Tab by mouth nightly. 80 mg CONTINUE taking these medications Instructions Each Dose to Equal  
 Morning Noon Evening Bedtime  
 acetaminophen-codeine 300-30 mg per tablet Commonly known as:  TYLENOL #3 Start taking on:  7/21/2018  
   
 1-2  Bid  prn  
     
   
   
   
  
 albuterol 90 mcg/actuation inhaler Commonly known as:  PROVENTIL HFA, VENTOLIN HFA, PROAIR HFA Take 1 Puff by inhalation every four (4) hours as needed for Wheezing. 1 Puff CYMBALTA 60 mg capsule Generic drug:  DULoxetine Take 60 mg by mouth nightly. 60 mg  
    
   
   
   
  
  
 docusate sodium 100 mg capsule Commonly known as:  Doc Vanessa Take 100 mg by mouth two (2) times a day. 100 mg FERROUS SULFATE PO Take 325 mg by mouth. 325 mg  
    
   
   
   
  
 furosemide 20 mg tablet Commonly known as:  LASIX Take 40 mg by mouth daily. 40 mg  
    
  
   
   
   
  
 gabapentin 300 mg capsule Commonly known as:  NEURONTIN  
   
 TAKE 1 CAPSULE BY MOUTH EVERY 12 HOURS FOR 30 DAYS  
     
   
   
   
  
 levothyroxine 50 mcg tablet Commonly known as:  SYNTHROID  
   
 TAKE 1 TABLET BY MOUTH EVERY DAY  
     
   
   
   
  
 meclizine 25 mg tablet Commonly known as:  ANTIVERT Take 25 mg by mouth daily as needed. 25 mg  
    
   
   
   
  
 naloxone 4 mg/actuation nasal spray Commonly known as:  [employer] Use 1 spray intranasally into 1 nostril. Use a new Narcan nasal spray for subsequent doses and administer into alternating nostrils. May repeat every 2 to 3 minutes as needed. potassium chloride SR 20 mEq tablet Commonly known as:  K-TAB Take 20 mEq by mouth daily. 20 mEq SYMBICORT 160-4.5 mcg/actuation [facility] Generic drug:  budesonide-formoterol Take 2 Puffs by inhalation two (2) times a day. 2 Puff * topiramate 50 mg tablet Commonly known as:  TOPAMAX Take 1 Tab by mouth two (2) times a day. Indications: MIGRAINE PREVENTION 50 mg  
    
  
   
   
   
  
  
 * topiramate 100 mg tablet Commonly known as:  TOPAMAX Take 1 Tab by mouth two (2) times a day. Indications: MIGRAINE PREVENTION  
 100 mg  
    
  
   
   
   
  
  
 VITAMIN D3 1,000 unit Cap Generic drug:  cholecalciferol Take 2,000 Units by mouth daily. 2000 Units * Notice: This list has 2 medication(s) that are the same as other medications prescribed for you. Read the directions carefully, and ask your doctor or other care provider to review them with you. STOP taking these medications   
 aspirin delayed-release 81 mg tablet Replaced by:  aspirin 325 mg tablet  
   
  
 pravastatin 80 mg tablet Commonly known as:  PRAVACHOL Where to Get Your Medications Information on where to get these meds will be given to you by the nurse or doctor. ! Ask your nurse or doctor about these medications  
  aspirin 325 mg tablet  
 atorvastatin 80 mg tablet

## 2018-07-14 NOTE — CONSULTS
[facility]  Pulmonary, Critical Care, and Sleep Medicine    Name: Yazmin Mcfarlane MRN: [id number]   : 1965 Hospital: [address] [place] [address]   Date: 2018        Critical Care Initial Patient Consult      IMPRESSION:   1. Probable acute stroke with R hemiparesis, NIH stroke score 2 per neurology (drift, facial palsy)  2. S/p TPA for ischemic stroke  3. KISHOR on 12 cm CPAP with good compliance  4. Anemia  5. History of migraines  6. H/O CVA with TPA ~2 years ago  6.5 hyperglycemia  7.  Other nonacute problems as below; h/o L4-5 fusion in , no active bleeding prior to admission     Patient Active Problem List   Diagnosis Code    Asthma J45.909    Hyperlipidemia E78.5    Eczema L30.9    Back pain with radiation M54.9    Spinal stenosis, lumbar region, without neurogenic claudication M48.061    Lumbar stenosis M48.061    S/P lumbar fusion Z98.1    Acute right-sided low back pain with sciatica M54.40    Neurological symptoms R29.90    Ischemic stroke (HCC) I63.9    Migraines G43.909    Leg swelling M79.89    Hypothyroidism E03.9    CTS (carpal tunnel syndrome) G56.00    Hypercholesterolemia E78.00    Ill-defined condition R69    Sciatica M54.30    Sciatic neuropathy G57.00    Chronic midline low back pain with right-sided sciatica M54.41, G89.29    Encounter for long-term (current) use of high-risk medication Z79.899    Chronic pain syndrome G89.4    History of lumbar surgery Z98.890    Protrusion of lumbar intervertebral disc M51.26    Acute ischemic stroke (HCC) I63.9        RECOMMENDATIONS:   · Patient completed TPA per neuro in ER; 9 mg bolus, 81 mg/hr  · Monitor for bleeding, dysphagia, progression, breathing/airway  · Neuro consult  · albuterol neb PRN q4h  · Admission orders per primary care  · Per protocol: no sticks x 6 hours, no catheters x 18 hours, labetolol PRN hypertension (not needed)  · PT, OT  · Monitor labs when draws allowed  · Continue CPAP 12 cm HS  · Stroke workup in am, stroke bundle     Subjective/History: This patient has been seen and evaluated at the request of  In ER for new ischemic CVA in a patient with known PFO, post TPA.    07/14/18    Patient is a right handed 48 y.o. female with a history of migraines was last well at 10:30 AM when she noted R sided weakness of her arm. She presented to the ER, had NIH stroke score of 2 and no hemorrhage on CT; teleneuro was consulted and ordered TPA. Patient now has mild weakness of raising her R arm against resistance, and cogwheeling and weakness against resistance of R foot and legs, and downward drift of R arm, unable to hold it open, during my exam at the end of her infusion. Speaks slowly, had some difficulty with word finding, did not remember the month or day, knew it was 2018, [facility] and self. The patient is critically ill and can not provide additional history due to dysarthria. Past Medical History:   Diagnosis Date    Asthma     Back pain with radiation     Cardiac echocardiogram, ltd study 12/29/2016    EF 55-60%. No WMA. Right to left atrial septal shunt suggests PFO. Similar to study of 10/18/16.  Cardiovascular LE venous duplex 10/18/2016    No DVT bilaterally.  Contact dermatitis and other eczema, due to unspecified cause     CTS (carpal tunnel syndrome)     HSV-2 (herpes simplex virus 2) infection     Hypercholesterolemia     Hypothyroidism     Ill-defined condition     Vertigo    L4-L5 disc bulge     Leg swelling     Migraines     Positive PPD, treated     S/P lumbar fusion 1/13/2016    1. L4-5 bilateral hemilaminotomy, medial facetectomy, foraminotomy, diskectomy L4-5. 2. Transforaminal lumbar interbody fusion with PEEK cage and demineralized bone graft L4-L5 posterolateral fusion.  3. Segmental spinal instrumentation, DePuy Expedium type L4-L5. 01/13/2016 By Dr. Madelyn Quinn Sciatica     EMG '17 , mild sciatic EMG findings    Stroke ([facility] [address] 12/2016    Vertigo       Past Surgical History:   Procedure Laterality Date    HX GYN      partial hysterectomy due to abnormal pap    HX LUMBAR LAMINECTOMY  01-13-16    L4/5      Prior to Admission medications    Medication Sig Start Date End Date Taking? Authorizing Provider   gabapentin (NEURONTIN) 300 mg capsule TAKE 1 CAPSULE BY MOUTH EVERY 12 HOURS FOR 30 DAYS 7/13/18   LIZY Mir   acetaminophen-codeine (TYLENOL #3) 300-30 mg per tablet 1-2  Bid  prn 7/21/18   LIZY Mir   acetaminophen-codeine (TYLENOL #3) 300-30 mg per tablet 1-2  Bid  prn 8/20/18   LIZY Mir   acetaminophen-codeine (TYLENOL #3) 300-30 mg per tablet 1-2  Bid  prn 9/19/18   LIZY Mir   gabapentin (NEURONTIN) 300 mg capsule Take 1 capsule once in the evening for one week, then 2 times a day thereafter. #53 3/26/18   LIZY Mir   aspirin delayed-release 81 mg tablet Take  by mouth daily. Historical Provider   budesonide-formoterol (SYMBICORT) 160-4.5 mcg/actuation HFA inhaler Take 2 Puffs by inhalation two (2) times a day. Historical Provider   naloxone [facility]) 4 mg/actuation nasal spray Use 1 spray intranasally into 1 nostril. Use a new Narcan nasal spray for subsequent doses and administer into alternating nostrils. May repeat every 2 to 3 minutes as needed. 9/18/17   Ramin Martell MD   topiramate (TOPAMAX) 50 mg tablet Take 1 Tab by mouth two (2) times a day. Indications: MIGRAINE PREVENTION 8/14/17   Rosa Andujar MD   topiramate (TOPAMAX) 100 mg tablet Take 1 Tab by mouth two (2) times a day. Indications: MIGRAINE PREVENTION 8/14/17   Rosa Andujar MD   albuterol (PROVENTIL HFA, VENTOLIN HFA, PROAIR HFA) 90 mcg/actuation inhaler Take 1 Puff by inhalation every four (4) hours as needed for Wheezing. 7/2/17   An Garcia MD   furosemide (LASIX) 20 mg tablet Take  by mouth daily.     Historical Provider   docusate sodium (COLACE) 100 mg capsule Take 100 mg by mouth two (2) times a day. Historical Provider   FERROUS SULFATE PO Take 325 mg by mouth. Historical Provider   potassium chloride SR (K-TAB) 20 mEq tablet Take 20 mEq by mouth daily. 10/23/16   Nadeem Hamlin NP   pravastatin (PRAVACHOL) 80 mg tablet Take 80 mg by mouth daily. 1/3/17   Nani Henderson MD   etodolac (LODINE) 300 mg capsule TAKE 1 CAPSULE BY MOUTH THREE TIMES DAILY AS NEEDED FOR PAIN WITH FOOD 11/22/16   Historical Provider   levothyroxine (SYNTHROID) 50 mcg tablet TAKE 1 TABLET BY MOUTH EVERY DAY 11/28/16   Historical Provider   cholecalciferol (VITAMIN D3) 1,000 unit cap Take 2,000 Units by mouth daily. Historical Provider   meclizine (ANTIVERT) 25 mg tablet Take 25 mg by mouth daily as needed. 1/4/16   Historical Provider   butalbital-acetaminophen-caffeine (FIORICET) -40 mg per tablet Take 1 Tab by mouth two (2) times a day. Historical Provider   DULoxetine (CYMBALTA) 60 mg capsule Take 60 mg by mouth nightly. Historical Provider     Current Facility-Administered Medications   Medication Dose Route Frequency    sodium chloride (NS) flush 5 mL  5 mL IntraVENous Q8H     Allergies   Allergen Reactions    Skelaxin [Metaxalone] Other (comments)     jittery    Tramadol Other (comments)     Halluctations      Social History   Substance Use Topics    Smoking status: Never Smoker    Smokeless tobacco: Never Used    Alcohol use No      Family History   Problem Relation Age of Onset    Diabetes Mother     Elevated Lipids Mother     Hypertension Mother     Cancer Father      pancreatic    Diabetes Sister     Hypertension Sister     Diabetes Brother     Diabetes Daughter     Hypertension Daughter         Review of Systems:  Review of systems not obtained due to patient factors.     Objective:   Vital Signs:    Visit Vitals    /63    Pulse 92    Temp 98.5 °F (36.9 °C)    Resp 15    Wt 113.1 kg (249 lb 5 oz)    SpO2 100%    BMI 40.24 kg/m2       O2 Device: Room air       Temp (24hrs), Av.5 °F (36.9 °C), Min:98.5 °F (36.9 °C), Max:98.5 °F (36.9 °C)       Intake/Output:   Last shift:         Last 3 shifts:    No intake or output data in the 24 hours ending 18 1550      Ventilator Settings: none; patient is on room air    Physical Exam:    General:  Mildly lethargic, cooperative, dysphoria and uncomfortable due to leg cramp. No slurring of speech, but speaks slowly. Head:  Normocephalic, without obvious abnormality, atraumatic. R facial droop. Teeth started bleeding at end of TPA infusion. Eyes:  Conjunctivae/corneas clear. PERRL, EOMI, anicteric, tracks, no field cut   Nose: Nares normal. Septum midline. Mucosa normal. No drainage or sinus tenderness. Throat: Lips, mucosa, and tongue normal. Teeth and gums normal.   Neck: Supple, symmetrical, trachea midline, no adenopathy, thyroid: no enlargment/tenderness/nodules, no carotid bruit and no JVD. Back:   Symmetric, no curvature. ROM normal.   Lungs:   Clear to auscultation bilaterally. Chest wall:  No tenderness or deformity. Heart:  Regular rate and rhythm, S1, S2 normal, no murmur, click, rub or gallop. Abdomen:   Soft, non-tender. Bowel sounds normal. No masses,  No organomegaly. Extremities: Extremities normal, atraumatic, no cyanosis or edema. Pulses: 2+ and symmetric all extremities. Skin: Skin color, texture, turgor normal. No rashes or lesions, no bleeding. Lymph nodes:      Cervical, supraclavicular, and axillary nodes normal.   Neurologic: R Facial droop, drift R arm, and R mild weakness, slow speech, dysarthria, no dysphagia.          Data:     Recent Results (from the past 24 hour(s))   CBC WITH AUTOMATED DIFF    Collection Time: 18 12:40 PM   Result Value Ref Range    WBC 9.9 4.6 - 13.2 K/uL    RBC 5.12 4.20 - 5.30 M/uL    HGB 11.9 (L) 12.0 - 16.0 g/dL    HCT 36.5 35.0 - 45.0 %    MCV 71.3 (L) 74.0 - 97.0 FL    MCH 23.2 (L) 24.0 - 34.0 PG    MCHC 32.6 31.0 - 37.0 g/dL RDW 14.2 11.6 - 14.5 %    PLATELET 295 286 - 333 K/uL    MPV 10.7 9.2 - 11.8 FL    NEUTROPHILS 32 (L) 42 - 75 %    LYMPHOCYTES 56 (H) 20 - 51 %    MONOCYTES 10 (H) 2 - 9 %    EOSINOPHILS 1 0 - 5 %    BASOPHILS 1 0 - 3 %    ABS. NEUTROPHILS 3.2 1.8 - 8.0 K/UL    ABS. LYMPHOCYTES 5.5 (H) 0.8 - 3.5 K/UL    ABS. MONOCYTES 1.0 0 - 1.0 K/UL    ABS. EOSINOPHILS 0.1 0.0 - 0.4 K/UL    ABS.  BASOPHILS 0.1 (H) 0.0 - 0.06 K/UL    DF MANUAL      PLATELET COMMENTS ADEQUATE PLATELETS      RBC COMMENTS NORMOCYTIC, NORMOCHROMIC     PTT    Collection Time: 07/14/18 12:40 PM   Result Value Ref Range    aPTT 32.0 23.0 - 36.4 SEC   PROTHROMBIN TIME + INR    Collection Time: 07/14/18 12:40 PM   Result Value Ref Range    Prothrombin time 12.6 11.5 - 15.2 sec    INR 1.0 0.8 - 1.2     METABOLIC PANEL, BASIC    Collection Time: 07/14/18 12:40 PM   Result Value Ref Range    Sodium 139 136 - 145 mmol/L    Potassium 4.0 3.5 - 5.5 mmol/L    Chloride 106 100 - 108 mmol/L    CO2 26 21 - 32 mmol/L    Anion gap 7 3.0 - 18 mmol/L    Glucose 130 (H) 74 - 99 mg/dL    BUN 13 7.0 - 18 MG/DL    Creatinine 1.21 0.6 - 1.3 MG/DL    BUN/Creatinine ratio 11 (L) 12 - 20      GFR est AA 56 (L) >60 ml/min/1.73m2    GFR est non-AA 47 (L) >60 ml/min/1.73m2    Calcium 8.9 8.5 - 10.1 MG/DL   GLUCOSE, POC    Collection Time: 07/14/18 12:40 PM   Result Value Ref Range    Glucose (POC) 144 (H) 70 - 110 mg/dL   EKG, 12 LEAD, INITIAL    Collection Time: 07/14/18 12:55 PM   Result Value Ref Range    Ventricular Rate 97 BPM    Atrial Rate 97 BPM    P-R Interval 172 ms    QRS Duration 70 ms    Q-T Interval 362 ms    QTC Calculation (Bezet) 459 ms    Calculated P Axis 48 degrees    Calculated R Axis 13 degrees    Calculated T Axis 29 degrees    Diagnosis       Normal sinus rhythm  Nonspecific T wave abnormality  Abnormal ECG  When compared with ECG of 28-DEC-2016 21:43,  No significant change was found  Confirmed by Maddie Reilly ([id number]) on 7/14/2018 3:44:59 PM GLUCOSE, POC    Collection Time: 07/14/18 12:57 PM   Result Value Ref Range    Glucose (POC) 125 (H) 70 - 110 mg/dL           No results for input(s): FIO2I, IFO2, HCO3I, IHCO3, HCOPOC, PCO2I, PCOPOC, IPHI, PHI, PHPOC, PO2I, PO2POC in the last 72 hours. No lab exists for component: IPOC2  Telemetry:normal sinus rhythm    Imaging:  I have personally reviewed the patients radiographs and have reviewed the reports:  CXR Results  (Last 48 hours)    None         CT head wo cont 7/14/18: IMPRESSION:   No acute intracranial hemorrhage or mass. No convincing large territory acute infarct. However there is questionable very  mild loss of gray-white matter differentiation left lentiform nucleus region,  may be exaggerated secondary to positioning. Given patient symptoms perhaps MR  imaging may be helpful as clinically warranted. MRI brain 12/21/16 w and wo gadolinium:  FINDINGS:  On the CT scan of brain dated 12/28/2016, there was no evidence of intracranial  hemorrhages. On current study, on the gradient echo images, there is no evidence of  intracerebral or extra-axial hemorrhages. On the diffusion images, there is no evidence of acute infarction, acute  ischemic process or restricted diffusion in brain. On the FLAIR and T2-weighted images, there is no definable focal abnormality in  both cerebral hemispheres, basal ganglia structures of both sides, brainstem, or  in cerebellum. The cerebral ventricles are of normal size, without midline  shift. With intravenous administration of gadolinium contrast, there is no abnormal  intra-axial or extra-axial enhancement. There is no evidence of intracranial  mass lesion or mass effect. There is no demonstrable abnormality in sella, suprasellar cistern, both orbits,  or in the IACs and CP angles of both sides. Study demonstrates signal voids, indicating blood flow in all major intracranial  arteries.   Visualized paranasal sinuses are clear except for a small, about 1 cm size  retention cyst in the lower medial portion of right maxillary sinus.  ------------------------------  IMPRESSION  IMPRESSION: [note this is an old MRI result]  On the diffusion images, there is no evidence of acute infarction or acute  ischemic process in brain. There is no definable focal abnormality in cerebrum, cerebellum or in brainstem. No definable old or subacute infarction in brain. With intravenous gadolinium contrast, no abnormal enhancement. No evidence of  intracranial mass lesion or mass effect. Best practice :  Glycemic control  IHI ICU bundles:  Sress ulcer prophylaxis  DVT prophylaxis; none at this time; patient anticoagulated  Need for Lines, buchanan assessed       Buchanan Bundle Followed       Total of  60  min critical care time spent at bedside during the course of care providing evaluation,management and care decisions and ordering appropriate treatment related to critical care problems exclusive of procedures. The reason for providing this level of medical care for this critically ill patient was due a critical illness that impaired one or more vital organ systems such that there was a high probability of imminent or life threatening deterioration in the patients condition. This care involved high complexity decision making to assess, manipulate, and support vital system functions, to treat this degree vital organ system failure and to prevent further life threatening deterioration of the patients condition.     Tino Goldsmith MD

## 2018-07-14 NOTE — IP AVS SNAPSHOT
[address] Patient: Tomas MRN: [id number] FYE:[birth year] About your hospitalization You were admitted on:  July 14, 2018 You last received care in the:  [facility] [address] [place] [address] You were discharged on:  July 16, 2018 Why you were hospitalized Your primary diagnosis was:  Acute Ischemic Stroke (Hcc) Your diagnoses also included:  Asthma, Hyperlipidemia, Migraines, Leg Swelling, Hypothyroidism Follow-up Information Follow up With Details Comments Contact Info John Khalil MD Schedule an appointment as soon as possible for a visit in 1 week [place] left office voicemail. Will try again. [address] [facility] [address] 
[phone] Sayra Hoskins MD Schedule an appointment as soon as possible for a visit on 8/27/2018 Follow up @ 8:20 a.m. [address] [phone] Your Scheduled Appointments Monday August 27, 2018  8:20 AM EDT Follow Up with Sayra Hoskins MD  
[facility] [employer] [address] [facility] [id number] [phone] Discharge Orders None A check luis manuel indicates which time of day the medication should be taken. My Medications START taking these medications Instructions Each Dose to Equal  
 Morning Noon Evening Bedtime  
 aspirin 325 mg tablet Commonly known as:  ASPIRIN Start taking on:  7/17/2018 Replaces:  aspirin delayed-release 81 mg tablet Take 1 Tab by mouth daily. 325 mg  
    
  
   
   
   
  
 atorvastatin 80 mg tablet Commonly known as:  LIPITOR Take 1 Tab by mouth nightly. 80 mg CONTINUE taking these medications Instructions Each Dose to Equal  
 Morning Noon Evening Bedtime  
 acetaminophen-codeine 300-30 mg per tablet Commonly known as:  TYLENOL #3 Start taking on:  7/21/2018  
   
 1-2  Bid  prn  
     
   
   
   
  
 albuterol 90 mcg/actuation inhaler Commonly known as:  PROVENTIL HFA, VENTOLIN HFA, PROAIR HFA Take 1 Puff by inhalation every four (4) hours as needed for Wheezing. 1 Puff CYMBALTA 60 mg capsule Generic drug:  DULoxetine Take 60 mg by mouth nightly. 60 mg  
    
   
   
   
  
  
 docusate sodium 100 mg capsule Commonly known as:  Mainor Call Take 100 mg by mouth two (2) times a day. 100 mg FERROUS SULFATE PO Take 325 mg by mouth. 325 mg  
    
   
   
   
  
 furosemide 20 mg tablet Commonly known as:  LASIX Take 40 mg by mouth daily. 40 mg  
    
  
   
   
   
  
 gabapentin 300 mg capsule Commonly known as:  NEURONTIN  
   
 TAKE 1 CAPSULE BY MOUTH EVERY 12 HOURS FOR 30 DAYS  
     
   
   
   
  
 levothyroxine 50 mcg tablet Commonly known as:  SYNTHROID  
   
 TAKE 1 TABLET BY MOUTH EVERY DAY  
     
   
   
   
  
 meclizine 25 mg tablet Commonly known as:  ANTIVERT Take 25 mg by mouth daily as needed. 25 mg  
    
   
   
   
  
 naloxone 4 mg/actuation nasal spray Commonly known as:  [employer] Use 1 spray intranasally into 1 nostril. Use a new Narcan nasal spray for subsequent doses and administer into alternating nostrils. May repeat every 2 to 3 minutes as needed. potassium chloride SR 20 mEq tablet Commonly known as:  K-TAB Take 20 mEq by mouth daily. 20 mEq SYMBICORT 160-4.5 mcg/actuation Hfaa Generic drug:  budesonide-formoterol Take 2 Puffs by inhalation two (2) times a day. 2 Puff * topiramate 50 mg tablet Commonly known as:  TOPAMAX Take 1 Tab by mouth two (2) times a day. Indications: MIGRAINE PREVENTION  50 mg  
    
  
   
   
   
  
  
 * topiramate 100 mg tablet Commonly known as:  TOPAMAX Take 1 Tab by mouth two (2) times a day. Indications: MIGRAINE PREVENTION  
 100 mg  
    
  
   
   
   
  
  
 VITAMIN D3 1,000 unit Cap Generic drug:  cholecalciferol Take 2,000 Units by mouth daily. 2000 Units * Notice: This list has 2 medication(s) that are the same as other medications prescribed for you. Read the directions carefully, and ask your doctor or other care provider to review them with you. STOP taking these medications   
 aspirin delayed-release 81 mg tablet Replaced by:  aspirin 325 mg tablet  
   
  
 pravastatin 80 mg tablet Commonly known as:  PRAVACHOL Where to Get Your Medications Information on where to get these meds will be given to you by the nurse or doctor. ! Ask your nurse or doctor about these medications  
  aspirin 325 mg tablet  
 atorvastatin 80 mg tablet Opioid Education Prescription Opioids: What You Need to Know: 
 
Prescription opioids can be used to help relieve moderate-to-severe pain and are often prescribed following a surgery or injury, or for certain health conditions. These medications can be an important part of treatment but also come with serious risks. Opioids are strong pain medicines. Examples include hydrocodone, oxycodone, fentanyl, and morphine. Heroin is an example of an illegal opioid. It is important to work with your health care provider to make sure you are getting the safest, most effective care. WHAT ARE THE RISKS AND SIDE EFFECTS OF OPIOID USE? Prescription opioids carry serious risks of addiction and overdose, especially with prolonged use. An opioid overdose, often marked by slow breathing, can cause sudden death. The use of prescription opioids can have a number of side effects as well, even when taken as directed. · Tolerance-meaning you might need to take more of a medication for the same pain relief · Physical dependence-meaning you have symptoms of withdrawal when the medication is stopped. Withdrawal symptoms can include nausea, sweating, chills, diarrhea, stomach cramps, and muscle aches. Withdrawal can last up to several weeks, depending on which drug you took and how long you took it. · Increased sensitivity to pain · Constipation · Nausea, vomiting, and dry mouth · Sleepiness and dizziness · Confusion · Depression · Low levels of testosterone that can result in lower sex drive, energy, and strength · Itching and sweating RISKS ARE GREATER WITH:      
· History of drug misuse, substance use disorder, or overdose · Mental health conditions (such as depression or anxiety) · Sleep apnea · Older age (72 years or older) · Pregnancy Avoid alcohol while taking prescription opioids. Also, unless specifically advised by your health care provider, medications to avoid include: · Benzodiazepines (such as Xanax or Valium) · Muscle relaxants (such as Soma or Flexeril) · Hypnotics (such as Ambien or Lunesta) · Other prescription opioids KNOW YOUR OPTIONS Talk to your health care provider about ways to manage your pain that don't involve prescription opioids. Some of these options may actually work better and have fewer risks and side effects. Options may include: 
· Pain relievers such as acetaminophen, ibuprofen, and naproxen · Some medications that are also used for depression or seizures · Physical therapy and exercise · Counseling to help patients learn how to cope better with triggers of pain and stress. · Application of heat or cold compress · Massage therapy · Relaxation techniques Be Informed Make sure you know the name of your medication, how much and how often to take it, and its potential risks & side effects.  
 
IF YOU ARE PRESCRIBED OPIOIDS FOR PAIN: 
 · Never take opioids in greater amounts or more often than prescribed. Remember the goal is not to be pain-free but to manage your pain at a tolerable level. · Follow up with your primary care provider to: · Work together to create a plan on how to manage your pain. · Talk about ways to help manage your pain that don't involve prescription opioids. · Talk about any and all concerns and side effects. · Help prevent misuse and abuse. · Never sell or share prescription opioids · Help prevent misuse and abuse. · Store prescription opioids in a secure place and out of reach of others (this may include visitors, children, friends, and family). · Safely dispose of unused/unwanted prescription opioids: Find your community drug take-back program or your pharmacy mail-back program, or flush them down the toilet, following guidance from the Food and Drug Administration (www.fda.gov/Drugs/ResourcesForYou). · Visit www.cdc.gov/drugoverdose to learn about the risks of opioid abuse and overdose. · If you believe you may be struggling with addiction, tell your health care provider and ask for guidance or call [address] [place], [place] [address] at [phone]. Discharge Instructions DISCHARGE SUMMARY from Nurse PATIENT INSTRUCTIONS: 
 
Report the following to your doctor or call [phone] · Excessive pain not relieved by pain meds. · Any sign or symptom of worsening stroke. · Temperature over 100.5 · Nausea and vomiting lasting longer than 4 hours or if unable to take medications · Any signs of decreased circulation or nerve impairment to extremity: change in color, persistent  numbness, tingling, coldness or increase pain · Any questions What to do at Home: 
Recommended activity: Activity as tolerated. *  Please give a list of your current medications to your Primary Care Provider. *  Please update this list whenever your medications are discontinued, doses are 
    changed, or new medications (including over-the-counter products) are added. *  Please carry medication information at all times in case of emergency situations. These are general instructions for a healthy lifestyle: No smoking/ No tobacco products/ Avoid exposure to second hand smoke Surgeon General's Warning:  Quitting smoking now greatly reduces serious risk to your health. Obesity, smoking, and sedentary lifestyle greatly increases your risk for illness A healthy diet, regular physical exercise & weight monitoring are important for maintaining a healthy lifestyle You may be retaining fluid if you have a history of heart failure or if you experience any of the following symptoms:  Weight gain of 3 pounds or more overnight or 5 pounds in a week, increased swelling in our hands or feet or shortness of breath while lying flat in bed. Please call your doctor as soon as you notice any of these symptoms; do not wait until your next office visit. Recognize signs and symptoms of STROKE: 
 
F-face looks uneven A-arms unable to move or move unevenly S-speech slurred or non-existent T-time-call 911 as soon as signs and symptoms begin-DO NOT go Back to bed or wait to see if you get better-TIME IS BRAIN. Warning Signs of HEART ATTACK Call 911 if you have these symptoms: 
? Chest discomfort. Most heart attacks involve discomfort in the center of the chest that lasts more than a few minutes, or that goes away and comes back. It can feel like uncomfortable pressure, squeezing, fullness, or pain. ? Discomfort in other areas of the upper body. Symptoms can include pain or discomfort in one or both arms, the back, neck, jaw, or stomach. ? Shortness of breath with or without chest discomfort. ? Other signs may include breaking out in a cold sweat, nausea, or lightheadedness. Don't wait more than five minutes to call [address]! Fast action can save your life. Calling 911 is almost always the fastest way to get lifesaving treatment. Emergency Medical Services staff can begin treatment when they arrive  up to an hour sooner than if someone gets to the hospital by car. The discharge information has been reviewed with the patient. The patient verbalized understanding. Discharge medications reviewed with the patient and appropriate educational materials and side effects teaching were provided. Patient armband removed and shredded 
___________________________________________________________________________________________________________________________________ ACE Inhibitors: Care Instructions Your Care Instructions ACE (angiotensin-converting enzyme) inhibitors lower blood pressure. They also treat heart failure and prevent heart attacks and strokes. They block an enzyme that makes blood vessels narrow. As a result, the blood vessels relax and widen. This lowers blood pressure. These medicines also put more water and salt into the urine. This lowers blood pressure too. These medicines are a good choice for people with diabetes. They don't affect blood sugar levels, and they may protect the kidneys. Examples include: · Benazepril (Lotensin). · Lisinopril (Prinivil, Zestril). · Ramipril (Altace). Before you start taking this medicine, make sure your doctor knows if you take other medicines, especially water pills (diuretics) or potassium tablets. And tell your doctor if you use a salt substitute. You should not take an ACE inhibitor if you are pregnant or planning to become pregnant. You may need regular blood tests. Follow-up care is a key part of your treatment and safety. Be sure to make and go to all appointments, and call your doctor if you are having problems. It's also a good idea to know your test results and keep a list of the medicines you take. How can you care for yourself at home? · Take your medicines exactly as prescribed. Be sure to take high blood pressure medicine every day. Since high blood pressure often has no symptoms, it is easy to forget to take the pills. Call your doctor if you think you are having a problem with your medicine. · ACE inhibitors can cause a dry cough. If the cough is bad, talk to your doctor. You may need to try a different medicine. · ACE inhibitors can cause an allergic reaction of the skin. Symptoms may range from mild swelling to painful welts. Severe swelling can make it hard to breathe, but this is very rare. · Check with your doctor or pharmacist before you use any other medicines. This includes over-the-counter medicines. Make sure your doctor knows all of the medicines, vitamins, herbal products, and supplements you take. Taking some medicines together can cause problems. When should you call for help? Call your doctor now or seek immediate medical care if: 
  · You develop a new cough.  
  · You think you are having problems with your medicine.  
 Watch closely for changes in your health, and be sure to contact your doctor if you have any problems. Where can you learn more? Go to http://leesa-jean carlos.info/. Enter J447 in the search box to learn more about \"ACE Inhibitors: Care Instructions. \" Current as of: December 6, 2017 Content Version: 11.7 © 0617-2063 [employer]. Care instructions adapted under license by [employer] (which disclaims liability or warranty for this information). If you have questions about a medical condition or this instruction, always ask your healthcare professional. [place] [address] any warranty or liability for your use of this information. Learning About Asthma Triggers What are asthma triggers? When you have asthma, certain things can make your symptoms worse.  These are called triggers. Learn what triggers an asthma attack for you, and avoid the triggers when you can. Common triggers include colds, smoke, air pollution, dust, pollen, pets, stress, and cold air. How do asthma triggers affect you? Triggers can make it harder for your lungs to work as they should. They can lead to sudden breathing problems and other symptoms. When you are around a trigger, an asthma attack is more likely. If your symptoms are severe, you may need emergency treatment or have to go to the hospital for treatment. What can you do to avoid triggers? The first thing is to know your triggers. When you are having symptoms, note the things around you that might be causing them. Then look for patterns that may be triggering your symptoms. Record your triggers on a piece of paper or in an asthma diary. When you have your list of possible triggers, work with your doctor to find ways to avoid them. Avoid colds and flu. Get a pneumococcal vaccine shot. If you have had one before, ask your doctor whether you need a second dose. Get a flu vaccine every year, as soon as it's available. If you must be around people with colds or the flu, wash your hands often. Here are some ways to avoid a few common triggers. · Do not smoke or allow others to smoke around you. If you need help quitting, talk to your doctor about stop-smoking programs and medicines. These can increase your chances of quitting for good. · If there is a lot of pollution, pollen, or dust outside, stay at home and keep your windows closed. Use an air conditioner or air filter in your home. Check your local weather report or newspaper for air quality and pollen reports. What else should you know? · Take your controller medicine every day, not just when you have symptoms. It helps prevent problems before they occur.  
· Your doctor may suggest that you check how well your lungs are working by measuring your peak expiratory flow (PEF) throughout the day. Your PEF may drop when you are near things that trigger symptoms. Where can you learn more? Go to http://leesa-jean carlos.info/. Enter A852 in the search box to learn more about \"Learning About Asthma Triggers. \" Current as of: December 6, 2017 Content Version: 11.7 © 7476-2041 [employer]. Care instructions adapted under license by [employer] (which disclaims liability or warranty for this information). If you have questions about a medical condition or this instruction, always ask your healthcare professional. [place] [address] any warranty or liability for your use of this information. Introducing [facility] & HE[facility]! [employer] introduces Bluwan patient portal. Now you can access parts of your medical record, email your doctor's office, and request medication refills online. 1. In your internet browser, go to [email or web address] 2. Click on the First Time User? Click Here link in the Sign In box. You will see the New Member Sign Up page. 3. Enter your Bluwan Access Code exactly as it appears below. You will not need to use this code after youve completed the sign-up process. If you do not sign up before the expiration date, you must request a new code. · Bluwan Access Code: [email or web address] Expires: 9/23/2018  2:13 PM 
 
4. Enter the last four digits of your Social Security Number (xxxx) and Date of Birth (mm/dd/yyyy) as indicated and click Submit. You will be taken to the next sign-up page. 5. Create a Bluwan ID. This will be your Bluwan login ID and cannot be changed, so think of one that is secure and easy to remember. 6. Create a Bluwan password. You can change your password at any time. 7. Enter your Password Reset Question and Answer. This can be used at a later time if you forget your password. 8. Enter your e-mail address. You will receive e-mail notification when new information is available in [address] 9. Click Sign Up. You can now view and download portions of your medical record. 10. Click the Download Summary menu link to download a portable copy of your medical information. If you have questions, please visit the Frequently Asked Questions section of the Taking [employer]inth[employer] website. Remember, [employer] is NOT to be used for urgent needs. For medical emergencies, dial 911. Now available from your iPhone and Android! Introducing Margarito Leary As a [employer] patient, I wanted to make you aware of our electronic visit tool called Margarito Leary. [employer] 24/7 allows you to connect within minutes with a medical provider 24 hours a day, seven days a week via a mobile device or tablet or logging into a secure website from your computer. You can access Margarito Leary from anywhere in the [place]. A virtual visit might be right for you when you have a simple condition and feel like you just dont want to get out of bed, or cant get away from work for an appointment, when your regular [employer] provider is not available (evenings, weekends or holidays), or when youre out of town and need minor care. Electronic visits cost only $49 and if the [employer] 24/7 provider determines a prescription is needed to treat your condition, one can be electronically transmitted to a nearby pharmacy*. Please take a moment to enroll today if you have not already done so. The enrollment process is free and takes just a few minutes. To enroll, please download the [employer] 24/7 guevara to your tablet or phone, or visit [email or web address] to enroll on your computer.    
And, as an [address] [place] [address] patient with a [employer] account, the results of your visits will be scanned into your electronic medical record and your primary care provider will be able to view the scanned results. We urge you to continue to see your regular [facility] provider for your ongoing medical care. And while your primary care provider may not be the one available when you seek a [employer] virtual visit, the peace of mind you get from getting a real diagnosis real time can be priceless. For more information on [employer], view our Frequently Asked Questions (FAQs) at [email or web address]. Sincerely, 
 
Valentino Milks, MD 
Chief Medical Officer Melisa Fink *:  certain medications cannot be prescribed via [employer] Providers Seen During Your Hospitalization Provider Specialty Primary office phone Kimber Alvarado MD Emergency Medicine [phone] Harjinder Marti MD Family Practice [phone] Joanie Garg MD Family Practice [phone] Your Primary Care Physician (PCP) Primary Care Physician Office Phone Office Fax Veto Chadwick [phone] You are allergic to the following Allergen Reactions Skelaxin (Metaxalone) Other (comments)  
 jittery Tramadol Other (comments) Halluctations Recent Documentation Height Weight BMI OB Status Smoking Status 1.651 m 106.6 kg 39.11 kg/m2 Hysterectomy Never Smoker Emergency Contacts Name Discharge Info Relation Home Work [employer] [facility]  Mother [14] [phone] Jordindaisy Bread DISCHARGE CAREGIVER [3] Daughter [21] [phone] [employer] [facility]  Parent [1] [phone] Ronnie Dial [22]   [phone] Patient Belongings The following personal items are in your possession at time of discharge: 
  Dental Appliances: None  Visual Aid: Glasses, At bedside      Home Medications: None   Jewelry: Ring (yellow metal with red stone)  Clothing: None    Other Valuables: None Discharge Instructions Attachments/References TIA (TRANSIENT ISCHEMIC ATTACK) (ENGLISH) Patient Handouts Transient Ischemic Attack: Care Instructions Your Care Instructions A transient ischemic attack (TIA) is when blood flow to a part of your brain is blocked for a short time. A TIA is like a stroke but usually lasts only a few minutes. A TIA does not cause lasting brain damage. Any vision problems, slurred speech, or other symptoms usually go away in 10 to 20 minutes. But they may last for up to 24 hours. TIAs are often warning signs of a stroke. Some people who have a TIA may have a stroke in the future. A stroke can cause symptoms like those of a TIA. But a stroke causes lasting damage to your brain. You can take steps to help prevent a stroke. One thing you can do is get early treatment. If you have other new symptoms, or if your symptoms do not get better, go back to the emergency room or call your doctor right away. Getting treatment right away may prevent long-term brain damage caused by a stroke. The doctor has checked you carefully, but problems can develop later. If you notice any problems or new symptoms, get medical treatment right away. Follow-up care is a key part of your treatment and safety. Be sure to make and go to all appointments, and call your doctor if you are having problems. It's also a good idea to know your test results and keep a list of the medicines you take. How can you care for yourself at home? Medicines 
  · Be safe with medicines. Take your medicines exactly as prescribed. Call your doctor if you think you are having a problem with your medicine.  
  · If you take a blood thinner, such as aspirin, be sure you get instructions about how to take your medicine safely.  Blood thinners can cause serious bleeding problems.  
  · Call your doctor if you are not able to take your medicines for any reason.  
  · Do not take any over-the-counter medicines or herbal products without talking to your doctor first.  
  · If you take birth control pills or hormone therapy, talk to your doctor. Ask if these treatments are right for you.  
 Lifestyle changes 
  · Do not smoke. If you need help quitting, talk to your doctor about stop-smoking programs and medicines.  
  · Be active. If your doctor recommends it, get more exercise. Walking is a good choice. Bit by bit, increase the amount you walk every day. Try for at least 30 minutes on most days of the week. You also may want to swim, bike, or do other activities.  
  · Eat heart-healthy foods. These include fruits, vegetables, high-fiber foods, fish, and foods that are low in sodium, saturated fat, and trans fat.  
  · Stay at a healthy weight. Lose weight if you need to.  
  · Limit alcohol to 2 drinks a day for men and 1 drink a day for women.  
 Staying healthy 
  · Manage other health problems such as diabetes, high blood pressure, and high cholesterol.  
  · Get the flu vaccine every year. When should you call for help? Call 911 anytime you think you may need emergency care. For example, call if: 
  · You have new or worse symptoms of a stroke. These may include: 
¨ Sudden numbness, tingling, weakness, or loss of movement in your face, arm, or leg, especially on only one side of your body. ¨ Sudden vision changes. ¨ Sudden trouble speaking. ¨ Sudden confusion or trouble understanding simple statements. ¨ Sudden problems with walking or balance. ¨ A sudden, severe headache that is different from past headaches. Call 911 even if these symptoms go away in a few minutes.  
  · You feel like you are having another TIA.  
 Watch closely for changes in your health, and be sure to contact your doctor if you have any problems. Where can you learn more? Go to http://leesa-jean carlos.info/. Enter [id number] in the search box to learn more about \"Transient Ischemic Attack: Care Instructions. \" Current as of: November 21, 2017 Content Version: 11.7 © 7393-8000 [facility] [employer]. Care instructions adapted under license by [employer] (which disclaims liability or warranty for this information). If you have questions about a medical condition or this instruction, always ask your healthcare professional. [address] any warranty or liability for your use of this information. Please provide this summary of care documentation to your next provider. Signatures-by signing, you are acknowledging that this After Visit Summary has been reviewed with you and you have received a copy. Patient Signature:  ____________________________________________________________ Date:  ____________________________________________________________  
  
Yeni Patel Provider Signature:  ____________________________________________________________ Date:  ____________________________________________________________

## 2018-07-14 NOTE — Clinical Note
Status[de-identified] Inpatient [101] Type of Bed: Intensive Care [6] Inpatient Hospitalization Certified Necessary for the Following Reasons: 4. Patient requires ICU level of care interventions (further clarification in H&P documentation) Admitting Diagnosis: Acute ischemic stroke [facility]) [[id number]] Admitting Physician: Cecy Chand [[id number]] Attending Physician: Ccey Chand [[id number]] Estimated Length of Stay: 2 Midnights Discharge Plan[de-identified] Home with Office Follow-up

## 2018-07-15 ENCOUNTER — APPOINTMENT (OUTPATIENT)
Dept: CT IMAGING | Age: 53
DRG: 062 | End: 2018-07-15
Attending: FAMILY MEDICINE
Payer: SELF-PAY

## 2018-07-15 ENCOUNTER — APPOINTMENT (OUTPATIENT)
Dept: MRI IMAGING | Age: 53
DRG: 062 | End: 2018-07-15
Attending: FAMILY MEDICINE
Payer: SELF-PAY

## 2018-07-15 LAB
ANION GAP SERPL CALC-SCNC: 8 MMOL/L (ref 3–18)
BACTERIA SPEC CULT: NORMAL
BASOPHILS # BLD: 0.1 K/UL (ref 0–0.1)
BASOPHILS NFR BLD: 1 % (ref 0–2)
BUN SERPL-MCNC: 12 MG/DL (ref 7–18)
BUN/CREAT SERPL: 12 (ref 12–20)
CALCIUM SERPL-MCNC: 8.7 MG/DL (ref 8.5–10.1)
CHLORIDE SERPL-SCNC: 109 MMOL/L (ref 100–108)
CHOLEST SERPL-MCNC: 162 MG/DL
CO2 SERPL-SCNC: 24 MMOL/L (ref 21–32)
CREAT SERPL-MCNC: 1.04 MG/DL (ref 0.6–1.3)
DIFFERENTIAL METHOD BLD: ABNORMAL
EOSINOPHIL # BLD: 0.1 K/UL (ref 0–0.4)
EOSINOPHIL NFR BLD: 2 % (ref 0–5)
ERYTHROCYTE [DISTWIDTH] IN BLOOD BY AUTOMATED COUNT: 14.3 % (ref 11.6–14.5)
EST. AVERAGE GLUCOSE BLD GHB EST-MCNC: 117 MG/DL
GLUCOSE BLD STRIP.AUTO-MCNC: 123 MG/DL (ref 70–110)
GLUCOSE SERPL-MCNC: 108 MG/DL (ref 74–99)
HBA1C MFR BLD: 5.7 % (ref 4.2–5.6)
HCT VFR BLD AUTO: 34.6 % (ref 35–45)
HDLC SERPL-MCNC: 42 MG/DL (ref 40–60)
HDLC SERPL: 3.9 {RATIO} (ref 0–5)
HGB BLD-MCNC: 11.3 G/DL (ref 12–16)
INR PPP: 1.1 (ref 0.8–1.2)
LDLC SERPL CALC-MCNC: 84.8 MG/DL (ref 0–100)
LIPID PROFILE,FLP: ABNORMAL
LYMPHOCYTES # BLD: 3.1 K/UL (ref 0.9–3.6)
LYMPHOCYTES NFR BLD: 51 % (ref 21–52)
MCH RBC QN AUTO: 23.3 PG (ref 24–34)
MCHC RBC AUTO-ENTMCNC: 32.7 G/DL (ref 31–37)
MCV RBC AUTO: 71.3 FL (ref 74–97)
MONOCYTES # BLD: 0.5 K/UL (ref 0.05–1.2)
MONOCYTES NFR BLD: 8 % (ref 3–10)
NEUTS SEG # BLD: 2.4 K/UL (ref 1.8–8)
NEUTS SEG NFR BLD: 38 % (ref 40–73)
P2Y12 PLT RESPONSE,PPPR: 321 PRU (ref 194–418)
PLATELET # BLD AUTO: 300 K/UL (ref 135–420)
PLATELET COMMENTS,PCOM: ABNORMAL
PMV BLD AUTO: 10.4 FL (ref 9.2–11.8)
POTASSIUM SERPL-SCNC: 3.7 MMOL/L (ref 3.5–5.5)
PROTHROMBIN TIME: 13.8 SEC (ref 11.5–15.2)
RBC # BLD AUTO: 4.85 M/UL (ref 4.2–5.3)
RBC MORPH BLD: ABNORMAL
RBC MORPH BLD: ABNORMAL
SERVICE CMNT-IMP: NORMAL
SODIUM SERPL-SCNC: 141 MMOL/L (ref 136–145)
TRIGL SERPL-MCNC: 176 MG/DL (ref ?–150)
TSH SERPL DL<=0.05 MIU/L-ACNC: 1.12 UIU/ML (ref 0.36–3.74)
VLDLC SERPL CALC-MCNC: 35.2 MG/DL
WBC # BLD AUTO: 6.2 K/UL (ref 4.6–13.2)

## 2018-07-15 PROCEDURE — 70553 MRI BRAIN STEM W/O & W/DYE: CPT

## 2018-07-15 PROCEDURE — 82962 GLUCOSE BLOOD TEST: CPT

## 2018-07-15 PROCEDURE — 97162 PT EVAL MOD COMPLEX 30 MIN: CPT

## 2018-07-15 PROCEDURE — 74011636320 HC RX REV CODE- 636/320: Performed by: FAMILY MEDICINE

## 2018-07-15 PROCEDURE — 65660000000 HC RM CCU STEPDOWN

## 2018-07-15 PROCEDURE — 92610 EVALUATE SWALLOWING FUNCTION: CPT

## 2018-07-15 PROCEDURE — 74011000250 HC RX REV CODE- 250: Performed by: INTERNAL MEDICINE

## 2018-07-15 PROCEDURE — 85025 COMPLETE CBC W/AUTO DIFF WBC: CPT

## 2018-07-15 PROCEDURE — 85576 BLOOD PLATELET AGGREGATION: CPT

## 2018-07-15 PROCEDURE — 83036 HEMOGLOBIN GLYCOSYLATED A1C: CPT

## 2018-07-15 PROCEDURE — 80048 BASIC METABOLIC PNL TOTAL CA: CPT

## 2018-07-15 PROCEDURE — 80061 LIPID PANEL: CPT

## 2018-07-15 PROCEDURE — 77030037878 HC DRSG MEPILEX >48IN BORD MOLN -B

## 2018-07-15 PROCEDURE — 84443 ASSAY THYROID STIM HORMONE: CPT

## 2018-07-15 PROCEDURE — 97530 THERAPEUTIC ACTIVITIES: CPT

## 2018-07-15 PROCEDURE — 87641 MR-STAPH DNA AMP PROBE: CPT

## 2018-07-15 PROCEDURE — 74011250637 HC RX REV CODE- 250/637: Performed by: FAMILY MEDICINE

## 2018-07-15 PROCEDURE — 70450 CT HEAD/BRAIN W/O DYE: CPT

## 2018-07-15 PROCEDURE — A9575 INJ GADOTERATE MEGLUMI 0.1ML: HCPCS | Performed by: FAMILY MEDICINE

## 2018-07-15 PROCEDURE — 85610 PROTHROMBIN TIME: CPT

## 2018-07-15 RX ORDER — ACETAMINOPHEN 325 MG/1
650 TABLET ORAL
Status: DISCONTINUED | OUTPATIENT
Start: 2018-07-15 | End: 2018-07-16 | Stop reason: HOSPADM

## 2018-07-15 RX ADMIN — DULOXETINE HYDROCHLORIDE 60 MG: 30 CAPSULE, DELAYED RELEASE ORAL at 21:44

## 2018-07-15 RX ADMIN — Medication 5 ML: at 09:51

## 2018-07-15 RX ADMIN — BUDESONIDE AND FORMOTEROL FUMARATE DIHYDRATE 2 PUFF: 160; 4.5 AEROSOL RESPIRATORY (INHALATION) at 09:51

## 2018-07-15 RX ADMIN — ATORVASTATIN CALCIUM 80 MG: 40 TABLET, FILM COATED ORAL at 21:44

## 2018-07-15 RX ADMIN — BUDESONIDE AND FORMOTEROL FUMARATE DIHYDRATE 2 PUFF: 160; 4.5 AEROSOL RESPIRATORY (INHALATION) at 00:30

## 2018-07-15 RX ADMIN — DOCUSATE SODIUM 100 MG: 100 CAPSULE, LIQUID FILLED ORAL at 09:50

## 2018-07-15 RX ADMIN — FAMOTIDINE 20 MG: 10 INJECTION, SOLUTION INTRAVENOUS at 21:44

## 2018-07-15 RX ADMIN — Medication 5 ML: at 21:44

## 2018-07-15 RX ADMIN — LEVOTHYROXINE SODIUM 50 MCG: 50 TABLET ORAL at 09:50

## 2018-07-15 RX ADMIN — GADOTERATE MEGLUMINE 20 ML: 376.9 INJECTION INTRAVENOUS at 18:06

## 2018-07-15 RX ADMIN — FAMOTIDINE 20 MG: 10 INJECTION, SOLUTION INTRAVENOUS at 09:50

## 2018-07-15 NOTE — PROGRESS NOTES
A [facility]era PAC states do not need the CPAP for tonight as long as vital signs with pulse ox are in normal range.

## 2018-07-15 NOTE — PROGRESS NOTES
Speech LAnguage Pathology bedside swallow evaluation AND DISCHARGE    Patient: Yana Connor (48 y.o. female)  Date: 7/15/2018  Primary Diagnosis: Acute ischemic stroke (HCC)  Acute ischemic stroke ([facility] [address]        Precautions: none       ASSESSMENT :  Clinical beside swallow eval completed per MD orders. Pt A&Ox4. Functional communication. Intelligibility >90%. Cognitive-linguistic function appears intact. OM examination revealed oral motor structures functional for mastication and deglutition. Presented with thin liquid, puree, and solid trials. Exhibited (+) bolus cohesion, manipulation and A-P transit. Further exhibited timely swallow timing/reflex and hyolaryngeal excursion. Pt able to manipulate and clear with 0 clinical s/s aspiration and/or oropharyngeal dysphagia. Pt safe for regular solid, thin liquid diet. 0 formal ST needs for dysphagia indicated at this time. SLP educated pt on role of speech therapist in current setting with re: speech/swallow; verbalized comprehension. SLP available for re-evaluation if indicated by MD. D/w RN who was present at b/s       PLAN :  Recommendations and Planned Interventions:  No formal ST needs ID'd for dysphagia. Eval only. Discharge Recommendations: To Be Determined     SUBJECTIVE:   Patient stated No I feel fine. OBJECTIVE:     Past Medical History:   Diagnosis Date    Asthma     Back pain with radiation     Cardiac echocardiogram, ltd study 12/29/2016    EF 55-60%. No WMA. Right to left atrial septal shunt suggests PFO. Similar to study of 10/18/16.  Cardiovascular LE venous duplex 10/18/2016    No DVT bilaterally.     Contact dermatitis and other eczema, due to unspecified cause     CTS (carpal tunnel syndrome)     HSV-2 (herpes simplex virus 2) infection     Hypercholesterolemia     Hypothyroidism     Ill-defined condition     Vertigo    L4-L5 disc bulge     Leg swelling     Migraines     Positive PPD, treated     S/P lumbar fusion 1/13/2016 1. L4-5 bilateral hemilaminotomy, medial facetectomy, foraminotomy, diskectomy L4-5. 2. Transforaminal lumbar interbody fusion with PEEK cage and demineralized bone graft L4-L5 posterolateral fusion. 3. Segmental spinal instrumentation, DePuy Expedium type L4-L5. 01/13/2016 By Dr. Joycelyn Kapadia     EMG '17 , mild sciatic EMG findings    Stroke [facility])     12/2016    Vertigo      Past Surgical History:   Procedure Laterality Date    HX GYN      partial hysterectomy due to abnormal pap    HX LUMBAR LAMINECTOMY  01-13-16    L4/5     Prior Level of Function/Home Situation:   Home Situation  Home Environment: Private residence  One/Two Story Residence: Two story  # of Interior Steps: 12  Living Alone: No  Support Systems: Child(lj)  Patient Expects to be Discharged to[de-identified] Private residence  Current DME Used/Available at Home: CPAP     Diet prior to admission: regular  Current Diet:  Regular     Cognitive and Communication Status:  Neurologic State: Alert  Orientation Level: Oriented X4  Cognition: Appropriate decision making, Appropriate for age attention/concentration, Appropriate safety awareness  Perception: Appears intact  Perseveration: No perseveration noted  Safety/Judgement: Awareness of environment     Oral Assessment:  Oral Assessment  Labial: No impairment  Dentition: Natural;Intact  Oral Hygiene: adequate  Lingual: No impairment  Velum: No impairment  Mandible: No impairment     P.O. Trials:  Patient Position: HOB 50  Vocal quality prior to P.O.: Low volume  Consistency Presented: Thin liquid; Solid;Pill/Tablet  How Presented: Self-fed/presented;Straw;Successive swallows     Bolus Acceptance: No impairment  Bolus Formation/Control: No impairment     Propulsion: No impairment  Oral Residue: None  Initiation of Swallow: No impairment  Laryngeal Elevation: Functional  Aspiration Signs/Symptoms: None  Pharyngeal Phase Characteristics: No impairment, issues, or problems   Effective Modifications: None  Cues for Modifications: None    Oral Phase Severity: No impairment  Pharyngeal Phase Severity : No impairment    GCODESwallowing:  Swallow Current Status CH= 0%   Swallow Goal Status CH= 0%   Swallow D/C Status CH= 0%    The severity rating is based on the following outcomes:  KAYLEE Noms Swallow Level 7    Clinical Judgement      PAIN:  Start of Eval: 0  End of Eval: 0     After evaluation:   []            Patient left in no apparent distress sitting up in chair  [x]            Patient left in no apparent distress in bed  [x]            Call bell left within reach  [x]            Nursing notified  [x]            Family present  []            Caregiver present  []            Bed alarm activated      COMMUNICATION/EDUCATION:   [x]            Aspiration precautions; swallow safety; compensatory techniques. []            Patient/family have participated as able in goal setting and plan of care. []            Patient/family agree to work toward stated goals and plan of care. []            Patient understands intent and goals of therapy; neutral about participation. []            Patient unable to participate in goal setting/plan of care; educ ongoing with interdisciplinary staff  []         Posted safety precautions in patient's room. Thank you for this referral.    Abner DIAZ  CCC-SLP  Speech-Language Pathologist  Time Calculation: 9 mins

## 2018-07-15 NOTE — PROGRESS NOTES
[facility]. Pulmonary, Critical Care, and Sleep Medicine    Name: Fatimah Quintero MRN: [id number]   : 1965 Hospital: [facility]   Date: 7/15/2018  Admission Date: 2018     Chart and notes reviewed. Data reviewed. I have evaluated all findings. [x]I have reviewed the flowsheet and previous days notes    S: patient feels that R side is stronger, She seemed to tire during my evaluation; started symmetrical but within 10 minutes the right face drooped and R side drifted. She felt she was back to normal.  She is stable for transfer out of ICU after 24h post-TPA period is past.   Neuro note appreciated, discussed with Dr. Aishwarya Kerr. ROS:A comprehensive review of systems was negative except for that written in the HPI. Events and notes from last 24 hours reviewed. Care plan discussed on multidisciplinary rounds.   Patient Active Problem List   Diagnosis Code    Asthma J45.909    Hyperlipidemia E78.5    Eczema L30.9    Back pain with radiation M54.9    Spinal stenosis, lumbar region, without neurogenic claudication M48.061    Lumbar stenosis M48.061    S/P lumbar fusion Z98.1    Acute right-sided low back pain with sciatica M54.40    Neurological symptoms R29.90    Ischemic stroke (HCC) I63.9    Migraines G43.909    Leg swelling M79.89    Hypothyroidism E03.9    CTS (carpal tunnel syndrome) G56.00    Hypercholesterolemia E78.00    Ill-defined condition R69    Sciatica M54.30    Sciatic neuropathy G57.00    Chronic midline low back pain with right-sided sciatica M54.41, G89.29    Encounter for long-term (current) use of high-risk medication Z79.899    Chronic pain syndrome G89.4    History of lumbar surgery Z98.890    Protrusion of lumbar intervertebral disc M51.26    Acute ischemic stroke (HCC) I63.9       Vital Signs:  Visit Vitals    BP 93/59    Pulse 89    Temp 98.9 °F (37.2 °C)    Resp 17    Wt 112 kg (246 lb 14.6 oz)    SpO2 96%  BMI 39.85 kg/m2       O2 Device: Room air       Temp (24hrs), Av.3 °F (36.8 °C), Min:97.5 °F (36.4 °C), Max:98.9 °F (37.2 °C)       Intake/Output Summary (Last 24 hours) at 07/15/18 1808  Last data filed at 07/15/18 1200   Gross per 24 hour   Intake              100 ml   Output             1100 ml   Net            -1000 ml      Ventilator Settings: n/a     Current Facility-Administered Medications   Medication Dose Route Frequency    sodium chloride (NS) flush 5 mL  5 mL IntraVENous Q8H    famotidine (PF) (PEPCID) 20 mg in sodium chloride 0.9% 10 mL injection  20 mg IntraVENous Q12H    budesonide-formoterol (SYMBICORT) 160-4.5 mcg/actuation HFA inhaler 2 Puff  2 Puff Inhalation BID RT    docusate sodium (COLACE) capsule 100 mg  100 mg Oral BID    DULoxetine (CYMBALTA) capsule 60 mg  60 mg Oral QHS    levothyroxine (SYNTHROID) tablet 50 mcg  50 mcg Oral ACB    atorvastatin (LIPITOR) tablet 80 mg  80 mg Oral QHS     Telemetry: NSR    Physical Exam:     General:  Awake, alert, cooperative. No slurring of speech, but speaks slowly. Head:  Normocephalic, without obvious abnormality, atraumatic. R facial droop. No dental or other bleeding. Eyes:  Conjunctivae/corneas clear. PERRL, EOMI, anicteric, tracks, no field cut   Nose: Nares normal. Septum midline. Mucosa normal. No drainage or sinus tenderness. Throat: Lips, mucosa, and tongue normal. Teeth and gums normal.   Neck: Supple, symmetrical, trachea midline, no adenopathy, thyroid: no enlargment/tenderness/nodules, no carotid bruit and no JVD. Back:   Symmetric, no curvature. ROM normal.   Lungs:   Clear to auscultation bilaterally. Chest wall:  No tenderness or deformity. Heart:  Regular rate and rhythm, S1, S2 normal, no murmur, click, rub or gallop. Abdomen:   Soft, non-tender. Bowel sounds normal. No masses,  No organomegaly. Extremities: Extremities normal, atraumatic, no cyanosis or edema. Ecchymosis at venipuncture site. Pulses: 2+ and symmetric all extremities. Skin: Skin color, texture, turgor normal. No rashes or lesions, no bleeding. Lymph nodes:        Cervical, supraclavicular, and axillary nodes normal.   Neurologic: Improved R Facial droop, drift R arm, and R mild weakness, slow speech, dysarthria, no dysphagia. Slightly better than yesterday. DATA:  MAR reviewed and pertinent medications noted or modified as needed    Labs:  Recent Labs      07/15/18   0300  07/14/18   1240   WBC  6.2  9.9   HGB  11.3*  11.9*   HCT  34.6*  36.5   PLT  300  333     Recent Labs      07/15/18   0300  07/14/18   1240   NA  141  139   K  3.7  4.0   CL  109*  106   CO2  24  26   GLU  108*  130*   BUN  12  13   CREA  1.04  1.21   CA  8.7  8.9   INR  1.1  1.0     No results for input(s): PH, PCO2, PO2, HCO3, FIO2 in the last 72 hours. No results for input(s): FIO2I, IFO2, HCO3I, IHCO3, HCOPOC, PCO2I, PCOPOC, IPHI, PHI, PHPOC, PO2I, PO2POC in the last 72 hours. No lab exists for component: IPOC2  Imaging:  [x]                           I have personally reviewed the patients radiographs and reports    CT head without contrast 7/15/18:  INDICATION: 24 hours status post TPA  COMPARISON: 7/14/2018  TECHNIQUE: CT of the head performed without contrast, with multiplanar  reformations. All CT scans at this facility are performed using dose  optimization technique as appropriate to a performed exam, to include automated  exposure control, adjustment of the mA and/or kV according to patient size  (including appropriate matching first site specific examinations), or use of  iterative reconstruction technique. FINDINGS: No focal mass effect or shift of midline structures. No abnormal  extra-axial collection. Ventricles are normal in size and configuration. No  evidence of acute intracranial hemorrhage. No suspicious parenchymal lesions. Calvarium intact. Imaged paranasal sinuses and mastoids well-aerated.   Superficial soft tissues are unremarkable. IMPRESSION  IMPRESSION:  1. No acute intracranial findings. No evidence of acute hemorrhage. Of note, MRI  is more sensitive in the detection of acute infarct. MRI head 7/15 pending. Moderate complexity decision making was performed during this consultation and evaluation. Care time spent 35 minutes with patient exclusive of procedures. IMPRESSION:   1. Probable acute ischemic CVA vs. TIA with R hemiparesis  2. S/p TPA for ischemic stroke  3. KISHOR on 12 cm CPAP with good compliance  4. Anemia stable  5. History of migraines  6. H/O CVA with TPA ~2 years ago  6.5 hyperglycemia controlled, cholesterol normal at 1652; TG elevated 176, LDL 85, HDL 42, all normal except TG  7. Other nonacute problems as above; h/o L4-5 fusion in 2015      PLAN:   CT head and MRI today  Neuro consulting, stroke coordinator to see tomorrow  Begin statin, antiplatelet therapy if today's CT is negative for bleed. Additional testing per neuro, depending on test results. CPAP qHS 12 cm  PT, OT  Transfer to telemetry out of ICU.          Juan Wilson MD   Pulmonary and critical care medicine  7/15/2018

## 2018-07-15 NOTE — PROGRESS NOTES
Intern Progress Note [address] Patient: Van Godoy MRN: [id number]  CSN: [id number] YOB: 1965  Age: 48 y.o. Sex: female DOA: 7/14/2018 LOS:  LOS: 1 day Subjective:  
 
Acute events: ICU Patient reports feeling well this morning, no overnight events. Her sister was at bedside. Review of Systems: 
Patient Endorses  
--------------------------------------------------------------------------------- Constitutional: Negative for fever. Respiratory: Negative for cough and hemoptysis. Cardiovascular: Negative for chest pain and palpitations. Gastrointestinal: Negative for nausea and vomiting. Neurological: Negative for dizziness and headaches. Objective:  
  
Patient Vitals for the past 24 hrs: 
 Temp Pulse Resp BP SpO2  
07/15/18 0830 - 83 17 114/64 98 % 07/15/18 0800 - 79 16 103/47 97 % 07/15/18 0732 - - - 109/67 -  
07/15/18 0730 - 84 17 - 96 % 07/15/18 0700 98.5 °F (36.9 °C) 82 16 109/60 95 % 07/15/18 0630 - 79 15 - 96 % 07/15/18 0625 - - - 98/64 -  
07/15/18 0600 - 86 13 102/58 97 % 07/15/18 0530 - 80 14 - 96 % 07/15/18 0525 - - - 102/50 -  
07/15/18 0500 - 79 16 105/56 98 % 07/15/18 0430 - 82 25 - 98 % 07/15/18 0425 - - - 108/59 -  
07/15/18 0400 97.9 °F (36.6 °C) 80 19 114/64 95 % 07/15/18 0330 - 73 15 - 97 % 07/15/18 0325 - - - 109/65 -  
07/15/18 0300 - 78 15 114/57 95 % 07/15/18 0230 - 74 (!) 2 - 96 % 07/15/18 0225 - - - 102/62 -  
07/15/18 0200 - 76 16 111/64 95 % 07/15/18 0130 - 71 15 - 97 % 07/15/18 0125 - - - 105/57 -  
07/15/18 0100 - 74 12 109/67 97 % 07/15/18 0030 - 75 20 99/41 97 % 07/15/18 0000 97.5 °F (36.4 °C) 75 16 114/62 96 % 07/14/18 2355 - - - 114/62 -  
07/14/18 2330 - 75 15 96/66 95 % 07/14/18 2325 - - - 101/64 -  
07/14/18 2300 - 71 15 101/68 97 % 07/14/18 2255 - - - 112/71 -  
07/14/18 2230 - 70 13 - 100 % 07/14/18 2225 - - - 115/70 -  
07/14/18 2209 - 79 19 - 98 %  
07/14/18 2208 - 78 18 - 98 % 07/14/18 2207 - 74 19 - 98 % 07/14/18 2206 - 73 19 - 98 % 07/14/18 2205 - 73 19 - 99 % 07/14/18 2204 - 75 20 - 98 % 07/14/18 2203 - 73 19 - 98 % 07/14/18 2202 - 72 19 - 99 % 07/14/18 2201 - 72 21 - 100 % 07/14/18 2200 - 72 17 113/68 100 % 07/14/18 2159 - 74 19 - 100 % 07/14/18 2158 - 71 15 - 100 % 07/14/18 2157 - 73 19 - 99 % 07/14/18 2156 - 73 17 - 99 % 07/14/18 2155 - 72 18 116/70 99 % 07/14/18 2154 - 74 14 - 99 % 07/14/18 2153 - 75 17 - 99 % 07/14/18 2152 - 74 15 - 100 % 07/14/18 2151 - 77 18 - 99 % 07/14/18 2150 - 75 17 - 98 % 07/14/18 2149 - 74 19 - 99 % 07/14/18 2148 - 74 19 - 99 % 07/14/18 2147 - 74 18 - 99 % 07/14/18 2146 - 76 17 - 99 % 07/14/18 2145 - 75 19 - 99 % 07/14/18 2144 - 75 21 - 98 % 07/14/18 2143 - 75 21 - 98 % 07/14/18 2142 - 75 18 - 99 % 07/14/18 2141 - 75 15 - 100 % 07/14/18 2140 - 73 15 - 100 % 07/14/18 2139 - 76 19 - 100 % 07/14/18 2138 - 74 14 - 100 % 07/14/18 2137 - 76 17 - 100 % 07/14/18 2136 - 75 17 - 99 % 07/14/18 2135 - 74 19 - 99 % 07/14/18 2134 - 73 19 - 99 % 07/14/18 2133 - 73 17 116/61 100 % 07/14/18 2132 - 75 16 - 100 % 07/14/18 2131 - 75 15 - 100 % 07/14/18 2130 - 76 15 - 100 % 07/14/18 2129 - 77 18 - 100 % 07/14/18 2128 - 75 16 - 98 % 07/14/18 2127 - 76 17 - 100 % 07/14/18 2126 - 75 18 - 99 % 07/14/18 2125 - 75 16 116/61 98 % 07/14/18 2124 - 75 17 - 98 % 07/14/18 2123 - 76 15 - 100 % 07/14/18 2122 - 77 18 - 97 % 07/14/18 2121 - 76 17 - 98 % 07/14/18 2120 - 76 19 - 98 % 07/14/18 2119 - 77 18 - 98 % 07/14/18 2118 - 77 18 - 97 % 07/14/18 2117 - 76 18 - 97 % 07/14/18 2116 - 77 17 - 98 % 07/14/18 2115 - 76 20 - 97 % 07/14/18 2114 - 76 19 - 97 % 07/14/18 2113 - 75 18 - 98 % 07/14/18 2112 - 77 18 106/66 98 % 07/14/18 2111 - 75 17 - 98 % 07/14/18 2110 - 76 16 - 100 % 07/14/18 2109 - 78 17 - 100 % 07/14/18 2108 - 77 19 - 100 % 07/14/18 2107 - 77 18 - 100 % 07/14/18 2106 - 77 17 - 100 % 07/14/18 2105 - 76 19 - 100 % 07/14/18 2104 - 74 13 - 100 % 07/14/18 2103 - 78 14 - 100 % 07/14/18 2102 - 77 19 - 100 % 07/14/18 2101 - 77 15 - 100 % 07/14/18 2100 - 79 18 106/66 100 % 07/14/18 2059 - 76 14 - 99 % 07/14/18 2058 - 76 20 - 98 % 07/14/18 2057 - 76 15 - 98 % 07/14/18 2056 - 78 18 - 98 % 07/14/18 2055 - 77 19 - 99 % 07/14/18 2054 98.6 °F (37 °C) 76 19 - 99 % 07/14/18 1930 - 80 18 - 100 % 07/14/18 1915 - 77 15 - 100 % 07/14/18 1900 - 78 17 112/58 98 % 07/14/18 1845 - 84 18 104/63 100 % 07/14/18 1830 - 77 15 - 99 % 07/14/18 1815 - 79 13 107/62 98 % 07/14/18 1800 - 86 17 111/60 94 % 07/14/18 1745 - 84 15 105/63 96 % 07/14/18 1730 - 86 16 106/58 93 % 07/14/18 1715 - 85 16 108/54 96 % 07/14/18 1700 - 86 15 104/59 96 % 07/14/18 1645 - 86 16 109/57 95 % 07/14/18 1630 - 87 16 109/60 94 % 07/14/18 1615 - 89 15 118/66 100 % 07/14/18 1600 - 89 18 115/67 100 % 07/14/18 1545 - 90 14 102/49 100 % 07/14/18 1530 - 86 21 (!) 88/52 97 % 07/14/18 1515 - 92 15 103/63 100 % 07/14/18 1500 - 90 19 109/53 99 % 07/14/18 1445 - 94 17 127/52 99 % 07/14/18 1430 - 98 18 93/66 98 % 07/14/18 1415 - 98 18 110/55 99 % 07/14/18 1404 - - - 120/61 -  
07/14/18 1350 - 93 22 - 100 % 07/14/18 1345 - (!) 102 18 103/57 100 % 07/14/18 1330 - 93 18 121/71 100 % 07/14/18 1315 - (!) 103 15 135/77 100 % 07/14/18 1312 98.5 °F (36.9 °C) 96 20 120/78 100 % 07/14/18 1300 - 98 19 (!) 136/102 100 % 07/14/18 1250 - - - - 100 % Intake/Output Summary (Last 24 hours) at 07/15/18 0940 Last data filed at 07/15/18 8183 Gross per 24 hour Intake              100 ml Output             1200 ml Net            -1100 ml Physical Exam:  
General appearance: alert and oriented, NAD, WN 
Lungs: ctab Heart: rrr Abdomen: soft, +bs, no ttp Ext: feet and hands are warm to touch Neuro:  Speech normal, CN III-XII intact, upper and lower extremity sensation intact, 5/5 left side  strenght/biceps/triceps, plantarflexion/dorsiflexion. 4/5 right side  strenght/biceps/triceps, plantarflexion/dorsiflexion. Lab/Data Reviewed: CBC WITH AUTOMATED DIFF Collection Time: 07/15/18  3:00 AM  
Result Value Ref Range WBC 6.2 4.6 - 13.2 K/uL  
 RBC 4.85 4.20 - 5.30 M/uL  
 HGB 11.3 (L) 12.0 - 16.0 g/dL HCT 34.6 (L) 35.0 - 45.0 % MCV 71.3 (L) 74.0 - 97.0 FL  
 MCH 23.3 (L) 24.0 - 34.0 PG  
 MCHC 32.7 31.0 - 37.0 g/dL  
 RDW 14.3 11.6 - 14.5 % PLATELET 360 353 - 273 K/uL MPV 10.4 9.2 - 11.8 FL  
 NEUTROPHILS 38 (L) 40 - 73 % LYMPHOCYTES 51 21 - 52 % MONOCYTES 8 3 - 10 % EOSINOPHILS 2 0 - 5 % BASOPHILS 1 0 - 2 %  
 ABS. NEUTROPHILS 2.4 1.8 - 8.0 K/UL  
 ABS. LYMPHOCYTES 3.1 0.9 - 3.6 K/UL  
 ABS. MONOCYTES 0.5 0.05 - 1.2 K/UL  
 ABS. EOSINOPHILS 0.1 0.0 - 0.4 K/UL  
 ABS. BASOPHILS 0.1 0.0 - 0.1 K/UL  
 DF AUTOMATED PLATELET COMMENTS ADEQUATE PLATELETS    
 RBC COMMENTS MICROCYTOSIS 1+ 
    
 RBC COMMENTS HYPOCHROMIA 1+ BMP:  
Lab Results Component Value Date/Time  07/15/2018 03:00 AM  
 K 3.7 07/15/2018 03:00 AM  
  (H) 07/15/2018 03:00 AM  
 CO2 24 07/15/2018 03:00 AM  
 AGAP 8 07/15/2018 03:00 AM  
  (H) 07/15/2018 03:00 AM  
 BUN 12 07/15/2018 03:00 AM  
 CREA 1.04 07/15/2018 03:00 AM  
 GFRAA >60 07/15/2018 03:00 AM  
 GFRNA 55 (L) 07/15/2018 03:00 AM  
   
 
Scheduled Medications Reviewed: 
Current Facility-Administered Medications Medication Dose Route Frequency  sodium chloride (NS) flush 5 mL  5 mL IntraVENous Q8H  
 famotidine (PF) (PEPCID) 20 mg in sodium chloride 0.9% 10 mL injection  20 mg IntraVENous Q12H  
 budesonide-formoterol (SYMBICORT) 160-4.5 mcg/actuation HFA inhaler 2 Puff  2 Puff Inhalation BID RT  
 docusate sodium (COLACE) capsule 100 mg  100 mg Oral BID  DULoxetine (CYMBALTA) capsule 60 mg  60 mg Oral QHS  levothyroxine (SYNTHROID) tablet 50 mcg  50 mcg Oral ACB  atorvastatin (LIPITOR) tablet 80 mg  80 mg Oral QHS Imaging, microbiology, and EKG/Telemetry: 
Non-con Head CT 7/14- No acute intracranial hemorrhage or mass. 
  
No convincing large territory acute infarct. However there is questionable very 
mild loss of gray-white matter differentiation left lentiform nucleus region, 
may be exaggerated secondary to positioning. Given patient symptoms perhaps MR 
imaging may be helpful as clinically warranted. CTA Head Neck 7/14- Patent intracranial and extracranial cerebral circulation.] Assessment/Plan  
 
48 y.o. female with PMH  prior stroke and known PFO, hypothyroidism, HLD, s/p lumbar fusion, KISHOR on CPAP, now admitted with acute CVA s/p tPa.   
  
Acute CVA s/p tPA - Tolerated the dosage of tPA without any major complications. In-house neurology was consulted in ER by Ashley Mosqueda to ICU per post tPA protocol 
-repeat CT head w/o contrast 24 hours s/p tPA 
-MRI/MRA Brain and neck 
-U/S carotid arteries 
-Stat CT for any acute neurologic changes or bleeding 
-Daily CBC and BMP 
-Switch statin to Atorvastatin 80mg 
-PT/OT/ Speech therapy consults 
- HOB @ 30 degrees 
-Supplemental O2 prn 
-Permissive HTN but <180/105 
-Labetalol prn if BP elevated 
-repeat TTE 
  
H/o Patent foramen ovale -  
-repeat TTE  
  
Hypothyroidism - TSH 4/2018 was 1.14 
- Recheck TSH 
-Continue synthroid and adjust dosage if necessary 
  
HLD -  
-recheck lipid panel in AM 
-Switch to higher intensity statin 
  
KISHOR on CPAP 
-CPAP qhs settings per RT/Pulm 
  
Migraines -  
-Plan to hold Topamax 
  
Lower extremity swelling - minimal on exam 
- plan to hold furosemide for now 
  
  
Diet: Cardiac DVT Prophylaxis: SCD Code Status: FULL Point of Contact:  
  Name Relation Home Work Mobile  
Yissel Mother [phone]  
Teresa Bean Daughter [phone]      
Yissel Parent [phone]     Ramiro Montanez Son     [phone]  
  
  
  
Disposition and anticipated LOS: 2-3 midnights Braulio Ledezma MD 
07/15/18    9:40 AM

## 2018-07-15 NOTE — PROGRESS NOTES
Problem: Mobility Impaired (Adult and Pediatric)  Goal: *Acute Goals and Plan of Care (Insert Text)  Physical Therapy Goals  Initiated 7/15/2018 and to be accomplished within 7 day(s)  1. Patient will move from supine to sit and sit to supine  in bed with modified independence. 2.  Patient will transfer from bed to chair and chair to bed with supervision/set-up using the least restrictive device. 3.  Patient will perform sit to stand with supervision/set-up. 4.  Patient will ambulate with supervision/set-up for 150 feet with the least restrictive device. 5.  Patient will ascend/descend 3 stairs with 1 handrail(s) with minimal assistance/contact guard assist.  physical Therapy EVALUATION    Patient: Aston Khanna (48 y.o. female)  Date: 7/15/2018  Primary Diagnosis: Acute ischemic stroke (HCC)  Acute ischemic stroke ([facility] [address]        Precautions: fall       ASSESSMENT :  Based on the objective data described below, the patient presents with decreased strength and balance and increased pain resulting in decreased independence with functional mobility. Patients biggest complaint during evaluation was pain in her right LE which she says is pre-existing from her back. Pt transferred supine to sit with SBA and increased time. Pt demonstrated good static sitting balance at the edge of the bed. Pt stood with min A and demonstrated full body tremmoring which she stated was caused by pain in her right leg. Pt was returned to sitting and educated and using her UEs to keep some weight off her her right leg. Pt stood again with min A and was able to sidestep 3 feet along the edged of the bed with RW, min A and short shuffling steps. Pt was returned to supine in bed with SBA and increased time. Pt was left with needs in reach and nurse and family present. Patient will benefit from skilled intervention to address the above impairments.   Patients rehabilitation potential is considered to be Good  Factors which may influence rehabilitation potential include:   []         None noted  []         Mental ability/status  [x]         Medical condition  []         Home/family situation and support systems  []         Safety awareness  []         Pain tolerance/management  []         Other:      PLAN :  Recommendations and Planned Interventions:  [x]           Bed Mobility Training             [x]    Neuromuscular Re-Education  [x]           Transfer Training                   []    Orthotic/Prosthetic Training  [x]           Gait Training                          []    Modalities  [x]           Therapeutic Exercises          []    Edema Management/Control  [x]           Therapeutic Activities            [x]    Patient and Family Training/Education  []           Other (comment):    Frequency/Duration: Patient will be followed by physical therapy 1-2 times per day/4-7 days per week to address goals. Discharge Recommendations: Home Health versus rehab pending progress  Further Equipment Recommendations for Discharge: N/A     G-CODES      Mobility  Current  CJ= 20-39%   Goal  CI= 1-19%. The severity rating is based on the Level of Assistance required for Functional Mobility and ADLs. Eval Complexity: History: MEDIUM  Complexity : 1-2 comorbidities / personal factors will impact the outcome/ POC Exam:MEDIUM Complexity : 3 Standardized tests and measures addressing body structure, function, activity limitation and / or participation in recreation  Presentation: MEDIUM Complexity : Evolving with changing characteristics  Clinical Decision Making:Medium Complexity , Overall Complexity:MEDIUM    SUBJECTIVE:   Patient stated My leg really hurts.     OBJECTIVE DATA SUMMARY:     Past Medical History:   Diagnosis Date    Asthma     Back pain with radiation     Cardiac echocardiogram, [facility] study 12/29/2016    EF 55-60%. No WMA. Right to left atrial septal shunt suggests PFO. Similar to study of 10/18/16.     Cardiovascular LE venous duplex 10/18/2016    No DVT bilaterally.  Contact dermatitis and other eczema, due to unspecified cause     CTS (carpal tunnel syndrome)     HSV-2 (herpes simplex virus 2) infection     Hypercholesterolemia     Hypothyroidism     Ill-defined condition     Vertigo    L4-L5 disc bulge     Leg swelling     Migraines     Positive PPD, treated     S/P lumbar fusion 1/13/2016    1. L4-5 bilateral hemilaminotomy, medial facetectomy, foraminotomy, diskectomy L4-5. 2. Transforaminal lumbar interbody fusion with PEEK cage and demineralized bone graft L4-L5 posterolateral fusion. 3. Segmental spinal instrumentation, DePuy Expedium type L4-L5. 01/13/2016 By Dr. Pam Aguilar     EMG '17 , mild sciatic EMG findings    Stroke [facility])     12/2016    Vertigo      Past Surgical History:   Procedure Laterality Date    HX GYN      partial hysterectomy due to abnormal pap    HX LUMBAR LAMINECTOMY  01-13-16    L4/5     Prior Level of Function/Home Situation: patient ambulated without an assistive device but has a rw and rollator at home  [address]: Private residence  One/Two Story Residence: Two story  # of Interior Steps: 12  Living Alone: No  Support Systems: Child(lj)  Patient Expects to be Discharged to[de-identified] Private residence  Current DME Used/Available at Home: CPAP  Critical Behavior:  Neurologic State: Alert  Orientation Level: Oriented X4  Cognition: Appropriate decision making; Appropriate for age attention/concentration; Appropriate safety awareness  Safety/Judgement: Awareness of environment  Strength:    Strength:  (left LE 5/5, R LE 3+/5)   Tone & Sensation:   Tone: Normal       Range Of Motion:  AROM: Within functional limits (pain with R LE movement)   Functional Mobility:  Bed Mobility:  Supine to Sit: Stand-by assistance; Additional time  Sit to Supine: Stand-by assistance; Additional time     Transfers:  Sit to Stand: Minimum assistance  Stand to Sit: Minimum assistance  Balance:   Sitting: Intact  Standing: With support  Standing - Static:  (fair-)  Standing - Dynamic :  (fair-/poor+)  Ambulation/Gait Training:  Distance (ft): 3 Feet (ft) (sidestepping alopng edge of bed)  Assistive Device: Walker, rolling  Ambulation - Level of Assistance: Minimal assistance  Gait Abnormalities: Antalgic; Shuffling gait  Therapeutic Exercises: Ankle pumps, heel slides, LAQ  Pain:  Pt reports 8/10 pain or discomfort prior to treatment.    Pt reports 8/10 pain or discomfort post treatment. Activity Tolerance:   Fair-  Please refer to the flowsheet for vital signs taken during this treatment. After treatment:   []         Patient left in no apparent distress sitting up in chair  [x]         Patient left in no apparent distress in bed  [x]         Call bell left within reach  [x]         Nursing notified  [x]         Caregiver present  []         Bed alarm activated    COMMUNICATION/EDUCATION:   [x]         Fall prevention education was provided and the patient/caregiver indicated understanding. [x]         Patient/family have participated as able in goal setting and plan of care. [x]         Patient/family agree to work toward stated goals and plan of care. []         Patient understands intent and goals of therapy, but is neutral about his/her participation. []         Patient is unable to participate in goal setting and plan of care.   Educated patient on safety with transfers and calling for assistance with mobility    Thank you for this referral.  Karma Espinal, PT   Time Calculation: 23 mins

## 2018-07-15 NOTE — ROUTINE PROCESS
Bedside and Verbal shift change report given to c dressler rn (oncoming nurse) by Cena Sicard, RN (offgoing nurse). Report included the following information SBAR, Kardex, MAR and Recent Results. SITUATION:    Code Status: Full Code   Reason for Admission: Acute ischemic stroke ([facility] [address]   Acute ischemic stroke [facility])    [facility] day: 1   Problem List:       Hospital Problems  Date Reviewed: 6/25/2018          Codes Class Noted POA    * (Principal)Acute ischemic stroke ([facility] [address] ICD-10-CM: I63.9  ICD-9-CM: 434.91  7/14/2018 Unknown        Migraines ICD-10-CM: S69.087  ICD-9-CM: 346.90  Unknown Yes        Leg swelling ICD-10-CM: M79.89  ICD-9-CM: 729.81  Unknown Yes        Hypothyroidism ICD-10-CM: E03.9  ICD-9-CM: 244.9  Unknown Yes        Asthma ICD-10-CM: J45.909  ICD-9-CM: 493.90  5/6/2010 Yes        Hyperlipidemia ICD-10-CM: E78.5  ICD-9-CM: 272.4  5/6/2010 Yes              BACKGROUND:    Past Medical History:   Past Medical History:   Diagnosis Date    Asthma     Back pain with radiation     Cardiac echocardiogram, [facility] study 12/29/2016    EF 55-60%. No WMA. Right to left atrial septal shunt suggests PFO. Similar to study of 10/18/16.  Cardiovascular LE venous duplex 10/18/2016    No DVT bilaterally.  Contact dermatitis and other eczema, due to unspecified cause     CTS (carpal tunnel syndrome)     HSV-2 (herpes simplex virus 2) infection     Hypercholesterolemia     Hypothyroidism     Ill-defined condition     Vertigo    L4-L5 disc bulge     Leg swelling     Migraines     Positive PPD, treated     S/P lumbar fusion 1/13/2016    1. L4-5 bilateral hemilaminotomy, medial facetectomy, foraminotomy, diskectomy L4-5. 2. Transforaminal lumbar interbody fusion with PEEK cage and demineralized bone graft L4-L5 posterolateral fusion.  3. Segmental spinal instrumentation, DePuy Expedium type L4-L5. 01/13/2016 By Dr. Cyr Members Sciatica     EMG '17 , mild sciatic EMG findings    Stroke ([facility] [address] 12/2016    Vertigo          Patient taking anticoagulants yes     ASSESSMENT:    Changes in Assessment Throughout Shift: improvement facial & rt arm& leg     Patient has Central Line: no Reasons if yes: .  Patient has Saha Cath: no Reasons if yes: .  Last Vitals:     Vitals:    07/15/18 0630 07/15/18 0700 07/15/18 0730 07/15/18 0732   BP:  109/60  109/67   Pulse: 79 82 84    Resp: 15 16 17    Temp:       SpO2: 96% 95% 96%    Weight:            IV and DRAINS (will only show if present)   Peripheral IV 07/14/18 Left Antecubital-Site Assessment: Clean, dry, & intact  Peripheral IV 07/14/18 Right Antecubital-Site Assessment: Clean, dry, & intact     WOUND (if present)   Wound Type:  none   Dressing present     Wound Concerns/Notes:  none     PAIN    Pain Assessment    Pain Intensity 1: 0 (07/15/18 0400)              Patient Stated Pain Goal: 0  o Interventions for Pain:  none  o Intervention effective: .  o Time of last intervention: .   o Reassessment Completed: .  Last 3 Weights:  Last 3 Recorded Weights in this Encounter    07/14/18 1312 07/15/18 0241   Weight: 113.1 kg (249 lb 5 oz) 112 kg (246 lb 14.6 oz)     Weight change:      INTAKE/OUPUT    Current Shift:      Last three shifts: 07/13 1901 - 07/15 0700  In: 100 [P.O.:100]  Out: 1200 [Urine:1200]     LAB RESULTS     Recent Labs      07/15/18   0300  07/14/18   1240   WBC  6.2  9.9   HGB  11.3*  11.9*   HCT  34.6*  36.5   PLT  300  333        Recent Labs      07/15/18   0300  07/14/18   1240   NA  141  139   K  3.7  4.0   GLU  108*  130*   BUN  12  13   CREA  1.04  1.21   CA  8.7  8.9   INR  1.1  1.0       RECOMMENDATIONS AND DISCHARGE PLANNING     1. Pending tests/procedures/ Plan of Care or Other Needs: ct mri mra echo doppler     2. Discharge plan for patient and Needs/Barriers: home    3. Estimated Discharge Date: pending- soon Posted on Whiteboard in Patients Room: yes      4.  The patient's care plan was reviewed with the oncoming nurse.       \"HEALS\" SAFETY CHECK      Fall Risk    Total Score: 3    Safety Measures: Safety Measures: Bed/Chair-Wheels locked, Bed in low position, Call light within reach    A safety check occurred in the patient's room between off going nurse and oncoming nurse listed above. The safety check included the below items  Area Items   H  High Alert Medications - Verify all high alert medication drips (heparin, PCA, etc.)   E  Equipment - Suction is set up for ALL patients (with gabriela)  - Red plugs utilized for all equipment (IV pumps, etc.)  - WOWs wiped down at end of shift.  - Room stocked with oxygen, suction, and other unit-specific supplies   A  Alarms - Bed alarm is set for fall risk patients  - Ensure chair alarm is in place and activated if patient is up in a chair   L  Lines - Check IV for any infiltration  - Saha bag is empty if patient has a Saha   - Tubing and IV bags are labeled   S  Safety   - Room is clean, patient is clean, and equipment is clean. - Hallways are clear from equipment besides carts. - Fall bracelet on for fall risk patients  - Ensure room is clear and free of clutter  - Suction is set up for ALL patients (with gabriela)  - Hallways are clear from equipment besides carts.    - Isolation precautions followed, supplies available outside room, sign posted     Luly Muñiz RN

## 2018-07-15 NOTE — ROUTINE PROCESS
Stroke Education provided to patient and the following topics were discussed    1. Patients personal risk factors for stroke are hyperlipidemia, obesity, sleep apnea screen and prior stroke    2. Warning signs of Stroke:        * Sudden numbness or weakness of the face, arm or leg, especially on one side of          The body            * Sudden confusion, trouble speaking or understanding        * Sudden trouble seeing in one or both eyes        * Sudden trouble walking, dizziness, loss of balance or coordination        * Sudden severe headache with no known cause      3. Importance of activation Emergency Medical Services ( 9-1-1 ) immediately if experience any warning signs of stroke. 4. Be sure and schedule a follow-up appointment with your primary care doctor or any specialists as instructed. 5. You must take medicine every day to treat your risk factors for stroke. Be sure to take your medicines exactly as your doctor tells you: no more, no less. Know what your medicines are for , what they do. Anti-thrombotics /anticoagulants can help prevent strokes. You are taking the following medicine(s)  statin for cholesterol @ present,any others to be reviewed prior to discharge when ordered for home. 6.  Smoking and second-hand smoke greatly increase your risk of stroke, cardiovascular disease and death. Smoking history never    7. Information provided was Stroke Handouts    8. Documentation of teaching completed in Patient Education Activity and on Care Plan with teaching response noted?   yes

## 2018-07-15 NOTE — CONSULTS
Yulia Barfield is a 48 y.o., right handed female, with an established history of patent foramen ovale, hypothyroidism, hyperlipidemia, prior stroke, who presents with slurred speech facial droop and right-sided weakness. Apparently the patient was going about her business yesterday morning when after dropping her family off at the [facility] around [phone] she just did not feel right. She developed facial droop on the right side and had difficulty expressing herself. She may have had a little bit of right-sided weakness but that resolved pretty promptly. Was at this point that she was brought to the emergency room where she was evaluated promptly and the decision was made to deliver alteplase to her. Since that time she says she has had complete resolution of her symptoms and feels back to her baseline. Please note that I am familiar with this patient from her prior admission for stroke about 2 years ago. I have also seen her for some migraine symptoms. Social History; patient is  lives with her family. Denies alcohol tobacco use. Family History; mother has diabetes hyperlipidemia and hypertension. Father  from pancreatic cancer. Siblings have diabetes and hypertension and her daughter also has hypertension diabetes.     Current Facility-Administered Medications   Medication Dose Route Frequency Provider Last Rate Last Dose    sodium chloride (NS) flush 5 mL  5 mL IntraVENous Q8H Mat Zamora MD   5 mL at 07/15/18 0951    sodium chloride (NS) flush 5 mL  5 mL IntraVENous PRN Mat Zamora MD        labetalol (NORMODYNE;TRANDATE) 20 mg/4 mL (5 mg/mL) injection 10 mg  10 mg IntraVENous Q10MIN PRN Mat Zamora MD        albuterol (PROVENTIL VENTOLIN) nebulizer solution 2.5 mg  2.5 mg Nebulization Q4H PRN Neno Ahuja MD        famotidine (PF) (PEPCID) 20 mg in sodium chloride 0.9% 10 mL injection  20 mg IntraVENous Q12H Neno Ahuja MD   20 mg at 07/15/18 0950    budesonide-formoterol (SYMBICORT) 160-4.5 mcg/actuation HFA inhaler 2 Puff  2 Puff Inhalation BID RT Mercedes Savage, DO   2 Puff at 07/15/18 9777    docusate sodium (COLACE) capsule 100 mg  100 mg Oral BID Mercedes Savage, DO   100 mg at 07/15/18 5919    DULoxetine (CYMBALTA) capsule 60 mg  60 mg Oral QHS Mercedes Savage, DO   60 mg at 07/14/18 2148    levothyroxine (SYNTHROID) tablet 50 mcg  50 mcg Oral ACB Mercedes Savage, DO   50 mcg at 07/15/18 0950    atorvastatin (LIPITOR) tablet 80 mg  80 mg Oral QHS Mercedes Savage, DO   80 mg at 07/14/18 2147       Past Medical History:   Diagnosis Date    Asthma     Back pain with radiation     Cardiac echocardiogram, [facility] study 12/29/2016    EF 55-60%. No WMA. Right to left atrial septal shunt suggests PFO. Similar to study of 10/18/16.  Cardiovascular LE venous duplex 10/18/2016    No DVT bilaterally.  Contact dermatitis and other eczema, due to unspecified cause     CTS (carpal tunnel syndrome)     HSV-2 (herpes simplex virus 2) infection     Hypercholesterolemia     Hypothyroidism     Ill-defined condition     Vertigo    L4-L5 disc bulge     Leg swelling     Migraines     Positive PPD, treated     S/P lumbar fusion 1/13/2016    1. L4-5 bilateral hemilaminotomy, medial facetectomy, foraminotomy, diskectomy L4-5. 2. Transforaminal lumbar interbody fusion with PEEK cage and demineralized bone graft L4-L5 posterolateral fusion.  3. Segmental spinal instrumentation, DePuy Expedium type L4-L5. 01/13/2016 By Dr. Demi George     EMG '17 , mild sciatic EMG findings    Stroke [facility])     12/2016    Vertigo        Past Surgical History:   Procedure Laterality Date    HX GYN      partial hysterectomy due to abnormal pap    HX LUMBAR LAMINECTOMY  01-13-16    L4/5       Allergies   Allergen Reactions    Skelaxin [Metaxalone] Other (comments)     jittery    Tramadol Other (comments)     Halluctations       Patient Active Problem List   Diagnosis Code  Asthma J45.909    Hyperlipidemia E78.5    Eczema L30.9    Back pain with radiation M54.9    Spinal stenosis, lumbar region, without neurogenic claudication M48.061    Lumbar stenosis M48.061    S/P lumbar fusion Z98.1    Acute right-sided low back pain with sciatica M54.40    Neurological symptoms R29.90    Ischemic stroke (HCC) I63.9    Migraines G43.909    Leg swelling M79.89    Hypothyroidism E03.9    CTS (carpal tunnel syndrome) G56.00    Hypercholesterolemia E78.00    Ill-defined condition R69    Sciatica M54.30    Sciatic neuropathy G57.00    Chronic midline low back pain with right-sided sciatica M54.41, G89.29    Encounter for long-term (current) use of high-risk medication Z79.899    Chronic pain syndrome G89.4    History of lumbar surgery Z98.890    Protrusion of lumbar intervertebral disc M51.26    Acute ischemic stroke (HCC) I63.9         Review of Systems:   As above otherwise 11 point review of systems negative including;   Constitutional no fever or chills  Skin denies rash or itching  HENT  Denies tinnitus, hearing lose  Eyes denies diplopia vision lose  Respiratory denies shortness of breath  Cardiovascular denies chest pain, dyspnea on exertion  Gastrointestinal denies nausea, vomiting, diarrhea, constipation  Genitourinary denies incontinence  Musculoskeletal denies joint pain or swelling  Endocrine denies weight change  Hematology denies easy bruising or bleeding   Neurological as above in HPI      PHYSICAL EXAMINATION:      VITAL SIGNS:    Visit Vitals    BP 93/59    Pulse 89    Temp 98.4 °F (36.9 °C)    Resp 17    Wt 112 kg (246 lb 14.6 oz)    SpO2 96%    BMI 39.85 kg/m2       GENERAL: The patient is well developed, well nourished, and in no apparent distress. EXTREMITIES: No clubbing, cyanosis, or edema is identified. Pulses 2+ and symmetrical.  Muscle tone is normal.  HEAD:   Ear, nose, and throat appear to be without trauma.   The patient is normocephalic. NEUROLOGIC EXAMINATION    MENTAL STATUS: The patient is awake, alert, and oriented x 4. Fund of knowledge is adequate. Speech is fluent and memory appears to be intact, both long and short term. CRANIAL NERVES: II  Visual fields are full to confrontation. Funduscopic examination reveals flat disks bilaterally. Pupils are both 3 mm and briskly reactive to light and accommodation. III, IV, VI  Extraocular movements are intact and there is no nystagmus. V  Facial sensation is intact to pinprick and light touch. VII  Face is symmetrical.   VIII - Hearing is present. IX, X, [address] rises symmetrically. Gag is present. Tongue is in the midline. XI - Shoulder shrugging and head turning intact  MOTOR:  The patient is 5/5 in all four limbs without any drift. Fine finger movements are symmetrical.  Isolated motor group testing reveals no focal abnormalities. Tone is normal.  Sensory examination is intact to pinprick, light touch and position sense testing. Reflexes are 2+ and symmetrical. Plantars are down going. Cerebellar examination reveals no gross ataxia or dysmetria. Gait is not tested at this time, she is en route to have her repeat CAT scan. Final result (Exam End: 7/14/2018 12:45 PM) Open    Study Result   INDICATION: Delayed speech     COMPARISON: 12/16     TECHNIQUE: Unenhanced CT of the head was performed using 5 mm images. Brain and  bone windows were generated. All CT scans at this facility are performed using  doze optimization technique as appropriate to a performed exam, to include  automated exposure control, adjustment of the mA and/or KV according to patient  size (including appropriate matching for site specific examinations), or use of  iterative reconstruction technique.      FINDINGS:     The ventricles and sulci are normal in size, shape and configuration and  midline.  Questionable very mild loss of gray-white matter differentiation left  lentiform nucleus region, may be exaggerated secondary to positioning. There is  no intracranial hemorrhage, extra-axial collection, mass, mass effect or midline  shift. The basilar cisterns are open. No large territory acute infarct is  identified. Please note that MR is more sensitive for an acute infarct in the  first 24-48 hours The bone windows demonstrate no acute abnormalities. The  visualized portions of the paranasal sinuses and mastoid air cells are clear.     IMPRESSION  IMPRESSION:      No acute intracranial hemorrhage or mass.     No convincing large territory acute infarct. However there is questionable very  mild loss of gray-white matter differentiation left lentiform nucleus region,  may be exaggerated secondary to positioning. Given patient symptoms perhaps MR  imaging may be helpful as clinically warranted. Final result (Exam End: 7/14/2018  2:02 PM) Open    Study Result   CT ANGIOGRAPHY NECK AND HEAD     HISTORY: Stroke like symptoms. Facial drooping, right-sided weakness, speech  difficulty     COMPARISONS: Head CT performed at the same time.     TECHNIQUE: Following the uneventful administration of 100 cc Optiray-350  intravenous contrast, thin section 1.25 mm axial images were obtained from the  upper mediastinum through the vertex. Images were post processed off-line with  MPR and VR segmented reformations of the intracranial and extracranial  circulation which were created and sent to PACS for permanent archives. Additionally, thick overlapping sagittal, coronal and axial images were  reformatted as well as 5 mm thick section images through the data set for review  of soft tissues. Source images and reformatted images were reviewed.     FINDINGS:      CT SOFT TISSUE FINDINGS:     The ventricles are normal in size, shape and configuration for the patient's  stated age. No mass effect. No evidence of extra axial fluid collection.  No  enhancing abnormalities of the brain parenchyma, leptomeninges or dura. Mildly  enlarged upper cervical lymph nodes, uncertain etiology. Mild mucosal thickening  right maxillary antrum. [] Visualized portions of the upper lungs demonstrate no  acute abnormalities. Degenerative changes of the cervical spine without critical  central canal stenosis. Several periapical lucencies surrounding molar teeth. No  acute inflammatory change.     CT NECK VASCULAR FINDINGS:     [The thoracic aorta is normal in caliber. Conventional arch anatomy of the great  vessels.     Innominate: Patent.     Right carotid: The common carotid artery is widely patent. Medial  retropharyngeal course of the common carotid artery. Widely patent carotid  bifurcation. Cervical right internal carotid arteries normal in course and  caliber and widely patent to the skull base. External carotid trunk is patent.     Left carotid: The left common carotid artery is widely patent to the carotid  bifurcation. Medial retropharyngeal course of the distal common carotid artery  and carotid bifurcation. Cervical left internal carotid arteries otherwise  normal in course and caliber and widely patent to the skull base. External  carotid trunk are patent.     Bilateral vertebral artery origins and cervical segments are normal in course  and caliber and widely patent to the skull base.     Bilateral subclavian arteries are widely patent.]     CT INTRACRANIAL VASCULAR FINDINGS:     [The distal cervical, petrous, cavernous and supraclinoid segments of the  internal carotid arteries are widely patent. Widely patent bilateral anterior  and middle cerebral arteries. In the posterior circulation, the vertebral  arteries are balanced. Basilar trunk and basilar terminus are patent. Bilateral  posterior cerebral arteries and superior cerebellar arteries are patent.   Posterior communicating arteries are patent.]      IMPRESSION  IMPRESSION:     [Patent intracranial and extracranial cerebral circulation.]         I have reviewed the above imagines myself. CBC:   Lab Results   Component Value Date/Time    WBC 6.2 07/15/2018 03:00 AM    RBC 4.85 07/15/2018 03:00 AM    HGB 11.3 (L) 07/15/2018 03:00 AM    HCT 34.6 (L) 07/15/2018 03:00 AM    PLATELET 924 39/69/8074 03:00 AM     BMP:   Lab Results   Component Value Date/Time    Glucose 108 (H) 07/15/2018 03:00 AM    Sodium 141 07/15/2018 03:00 AM    Potassium 3.7 07/15/2018 03:00 AM    Chloride 109 (H) 07/15/2018 03:00 AM    CO2 24 07/15/2018 03:00 AM    BUN 12 07/15/2018 03:00 AM    Creatinine 1.04 07/15/2018 03:00 AM    Calcium 8.7 07/15/2018 03:00 AM     CMP:   Lab Results   Component Value Date/Time    Glucose 108 (H) 07/15/2018 03:00 AM    Sodium 141 07/15/2018 03:00 AM    Potassium 3.7 07/15/2018 03:00 AM    Chloride 109 (H) 07/15/2018 03:00 AM    CO2 24 07/15/2018 03:00 AM    BUN 12 07/15/2018 03:00 AM    Creatinine 1.04 07/15/2018 03:00 AM    Calcium 8.7 07/15/2018 03:00 AM    Anion gap 8 07/15/2018 03:00 AM    BUN/Creatinine ratio 12 07/15/2018 03:00 AM    Alk. phosphatase 117 07/23/2017 10:18 AM    Protein, total 7.9 07/23/2017 10:18 AM    Albumin 3.3 (L) 07/23/2017 10:18 AM    Globulin 4.6 (H) 07/23/2017 10:18 AM    A-G Ratio 0.7 (L) 07/23/2017 10:18 AM     Coagulation:   Lab Results   Component Value Date/Time    Prothrombin time 13.8 07/15/2018 03:00 AM    INR 1.1 07/15/2018 03:00 AM    aPTT 32.0 07/14/2018 12:40 PM     Cardiac markers:   Lab Results   Component Value Date/Time     (H) 12/28/2016 08:55 PM    CK-MB Index CANNOT BE CALCULATED 12/28/2016 08:55 PM          Impression: Left hemispheric ischemic event, possible stroke versus TIA, in this patient who has risk factors including history of stroke, possible complicated migraine, hypertension. The most recent events seems like it completely resolved especially after being given TPA. She is currently in good shape and seems to have had resolution of her symptoms. Was just a TIA or small stroke?   We will see what the MRI scan shows. Plan: Depending upon the results of that test further testing will be arranged. Currently full stroke protocols were initiated. She will be placed on a statin agent and also get antiplatelet therapy to be initiated after the CAT scan is read out to make sure she does not have a bleed. Will follow closely with you. PLEASE NOTE:   This document has been produced using voice recognition software. Unrecognized errors in transcription may be present.

## 2018-07-16 ENCOUNTER — TELEPHONE (OUTPATIENT)
Dept: NEUROLOGY | Age: 53
End: 2018-07-16

## 2018-07-16 ENCOUNTER — HOME HEALTH ADMISSION (OUTPATIENT)
Dept: HOME HEALTH SERVICES | Facility: HOME HEALTH | Age: 53
End: 2018-07-16

## 2018-07-16 ENCOUNTER — APPOINTMENT (OUTPATIENT)
Dept: NON INVASIVE DIAGNOSTICS | Age: 53
DRG: 062 | End: 2018-07-16
Attending: FAMILY MEDICINE
Payer: SELF-PAY

## 2018-07-16 VITALS
WEIGHT: 235 LBS | HEART RATE: 85 BPM | SYSTOLIC BLOOD PRESSURE: 102 MMHG | TEMPERATURE: 98.7 F | DIASTOLIC BLOOD PRESSURE: 70 MMHG | HEIGHT: 65 IN | RESPIRATION RATE: 20 BRPM | OXYGEN SATURATION: 98 % | BODY MASS INDEX: 39.15 KG/M2

## 2018-07-16 LAB
ANION GAP SERPL CALC-SCNC: 8 MMOL/L (ref 3–18)
BASOPHILS # BLD: 0 K/UL (ref 0–0.1)
BASOPHILS NFR BLD: 0 % (ref 0–2)
BUN SERPL-MCNC: 12 MG/DL (ref 7–18)
BUN/CREAT SERPL: 11 (ref 12–20)
CALCIUM SERPL-MCNC: 8.6 MG/DL (ref 8.5–10.1)
CHLORIDE SERPL-SCNC: 110 MMOL/L (ref 100–108)
CO2 SERPL-SCNC: 23 MMOL/L (ref 21–32)
CREAT SERPL-MCNC: 1.07 MG/DL (ref 0.6–1.3)
DIFFERENTIAL METHOD BLD: ABNORMAL
ECHO IVC SNIFF: 2.07 CM
ECHO LA AREA 4C: 18 CM2
ECHO LA VOL 2C: 55.43 ML (ref 22–52)
ECHO LA VOL 4C: 45.09 ML (ref 22–52)
ECHO LA VOLUME INDEX A2C: 26.17 ML/M2
ECHO LA VOLUME INDEX A4C: 21.28 ML/M2
ECHO LV INTERNAL DIMENSION DIASTOLIC: 4.07 CM (ref 3.9–5.3)
ECHO LV INTERNAL DIMENSION SYSTOLIC: 2.67 CM
ECHO LV IVSD: 0.99 CM (ref 0.6–0.9)
ECHO LV MASS 2D: 146.8 G (ref 67–162)
ECHO LV MASS INDEX 2D: 69.3 G/M2
ECHO LV POSTERIOR WALL DIASTOLIC: 0.99 CM (ref 0.6–0.9)
ECHO LVOT DIAM: 2 CM
ECHO LVOT PEAK GRADIENT: 4.7 MMHG
ECHO LVOT PEAK VELOCITY: 107.88 CM/S
ECHO LVOT VTI: 23.27 CM
ECHO MV A VELOCITY: 59.71 CM/S
ECHO MV E DECELERATION TIME (DT): 226.5 MS
ECHO MV E VELOCITY: 0.84 CM/S
ECHO MV E/A RATIO: 1.4
EOSINOPHIL # BLD: 0.2 K/UL (ref 0–0.4)
EOSINOPHIL NFR BLD: 2 % (ref 0–5)
ERYTHROCYTE [DISTWIDTH] IN BLOOD BY AUTOMATED COUNT: 14.5 % (ref 11.6–14.5)
GLUCOSE BLD STRIP.AUTO-MCNC: 114 MG/DL (ref 70–110)
GLUCOSE SERPL-MCNC: 108 MG/DL (ref 74–99)
HCT VFR BLD AUTO: 35.1 % (ref 35–45)
HGB BLD-MCNC: 10.9 G/DL (ref 12–16)
LYMPHOCYTES # BLD: 4.8 K/UL (ref 0.9–3.6)
LYMPHOCYTES NFR BLD: 51 % (ref 21–52)
MCH RBC QN AUTO: 23.1 PG (ref 24–34)
MCHC RBC AUTO-ENTMCNC: 31.1 G/DL (ref 31–37)
MCV RBC AUTO: 74.4 FL (ref 74–97)
MONOCYTES # BLD: 0.8 K/UL (ref 0.05–1.2)
MONOCYTES NFR BLD: 9 % (ref 3–10)
NEUTS SEG # BLD: 3.5 K/UL (ref 1.8–8)
NEUTS SEG NFR BLD: 38 % (ref 40–73)
PLATELET # BLD AUTO: 285 K/UL (ref 135–420)
PMV BLD AUTO: 10.2 FL (ref 9.2–11.8)
POTASSIUM SERPL-SCNC: 3.6 MMOL/L (ref 3.5–5.5)
RBC # BLD AUTO: 4.72 M/UL (ref 4.2–5.3)
SODIUM SERPL-SCNC: 141 MMOL/L (ref 136–145)
WBC # BLD AUTO: 9.3 K/UL (ref 4.6–13.2)

## 2018-07-16 PROCEDURE — 74011250637 HC RX REV CODE- 250/637: Performed by: FAMILY MEDICINE

## 2018-07-16 PROCEDURE — 97116 GAIT TRAINING THERAPY: CPT

## 2018-07-16 PROCEDURE — 36415 COLL VENOUS BLD VENIPUNCTURE: CPT

## 2018-07-16 PROCEDURE — 74011250637 HC RX REV CODE- 250/637: Performed by: PSYCHIATRY & NEUROLOGY

## 2018-07-16 PROCEDURE — 85025 COMPLETE CBC W/AUTO DIFF WBC: CPT

## 2018-07-16 PROCEDURE — 97110 THERAPEUTIC EXERCISES: CPT

## 2018-07-16 PROCEDURE — 93306 TTE W/DOPPLER COMPLETE: CPT

## 2018-07-16 PROCEDURE — 74011000250 HC RX REV CODE- 250: Performed by: INTERNAL MEDICINE

## 2018-07-16 PROCEDURE — 82962 GLUCOSE BLOOD TEST: CPT

## 2018-07-16 PROCEDURE — 80048 BASIC METABOLIC PNL TOTAL CA: CPT

## 2018-07-16 PROCEDURE — 97166 OT EVAL MOD COMPLEX 45 MIN: CPT

## 2018-07-16 PROCEDURE — 74011250637 HC RX REV CODE- 250/637: Performed by: STUDENT IN AN ORGANIZED HEALTH CARE EDUCATION/TRAINING PROGRAM

## 2018-07-16 RX ORDER — ASPIRIN 325 MG
325 TABLET ORAL DAILY
Status: DISCONTINUED | OUTPATIENT
Start: 2018-07-16 | End: 2018-07-16 | Stop reason: HOSPADM

## 2018-07-16 RX ORDER — ASPIRIN 325 MG
325 TABLET ORAL DAILY
Qty: 30 TAB | Refills: 0 | Status: SHIPPED | OUTPATIENT
Start: 2018-07-17 | End: 2021-07-20

## 2018-07-16 RX ORDER — ATORVASTATIN CALCIUM 80 MG/1
80 TABLET, FILM COATED ORAL
Qty: 30 TAB | Refills: 0 | Status: SHIPPED | OUTPATIENT
Start: 2018-07-16

## 2018-07-16 RX ADMIN — FAMOTIDINE 20 MG: 10 INJECTION, SOLUTION INTRAVENOUS at 09:30

## 2018-07-16 RX ADMIN — Medication 5 ML: at 08:02

## 2018-07-16 RX ADMIN — DOCUSATE SODIUM 100 MG: 100 CAPSULE, LIQUID FILLED ORAL at 09:30

## 2018-07-16 RX ADMIN — Medication 5 ML: at 14:14

## 2018-07-16 RX ADMIN — LEVOTHYROXINE SODIUM 50 MCG: 50 TABLET ORAL at 06:46

## 2018-07-16 RX ADMIN — Medication: at 16:11

## 2018-07-16 RX ADMIN — Medication: at 12:30

## 2018-07-16 RX ADMIN — ASPIRIN 325 MG ORAL TABLET 325 MG: 325 PILL ORAL at 09:30

## 2018-07-16 NOTE — HOME CARE
[address] received referral for SN/PT/OT/MSW - discharge orders noted. Address and contact number verified with the patient. Per the patient, she lives with her daughter, Kitty Rivera and has a front wheeled walker, seated walker and CPAP at home. Referral called to KAITLIN Mallory LPN intake nurse, for completion and visit scheduling - HESHAM Montgomery LPN

## 2018-07-16 NOTE — PROGRESS NOTES
Intern Progress Note [address] Patient: Winston Leavitt MRN: [id number]  CSN: [id number] YOB: 1965  Age: 48 y.o. Sex: female DOA: 7/14/2018 LOS:  LOS: 2 days   PCP: Kayla Doran MD  
             
Subjective:  
 
Acute events: no acute events overnight. Patient says she has a headache she has had since she came in and has history of migraines with eye pain. Otherwise she reports feeling same as yesterday. Brief ROS: Denies CP, SOB, abdominal pain, she is urinating and moving bowels without difficulty Positive for HA, right sideded weakness Objective:  
  
Patient Vitals for the past 24 hrs: 
 Temp Pulse Resp BP SpO2  
07/16/18 0400 98.9 °F (37.2 °C) 80 20 113/73 96 % 07/16/18 0202 98.2 °F (36.8 °C) 76 16 120/73 97 % 07/16/18 0000 98.9 °F (37.2 °C) 80 15 117/80 100 % 07/15/18 2300 - 82 17 105/60 96 % 07/15/18 2200 - 78 20 117/71 99 % 07/15/18 2100 - 83 22 118/64 99 % 07/15/18 2000 98.9 °F (37.2 °C) 85 16 109/70 -  
07/15/18 1900 - 89 18 113/74 -  
07/15/18 1506 98.9 °F (37.2 °C) - - - -  
07/15/18 1330 - 89 17 93/59 96 % 07/15/18 1300 - 85 17 105/61 100 % 07/15/18 1230 - 89 18 121/71 98 % 07/15/18 1200 - 86 16 104/50 95 % 07/15/18 1130 - 83 16 103/68 97 % 07/15/18 1105 98.4 °F (36.9 °C) - - - -  
07/15/18 1100 - 84 16 115/70 99 % 07/15/18 1035 - - - 96/59 -  
07/15/18 1034 - 82 19 96/59 96 % 07/15/18 1030 - 81 17 - 96 % 07/15/18 1000 - 80 20 104/60 100 % 07/15/18 0930 - 83 20 111/60 98 % 07/15/18 0900 - 80 16 111/62 98 % 07/15/18 0830 - 83 17 114/64 98 % 07/15/18 0800 - 79 16 103/47 97 % 07/15/18 0732 - - - 109/67 -  
07/15/18 0730 - 84 17 - 96 % Physical Exam:  
General: Patient was sleeping when I saw her this AM, in NAD but reports headache that she says she has had Cardiovascular: RRR w/o MRGs Respiratory: CTAB no rales, rhonchi, wheezes Abdomen: Soft, +BS, non-tendeder, Non-Distended Extremities: no pitting edema appreciated, dorsalis pedis pulses intact bilaterally Neuro: RUE and RLE 4/5 strength, 5/5 on left. Sensation intact in bilaterally with some tingling on her right upper extremity. Cranial nerves grossly intact, R pupil questionable less brisk response to light Skin: Negative for lesions, ulcers, rashes Lab/Data Reviewed: 
BMP:  
Lab Results Component Value Date/Time  07/16/2018 12:31 AM  
 K 3.6 07/16/2018 12:31 AM  
  (H) 07/16/2018 12:31 AM  
 CO2 23 07/16/2018 12:31 AM  
 AGAP 8 07/16/2018 12:31 AM  
  (H) 07/16/2018 12:31 AM  
 BUN 12 07/16/2018 12:31 AM  
 CREA 1.07 07/16/2018 12:31 AM  
 GFRAA >60 07/16/2018 12:31 AM  
 GFRNA 54 (L) 07/16/2018 12:31 AM  
 
CMP:  
Lab Results Component Value Date/Time  07/16/2018 12:31 AM  
 K 3.6 07/16/2018 12:31 AM  
  (H) 07/16/2018 12:31 AM  
 CO2 23 07/16/2018 12:31 AM  
 AGAP 8 07/16/2018 12:31 AM  
  (H) 07/16/2018 12:31 AM  
 BUN 12 07/16/2018 12:31 AM  
 CREA 1.07 07/16/2018 12:31 AM  
 GFRAA >60 07/16/2018 12:31 AM  
 GFRNA 54 (L) 07/16/2018 12:31 AM  
 CA 8.6 07/16/2018 12:31 AM  
 
CBC:  
Lab Results Component Value Date/Time WBC 9.3 07/16/2018 12:31 AM  
 HGB 10.9 (L) 07/16/2018 12:31 AM  
 HCT 35.1 07/16/2018 12:31 AM  
  07/16/2018 12:31 AM  
  
 
CBC w/Diff Recent Labs  
   07/16/18 
 0031  07/15/18 
 0300  07/14/18 
 1240 WBC  9.3  6.2  9.9  
RBC  4.72  4.85  5.12  
HGB  10.9*  11.3*  11.9*  
HCT  35.1  34.6*  36.5 PLT  285  300  333 GRANS  38*  38*  32* LYMPH  51  51  56* EOS  2  2  1 Chemistry Recent Labs  
   07/16/18 
 0644  07/16/18 
 0031   07/15/18 
 0300   07/14/18 
 1240 GLUCPOC  114*   --    < >   --    < >  144* GLU   --   108*   --   108*   --   130* NA   --   141   --   141   --   139 K   --   3.6   --   3.7   --   4.0  
CL   --   110*   --   109*   --   106 CO2   --   23   --   24   --   26 BUN   --   12   --   12   --   13  
CREA   -- 1.07   --   1.04   --   1.21  
CA   --   8.6   --   8.7   --   8.9 AGAP   --   8   --   8   --   7  
BUCR   --   11*   --   12   --   11*  
 < > = values in this interval not displayed. Microbiology Recent Labs  
   07/15/18 
 0300 CULT  MRSA target DNA is not detected (presumptive not colonized with MRSA) Recent Labs  
   07/15/18 
 0300 CULT  MRSA target DNA is not detected (presumptive not colonized with MRSA) I/O Intake/Output Summary (Last 24 hours) at 07/16/18 2070 Last data filed at 07/15/18 1200 Gross per 24 hour Intake                0 ml Output              500 ml Net             -500 ml Scheduled Medications Reviewed: 
Current Facility-Administered Medications Medication Dose Route Frequency  sodium chloride (NS) flush 5 mL  5 mL IntraVENous Q8H  
 famotidine (PF) (PEPCID) 20 mg in sodium chloride 0.9% 10 mL injection  20 mg IntraVENous Q12H  
 budesonide-formoterol (SYMBICORT) 160-4.5 mcg/actuation HFA inhaler 2 Puff  2 Puff Inhalation BID RT  
 docusate sodium (COLACE) capsule 100 mg  100 mg Oral BID  DULoxetine (CYMBALTA) capsule 60 mg  60 mg Oral QHS  levothyroxine (SYNTHROID) tablet 50 mcg  50 mcg Oral ACB  atorvastatin (LIPITOR) tablet 80 mg  80 mg Oral QHS  
 
 
EKG 7/14/18: NSR, non-specific T wave abnormality Assessment/Plan  
 
48 y.o. female with PMH prior stroke and known PFO, hypothyroidism, HLD, s/p lumbar fusion, KISHOR on CPAP, now admitted with acute CVA s/p tPa.   
 Acute CVA s/p tPA - Patient and family good historians about when symptoms began. Tolerated the dosage of tPA without any major complications. Tele-neuro recommendations reviewed. In-house neurology was consulted in ER by Jinny. Repeat head CT yesterday was wnl. MRI brain pending results.  Strength in 4/5 in RUE and RLE.    
- Atorvastatin 80mg 
- Neuro to consider anti-platelet  
-FU U/S carotid arteries 
-Stat CT for any acute neurologic changes or bleeding 
-Daily CBC and BMP 
 -PT/OT/ Speech therapy consults 
- HOB @ 30 degrees 
-Supplemental O2 prn 
-Permissive HTN but <180/105 
-Labetalol prn if BP elevated - FU TTE results  
  
H/o Patent foramen ovale -  
-FU TTE  
  
Hypothyroidism - TSH 2018 was 1.14 
- TSH 1.12 
-Continue synthroid and adjust dosage if necessary 
  
HLD - T, Choleserol 162, HDL 42, LDL 84.8  
- continue atorvastatin 80  
  
KISHOR on CPAP 
-CPAP qhs settings per RT/Pulm 
  
Migraines -  
-Plan to hold Topamax 
  
Lower extremity swelling - minimal on exam 
- plan to hold furosemide for now 
  
  
Diet: Cardiac DVT Prophylaxis: SCD Code Status: FULL Point of Contact:  
  Name Relation Home Work  [address] Mother [phone]  
Randy Villavicencio Daughter [phone]      
Herbert Alonso Parent [phone]      
Richard Jules     [phone]  
  
  
  
Disposition and anticipated LOS: 2-3 midnights Ascencion Cho MD PGY-1 
2018, 7:06 AM

## 2018-07-16 NOTE — PROGRESS NOTES
All discharge work done from nursing end. Pt awaiting medications and home health referral. Pt's ride is here. Pt and family deny further questions or needs.

## 2018-07-16 NOTE — PROGRESS NOTES
Reason for Admission:   STROKE                   RRAT Score:    8                 Plan for utilizing home health:    ORDERED                      Likelihood of Readmission:  LOW                         Transition of Care Plan:  [address] Management Interventions  PCP Verified by CM: Yes (DR. ALTON TAVERA)  Palliative Care Criteria Met (RRAT>21 & CHF Dx)?: No  Mode of Transport at Discharge: Other (see comment) (FAMILY)  Transition of Care Consult (CM Consult): Home Health, Discharge Chilango Hernandez  [address] [address]: Yes  Physical Therapy Consult: Yes  Occupational Therapy Consult: Yes  Current Support Network: [place], Family Lives Nearby  Confirm Follow Up Transport: Family  Plan discussed with Pt/Family/Caregiver: Yes ([address]  [place] of Choice Offered: Yes  [address] Provided?: No  Discharge Location  Discharge Placement: Home with home health     Provided this pt with indigent medications voucher for [address]. Pt reports that she has a right home. Pt referred to [facility] for services.

## 2018-07-16 NOTE — DISCHARGE INSTRUCTIONS
DISCHARGE SUMMARY from Nurse    PATIENT INSTRUCTIONS:    Report the following to your doctor or call 911    · Excessive pain not relieved by pain meds. · Any sign or symptom of worsening stroke. · Temperature over 100.5  · Nausea and vomiting lasting longer than 4 hours or if unable to take medications  · Any signs of decreased circulation or nerve impairment to extremity: change in color, persistent  numbness, tingling, coldness or increase pain  · Any questions    What to do at Home:  Recommended activity: Activity as tolerated. *  Please give a list of your current medications to your Primary Care Provider. *  Please update this list whenever your medications are discontinued, doses are      changed, or new medications (including over-the-counter products) are added. *  Please carry medication information at all times in case of emergency situations. These are general instructions for a healthy lifestyle:    No smoking/ No tobacco products/ Avoid exposure to second hand smoke  Surgeon General's Warning:  Quitting smoking now greatly reduces serious risk to your health. Obesity, smoking, and sedentary lifestyle greatly increases your risk for illness    A healthy diet, regular physical exercise & weight monitoring are important for maintaining a healthy lifestyle    You may be retaining fluid if you have a history of heart failure or if you experience any of the following symptoms:  Weight gain of 3 pounds or more overnight or 5 pounds in a week, increased swelling in our hands or feet or shortness of breath while lying flat in bed. Please call your doctor as soon as you notice any of these symptoms; do not wait until your next office visit.     Recognize signs and symptoms of STROKE:    F-face looks uneven    A-arms unable to move or move unevenly    S-speech slurred or non-existent    T-time-call 911 as soon as signs and symptoms begin-DO NOT go       Back to bed or wait to see if you get better-TIME IS BRAIN. Warning Signs of HEART ATTACK     Call 911 if you have these symptoms:   Chest discomfort. Most heart attacks involve discomfort in the center of the chest that lasts more than a few minutes, or that goes away and comes back. It can feel like uncomfortable pressure, squeezing, fullness, or pain.  Discomfort in other areas of the upper body. Symptoms can include pain or discomfort in one or both arms, the back, neck, jaw, or stomach.  Shortness of breath with or without chest discomfort.  Other signs may include breaking out in a cold sweat, nausea, or lightheadedness. Don't wait more than five minutes to call 911 - MINUTES MATTER! Fast action can save your life. Calling 911 is almost always the fastest way to get lifesaving treatment. Emergency Medical Services staff can begin treatment when they arrive -- up to an hour sooner than if someone gets to the hospital by car. The discharge information has been reviewed with the patient. The patient verbalized understanding. Discharge medications reviewed with the patient and appropriate educational materials and side effects teaching were provided. Patient armband removed and shredded  ___________________________________________________________________________________________________________________________________     ACE Inhibitors: Care Instructions  Your Care Instructions    ACE (angiotensin-converting enzyme) inhibitors lower blood pressure. They also treat heart failure and prevent heart attacks and strokes. They block an enzyme that makes blood vessels narrow. As a result, the blood vessels relax and widen. This lowers blood pressure. These medicines also put more water and salt into the urine. This lowers blood pressure too. These medicines are a good choice for people with diabetes. They don't affect blood sugar levels, and they may protect the kidneys. Examples include:  · Benazepril (Lotensin).   · Lisinopril (Prinivil, Zestril). · Ramipril (Altace). Before you start taking this medicine, make sure your doctor knows if you take other medicines, especially water pills (diuretics) or potassium tablets. And tell your doctor if you use a salt substitute. You should not take an ACE inhibitor if you are pregnant or planning to become pregnant. You may need regular blood tests. Follow-up care is a key part of your treatment and safety. Be sure to make and go to all appointments, and call your doctor if you are having problems. It's also a good idea to know your test results and keep a list of the medicines you take. How can you care for yourself at home? · Take your medicines exactly as prescribed. Be sure to take high blood pressure medicine every day. Since high blood pressure often has no symptoms, it is easy to forget to take the pills. Call your doctor if you think you are having a problem with your medicine. · ACE inhibitors can cause a dry cough. If the cough is bad, talk to your doctor. You may need to try a different medicine. · ACE inhibitors can cause an allergic reaction of the skin. Symptoms may range from mild swelling to painful welts. Severe swelling can make it hard to breathe, but this is very rare. · Check with your doctor or pharmacist before you use any other medicines. This includes over-the-counter medicines. Make sure your doctor knows all of the medicines, vitamins, herbal products, and supplements you take. Taking some medicines together can cause problems. When should you call for help? Call your doctor now or seek immediate medical care if:    · You develop a new cough.     · You think you are having problems with your medicine.    Watch closely for changes in your health, and be sure to contact your doctor if you have any problems. Where can you learn more? Go to http://leesa-jean carlos.info/.   Enter X352 in the search box to learn more about \"ACE Inhibitors: Care Instructions. \"  Current as of: December 6, 2017  Content Version: 11.7  © 7330-9439 [employer]. Care instructions adapted under license by [employer] (which disclaims liability or warranty for this information). If you have questions about a medical condition or this instruction, always ask your healthcare professional. [address] any warranty or liability for your use of this information. Learning About Asthma Triggers  What are asthma triggers? When you have asthma, certain things can make your symptoms worse. These are called triggers. Learn what triggers an asthma attack for you, and avoid the triggers when you can. Common triggers include colds, smoke, air pollution, dust, pollen, pets, stress, and cold air. How do asthma triggers affect you? Triggers can make it harder for your lungs to work as they should. They can lead to sudden breathing problems and other symptoms. When you are around a trigger, an asthma attack is more likely. If your symptoms are severe, you may need emergency treatment or have to go to the hospital for treatment. What can you do to avoid triggers? The first thing is to know your triggers. When you are having symptoms, note the things around you that might be causing them. Then look for patterns that may be triggering your symptoms. Record your triggers on a piece of paper or in an asthma diary. When you have your list of possible triggers, work with your doctor to find ways to avoid them. Avoid colds and flu. Get a pneumococcal vaccine shot. If you have had one before, ask your doctor whether you need a second dose. Get a flu vaccine every year, as soon as it's available. If you must be around people with colds or the flu, wash your hands often. Here are some ways to avoid a few common triggers. · Do not smoke or allow others to smoke around you.  If you need help quitting, talk to your doctor about stop-smoking programs and medicines. These can increase your chances of quitting for good. · If there is a lot of pollution, pollen, or dust outside, stay at home and keep your windows closed. Use an air conditioner or air filter in your home. Check your local weather report or newspaper for air quality and pollen reports. What else should you know? · Take your controller medicine every day, not just when you have symptoms. It helps prevent problems before they occur. · Your doctor may suggest that you check how well your lungs are working by measuring your peak expiratory flow (PEF) throughout the day. Your PEF may drop when you are near things that trigger symptoms. Where can you learn more? Go to http://leesa-jean carlos.info/. Enter V631 in the search box to learn more about \"Learning About Asthma Triggers. \"  Current as of: December 6, 2017  Content Version: 11.7  © 1119-7287 [employer]. Care instructions adapted under license by [employer] (which disclaims liability or warranty for this information). If you have questions about a medical condition or this instruction, always ask your healthcare professional. [address] any warranty or liability for your use of this information.

## 2018-07-16 NOTE — PROGRESS NOTES
Problem: Mobility Impaired (Adult and Pediatric)  Goal: *Acute Goals and Plan of Care (Insert Text)  Physical Therapy Goals  Initiated 7/15/2018 and to be accomplished within 7 day(s)  1. Patient will move from supine to sit and sit to supine  in bed with modified independence. 2.  Patient will transfer from bed to chair and chair to bed with supervision/set-up using the least restrictive device. 3.  Patient will perform sit to stand with supervision/set-up. 4.  Patient will ambulate with supervision/set-up for 150 feet with the least restrictive device. 5.  Patient will ascend/descend 3 stairs with 1 handrail(s) with minimal assistance/contact guard assist.   Outcome: Progressing Towards Goal  physical Therapy TREATMENT    Patient: Polly Kulkarni (48 y.o. female)  Date: 7/16/2018  Diagnosis: Acute ischemic stroke (HCC)  Acute ischemic stroke ([facility] [address] Acute ischemic stroke [facility])       Precautions:     Chart, physical therapy assessment, plan of care and goals were reviewed. OBJECTIVE/ ASSESSMENT:  Patient found semi reclined in bed willing to work with PT. Pt transferred supine to EOB with SBA. Pt stood to RW with CGA and ambulated 3ft to b/s chair. Pt then agreed to additional gait training and stood from b/s chair to RW and ambulated 90ft CGA into hallway. Pt demonstrated a narrow SUZI, step to gait pattern, and decreased shonna. Pt returned to room and performed seated therex (see below). Pt was left seated in b/s chair with all needs in reach, family present, and nursing notified. Education: therex, gait, transfers, bed mobility, activity pacing  Progression toward goals:  [x]      Improving appropriately and progressing toward goals  []      Improving slowly and progressing toward goals  []      Not making progress toward goals and plan of care will be adjusted     PLAN:  Patient continues to benefit from skilled intervention to address the above impairments.   Continue treatment per established plan of care. Discharge Recommendations:  Home Health with supervision vs SNF   Further Equipment Recommendations for Discharge:  rolling walker     SUBJECTIVE:   Patient stated I feel good     OBJECTIVE DATA SUMMARY:   Critical Behavior:  Neurologic State: Alert  Orientation Level: Oriented X4  Cognition: Appropriate decision making, Follows commands  Safety/Judgement: Awareness of environment  Functional Mobility Training:  Bed Mobility:  Supine to Sit: Stand-by assistance  Transfers:  Sit to Stand: Contact guard assistance  Stand to Sit: Stand-by assistance  Balance:  Sitting: Intact  Standing: Impaired; With support  Standing - Static: Fair  Standing - Dynamic : Fair  Ambulation/Gait Training:  Distance (ft): 90 Feet (ft)  Assistive Device: Walker, rolling  Ambulation - Level of Assistance: Contact guard assistance  Gait Abnormalities: Step to gait  Base of Support: Center of gravity altered;Narrowed  Speed/Cielo: Pace decreased (<100 feet/min)  Step Length: Left shortened;Right shortened  Therapeutic Exercises:       EXERCISE   Sets   Reps   Active Active Assist   Passive Self- assited ROM   Comments   Ankle Pumps 1 10  [x] [] [] [] bilaterally   Quad Sets   [] [] [] []    Glut Sets   [] [] [] []    Short Arc Quads   [] [] [] []    Heel Slides   [] [] [] []    Straight Leg Raises   [] [] [] []    Hip adduction with pillow 1 10 [x] [] [] [] bilaterally   Long Arc Quads 1 10 [x] [] [] [] bilaterally   Seated Marching   [] [] [] []    Seated Knee Flexion   [] [] [] []    Standing Marching   [] [] [] []      Pain:  Pre tx pain: 6/10   Post tx pain: not rated  Activity Tolerance:   fair  Please refer to the flowsheet for vital signs taken during this treatment.   After treatment:   [x] Patient left in no apparent distress sitting up in chair  [] Patient left in no apparent distress in bed  [x] Call bell left within reach  [x] Nursing notified  [x] family present  [] Bed alarm activated  [x] SCDs applied  [] Ice applied      Michi Yang, PTA   Time Calculation: 31 mins    Mobility  Current  CI= 1-19%. The severity rating is based on the Level of Assistance required for Functional Mobility and ADLs. Mobility   Goal  CI= 1-19%. The severity rating is based on the Level of Assistance required for Functional Mobility and ADLs.

## 2018-07-16 NOTE — PROGRESS NOTES
7/16/2018 2:50 PM 
 
SSN: [id number] Subjective:   Pt remains weak on the right side. There is documentation of right sided weakness since a stroke back in December 2016. Has chronic neck pain, currently has some pain right cervical area. MRI of the brain and CT were normal for acute ischemia, CTA showed no significant vascular anomalies. Medications:   
Current Facility-Administered Medications Medication Dose Route Frequency Provider Last Rate Last Dose  aspirin (ASPIRIN) tablet 325 mg  325 mg Oral DAILY Gilberto Francis MD   325 mg at 07/16/18 4343  
 methyl salicylate-menthol (BENGAY) 15-10 % cream   Topical TID Sherin Gomez MD      
 acetaminophen (TYLENOL) tablet 650 mg  650 mg Oral Q6H PRN Shelly Carrillo PA-C      
 sodium chloride (NS) flush 5 mL  5 mL IntraVENous Q8H Jackie Chavarria MD   5 mL at 07/16/18 1414  sodium chloride (NS) flush 5 mL  5 mL IntraVENous PRN Jackie Chavarria MD      
 labetalol (NORMODYNE;TRANDATE) 20 mg/4 mL (5 mg/mL) injection 10 mg  10 mg IntraVENous Q10MIN PRN Jackie Chavarria MD      
 albuterol (PROVENTIL VENTOLIN) nebulizer solution 2.5 mg  2.5 mg Nebulization Q4H PRN Michaelle Hugo MD      
 famotidine (PF) (PEPCID) 20 mg in sodium chloride 0.9% 10 mL injection  20 mg IntraVENous Q12H Michaelle Hugo MD   20 mg at 07/16/18 0930  budesonide-formoterol (SYMBICORT) 160-4.5 mcg/actuation HFA inhaler 2 Puff  2 Puff Inhalation BID RT Ana Guerrero DO   2 Puff at 07/15/18 1442  docusate sodium (COLACE) capsule 100 mg  100 mg Oral BID Ana Guerrero, DO   100 mg at 07/16/18 0930  DULoxetine (CYMBALTA) capsule 60 mg  60 mg Oral QHS Ana Guerrero DO   60 mg at 07/15/18 2144  levothyroxine (SYNTHROID) tablet 50 mcg  50 mcg Oral ACB Ana Guerrero, DO   50 mcg at 07/16/18 0646  
 atorvastatin (LIPITOR) tablet 80 mg  80 mg Oral QHS Ana Guerrero, DO   80 mg at 07/15/18 2144 Vital signs:   
Visit Vitals  /73 (BP 1 Location: Right arm, BP Patient Position: At rest)  Pulse 84  Temp 98.4 °F (36.9 °C)  Resp 21  
 Ht 5' 5\" (1.651 m)  Wt 106.6 kg (235 lb)  SpO2 97%  BMI 39.11 kg/m2 Review of Systems:  
GENERAL: Denies fever or fatigue CARDIAC: No CP or SOB PULMONARY: No cough of SOB MUSCULOSKELETAL: No new joint pain NEURO: SEE HPI Patient Active Problem List  
Diagnosis Code  Asthma J45.909  Hyperlipidemia E78.5  Eczema L30.9  Back pain with radiation M54.9  Spinal stenosis, lumbar region, without neurogenic claudication M48.061  Lumbar stenosis M48.061  
 S/P lumbar fusion Z98.1  Acute right-sided low back pain with sciatica M54.40  Neurological symptoms R29.90  
 Ischemic stroke (HCC) I63.9  Migraines Q27.526  Leg swelling M79.89  
 Hypothyroidism E03.9  CTS (carpal tunnel syndrome) G56.00  
 Hypercholesterolemia E78.00  Ill-defined condition R69  Sciatica M54.30  Sciatic neuropathy G57.00  Chronic midline low back pain with right-sided sciatica M54.41, G89.29  
 Encounter for long-term (current) use of high-risk medication Z79.899  Chronic pain syndrome G89.4  History of lumbar surgery Z98.890  
 Protrusion of lumbar intervertebral disc M51.26  
 Acute ischemic stroke (HCC) I63.9 EXAM: Alert, in NAD. Heart is regular. Oriented x3, EOM's are full, PERRL, mild right facial droop, 4/5 right HG with mild RUE drift. Tone normal. Gait not tested. DTR's +2. Recent Results (from the past 24 hour(s)) GLUCOSE, POC Collection Time: 07/15/18 10:02 PM  
Result Value Ref Range Glucose (POC) 123 (H) 70 - 110 mg/dL CBC WITH AUTOMATED DIFF Collection Time: 07/16/18 12:31 AM  
Result Value Ref Range WBC 9.3 4.6 - 13.2 K/uL  
 RBC 4.72 4.20 - 5.30 M/uL  
 HGB 10.9 (L) 12.0 - 16.0 g/dL HCT 35.1 35.0 - 45.0 %  MCV 74.4 74.0 - 97.0 FL  
 MCH 23.1 (L) 24.0 - 34.0 PG  
 MCHC 31.1 31.0 - 37.0 g/dL  
 RDW 14.5 11.6 - 14.5 % PLATELET 455 278 - 794 K/uL MPV 10.2 9.2 - 11.8 FL  
 NEUTROPHILS 38 (L) 40 - 73 % LYMPHOCYTES 51 21 - 52 % MONOCYTES 9 3 - 10 % EOSINOPHILS 2 0 - 5 % BASOPHILS 0 0 - 2 %  
 ABS. NEUTROPHILS 3.5 1.8 - 8.0 K/UL  
 ABS. LYMPHOCYTES 4.8 (H) 0.9 - 3.6 K/UL  
 ABS. MONOCYTES 0.8 0.05 - 1.2 K/UL  
 ABS. EOSINOPHILS 0.2 0.0 - 0.4 K/UL  
 ABS. BASOPHILS 0.0 0.0 - 0.1 K/UL  
 DF AUTOMATED METABOLIC PANEL, BASIC Collection Time: 07/16/18 12:31 AM  
Result Value Ref Range Sodium 141 136 - 145 mmol/L Potassium 3.6 3.5 - 5.5 mmol/L Chloride 110 (H) 100 - 108 mmol/L  
 CO2 23 21 - 32 mmol/L Anion gap 8 3.0 - 18 mmol/L Glucose 108 (H) 74 - 99 mg/dL BUN 12 7.0 - 18 MG/DL Creatinine 1.07 0.6 - 1.3 MG/DL  
 BUN/Creatinine ratio 11 (L) 12 - 20 GFR est AA >60 >60 ml/min/1.73m2 GFR est non-AA 54 (L) >60 ml/min/1.73m2 Calcium 8.6 8.5 - 10.1 MG/DL  
GLUCOSE, POC Collection Time: 07/16/18  6:44 AM  
Result Value Ref Range Glucose (POC) 114 (H) 70 - 110 mg/dL ECHO ADULT COMPLETE Collection Time: 07/16/18 11:34 AM  
Result Value Ref Range LVIDd 4.07 3.9 - 5.3 cm  
 LVPWd 0.99 (A) 0.6 - 0.9 cm LVIDs 2.67 cm IVSd 0.99 (A) 0.6 - 0.9 cm  
 LVOT d 2.00 cm LVOT Peak Velocity 107.88 cm/s LVOT Peak Gradient 4.7 mmHg LVOT VTI 23.27 cm  
 MV A Delbert 59.71 cm/s  
 MV E Delbert 0.84 cm/s  
 MV E/A 1.4 Inferior Vena Cava Diameter Sniffing 2.07 cm  
 LA Vol 4C 45.09 22 - 52 mL  
 LA Vol 2C 55.43 (A) 22 - 52 mL  
 LA Area 4C 18.0 cm2 LV Mass .8 67 - 162 g  
 LV Mass AL Index 58.3 g/m2 Mitral Valve E Wave Deceleration Time 226.5 ms  
 LA Vol Index 26.17 ml/m2 LA Vol Index 21.28 ml/m2 Assessment/Plan: Right sided weakness, subjectively is questionably different by patient, but the weakness is mild, described since stroke Dec 2016, and had normal MRI and CT of the head, as well as CTA.  She may have had a complicated migraine on top of her old stroke, but objectively there is no evidence of a new neuro insult. Agree with current therapy with statin, asa. No further inpatient recommendations, pt ought to f/u as an outpatient with Dr. Wu Nazario. Will signoff. PLEASE NOTE:  
Portions of this document may have been produced using voice recognition software. Unrecognized errors in transcription may be present.

## 2018-07-16 NOTE — TELEPHONE ENCOUNTER
Suzi Stover form [facility] called to consult with provider to discuss patient taking Topamax. Suzishruthi Stover stated p/t is taking medication at home, but it is not listed on medications. Patient would like to discuss with provider on re-administering medication. P/t hasn't been seen 8/2017. Please advise.

## 2018-07-16 NOTE — PROGRESS NOTES
CDMP Query    Patient is being managed for mild persistent asthma.     Yolande Melgar MD  7/16/2018    11:37 AM

## 2018-07-16 NOTE — PROGRESS NOTES
Problem: Self Care Deficits Care Plan (Adult)  Goal: *Acute Goals and Plan of Care (Insert Text)  Occupational Therapy Goals  Initiated 7/16/2018 within 7 day(s). 1.  Patient will perform grooming tasks while standing with supervision for balance. 2.  Patient will perform lower body dressing with supervision and minimal verbal cues for activity pacing. 3.  Patient will perform functional task in standing for 8 minutes with supervision for safety and fair+ dynamic standing balance in prep for ADLs. 4.  Patient will perform toilet transfers with supervision. 5.  Patient will perform all aspects of toileting with supervision. 6.  Patient will participate in upper extremity therapeutic exercise/activities with supervision/set-up for 8 minutes to increase RUE strength for functional transfers & ADLs. 7.  Patient will utilize energy conservation techniques during functional activities with minimal verbal cues. Outcome: Progressing Towards Goal  [facility]  Occupational THERAPY: Initial Assessment   INPATIENT: Self-pay: Hospital Day: 3     Patient: Sharyn Hatchet (48 y.o. female)    Date: 7/16/2018  Primary Diagnosis: Acute ischemic stroke (HCC)  Acute ischemic stroke (HCC)    ,  ,   Precautions: Falls  PLOF: Pt was independent with basic self care tasks and functional mobility PTA. Has RW but does not use. ASSESSMENT:   Based on the objective data described below, the patient presents with impairments with regard to bed mobility, activity tolerance and independence in ADLs due to back pain and R side weakness. Pt up in chair on arrival, family at bedside. Pt demo's approx 3+/5 strength in RUE, full AROM/strength of LUE. Min A & additional time for LB dressing utilizing modified technique & L hand. Pt reports independence with ADLs & functional mobility PTA. Pt reports requiring assistance with ADLs during hospital stay from family members.  Min A for functional transfer; pt reporting increased back & RLE pain; deferred further tx. Recommend [place] upon d/c as pt lives with dtr who can assist with all needs. Recommend continued therapy. Recommendations for the next treatment session: ADLs at sink  Ms. Zhou will benefit from skilled intervention to address the above impairments. Her rehabilitation potential is considered to be Good. EDUCATION     Education:  Patient was educated on the following topics:   Barriers to Learning/Limitations: None  Compensate with: visual, verbal, tactile, kinesthetic cues/model     PLAN OF CARE:  Problems:  Decreased strength affecting function, Decreased ADL/functioning of activities and Decreased transfer abilities    Recommendations and Planned Interventions:  [x]                  Self Care Training                   [x]         Therapeutic Activities  [x]                  Functional Mobility Training    []          Cognitive Retraining  [x]                  Therapeutic Exercises            [x]          Endurance Activities  [x]                  Balance Training                     []          Neuromuscular Re-ed   []                  Visual/Perceptual Training      [x]       Home Safety Training  [x]                  Patient Education                    [x]          Family Training/Education  []                  Other (comment):    Frequency/Duration: Patient will be followed by occupational therapy 3-5 times a week to address goals. Discharge Recommendations: Home Health (pt lives with dtr)    Further Equipment Recommendations for Discharge: bedside commodeSUBJECTIVE:  Patient stated: \"I need help with all of that now\" (referring to ADLs)    OBJECTIVE/TREATMENT:     Past Medical History:   Diagnosis Date    Asthma     Back pain with radiation     Cardiac echocardiogram, [facility] study 12/29/2016    EF 55-60%. No WMA. Right to left atrial septal shunt suggests PFO. Similar to study of 10/18/16.     Cardiovascular LE venous duplex 10/18/2016    No DVT bilaterally.  Contact dermatitis and other eczema, due to unspecified cause     CTS (carpal tunnel syndrome)     HSV-2 (herpes simplex virus 2) infection     Hypercholesterolemia     Hypothyroidism     Ill-defined condition     Vertigo    L4-L5 disc bulge     Leg swelling     Migraines     Positive PPD, treated     S/P lumbar fusion 1/13/2016    1. L4-5 bilateral hemilaminotomy, medial facetectomy, foraminotomy, diskectomy L4-5. 2. Transforaminal lumbar interbody fusion with PEEK cage and demineralized bone graft L4-L5 posterolateral fusion. 3. Segmental spinal instrumentation, DePuy Expedium type L4-L5. 01/13/2016 By Dr. Guerda Ryan     EMG '17 , mild sciatic EMG findings    Stroke [facility])     12/2016    Vertigo      Past Surgical History:   Procedure Laterality Date    HX GYN      partial hysterectomy due to abnormal pap    HX LUMBAR LAMINECTOMY  01-13-16    L4/5      Eval Complexity: History: MEDIUM Complexity : Expanded review of history including physical, cognitive and psychosocial  history ; Examination: MEDIUM Complexity : 3-5 performance deficits relating to physical, cognitive , or psychosocial skils that result in activity limitations and / or participation restrictions; Decision Making:MEDIUM Complexity : Patient may present with comorbidities that affect occupational performnce. Miniml to moderate modification of tasks or assistance (eg, physical or verbal ) with assesment(s) is necessary to enable patient to complete evaluation     G CODES: Self Care  Current  CJ= 20-39%   Goal  CI= 1-19%. The severity rating is based on the Other Functional assessment, MMT, ROM    Prior Level of Function/Home Situation:   Restricted in physically strenuous activity but ambulatory and able to carry out work of a light or sedentary nature (e.g., light house work, office work).    Lives with Daughter  2 story  Entrance steps 13+  Walker, Shower chair    Cognitive/Behavioral Status: Neurologic State: alert   Orientation: oriented to time, place, person and situation  Cognition:   appropriate decision making, appropriate for age attention/concentration, appropriate safety awareness and following commands  follows multi-step complex commands/direction   Safety/Judgement: Awareness of environment, Fall prevention and Insight into deficits    ROM: Minimally limited (RUE < LUE)  MMT: RUE 3+/5; LUE 5/5  Coordination: WFL (BUEs)   Hand dominance: Right    Skin: Intact (BUEs)  Edema: None noted (BUEs)  Sensation: Intact (BUEs)    Vision/Perceptual: normal      Functional Status      Indep   Mod I   Sup. /  Set- Up    SBA   CGA   Min Assist   Mod Assist   Max assist   Total Assist   Assist x2    Additional Time   NT   Comments   Rolling []  []  []  []  []    []    []    []  []  []  []  [x]     Supine to sit []  []  []  []  []  []  []  []  []  []  []  [x]  Up in chair   Sit to supine []  []  []  []  []  []  []  []  []  []  []  [x]  Left up in chair   Sit to stand []  []  []  []  []  [x]  []  []  []  []  []  []     Toilet Transfer []  []  []  []  []  [x]  []  []  []  []  []  []                     Feeding []  []  [x]  []  []  []  []  []  []  []  []  []     Grooming []  []  []  [x]  []  []  []  []  []  []  []  []     Bathing  []  []  []  []  []  []  [x]  []  []  []  []  []     UB Dressing  []  []  [x]  []  []  []  []  []  []  []  []  []     LB Dressing  []  []  []  []  []  [x]  []  []  []  []  []  []     Toileting []  []  []  []  []  [x]  []  []  []  []  []  []         Balance    Good   Fair   Poor   Unable   Comments   Sitting static [x]  []  []  []     Sitting dynamic []  [x]  []  []     Standing static []  [x]  []  []     Standing dynamic []  [x]  []  []       Pain:   Pre treatment: 0/10  Post treatment: 0/10  Scale: numeric    Activity tolerance:  fair    COMMUNICATION/EDUCATION: Pt/family educated on role of OT and POC; they verbalized understanding.    [x]         Fall prevention education was provided and the patient/caregiver indicated understanding. [x]         Patient/family have participated as able in goal setting and plan of care. [x]         Patient/family agree to work toward stated goals and plan of care. []         Patient understands intent and goals of therapy, but is neutral about his/her participation     The patients plan of care was also discussed with: Physical Therapist, Certified Occupational Therapy Assistant and Registered Nurse.      · After treatment position/precautions:   o Up in chair  o Call light within reach  o RN notified  o Family at bedside     Thank you for this referral.  Jean Banda MS OTR/L  Time Calculation: 10 mins

## 2018-07-16 NOTE — PROGRESS NOTES
TRANSFER - OUT REPORT:    Verbal report given to Naa De Leon RN(name) on [address]  being transferred to (unit) for routine progression of care       Report consisted of patients Situation, Background, Assessment and   Recommendations(SBAR). Information from the following report(s) SBAR, Kardex, Intake/Output, MAR and Recent Results was reviewed with the receiving nurse. Lines:   Peripheral IV 07/14/18 Right Antecubital (Active)   Site Assessment Clean, dry, & intact 7/16/2018 12:00 AM   Phlebitis Assessment 0 7/16/2018 12:00 AM   Infiltration Assessment 0 7/16/2018 12:00 AM   Dressing Status Clean, dry, & intact 7/16/2018 12:00 AM   Dressing Type Transparent;Tape 7/16/2018 12:00 AM   Hub Color/Line Status Pink;Patent; Flushed;Capped 7/16/2018 12:00 AM   Action Taken Open ports on tubing capped 7/16/2018 12:00 AM   Alcohol Cap Used Yes 7/16/2018 12:00 AM       Peripheral IV 07/14/18 Left Antecubital (Active)   Site Assessment Clean, dry, & intact 7/16/2018 12:00 AM   Phlebitis Assessment 0 7/16/2018 12:00 AM   Infiltration Assessment 0 7/16/2018 12:00 AM   Dressing Status Clean, dry, & intact 7/16/2018 12:00 AM   Dressing Type Transparent;Tape 7/16/2018 12:00 AM   Hub Color/Line Status Pink;Patent; Flushed;Capped 7/16/2018 12:00 AM   Action Taken Open ports on tubing capped 7/16/2018 12:00 AM   Alcohol Cap Used Yes 7/16/2018 12:00 AM        Opportunity for questions and clarification was provided.       Patient transported with:   Monitor  Registered Nurse

## 2018-07-16 NOTE — CDMP QUERY
'asthma' is listed in [facility]. Please specify further. Please clarify if this patient is being treated/managed for:    => Asthma    Severity (mild, moderate, severe)    Intermittent or Persistent  => Other Explanation of clinical findings  => Unable to Determine (no explanation of clinical findings)    The medical record reflects the following:  Risk:  -- PMH asthma    Clinical Indicators:  -- H&P:  RESP:  Unlabored breathing. Lungs clear to auscultation. No wheeze, rales, or rhonchi. Equal expansion bilaterally. Treatment:   -- receiving Symbicort Inh, Proventil neb    Please clarify and document your clinical opinion in the progress notes and discharge summary including the definitive and/or presumptive diagnosis, (suspected or probable), related to the above clinical findings. Please include clinical findings supporting your diagnosis. If you DECLINE this query or would like to communicate with [facility], please utilize the \"[email or web address] message box\" at the TOP of the Progress Note on the right.       Thank you,  Franki Pandey [facility], [address] [place] [address]

## 2018-07-16 NOTE — INTERDISCIPLINARY ROUNDS
Interdisciplinary STROKE Rounds       Recommendations:     Recommendations are as follows:   1. Pt is currently no on an anticoag. Dr. Pretty Talbert prescribed 325 mg ASA for pt. 2. Pt has SCDs ordered for DVT prophylaxis but these have not been documented as on the pt. Nurse aware and will place on pt if not there currently and will document as on pt. 3. Order set for post-lytics was not used. Will contact [facility] physicians to educate on order sets. 4. Continue stroke prevention education. 5. Discontinue q4 NIHSS on this pt. MRI negative for acute CVA. Stroke Meds currently prescribed:  mg, Lipitor 80 mg  DVT prophylaxis: SCDs    [facility]: [facility]hab. Discipline Participation:   Interdisciplinary rounds were conducted by:  Dr. Wendie Diaz, Neuroscience Medical director,   Alesha Nicholson RN, stroke coordinator  Prateek Blackwell, SLP,  NIGHAT Mckeon liaison,  Tyler Lacey, the patient's nurse. Discussion included patient's current condition, any tests and patient's expected discharge outcome.

## 2018-07-16 NOTE — DISCHARGE SUMMARY
Discharge Summary  [facility]      Patient: Emiliana Huerta Age: 48 y.o. Sex: female  : 1965    MRN: [id number]      DOA: 2018      Discharge Date: 18       Altagracia Rose MD      PCP: Nico Dhillon MD        ================================================================    Reason for Admission: Acute ischemic stroke [facility])  Acute ischemic stroke [facility])    Discharge Diagnoses:   TIA (resolved)   Patent Foramen Ovale (stable)   Hypothyroidism (stable)  Hyperlipidemia (stable)   KISHOR on CPAP (stable)   Migraines (stable)  Lower extremity swelling (stable)     Important notes to PCP/ follow-up studies and evaluations   - patient placed on aspirin 325 mg daily and atorvastatin 80 mg daily   - will follow up with Dr. Justyna Wilson in 2 weeks  Pending labs and studies:  None  Operative Procedures:   None     Discharge Medications:     Current Discharge Medication List      START taking these medications    Details   aspirin (ASPIRIN) 325 mg tablet Take 1 Tab by mouth daily. Qty: 30 Tab, Refills: 0      atorvastatin (LIPITOR) 80 mg tablet Take 1 Tab by mouth nightly. Qty: 30 Tab, Refills: 0         CONTINUE these medications which have NOT CHANGED    Details   furosemide (LASIX) 20 mg tablet Take 40 mg by mouth daily. gabapentin (NEURONTIN) 300 mg capsule TAKE 1 CAPSULE BY MOUTH EVERY 12 HOURS FOR 30 DAYS  Qty: 60 Cap, Refills: 1      acetaminophen-codeine (TYLENOL #3) 300-30 mg per tablet 1-2  Bid  prn  Qty: 120 Tab, Refills: 0    Associated Diagnoses: Chronic midline low back pain with right-sided sciatica; Chronic pain syndrome; Spinal stenosis, lumbar region, without neurogenic claudication; Encounter for long-term (current) use of high-risk medication      budesonide-formoterol (SYMBICORT) 160-4.5 mcg/actuation HFA inhaler Take 2 Puffs by inhalation two (2) times a day. !! topiramate (TOPAMAX) 50 mg tablet Take 1 Tab by mouth two (2) times a day.  Indications: MIGRAINE PREVENTION  Qty: 60 Tab, Refills: 6    Associated Diagnoses: Intractable chronic migraine without aura and with status migrainosus      !! topiramate (TOPAMAX) 100 mg tablet Take 1 Tab by mouth two (2) times a day. Indications: MIGRAINE PREVENTION  Qty: 60 Tab, Refills: 6    Associated Diagnoses: Intractable chronic migraine without aura and with status migrainosus      albuterol (PROVENTIL HFA, VENTOLIN HFA, PROAIR HFA) 90 mcg/actuation inhaler Take 1 Puff by inhalation every four (4) hours as needed for Wheezing. Qty: 1 Inhaler, Refills: 0      docusate sodium (COLACE) 100 mg capsule Take 100 mg by mouth two (2) times a day. Associated Diagnoses: Intractable chronic migraine without aura and with status migrainosus; Neurological symptoms      FERROUS SULFATE PO Take 325 mg by mouth. Associated Diagnoses: Intractable chronic migraine without aura and with status migrainosus; Neurological symptoms      potassium chloride SR (K-TAB) 20 mEq tablet Take 20 mEq by mouth daily. levothyroxine (SYNTHROID) 50 mcg tablet TAKE 1 TABLET BY MOUTH EVERY DAY  Refills: 3      cholecalciferol (VITAMIN D3) 1,000 unit cap Take 2,000 Units by mouth daily. DULoxetine (CYMBALTA) 60 mg capsule Take 60 mg by mouth nightly. naloxone (NARCAN) 4 mg/actuation nasal spray Use 1 spray intranasally into 1 nostril. Use a new Narcan nasal spray for subsequent doses and administer into alternating nostrils. May repeat every 2 to 3 minutes as needed. Qty: 1 Each, Refills: 0      meclizine (ANTIVERT) 25 mg tablet Take 25 mg by mouth daily as needed. Refills: 3       !! - Potential duplicate medications found. Please discuss with provider.       STOP taking these medications       aspirin delayed-release 81 mg tablet Comments:   Reason for Stopping:         pravastatin (PRAVACHOL) 80 mg tablet Comments:   Reason for Stopping:               Disposition: Home    Consultants:    Neurology     Brief Hospital Course (including pertinent history and physical findings)  Eboni Castañeda is a 48year old female with past medical history of prior stroke and known PFO, hypothyroidism, HLD, s/p lumbar fusion, KISHOR on CPAP, and depression now presented with complaint of facial droop, slurred speech and right sided weakness. Acute Ischemic Stroke/TIA   Patient present to ED when she started to feel not like herself, developed a facial droop, and difficulty speaking. Of note, patient has history of prior stroke in 2016 with documented right sided weakness. In the emergency department she presented with slow speech, right sided 3/5 strength in upper and lower extremities, facial droop, and right sided visual deficit. She had an NIH score of 2. CT of the head showed no hemorrhage and tele-neuro was consulted. Patient met criteria for tPA and received the full dose. Patient was admitted to the ICU per post tPA protocol. She tolerated tPA without any major complications. In house neurology was consulted. Recommended to start atorvastatin 80 mg daily. Patients strength improved to 4/5 in the right upper extremity and lower extremity. MRI of the brain was negative for acute infarction, hemorrhage, or mass lesion. CTA showed patent intracranial and extracranial cerebral circulation. Neurology recommended continuing statin and aspirin for now and to follow up with neurology as an outpatient. Patent Foramen Ovale  Transthoracic echo was repeated 7/16/18. Bubble study was positive 12/29/16 and not repeated on this admission. EF of 61/-65%. Hypothyroidism, Hyperlipidemia, KISHOR, Migraines, Lower extremity swelling   Patient was treated for chronic conditions and home medications were adjusted as appropriate. Summarized key findings and results (labs, imaging studies, ECHO, cardiac cath, endoscopies, etc): Non-con Head CT 7/14- No acute intracranial hemorrhage or mass. No convincing large territory acute infarct.  However there is questionable very mild loss of gray-white matter differentiation left lentiform nucleus region, may be exaggerated secondary to positioning. Given patient symptoms perhaps MR imaging may be helpful as clinically warranted. Non-con Head CT 7/15 : No acute intracranial findings. No evidence of acute hemorrhage. Of note, MRI  is more sensitive in the detection of acute infarct. Brain MRI 7/15/18: Unremarkable brain MRI    CTA Head Neck 7/14- Patent intracranial and extracranial cerebral circulation. TTE (7/16/18): Patient had positive bubble study induced by valsalva maneuver on 12/29/2016. Bubble study not repeated today. Estimated left ventricular ejection fraction is 61 - 65%. Normal left ventricular wall motion, no regional wall motion abnormality noted. Mild (grade 1) left ventricular diastolic dysfunction.     CBC w/Diff    Lab Results   Component Value Date/Time    WBC 9.3 07/16/2018 12:31 AM    HGB 10.9 (L) 07/16/2018 12:31 AM    HCT 35.1 07/16/2018 12:31 AM    PLATELET 224 98/01/9788 12:31 AM    MCV 74.4 07/16/2018 12:31 AM           Chemistry    Lab Results   Component Value Date/Time    Sodium 141 07/16/2018 12:31 AM    Potassium 3.6 07/16/2018 12:31 AM    Chloride 110 (H) 07/16/2018 12:31 AM    CO2 23 07/16/2018 12:31 AM    Anion gap 8 07/16/2018 12:31 AM    Glucose 108 (H) 07/16/2018 12:31 AM    BUN 12 07/16/2018 12:31 AM    Creatinine 1.07 07/16/2018 12:31 AM    BUN/Creatinine ratio 11 (L) 07/16/2018 12:31 AM    GFR est AA >60 07/16/2018 12:31 AM    GFR est non-AA 54 (L) 07/16/2018 12:31 AM    Calcium 8.6 07/16/2018 12:31 AM           Functional status and cognitive function:    Ambulatory   Status: alert, cooperative, mild distress (musculoskeletal headache), appears stated age    Diet:Cardiac    Code status and advanced care plan: Full  Point of Contact:  Name Relation Home Work Mobile    Yissel Mother [phone]   Clevesmarylou Nam Daughter [phone] Marieas Suzie [facility] [phone]            Ramon Brown [facility] [phone]         Patient Education:  Patient was educated on the following topics prior to discharge: TIA    Follow-up:   Follow-up Information     Follow up With Details Comments LIZY Go Schedule an appointment as soon as possible for a visit on 7/20/2018 Follow up @ 11:15 a.m. with LIZY Chase.  [facility], [address]      Kaylin Farmer MD Schedule an appointment as soon as possible for a visit on 8/27/2018 Follow up @ 8:20 a.m. [address] [place] [address] [address]  [phone]              ================================================================  Zohra Arteaga MD PGY-1  [facility]  7/16/2018, 5:00 PM

## 2018-07-16 NOTE — PROGRESS NOTES
0516 Pattient chose not to use bipap/cpap unit to night. This is the second night that she has not used the unit.

## 2018-07-18 ENCOUNTER — HOME CARE VISIT (OUTPATIENT)
Dept: SCHEDULING | Facility: HOME HEALTH | Age: 53
End: 2018-07-18

## 2018-07-19 ENCOUNTER — HOME CARE VISIT (OUTPATIENT)
Dept: HOME HEALTH SERVICES | Facility: HOME HEALTH | Age: 53
End: 2018-07-19

## 2018-07-20 ENCOUNTER — TELEPHONE (OUTPATIENT)
Dept: PAIN MANAGEMENT | Age: 53
End: 2018-07-20

## 2018-07-20 NOTE — TELEPHONE ENCOUNTER
Patient called the triage line and stated that she had a stroke and was in the hospital from Sat until Monday she states she was in the ER for her migraine. She states she is home now resting and she will be following up with MD Sara Canela. I informed the patient that I will put a note in and for her to take care of herself and we will see her at her next visit. There were no controlled meds given to the patient after discharge from the hospital. I will route this call to [facility] for him to be aware.

## 2018-07-21 ENCOUNTER — HOME CARE VISIT (OUTPATIENT)
Dept: HOME HEALTH SERVICES | Facility: HOME HEALTH | Age: 53
End: 2018-07-21

## 2018-08-27 ENCOUNTER — OFFICE VISIT (OUTPATIENT)
Dept: NEUROLOGY | Age: 53
End: 2018-08-27

## 2018-08-27 VITALS
RESPIRATION RATE: 18 BRPM | SYSTOLIC BLOOD PRESSURE: 136 MMHG | HEART RATE: 78 BPM | WEIGHT: 244 LBS | TEMPERATURE: 98.9 F | HEIGHT: 65 IN | DIASTOLIC BLOOD PRESSURE: 86 MMHG | BODY MASS INDEX: 40.65 KG/M2

## 2018-08-27 DIAGNOSIS — R29.90 NEUROLOGICAL SYMPTOMS: ICD-10-CM

## 2018-08-27 DIAGNOSIS — G43.711 INTRACTABLE CHRONIC MIGRAINE WITHOUT AURA AND WITH STATUS MIGRAINOSUS: ICD-10-CM

## 2018-08-27 DIAGNOSIS — I63.9 ACUTE ISCHEMIC STROKE (HCC): Primary | ICD-10-CM

## 2018-08-27 PROBLEM — E66.01 OBESITY, MORBID (HCC): Status: ACTIVE | Noted: 2018-08-27

## 2018-08-27 RX ORDER — PROPRANOLOL HYDROCHLORIDE 20 MG/1
20 TABLET ORAL 3 TIMES DAILY
Qty: 90 TAB | Refills: 3 | Status: ON HOLD | OUTPATIENT
Start: 2018-08-27 | End: 2021-07-21 | Stop reason: SDUPTHER

## 2018-08-27 NOTE — MR AVS SNAPSHOT
[address] [facility] [address] 
[phone] Patient: Tomas MRN:  [id number] Visit Information Date & Time Provider Department Dept. Phone Encounter #  
 2018  8:20 AM Mandy Soares, [facility] [address] [phone] [id number] Follow-up Instructions Return in about 3 weeks (around 2018). Follow-up and Disposition History Your Appointments 2018  3:00 PM  
Follow Up with Trisha Schwab, PA [address] [place] [address] for Pain Management ([facility] SCHEDULING) Appt Note: 3 mon f/u per rc. ..to  
 [facility] [address]  
[phone]  
  
    
 10/1/2018  2:20 PM  
Follow Up with Mandy Soares MD  
[facility] [address]) Appt Note: 3wk f/u; med ck [address] [facility] [address] [phone]  
  
   
 [place] [address] Upcoming Health Maintenance Date Due Hepatitis C Screening 1965 Pneumococcal 19-64 Medium Risk (1 of 1 - PPSV23) 1984 PAP AKA CERVICAL CYTOLOGY 1986 DTaP/Tdap/Td series (1 - Tdap) 1999 FOBT Q 1 YEAR AGE 50-75 2015 Influenza Age 5 to Adult 2018 BREAST CANCER SCRN MAMMOGRAM 2019 Allergies as of 2018  Review Complete On: 2018 By: Mandy Soares MD  
  
 Severity Noted Reaction Type Reactions Skelaxin [Metaxalone]  2010    Other (comments)  
 jittery Tramadol  2017    Other (comments) Halluctations Current Immunizations  Reviewed on 2010 Name Date  
 TD Vaccine 1999 Not reviewed this visit You Were Diagnosed With   
  
 Codes Comments Acute ischemic stroke ([facility] [address]    -  Primary ICD-10-CM: I63.9 ICD-9-CM: 434.91   
 Intractable chronic migraine without aura and with status migrainosus     ICD-10-CM: A00.755 ICD-9-CM: 346.73 Neurological symptoms     ICD-10-CM: R29.90 ICD-9-CM: 781.99 Vitals BP Pulse Temp Resp Height(growth percentile) Weight(growth percentile) 136/86 (BP 1 Location: Left arm, BP Patient Position: Sitting) 78 98.9 °F (37.2 °C) (Oral) 18 5' 5\" (1.651 m) 244 lb (110.7 kg) BMI OB Status Smoking Status 40.6 kg/m2 Hysterectomy Never Smoker BMI and BSA Data Body Mass Index Body Surface Area  
 40.6 kg/m 2 2.25 m 2 Preferred Pharmacy Pharmacy Name Phone [facility]/PHARMACY #[id number]- [address] Your Updated Medication List  
  
   
This list is accurate as of 8/27/18  9:52 AM.  Always use your most recent med list.  
  
  
  
  
 acetaminophen-codeine 300-30 mg per tablet Commonly known as:  TYLENOL #3  
1-2  Bid  prn  
  
 albuterol 90 mcg/actuation inhaler Commonly known as:  PROVENTIL HFA, VENTOLIN HFA, PROAIR HFA Take 1 Puff by inhalation every four (4) hours as needed for Wheezing. aspirin 325 mg tablet Commonly known as:  ASPIRIN Take 1 Tab by mouth daily. atorvastatin 80 mg tablet Commonly known as:  LIPITOR Take 1 Tab by mouth nightly. CYMBALTA 60 mg capsule Generic drug:  DULoxetine Take 60 mg by mouth nightly. docusate sodium 100 mg capsule Commonly known as:  Belinda Mule Take 100 mg by mouth two (2) times a day. FERROUS SULFATE PO Take 325 mg by mouth. furosemide 20 mg tablet Commonly known as:  LASIX Take 40 mg by mouth daily. gabapentin 300 mg capsule Commonly known as:  NEURONTIN  
TAKE 1 CAPSULE BY MOUTH EVERY 12 HOURS FOR 30 DAYS  
  
 levothyroxine 50 mcg tablet Commonly known as:  SYNTHROID  
TAKE 1 TABLET BY MOUTH EVERY DAY  
  
 meclizine 25 mg tablet Commonly known as:  ANTIVERT Take 25 mg by mouth daily as needed. naloxone 4 mg/actuation nasal spray Commonly known as:  [employer] Use 1 spray intranasally into 1 nostril. Use a new Narcan nasal spray for subsequent doses and administer into alternating nostrils. May repeat every 2 to 3 minutes as needed. potassium chloride SR 20 mEq tablet Commonly known as:  K-TAB Take 20 mEq by mouth daily. propranolol 20 mg tablet Commonly known as:  INDERAL Take 1 Tab by mouth three (3) times daily. Indications: MIGRAINE PREVENTION  
  
 SYMBICORT 160-4.5 mcg/actuation [employer] Generic drug:  budesonide-formoterol Take 2 Puffs by inhalation two (2) times a day. * topiramate 50 mg tablet Commonly known as:  TOPAMAX Take 1 Tab by mouth two (2) times a day. Indications: MIGRAINE PREVENTION  
  
 * topiramate 100 mg tablet Commonly known as:  TOPAMAX Take 1 Tab by mouth two (2) times a day. Indications: MIGRAINE PREVENTION  
  
 VITAMIN D3 1,000 unit Cap Generic drug:  cholecalciferol Take 2,000 Units by mouth daily. * Notice: This list has 2 medication(s) that are the same as other medications prescribed for you. Read the directions carefully, and ask your doctor or other care provider to review them with you. Prescriptions Sent to Pharmacy Refills  
 propranolol (INDERAL) 20 mg tablet 3 Sig: Take 1 Tab by mouth three (3) times daily. Indications: MIGRAINE PREVENTION Class: Normal  
 Pharmacy: [facility]/pharmacy #[id number], [address] [place] [address] #: [phone] Route: Oral  
  
Follow-up Instructions Return in about 3 weeks (around 9/17/2018). Introducing [facility]! [employer] introduces [employer] patient portal. Now you can access parts of your medical record, email your doctor's office, and request medication refills online. 1. In your internet browser, go to [email or web address] 2. Click on the First Time User? Click Here link in the Sign In box.  You will see the New Member Sign Up page. 3. Enter your Booker Access Code exactly as it appears below. You will not need to use this code after youve completed the sign-up process. If you do not sign up before the expiration date, you must request a new code. · Booker Access Code: [email or web address] Expires: 9/23/2018  2:13 PM 
 
4. Enter the last four digits of your Social Security Number (xxxx) and Date of Birth (mm/dd/yyyy) as indicated and click Submit. You will be taken to the next sign-up page. 5. Create a Booker ID. This will be your Booker login ID and cannot be changed, so think of one that is secure and easy to remember. 6. Create a Booker password. You can change your password at any time. 7. Enter your Password Reset Question and Answer. This can be used at a later time if you forget your password. 8. Enter your e-mail address. You will receive e-mail notification when new information is available in [address] 9. Click Sign Up. You can now view and download portions of your medical record. 10. Click the Download Summary menu link to download a portable copy of your medical information. If you have questions, please visit the Frequently Asked Questions section of the Booker website. Remember, Booker is NOT to be used for urgent needs. For medical emergencies, dial 911. Now available from your iPhone and Android! Please provide this summary of care documentation to your next provider. Your primary care clinician is listed as [address]. If you have any questions after today's visit, please call [phone].

## 2018-08-27 NOTE — PROGRESS NOTES
Re:  Yadira Zhou,Follow up visit     8/27/2018 9:34 AM    SSN: [id number]    Subjective: Sarahi Miller returns for follow up of her headaches. She was recently admitted to [facility] with what sounds like a complicated migraine. She had an MRI which was negative for a new stroke. She was taken off of her Topamax for unclear reasons. We're not sure what she was placed on as a replacement. Medications:    Current Outpatient Prescriptions   Medication Sig Dispense Refill    aspirin (ASPIRIN) 325 mg tablet Take 1 Tab by mouth daily. 30 Tab 0    atorvastatin (LIPITOR) 80 mg tablet Take 1 Tab by mouth nightly. 30 Tab 0    furosemide (LASIX) 20 mg tablet Take 40 mg by mouth daily.  gabapentin (NEURONTIN) 300 mg capsule TAKE 1 CAPSULE BY MOUTH EVERY 12 HOURS FOR 30 DAYS 60 Cap 1    acetaminophen-codeine (TYLENOL #3) 300-30 mg per tablet 1-2  Bid  prn 120 Tab 0    budesonide-formoterol (SYMBICORT) 160-4.5 mcg/actuation HFA inhaler Take 2 Puffs by inhalation two (2) times a day.  naloxone (NARCAN) 4 mg/actuation nasal spray Use 1 spray intranasally into 1 nostril. Use a new Narcan nasal spray for subsequent doses and administer into alternating nostrils. May repeat every 2 to 3 minutes as needed. 1 Each 0    albuterol (PROVENTIL HFA, VENTOLIN HFA, PROAIR HFA) 90 mcg/actuation inhaler Take 1 Puff by inhalation every four (4) hours as needed for Wheezing. 1 Inhaler 0    docusate sodium (COLACE) 100 mg capsule Take 100 mg by mouth two (2) times a day.  FERROUS SULFATE PO Take 325 mg by mouth.  potassium chloride SR (K-TAB) 20 mEq tablet Take 20 mEq by mouth daily.  levothyroxine (SYNTHROID) 50 mcg tablet TAKE 1 TABLET BY MOUTH EVERY DAY  3    cholecalciferol (VITAMIN D3) 1,000 unit cap Take 2,000 Units by mouth daily.  DULoxetine (CYMBALTA) 60 mg capsule Take 60 mg by mouth nightly.  topiramate (TOPAMAX) 50 mg tablet Take 1 Tab by mouth two (2) times a day. Indications: MIGRAINE PREVENTION 60 Tab 6    topiramate (TOPAMAX) 100 mg tablet Take 1 Tab by mouth two (2) times a day. Indications: MIGRAINE PREVENTION 60 Tab 6    meclizine (ANTIVERT) 25 mg tablet Take 25 mg by mouth daily as needed.   3       Vital signs:    Visit Vitals    /86 (BP 1 Location: Left arm, BP Patient Position: Sitting)    Pulse 78    Temp 98.9 °F (37.2 °C) (Oral)    Resp 18    Ht 5' 5\" (1.651 m)    Wt 110.7 kg (244 lb)    BMI 40.6 kg/m2       Review of Systems:   As above otherwise 11 point review of systems negative including;   Constitutional no fever or chills  Skin denies rash or itching  HEENT  Denies tinnitus, hearing lose  Eyes denies diplopia vision lose  Respiratory denies sortness of breath  Cardiovascular denies chest pain, dyspnea on exertion  Gastrointestinal denies nausea, vomiting, diarrhea, constipation  Genitourinary denies incontinence  Musculoskeletal denies joint pain or swelling  Endocrine denies weight change  Hematology denies easy bruising or bleeding   Neurological as above in HPI      Patient Active Problem List   Diagnosis Code    Asthma J45.909    Hyperlipidemia E78.5    Eczema L30.9    Back pain with radiation M54.9    Spinal stenosis, lumbar region, without neurogenic claudication M48.061    Lumbar stenosis M48.061    S/P lumbar fusion Z98.1    Acute right-sided low back pain with sciatica M54.40    Neurological symptoms R29.90    Ischemic stroke (HCC) I63.9    Migraines G43.909    Leg swelling M79.89    Hypothyroidism E03.9    CTS (carpal tunnel syndrome) G56.00    Hypercholesterolemia E78.00    Ill-defined condition R69    Sciatica M54.30    Sciatic neuropathy G57.00    Chronic midline low back pain with right-sided sciatica M54.41, G89.29    Encounter for long-term (current) use of high-risk medication Z79.899    Chronic pain syndrome G89.4    History of lumbar surgery Z98.890    Protrusion of lumbar intervertebral disc M51.26  Acute ischemic stroke ([facility]) I63.9    Obesity, morbid ([facility]) E66.01         Objective: The patient is awake, alert, and oriented x 4. Fund of knowledge is adequate. Speech is fluent and memory is intact. Cranial Nerves: II - Visual fields are full to confrontation. III, IV, VI - Extraocular movements are intact. There is no nystagmus. V - Facial sensation is intact to pinprick. VII - Face is symmetrical.  VIII - Hearing is present. IX, X, XII - Palate is symmetrical.   XI - Shoulder shrugging and head turning intact  Motor: The patient has a mild degree of right hemiparesis, both arm and leg about 4+/5. Tone is normal. Reflexes are 2+ and symmetrical. Plantars are down going. Gait is antalgic, limping off of the right leg. CBC:   Lab Results   Component Value Date/Time    WBC 9.3 07/16/2018 12:31 AM    RBC 4.72 07/16/2018 12:31 AM    HGB 10.9 (L) 07/16/2018 12:31 AM    HCT 35.1 07/16/2018 12:31 AM    PLATELET 678 57/89/5487 12:31 AM     BMP:   Lab Results   Component Value Date/Time    Glucose 108 (H) 07/16/2018 12:31 AM    Sodium 141 07/16/2018 12:31 AM    Potassium 3.6 07/16/2018 12:31 AM    Chloride 110 (H) 07/16/2018 12:31 AM    CO2 23 07/16/2018 12:31 AM    BUN 12 07/16/2018 12:31 AM    Creatinine 1.07 07/16/2018 12:31 AM    Calcium 8.6 07/16/2018 12:31 AM     CMP:   Lab Results   Component Value Date/Time    Glucose 108 (H) 07/16/2018 12:31 AM    Sodium 141 07/16/2018 12:31 AM    Potassium 3.6 07/16/2018 12:31 AM    Chloride 110 (H) 07/16/2018 12:31 AM    CO2 23 07/16/2018 12:31 AM    BUN 12 07/16/2018 12:31 AM    Creatinine 1.07 07/16/2018 12:31 AM    Calcium 8.6 07/16/2018 12:31 AM    Anion gap 8 07/16/2018 12:31 AM    BUN/Creatinine ratio 11 (L) 07/16/2018 12:31 AM    Alk.  phosphatase 117 07/23/2017 10:18 AM    Protein, total 7.9 07/23/2017 10:18 AM    Albumin 3.3 (L) 07/23/2017 10:18 AM    Globulin 4.6 (H) 07/23/2017 10:18 AM    A-G Ratio 0.7 (L) 07/23/2017 10:18 AM     Coagulation:   Lab Results   Component Value Date/Time    Prothrombin time 13.8 07/15/2018 03:00 AM    INR 1.1 07/15/2018 03:00 AM    aPTT 32.0 07/14/2018 12:40 PM     Cardiac markers:   Lab Results   Component Value Date/Time     (H) 12/28/2016 08:55 PM    CK-MB Index CANNOT BE CALCULATED 12/28/2016 08:55 PM       Assessment:  Migraine headaches, now resolving status migrainosis as well as analgesic rebound headaches in this patient who has risk factors including life long headaches. Probable recent complicated migraine without evidence of stroke on MRI. Plan: Will try Inderal 20 mg TID for her migraines considering her lack of payer source for medications. Return here in  3 weeks. .      Sincerely,        Gerry Han.  Sherrie Hodge M.D.

## 2018-08-27 NOTE — LETTER
8/27/2018 9:46 AM 
 
Patient:  Tomas YOB: 1965 Date of Visit: 8/27/2018 Dear MD Reji Godinez [address] [place] [id number] VIA Facsimile: [phone] 
 : Thank you for referring Ms. Marin to me for evaluation/treatment. Below are the relevant portions of my assessment and plan of care. If you have questions, please do not hesitate to call me. I look forward to following Ms. Zhou along with you.  
 
 
 
Sincerely, 
 
 
Kaylin Farmer MD

## 2018-08-30 ENCOUNTER — HOSPITAL ENCOUNTER (EMERGENCY)
Age: 53
Discharge: HOME | End: 2018-08-30
Attending: EMERGENCY MEDICINE
Payer: SELF-PAY

## 2018-08-30 VITALS
DIASTOLIC BLOOD PRESSURE: 83 MMHG | SYSTOLIC BLOOD PRESSURE: 140 MMHG | RESPIRATION RATE: 17 BRPM | HEART RATE: 88 BPM | TEMPERATURE: 98.4 F | OXYGEN SATURATION: 98 %

## 2018-08-30 DIAGNOSIS — M54.9 ACUTE BILATERAL BACK PAIN, UNSPECIFIED BACK LOCATION: Primary | ICD-10-CM

## 2018-08-30 DIAGNOSIS — R31.9 URINARY TRACT INFECTION WITH HEMATURIA, SITE UNSPECIFIED: ICD-10-CM

## 2018-08-30 DIAGNOSIS — N39.0 URINARY TRACT INFECTION WITH HEMATURIA, SITE UNSPECIFIED: ICD-10-CM

## 2018-08-30 DIAGNOSIS — R50.9 FEVER, UNSPECIFIED FEVER CAUSE: ICD-10-CM

## 2018-08-30 LAB
ANION GAP SERPL CALC-SCNC: 6 MMOL/L (ref 3–18)
APPEARANCE UR: CLEAR
BACTERIA URNS QL MICRO: ABNORMAL /HPF
BASOPHILS # BLD: 0 K/UL (ref 0–0.1)
BASOPHILS NFR BLD: 0 % (ref 0–2)
BILIRUB UR QL: NEGATIVE
BUN SERPL-MCNC: 11 MG/DL (ref 7–18)
BUN/CREAT SERPL: 10 (ref 12–20)
CALCIUM SERPL-MCNC: 9.2 MG/DL (ref 8.5–10.1)
CHLORIDE SERPL-SCNC: 105 MMOL/L (ref 100–108)
CO2 SERPL-SCNC: 29 MMOL/L (ref 21–32)
COLOR UR: YELLOW
CREAT SERPL-MCNC: 1.05 MG/DL (ref 0.6–1.3)
DIFFERENTIAL METHOD BLD: ABNORMAL
EOSINOPHIL # BLD: 0.1 K/UL (ref 0–0.4)
EOSINOPHIL NFR BLD: 1 % (ref 0–5)
EPITH CASTS URNS QL MICRO: ABNORMAL /LPF (ref 0–5)
ERYTHROCYTE [DISTWIDTH] IN BLOOD BY AUTOMATED COUNT: 14.9 % (ref 11.6–14.5)
GLUCOSE SERPL-MCNC: 90 MG/DL (ref 74–99)
GLUCOSE UR STRIP.AUTO-MCNC: NEGATIVE MG/DL
HCT VFR BLD AUTO: 37.7 % (ref 35–45)
HGB BLD-MCNC: 12.4 G/DL (ref 12–16)
HGB UR QL STRIP: ABNORMAL
KETONES UR QL STRIP.AUTO: NEGATIVE MG/DL
LEUKOCYTE ESTERASE UR QL STRIP.AUTO: NEGATIVE
LYMPHOCYTES # BLD: 4.2 K/UL (ref 0.9–3.6)
LYMPHOCYTES NFR BLD: 43 % (ref 21–52)
MCH RBC QN AUTO: 23.5 PG (ref 24–34)
MCHC RBC AUTO-ENTMCNC: 32.9 G/DL (ref 31–37)
MCV RBC AUTO: 71.4 FL (ref 74–97)
MONOCYTES # BLD: 1.1 K/UL (ref 0.05–1.2)
MONOCYTES NFR BLD: 11 % (ref 3–10)
NEUTS SEG # BLD: 4.2 K/UL (ref 1.8–8)
NEUTS SEG NFR BLD: 45 % (ref 40–73)
NITRITE UR QL STRIP.AUTO: NEGATIVE
PH UR STRIP: 5.5 [PH] (ref 5–8)
PLATELET # BLD AUTO: 342 K/UL (ref 135–420)
PMV BLD AUTO: 10.3 FL (ref 9.2–11.8)
POTASSIUM SERPL-SCNC: 4.7 MMOL/L (ref 3.5–5.5)
PROT UR STRIP-MCNC: NEGATIVE MG/DL
RBC # BLD AUTO: 5.28 M/UL (ref 4.2–5.3)
RBC #/AREA URNS HPF: ABNORMAL /HPF (ref 0–5)
SODIUM SERPL-SCNC: 140 MMOL/L (ref 136–145)
SP GR UR REFRACTOMETRY: 1.01 (ref 1–1.03)
UROBILINOGEN UR QL STRIP.AUTO: 1 EU/DL (ref 0.2–1)
WBC # BLD AUTO: 9.6 K/UL (ref 4.6–13.2)
WBC URNS QL MICRO: ABNORMAL /HPF (ref 0–4)

## 2018-08-30 PROCEDURE — 96374 THER/PROPH/DIAG INJ IV PUSH: CPT

## 2018-08-30 PROCEDURE — 80048 BASIC METABOLIC PNL TOTAL CA: CPT | Performed by: PHYSICIAN ASSISTANT

## 2018-08-30 PROCEDURE — 81001 URINALYSIS AUTO W/SCOPE: CPT | Performed by: PHYSICIAN ASSISTANT

## 2018-08-30 PROCEDURE — 74011250636 HC RX REV CODE- 250/636: Performed by: PHYSICIAN ASSISTANT

## 2018-08-30 PROCEDURE — 99283 EMERGENCY DEPT VISIT LOW MDM: CPT

## 2018-08-30 PROCEDURE — 87086 URINE CULTURE/COLONY COUNT: CPT | Performed by: PHYSICIAN ASSISTANT

## 2018-08-30 PROCEDURE — 74011250637 HC RX REV CODE- 250/637: Performed by: PHYSICIAN ASSISTANT

## 2018-08-30 PROCEDURE — 85025 COMPLETE CBC W/AUTO DIFF WBC: CPT | Performed by: PHYSICIAN ASSISTANT

## 2018-08-30 PROCEDURE — 96372 THER/PROPH/DIAG INJ SC/IM: CPT

## 2018-08-30 RX ORDER — PREDNISONE 10 MG/1
TABLET ORAL
Qty: 21 TAB | Refills: 0 | Status: SHIPPED | OUTPATIENT
Start: 2018-08-30 | End: 2018-12-17 | Stop reason: ALTCHOICE

## 2018-08-30 RX ORDER — KETOROLAC TROMETHAMINE 30 MG/ML
60 INJECTION, SOLUTION INTRAMUSCULAR; INTRAVENOUS
Status: COMPLETED | OUTPATIENT
Start: 2018-08-30 | End: 2018-08-30

## 2018-08-30 RX ORDER — DIAZEPAM 5 MG/1
5 TABLET ORAL
Status: COMPLETED | OUTPATIENT
Start: 2018-08-30 | End: 2018-08-30

## 2018-08-30 RX ORDER — NITROFURANTOIN 25; 75 MG/1; MG/1
100 CAPSULE ORAL 2 TIMES DAILY
Qty: 14 CAP | Refills: 0 | Status: SHIPPED | OUTPATIENT
Start: 2018-08-30 | End: 2018-09-06

## 2018-08-30 RX ORDER — MORPHINE SULFATE 2 MG/ML
2 INJECTION, SOLUTION INTRAMUSCULAR; INTRAVENOUS
Status: COMPLETED | OUTPATIENT
Start: 2018-08-30 | End: 2018-08-30

## 2018-08-30 RX ADMIN — KETOROLAC TROMETHAMINE 60 MG: 30 INJECTION, SOLUTION INTRAMUSCULAR at 20:31

## 2018-08-30 RX ADMIN — DIAZEPAM 5 MG: 5 TABLET ORAL at 20:30

## 2018-08-30 RX ADMIN — Medication 2 MG: at 21:17

## 2018-08-30 NOTE — ED PROVIDER NOTES
EMERGENCY DEPARTMENT HISTORY AND PHYSICAL EXAM 
 
6:43 PM 
 
 
Date: 8/30/2018 Patient Name: Tomas History of Presenting Illness Chief Complaint Patient presents with  Back Pain  Spasms History Provided By: Patient Chief Complaint: Back Pain Duration: 1 Days Timing:  Acute Location: Generalized Quality: \"spasms\" Severity: Moderate Modifying Factors: Tylenol 3 and flexeril had no significant relief Associated Symptoms: Right leg pain Additional History (Context): Tomas is a 48 y.o. female with a PMHx of sciatica, asthma, leg swelling, and other noted PMHx who presents with acute, moderate, generalized back pain described as \"spasms\" onset x 1 day ago with associated right leg pain that radiates from the back. Pt states to have a Hx of back spasms, and used Tylenol 3 and flexeril with no significant relief. Pt reports being in pain management. Denies dysuria, difficulty with BM, cough, or recent cold. No further concerns or complaints at this time. PCP: Mick Valles MD 
 
Current Outpatient Prescriptions Medication Sig Dispense Refill  predniSONE (STERAPRED DS) 10 mg dose pack Use as directed 21 Tab 0  
 nitrofurantoin, macrocrystal-monohydrate, (MACROBID) 100 mg capsule Take 1 Cap by mouth two (2) times a day for 7 days. 14 Cap 0  
 propranolol (INDERAL) 20 mg tablet Take 1 Tab by mouth three (3) times daily. Indications: MIGRAINE PREVENTION 90 Tab 3  
 aspirin (ASPIRIN) 325 mg tablet Take 1 Tab by mouth daily. 30 Tab 0  
 atorvastatin (LIPITOR) 80 mg tablet Take 1 Tab by mouth nightly. 30 Tab 0  
 furosemide (LASIX) 20 mg tablet Take 40 mg by mouth daily.     
 gabapentin (NEURONTIN) 300 mg capsule TAKE 1 CAPSULE BY MOUTH EVERY 12 HOURS FOR 30 DAYS 60 Cap 1  
 acetaminophen-codeine (TYLENOL #3) 300-30 mg per tablet 1-2  Bid  prn 120 Tab 0  
 budesonide-formoterol (SYMBICORT) 160-4.5 mcg/actuation HFA inhaler Take 2 Puffs by inhalation two (2) times a day.  naloxone (NARCAN) 4 mg/actuation nasal spray Use 1 spray intranasally into 1 nostril. Use a new Narcan nasal spray for subsequent doses and administer into alternating nostrils. May repeat every 2 to 3 minutes as needed. 1 Each 0  
 topiramate (TOPAMAX) 50 mg tablet Take 1 Tab by mouth two (2) times a day. Indications: MIGRAINE PREVENTION 60 Tab 6  
 topiramate (TOPAMAX) 100 mg tablet Take 1 Tab by mouth two (2) times a day. Indications: MIGRAINE PREVENTION 60 Tab 6  
 albuterol (PROVENTIL HFA, VENTOLIN HFA, PROAIR HFA) 90 mcg/actuation inhaler Take 1 Puff by inhalation every four (4) hours as needed for Wheezing. 1 Inhaler 0  
 docusate sodium (COLACE) 100 mg capsule Take 100 mg by mouth two (2) times a day.  FERROUS SULFATE PO Take 325 mg by mouth.  potassium chloride SR (K-TAB) 20 mEq tablet Take 20 mEq by mouth daily.  levothyroxine (SYNTHROID) 50 mcg tablet TAKE 1 TABLET BY MOUTH EVERY DAY  3  
 cholecalciferol (VITAMIN D3) 1,000 unit cap Take 2,000 Units by mouth daily.  meclizine (ANTIVERT) 25 mg tablet Take 25 mg by mouth daily as needed. 3  
 DULoxetine (CYMBALTA) 60 mg capsule Take 60 mg by mouth nightly. Past History Past Medical History: 
Past Medical History:  
Diagnosis Date  Asthma  Back pain with radiation  Cardiac echocardiogram, [facility] study 12/29/2016 EF 55-60%. No WMA. Right to left atrial septal shunt suggests PFO. Similar to study of 10/18/16.  Cardiovascular LE venous duplex 10/18/2016 No DVT bilaterally.  Contact dermatitis and other eczema, due to unspecified cause  CTS (carpal tunnel syndrome)  HSV-2 (herpes simplex virus 2) infection  Hypercholesterolemia  Hypothyroidism  Ill-defined condition Vertigo  L4-L5 disc bulge  Leg swelling  Migraines  Positive PPD, treated  S/P lumbar fusion 1/13/2016 1. L4-5 bilateral hemilaminotomy, medial facetectomy, foraminotomy, diskectomy L4-5. 2. Transforaminal lumbar interbody fusion with PEEK cage and demineralized bone graft L4-L5 posterolateral fusion. 3. Segmental spinal instrumentation, DePuy Expedium type L4-L5. 01/13/2016 By Dr. Kunal Higginbotham  Sciatica EMG '17 , mild sciatic EMG findings  Stroke [facility])   
 12/2016  Vertigo Past Surgical History: 
Past Surgical History:  
Procedure Laterality Date  HX GYN    
 partial hysterectomy due to abnormal pap  HX LUMBAR LAMINECTOMY  01-13-16 L4/5 Family History: 
Family History Problem Relation Age of Onset  Diabetes Mother  Elevated Lipids Mother  Hypertension Mother  Cancer Father   
  pancreatic  Diabetes Sister  Hypertension Sister  Diabetes Brother  Diabetes Daughter  Hypertension Daughter Social History: 
Social History Substance Use Topics  Smoking status: Never Smoker  Smokeless tobacco: Never Used  Alcohol use No  
 
 
Allergies: Allergies Allergen Reactions  Skelaxin [Metaxalone] Other (comments)  
  jittery  Tramadol Other (comments) Halluctations Review of Systems Review of Systems Constitutional: Negative. Negative for chills and fever. HENT: Negative. Negative for congestion, ear pain and rhinorrhea. Eyes: Negative. Negative for pain and redness. Respiratory: Negative. Negative for cough, shortness of breath, wheezing and stridor. Cardiovascular: Negative. Negative for chest pain and leg swelling. Gastrointestinal: Negative. Negative for abdominal pain, constipation, diarrhea, nausea and vomiting. Genitourinary: Negative. Negative for dysuria and frequency. Musculoskeletal: Positive for back pain (generalized) and myalgias (right leg). Negative for neck pain. Skin: Negative. Negative for rash and wound. Neurological: Negative.   Negative for dizziness, seizures, syncope and headaches. All other systems reviewed and are negative. Physical Exam  
 
Visit Vitals  BP (!) 142/92  Pulse 89  Temp 100.4 °F (38 °C)  Resp 16  SpO2 98% Physical Exam  
Constitutional: She is oriented to person, place, and time. She appears well-developed and well-nourished. She appears distressed. HENT:  
Head: Normocephalic and atraumatic. Eyes: Conjunctivae are normal. Right eye exhibits no discharge. Left eye exhibits no discharge. No scleral icterus. Neck: Normal range of motion. Neck supple. Cardiovascular: Normal rate, regular rhythm and normal heart sounds. Exam reveals no gallop and no friction rub. No murmur heard. Pulmonary/Chest: Effort normal and breath sounds normal. No stridor. No respiratory distress. She has no wheezes. She has no rales. Musculoskeletal: Normal range of motion. She exhibits tenderness. She exhibits no edema or deformity. Diffuse TTP noted along the spine midline, TTP and hypertonicity noted to the bilateral lumbar and thoracic paraspinal muscles, increased pain into the RLE noted on deep palpation of the R lumbar paraspinal muscles Neurological: She is alert and oriented to person, place, and time. Coordination normal.  
Gait is steady. Able to ambulate without difficulty. Skin: Skin is warm and dry. No rash noted. She is not diaphoretic. No erythema. Psychiatric: She has a normal mood and affect. Her behavior is normal. Thought content normal.  
Nursing note and vitals reviewed. Diagnostic Study Results Labs - Recent Results (from the past 12 hour(s)) URINALYSIS W/ RFLX MICROSCOPIC Collection Time: 08/30/18  7:16 PM  
Result Value Ref Range Color YELLOW Appearance CLEAR Specific gravity 1.014 1.005 - 1.030    
 pH (UA) 5.5 5.0 - 8.0 Protein NEGATIVE  NEG mg/dL Glucose NEGATIVE  NEG mg/dL Ketone NEGATIVE  NEG mg/dL Bilirubin NEGATIVE  NEG  Blood SMALL (A) NEG    
 Urobilinogen 1.0 0.2 - 1.0 EU/dL Nitrites NEGATIVE  NEG Leukocyte Esterase NEGATIVE  NEG    
URINE MICROSCOPIC ONLY Collection Time: 08/30/18  7:16 PM  
Result Value Ref Range WBC 0 to 2 0 - 4 /hpf  
 RBC 2 to 4 0 - 5 /hpf Epithelial cells 2+ 0 - 5 /lpf Bacteria 1+ (A) NEG /hpf  
CBC WITH AUTOMATED DIFF Collection Time: 08/30/18  7:55 PM  
Result Value Ref Range WBC 9.6 4.6 - 13.2 K/uL  
 RBC 5.28 4.20 - 5.30 M/uL  
 HGB 12.4 12.0 - 16.0 g/dL HCT 37.7 35.0 - 45.0 % MCV 71.4 (L) 74.0 - 97.0 FL  
 MCH 23.5 (L) 24.0 - 34.0 PG  
 MCHC 32.9 31.0 - 37.0 g/dL  
 RDW 14.9 (H) 11.6 - 14.5 % PLATELET 149 941 - 499 K/uL MPV 10.3 9.2 - 11.8 FL  
 NEUTROPHILS 45 40 - 73 % LYMPHOCYTES 43 21 - 52 % MONOCYTES 11 (H) 3 - 10 % EOSINOPHILS 1 0 - 5 % BASOPHILS 0 0 - 2 %  
 ABS. NEUTROPHILS 4.2 1.8 - 8.0 K/UL  
 ABS. LYMPHOCYTES 4.2 (H) 0.9 - 3.6 K/UL  
 ABS. MONOCYTES 1.1 0.05 - 1.2 K/UL  
 ABS. EOSINOPHILS 0.1 0.0 - 0.4 K/UL  
 ABS. BASOPHILS 0.0 0.0 - 0.1 K/UL  
 DF AUTOMATED METABOLIC PANEL, BASIC Collection Time: 08/30/18  7:55 PM  
Result Value Ref Range Sodium 140 136 - 145 mmol/L Potassium 4.7 3.5 - 5.5 mmol/L Chloride 105 100 - 108 mmol/L  
 CO2 29 21 - 32 mmol/L Anion gap 6 3.0 - 18 mmol/L Glucose 90 74 - 99 mg/dL BUN 11 7.0 - 18 MG/DL Creatinine 1.05 0.6 - 1.3 MG/DL  
 BUN/Creatinine ratio 10 (L) 12 - 20 GFR est AA >60 >60 ml/min/1.73m2 GFR est non-AA 55 (L) >60 ml/min/1.73m2 Calcium 9.2 8.5 - 10.1 MG/DL Radiologic Studies - No orders to display Medical Decision Making I am the first provider for this patient. I reviewed the vital signs, available nursing notes, past medical history, past surgical history, family history and social history. Vital Signs-Reviewed the patient's vital signs. Pulse Oximetry Analysis -  98% on room air, normal. 
 
Records Reviewed: Nursing Notes (Time of Review: 6:43 PM) ED Course: Progress Notes, Reevaluation, and Consults: 
 
Provider Notes (Medical Decision Making): Impression:  Back pain, fever, UTI Valium and toradol given Diagnosis Clinical Impression: 1. Acute bilateral back pain, unspecified back location 2. Fever, unspecified fever cause 3. Urinary tract infection with hematuria, site unspecified Disposition: Patient is stable for discharge at this time. Rx for macrobid and prednisone given. Rest and follow-up with PCP this week. Return to the ED immediately for any new or worsening sx. Sima Lopez PA-C 8:50 PM  
 
Follow-up Information Follow up With Details Comments Contact Info Michael Liang MD Schedule an appointment as soon as possible for a visit in 1 week  [address] [facility] [place] [address] [phone] [facility] EMERGENCY DEPT  As needed, If symptoms worsen [address] [place] [address] [address] Patient's Medications Start Taking NITROFURANTOIN, MACROCRYSTAL-MONOHYDRATE, (MACROBID) 100 MG CAPSULE    Take 1 Cap by mouth two (2) times a day for 7 days. PREDNISONE (STERAPRED DS) 10 MG DOSE PACK    Use as directed Continue Taking ACETAMINOPHEN-CODEINE (TYLENOL #3) 300-30 MG PER TABLET    1-2  Bid  prn ALBUTEROL (PROVENTIL HFA, VENTOLIN HFA, PROAIR HFA) 90 MCG/ACTUATION INHALER    Take 1 Puff by inhalation every four (4) hours as needed for Wheezing. ASPIRIN (ASPIRIN) 325 MG TABLET    Take 1 Tab by mouth daily. ATORVASTATIN (LIPITOR) 80 MG TABLET    Take 1 Tab by mouth nightly. BUDESONIDE-FORMOTEROL (SYMBICORT) 160-4.5 MCG/ACTUATION HFA INHALER    Take 2 Puffs by inhalation two (2) times a day. CHOLECALCIFEROL (VITAMIN D3) 1,000 UNIT CAP    Take 2,000 Units by mouth daily. DOCUSATE SODIUM (COLACE) 100 MG CAPSULE    Take 100 mg by mouth two (2) times a day. DULOXETINE (CYMBALTA) 60 MG CAPSULE    Take 60 mg by mouth nightly. FERROUS SULFATE PO    Take 325 mg by mouth. FUROSEMIDE (LASIX) 20 MG TABLET    Take 40 mg by mouth daily. GABAPENTIN (NEURONTIN) 300 MG CAPSULE    TAKE 1 CAPSULE BY MOUTH EVERY 12 HOURS FOR 30 DAYS  
 LEVOTHYROXINE (SYNTHROID) 50 MCG TABLET    TAKE 1 TABLET BY MOUTH EVERY DAY  
 MECLIZINE (ANTIVERT) 25 MG TABLET    Take 25 mg by mouth daily as needed. NALOXONE (NARCAN) 4 MG/ACTUATION NASAL SPRAY    Use 1 spray intranasally into 1 nostril. Use a new Narcan nasal spray for subsequent doses and administer into alternating nostrils. May repeat every 2 to 3 minutes as needed. POTASSIUM CHLORIDE SR (K-TAB) 20 MEQ TABLET    Take 20 mEq by mouth daily. PROPRANOLOL (INDERAL) 20 MG TABLET    Take 1 Tab by mouth three (3) times daily. Indications: MIGRAINE PREVENTION  
 TOPIRAMATE (TOPAMAX) 100 MG TABLET    Take 1 Tab by mouth two (2) times a day. Indications: MIGRAINE PREVENTION  
 TOPIRAMATE (TOPAMAX) 50 MG TABLET    Take 1 Tab by mouth two (2) times a day. Indications: MIGRAINE PREVENTION These Medications have changed No medications on file Stop Taking No medications on file  
 
_______________________________ Attestations: 
Scribe Attestation Zayda Valverde acting as a scribe for and in the presence of Remy Hardy August 30, 2018 at 6:43 PM 
    
Provider Attestation:     
I personally performed the services described in the documentation, reviewed the documentation, as recorded by the scribe in my presence, and it accurately and completely records my words and actions. August 30, 2018 at 6:43 PM - Sima Lopez PA-C   
_______________________________

## 2018-08-30 NOTE — DISCHARGE INSTRUCTIONS
Back Pain: Care Instructions  Your Care Instructions    Back pain has many possible causes. It is often related to problems with muscles and ligaments of the back. It may also be related to problems with the nerves, discs, or bones of the back. Moving, lifting, standing, sitting, or sleeping in an awkward way can strain the back. Sometimes you don't notice the injury until later. Arthritis is another common cause of back pain. Although it may hurt a lot, back pain usually improves on its own within several weeks. Most people recover in 12 weeks or less. Using good home treatment and being careful not to stress your back can help you feel better sooner. Follow-up care is a key part of your treatment and safety. Be sure to make and go to all appointments, and call your doctor if you are having problems. It's also a good idea to know your test results and keep a list of the medicines you take. How can you care for yourself at home? · Sit or lie in positions that are most comfortable and reduce your pain. Try one of these positions when you lie down:  ¨ Lie on your back with your knees bent and supported by large pillows. ¨ Lie on the floor with your legs on the seat of a sofa or chair. [place] on your side with your knees and hips bent and a pillow between your legs. ¨ Lie on your stomach if it does not make pain worse. · Do not sit up in bed, and avoid soft couches and twisted positions. Bed rest can help relieve pain at first, but it delays healing. Avoid bed rest after the first day of back pain. · Change positions every 30 minutes. If you must sit for long periods of time, take breaks from sitting. Get up and walk around, or lie in a comfortable position. · Try using a heating pad on a low or medium setting for 15 to 20 minutes every 2 or 3 hours. Try a warm shower in place of one session with the heating pad. · You can also try an ice pack for 10 to 15 minutes every 2 to 3 hours.  Put a thin cloth between the ice pack and your skin. · Take pain medicines exactly as directed. ¨ If the doctor gave you a prescription medicine for pain, take it as prescribed. ¨ If you are not taking a prescription pain medicine, ask your doctor if you can take an over-the-counter medicine. · Take short walks several times a day. You can start with 5 to 10 minutes, 3 or 4 times a day, and work up to longer walks. Walk on level surfaces and avoid hills and stairs until your back is better. · Return to work and other activities as soon as you can. Continued rest without activity is usually not good for your back. · To prevent future back pain, do exercises to stretch and strengthen your back and stomach. Learn how to use good posture, safe lifting techniques, and proper body mechanics. When should you call for help? Call your doctor now or seek immediate medical care if:    · You have new or worsening numbness in your legs.     · You have new or worsening weakness in your legs. (This could make it hard to stand up.)     · You lose control of your bladder or bowels.    Watch closely for changes in your health, and be sure to contact your doctor if:    · You have a fever, lose weight, or don't feel well.     · You do not get better as expected. Where can you learn more? Go to http://leesa-jean carlos.info/. Enter B476 in the search box to learn more about \"Back Pain: Care Instructions. \"  Current as of: November 29, 2017  Content Version: 11.7  © 6117-7674 [employer]. Care instructions adapted under license by [employer] (which disclaims liability or warranty for this information). If you have questions about a medical condition or this instruction, always ask your healthcare professional. [address] any warranty or liability for your use of this information. MyChart Activation    Thank you for requesting access to [employer].  Please follow the instructions below to securely access and download your online medical record. [employer] allows you to send messages to your doctor, view your test results, renew your prescriptions, schedule appointments, and more. How Do I Sign Up? 1. In your internet browser, go to [email or web address]  2. Click on the First Time User? Click Here link in the Sign In box. You will be redirect to the New Member Sign Up page. 3. Enter your [employer] Access Code exactly as it appears below. You will not need to use this code after youve completed the sign-up process. If you do not sign up before the expiration date, you must request a new code. [employer] Access Code: [email or web address]  Expires: 2018  2:13 PM (This is the date your [employer] access code will )    4. Enter the last four digits of your Social Security Number (xxxx) and Date of Birth (mm/dd/yyyy) as indicated and click Submit. You will be taken to the next sign-up page. 5. Create a [employer] ID. This will be your [employer] login ID and cannot be changed, so think of one that is secure and easy to remember. 6. Create a [employer] password. You can change your password at any time. 7. Enter your Password Reset Question and Answer. This can be used at a later time if you forget your password. 8. Enter your e-mail address. You will receive e-mail notification when new information is available in [address] 9. Click Sign Up. You can now view and download portions of your medical record. 10. Click the Download Summary menu link to download a portable copy of your medical information. Additional Information    If you have questions, please visit the Frequently Asked Questions section of the [employer] website at [email or web address] Remember, [employer] is NOT to be used for urgent needs. For medical emergencies, dial 911.

## 2018-08-31 NOTE — ED NOTES
I have reviewed discharge instructions with the patient. The patient verbalized understanding. Patient has no further questions at this time. Patient taken to the lobby via wheelchair for discharge.

## 2018-09-01 LAB
BACTERIA SPEC CULT: NORMAL
SERVICE CMNT-IMP: NORMAL

## 2018-09-19 ENCOUNTER — OFFICE VISIT (OUTPATIENT)
Dept: PAIN MANAGEMENT | Age: 53
End: 2018-09-19

## 2018-09-19 VITALS
HEIGHT: 65 IN | WEIGHT: 244 LBS | HEART RATE: 70 BPM | RESPIRATION RATE: 16 BRPM | TEMPERATURE: 97.7 F | BODY MASS INDEX: 40.65 KG/M2 | SYSTOLIC BLOOD PRESSURE: 123 MMHG | DIASTOLIC BLOOD PRESSURE: 78 MMHG

## 2018-09-19 DIAGNOSIS — M48.061 SPINAL STENOSIS, LUMBAR REGION, WITHOUT NEUROGENIC CLAUDICATION: ICD-10-CM

## 2018-09-19 DIAGNOSIS — G89.29 CHRONIC MIDLINE LOW BACK PAIN WITH RIGHT-SIDED SCIATICA: ICD-10-CM

## 2018-09-19 DIAGNOSIS — M54.41 CHRONIC MIDLINE LOW BACK PAIN WITH RIGHT-SIDED SCIATICA: ICD-10-CM

## 2018-09-19 DIAGNOSIS — G89.4 CHRONIC PAIN SYNDROME: ICD-10-CM

## 2018-09-19 DIAGNOSIS — Z79.899 ENCOUNTER FOR LONG-TERM (CURRENT) USE OF HIGH-RISK MEDICATION: Primary | ICD-10-CM

## 2018-09-19 LAB
ALCOHOL UR POC: NORMAL
AMPHETAMINES UR POC: NEGATIVE
BARBITURATES UR POC: NORMAL
BENZODIAZEPINES UR POC: NORMAL
BUPRENORPHINE UR POC: NEGATIVE
CANNABINOIDS UR POC: NEGATIVE
CARISOPRODOL UR POC: NORMAL
COCAINE UR POC: NEGATIVE
FENTANYL UR POC: NORMAL
MDMA/ECSTASY UR POC: NORMAL
METHADONE UR POC: NEGATIVE
METHAMPHETAMINE UR POC: NORMAL
METHYLPHENIDATE UR POC: NORMAL
OPIATES UR POC: NEGATIVE
OXYCODONE UR POC: NEGATIVE
PHENCYCLIDINE UR POC: NORMAL
PROPOXYPHENE UR POC: NORMAL
TRAMADOL UR POC: NORMAL
TRICYCLICS UR POC: NORMAL

## 2018-09-19 RX ORDER — ACETAMINOPHEN AND CODEINE PHOSPHATE 300; 30 MG/1; MG/1
TABLET ORAL
Qty: 120 TAB | Refills: 0 | Status: SHIPPED | OUTPATIENT
Start: 2018-09-19 | End: 2019-09-19

## 2018-09-19 RX ORDER — GABAPENTIN 300 MG/1
CAPSULE ORAL
Qty: 60 CAP | Refills: 5 | Status: SHIPPED | OUTPATIENT
Start: 2018-09-19 | End: 2018-12-17 | Stop reason: SDUPTHER

## 2018-09-19 RX ORDER — ACETAMINOPHEN AND CODEINE PHOSPHATE 300; 30 MG/1; MG/1
TABLET ORAL
Qty: 110 TAB | Refills: 0 | Status: SHIPPED | OUTPATIENT
Start: 2018-10-18 | End: 2018-12-17 | Stop reason: SDUPTHER

## 2018-09-19 RX ORDER — ACETAMINOPHEN AND CODEINE PHOSPHATE 300; 30 MG/1; MG/1
TABLET ORAL
Qty: 100 TAB | Refills: 0 | Status: SHIPPED | OUTPATIENT
Start: 2018-11-17 | End: 2018-12-17 | Stop reason: SDUPTHER

## 2018-09-19 NOTE — PATIENT INSTRUCTIONS
1. Modify current plan with no evidence of addiction or diversion. Stable on current medication without adverse events. 2. Refill Tylenol No. 3.  Take 1-2 tablets up to 2 times daily as needed ×1 month, then adjust down to no more than 110 per month ×1 month, then adjust down to no more than 100 per month until next visit. 3. Refill gabapentin 300 mg  every 12 hours. 4. Schedule appointment with Dr. Cinthya Staples and/or Dr. Geni Hanson to discuss spinal cord stimulator trial  5. Naloxone 4 mg nasal spray for opioid induced respiratory depression emergency only. 6. Discussed risks of addiction, dependency, and opioid induced hyperalgesia. Please remember to call at least 5 business days prior to your medication refill. Return to clinic in 3 months. Please call and cancel your appointment and pain management agreement if you do decide to transfer your care.

## 2018-09-19 NOTE — PROGRESS NOTES
Nursing Notes    Patient presents to the office today in follow-up. Patient rates her pain at 5/10 on the numerical pain scale. Last opioid agreement 09/19/18  Last urine drug screen 06/25/18    Comments: Patient is here today for a follow up appt today she states her pain level today is a 5  Patient is here today for a follow up appt today she states her pain level today is a 5  She states she went to the ER at [facility] for muscle spasms and was given Morphine via IV she was   Also given steroid dose pack also. She states a ct scan was done there. PHQ over the last two weeks 9/19/2018   PHQ Not Done -   Little interest or pleasure in doing things Nearly every day   Feeling down, depressed, irritable, or hopeless Nearly every day   Total Score PHQ 2 6   Trouble falling or staying asleep, or sleeping too much Several days   Feeling tired or having little energy Nearly every day   Poor appetite, weight loss, or overeating Not at all   Feeling bad about yourself - or that you are a failure or have let yourself or your family down Not at all   Trouble concentrating on things such as school, work, reading, or watching TV Several days   Moving or speaking so slowly that other people could have noticed; or the opposite being so fidgety that others notice Not at all   Thoughts of being better off dead, or hurting yourself in some way Not at all   PHQ 9 Score 11         POC UDS was performed in office today per verbal order per [facility]    Any new labs or imaging since last appointment? YES ct scan was done at [address] you been to an emergency room (ER) or urgent care clinic since your last visit? YES            Have you been hospitalized since your last visit? NO     If yes, where, when, and reason for visit? Have you seen or consulted any other health care providers outside of the [address] [place] [address]  since your last visit? NO     If yes, where, when, and reason for visit?    Ms. Irwin Person has a reminder for a \"due or due soon\" health maintenance. I have asked that she contact her primary care provider for follow-up on this health maintenance.

## 2018-09-19 NOTE — MR AVS SNAPSHOT
[address] [facility] [address] 
[phone] Patient: Tomas MRN:  [id number] Visit Information Date & Time Provider Department Dept. Phone Encounter #  
 9/19/2018  3:00 PM [facility]  Follow-up Instructions Return in about 3 months (around 12/19/2018). Your Appointments 10/1/2018  2:20 PM  
Follow Up with Harvinder Hannah MD  
[facility] [address]) Appt Note: 3wk f/u; med ck [address] [facility] [address]  
[phone]  
  
   
 [facility] [id number] Upcoming Health Maintenance Date Due Hepatitis C Screening 1965 Pneumococcal 19-64 Medium Risk (1 of 1 - PPSV23) 7/1/1984 PAP AKA CERVICAL CYTOLOGY 7/1/1986 DTaP/Tdap/Td series (1 - Tdap) 5/7/1999 FOBT Q 1 YEAR AGE 50-75 7/1/2015 Influenza Age 5 to Adult 8/1/2018 BREAST CANCER SCRN MAMMOGRAM 8/17/2019 Allergies as of 9/19/2018  Review Complete On: 9/19/2018 By: LIZY Wasserman Severity Noted Reaction Type Reactions Skelaxin [Metaxalone]  05/06/2010    Other (comments)  
 jittery Tramadol  03/22/2017    Other (comments) Halluctations Current Immunizations  Reviewed on 5/6/2010 Name Date  
 TD Vaccine 5/6/1999 Not reviewed this visit You Were Diagnosed With   
  
 Codes Comments Encounter for long-term (current) use of high-risk medication    -  Primary ICD-10-CM: O41.159 ICD-9-CM: V58.69 Chronic midline low back pain with right-sided sciatica     ICD-10-CM: M54.41, G89.29 ICD-9-CM: 724.2, 724.3, 338.29 Chronic pain syndrome     ICD-10-CM: G89.4 ICD-9-CM: 338.4 Spinal stenosis, lumbar region, without neurogenic claudication     ICD-10-CM: M48.061 
ICD-9-CM: 724.02 Vitals BP Pulse Temp Resp Height(growth percentile) Weight(growth percentile) 123/78 (BP 1 Location: Right arm, BP Patient Position: Sitting) 70 97.7 °F (36.5 °C) 16 5' 5\" (1.651 m) 244 lb (110.7 kg) BMI OB Status Smoking Status 40.6 kg/m2 Hysterectomy Never Smoker BMI and BSA Data Body Mass Index Body Surface Area  
 40.6 kg/m 2 2.25 m 2 Preferred Pharmacy Pharmacy Name Phone [facility]/PHARMACY #[id number]- [facility], [address] [place] [address] Your Updated Medication List  
  
   
This list is accurate as of 9/19/18  3:30 PM.  Always use your most recent med list.  
  
  
  
  
 * acetaminophen-codeine 300-30 mg per tablet Commonly known as:  TYLENOL #3  
1-2  Bid  prn * acetaminophen-codeine 300-30 mg per tablet Commonly known as:  TYLENOL #3  
1-2  Bid  Prn, no more than #110 per month Start taking on:  10/18/2018 * acetaminophen-codeine 300-30 mg per tablet Commonly known as:  TYLENOL #3  
1-2  Bid  Prn, no more than  #100 per month Start taking on:  11/17/2018  
  
 albuterol 90 mcg/actuation inhaler Commonly known as:  PROVENTIL HFA, VENTOLIN HFA, PROAIR HFA Take 1 Puff by inhalation every four (4) hours as needed for Wheezing. aspirin 325 mg tablet Commonly known as:  ASPIRIN Take 1 Tab by mouth daily. atorvastatin 80 mg tablet Commonly known as:  LIPITOR Take 1 Tab by mouth nightly. CYMBALTA 60 mg capsule Generic drug:  DULoxetine Take 60 mg by mouth nightly. docusate sodium 100 mg capsule Commonly known as:  Lucetta Kim Take 100 mg by mouth two (2) times a day. FERROUS SULFATE PO Take 325 mg by mouth. furosemide 20 mg tablet Commonly known as:  LASIX Take 40 mg by mouth daily. gabapentin 300 mg capsule Commonly known as:  NEURONTIN  
TAKE 1 CAPSULE BY MOUTH EVERY 12 HOURS FOR 30 DAYS  
  
 levothyroxine 50 mcg tablet Commonly known as:  SYNTHROID  
 TAKE 1 TABLET BY MOUTH EVERY DAY  
  
 meclizine 25 mg tablet Commonly known as:  ANTIVERT Take 25 mg by mouth daily as needed. naloxone 4 mg/actuation nasal spray Commonly known as:  [employer] Use 1 spray intranasally into 1 nostril. Use a new Narcan nasal spray for subsequent doses and administer into alternating nostrils. May repeat every 2 to 3 minutes as needed. potassium chloride SR 20 mEq tablet Commonly known as:  K-TAB Take 20 mEq by mouth daily. predniSONE 10 mg dose pack Commonly known as:  STERAPRED DS Use as directed  
  
 propranolol 20 mg tablet Commonly known as:  INDERAL Take 1 Tab by mouth three (3) times daily. Indications: MIGRAINE PREVENTION  
  
 SYMBICORT 160-4.5 mcg/actuation [facility]faa Generic drug:  budesonide-formoterol Take 2 Puffs by inhalation two (2) times a day. * topiramate 50 mg tablet Commonly known as:  TOPAMAX Take 1 Tab by mouth two (2) times a day. Indications: MIGRAINE PREVENTION  
  
 * topiramate 100 mg tablet Commonly known as:  TOPAMAX Take 1 Tab by mouth two (2) times a day. Indications: MIGRAINE PREVENTION  
  
 VITAMIN D3 1,000 unit Cap Generic drug:  cholecalciferol Take 2,000 Units by mouth daily. * Notice: This list has 5 medication(s) that are the same as other medications prescribed for you. Read the directions carefully, and ask your doctor or other care provider to review them with you. Prescriptions Printed Refills  
 acetaminophen-codeine (TYLENOL #3) 300-30 mg per tablet 0 Si-2  Bid  prn Class: Print  
 acetaminophen-codeine (TYLENOL #3) 300-30 mg per tablet 0 Starting on: 10/18/2018 Si-2  Bid  Prn, no more than #110 per month Class: Print  
 acetaminophen-codeine (TYLENOL #3) 300-30 mg per tablet 0 Starting on: 2018 Si-2  Bid  Prn, no more than  #100 per month Class: Print Prescriptions Sent to Pharmacy Refills gabapentin (NEURONTIN) 300 mg capsule 5 Sig: TAKE 1 CAPSULE BY MOUTH EVERY 12 HOURS FOR 30 DAYS Class: Normal  
 Pharmacy: [facility]/pharmacy #[id number]- Sarah Lopez, [address] [facility] #: [phone] We Performed the Following AMB POC DRUG SCREEN () [ hospitals] DRUG SCREEN [[id number] Custom] Follow-up Instructions Return in about 3 months (around 12/19/2018). Patient Instructions 1. Modify current plan with no evidence of addiction or diversion. Stable on current medication without adverse events. 2. Refill Tylenol No. 3.  Take 12 tablets up to 2 times daily as needed ×1 month, then adjust down to no more than 110 per month ×1 month, then adjust down to no more than 100 per month until next visit. 3. Refill gabapentin 300 mg  every 12 hours. 4. Schedule appointment with Dr. Bang Church and/or Dr. Soledad Beltran to discuss spinal cord stimulator trial 
5. Naloxone 4 mg nasal spray for opioid induced respiratory depression emergency only. 6. Discussed risks of addiction, dependency, and opioid induced hyperalgesia. Please remember to call at least 5 business days prior to your medication refill. Return to clinic in 3 months. Please call and cancel your appointment and pain management agreement if you do decide to transfer your care. Introducing [facility]! [facility] introduces [employer]blob patient portal. Now you can access parts of your medical record, email your doctor's office, and request medication refills online. 1. In your internet browser, go to [email or web address] 2. Click on the First Time User? Click Here link in the Sign In box. You will see the New Member Sign Up page. 3. Enter your Life[employer] Access Code exactly as it appears below. You will not need to use this code after youve completed the sign-up process. If you do not sign up before the expiration date, you must request a new code. · [employer] Access Code: [email or web address] Expires: 9/23/2018  2:13 PM 
 
4. Enter the last four digits of your Social Security Number (xxxx) and Date of Birth (mm/dd/yyyy) as indicated and click Submit. You will be taken to the next sign-up page. 5. Create a [employer] ID. This will be your [employer] login ID and cannot be changed, so think of one that is secure and easy to remember. 6. Create a [employer] password. You can change your password at any time. 7. Enter your Password Reset Question and Answer. This can be used at a later time if you forget your password. 8. Enter your e-mail address. You will receive e-mail notification when new information is available in [address] 9. Click Sign Up. You can now view and download portions of your medical record. 10. Click the Download Summary menu link to download a portable copy of your medical information. If you have questions, please visit the Frequently Asked Questions section of the [employer] website. Remember, [employer] is NOT to be used for urgent needs. For medical emergencies, dial 911. Now available from your iPhone and Android! Please provide this summary of care documentation to your next provider. Your primary care clinician is listed as [address]. If you have any questions after today's visit, please call [phone].

## 2018-09-19 NOTE — PROGRESS NOTES
HISTORY OF PRESENT ILLNESS  Roderick Amaya is a 48 y.o. female    HPI: Ms. Krishna Matthew  returns today for f/u of chronic low back pain. Prior lumbar Laminectomy 1/2016. H/o several lumbar epidural injections and RFA with no help. Significant side effects with Tramadol. She did not tolerate hydrocodone or oxycodone well. Ms. Krishna Matthew reports no significant changes since her last visit. She has been doing very well with her current treatment plan which has been offering significant pain control. We have been discussing possible spinal cord stimulator trial.  She has yet to schedule his appointment with Dr. Samina Mayfield. I have advised her to schedule appointment with Dr. Samina Mayfield or Dr. Henny Greene to discuss further trial.  We will continue with her current treatment plan with tapering plan as previously discussed. Please see tapering plan below. I will have her follow-up in 3 months for further evaluation and recommendation or sooner if needed. Medications are helping with pain control and quality of life. Her pain is 3/10 with medication and 8/10 without. Pt describes pain as aching and stabbing. Current treatment is helping to improve general activity, mood, walking, sleep, enjoyment of life    Ms. Zhou is tolerating medications well, with no side effects noted. She is able to stay more active with less discomfort with these current doses. The patient reports an average of 60% pain relief with current treatment/medications. She is informed of side effects, risks, and benefits of this regimen, and emphasizes that she derives a significant improvement in functionality and quality of life, and notes that non-opioid medications and therapies in the past have not offered significant benefit. Pt does report constipation but does not think it is associated with her pain medications. She receives treatment from her PCP. She  is otherwise doing well with no other complaints today.  She denies any adverse events including nausea, vomiting, dizziness, increased constipation, hallucinations, or seizures. MME: 18  COMM: 0  OSWESTRY: 38 %  POC UDS today. Confirmation pending. Allergies   Allergen Reactions    Skelaxin [Metaxalone] Other (comments)     jittery    Tramadol Other (comments)     Halluctations       Past Surgical History:   Procedure Laterality Date    HX GYN      partial hysterectomy due to abnormal pap    HX LUMBAR LAMINECTOMY  01-13-16    L4/5         Review of Systems   Constitutional: Negative for chills, fever and weight loss. HENT: Positive for hearing loss. Negative for congestion and sore throat. Eyes: Negative for blurred vision and double vision. Respiratory: Positive for shortness of breath. Negative for cough, hemoptysis and wheezing. Asthma, bronchitis    Cardiovascular: Negative for chest pain and palpitations. Gastrointestinal: Negative for abdominal pain, constipation, diarrhea, heartburn, nausea and vomiting. Genitourinary: Negative. Negative for dysuria and urgency. Musculoskeletal: Positive for back pain. Negative for falls. Skin: Negative for rash. Neurological: Positive for dizziness, tingling and headaches. Negative for focal weakness, seizures and loss of consciousness. Endo/Heme/Allergies: Negative for environmental allergies. Does not bruise/bleed easily. Psychiatric/Behavioral: Negative for depression, substance abuse and suicidal ideas. The patient has insomnia. The patient is not nervous/anxious. Physical Exam   Constitutional: She is oriented to person, place, and time and well-developed, well-nourished, and in no distress. No distress. HENT:   Head: Normocephalic and atraumatic. Eyes: EOM are normal.   Pulmonary/Chest: Effort normal.   Neurological: She is alert and oriented to person, place, and time. She displays weakness. Gait (using a cane) abnormal.   Skin: Skin is warm and dry. No rash noted. She is not diaphoretic. No erythema. Psychiatric: Mood, memory, affect and judgment normal.   Nursing note and vitals reviewed. ASSESSMENT:    1. Encounter for long-term (current) use of high-risk medication    2. Chronic midline low back pain with right-sided sciatica    3. Chronic pain syndrome    4. Spinal stenosis, lumbar region, without neurogenic claudication           [place] Prescription Monitoring Program was reviewed which does not demonstrate aberrancies and/or inconsistencies with regard to the historical prescribing of controlled medications to this patient by other providers. PLAN / Pt Instructions:  1. Modify current plan with no evidence of addiction or diversion. Stable on current medication without adverse events. 2. Refill Tylenol No. 3.  Take 1-2 tablets up to 2 times daily as needed ×1 month, then adjust down to no more than 110 per month ×1 month, then adjust down to no more than 100 per month until next visit. 3. Refill gabapentin 300 mg  every 12 hours. 4. Schedule appointment with Dr. Soraya Eagle and/or Dr. Vlad Richard to discuss spinal cord stimulator trial  5. Naloxone 4 mg nasal spray for opioid induced respiratory depression emergency only. 6. Discussed risks of addiction, dependency, and opioid induced hyperalgesia. Please remember to call at least 5 business days prior to your medication refill. Return to clinic in 3 months. Please call and cancel your appointment and pain management agreement if you do decide to transfer your care.       Medications Ordered Today   Medications    acetaminophen-codeine (TYLENOL #3) 300-30 mg per tablet     Si-2  Bid  prn     Dispense:  120 Tab     Refill:  0    acetaminophen-codeine (TYLENOL #3) 300-30 mg per tablet     Si-2  Bid  Prn, no more than #110 per month     Dispense:  110 Tab     Refill:  0    acetaminophen-codeine (TYLENOL #3) 300-30 mg per tablet     Si-2  Bid  Prn, no more than  #100 per month     Dispense:  100 Tab     Refill:  0    gabapentin (NEURONTIN) 300 mg capsule     Sig: TAKE 1 CAPSULE BY MOUTH EVERY 12 HOURS FOR 30 DAYS     Dispense:  60 Cap     Refill:  5       DISPOSITION     Pain medications are prescribed with the objective of pain relief and improved physical and psychosocial function in this patient.  Pain Meds and Quality Of Life have been reviewed. Nonpharmacologic therapy and non-opioid pharmacologic therapy have been considered. Opioid therapy is only prescribed if the expected benefits are anticipated to outweigh risks.  Counseled patient on proper use of prescribed medications.  Reviewed with patient self-help tools, home exercise, and lifestyle changes to assist the patient in self-management of symptoms.  Reviewed with patient the treatment plan, goals of treatment plan, and limitations of treatment plan, to include the potential for side effects from medications and procedures. If side effects occur, it is the responsibility of the patient to inform the clinic so that a change in the treatment plan can be made in a safe manner. The patient is advised that stopping prescribed medication may cause an increase in symptoms and possible medication withdrawal symptoms. The patient is informed an emergency room evaluation may be necessary if this occurs. Spent 25 minutes with patient today which more than 50% of that time was spent on counseling and coordination of care. Angel Miller, [address] 9/19/2018        Note: Please excuse any typographical errors. Voice recognition software was used for this note and may cause mistakes.

## 2018-11-16 NOTE — TELEPHONE ENCOUNTER
Erik Madison has called requesting a refill of their controlled medication, Tylenol #3, for the management of chronic LBP. Last office visit date: 9/19/18 with PETTY Villafuerte and has a f/u appt on 12/17/18 with Adriana    Last opioid care agreement 9/19/18  Last UDS was done 9/19/18    Date last  was pulled and reviewed : 11/16/18. Last fill:9/23/18    Was the patient compliant when the above report was pulled? yes    Analgesia: Patient reports 85% pain relief on current regimen. Aberrancies: None reported in the last 30 days. ADL's: Patient states they are able to perform ADL's on current regimen. Adverse Reaction: None reported by the patient at this time. Provider's last note and plan of care reviewed? yes  Request forwarded to provider for review. Prescription pre-printed by PETTY Sloan Client.

## 2018-12-17 ENCOUNTER — OFFICE VISIT (OUTPATIENT)
Dept: PAIN MANAGEMENT | Age: 53
End: 2018-12-17

## 2018-12-17 VITALS
HEIGHT: 65 IN | BODY MASS INDEX: 39.99 KG/M2 | HEART RATE: 70 BPM | RESPIRATION RATE: 16 BRPM | OXYGEN SATURATION: 100 % | WEIGHT: 240 LBS | TEMPERATURE: 97.3 F | SYSTOLIC BLOOD PRESSURE: 124 MMHG | DIASTOLIC BLOOD PRESSURE: 79 MMHG

## 2018-12-17 DIAGNOSIS — G89.4 CHRONIC PAIN SYNDROME: ICD-10-CM

## 2018-12-17 DIAGNOSIS — M79.2 NEUROPATHIC PAIN: ICD-10-CM

## 2018-12-17 DIAGNOSIS — Z79.899 ENCOUNTER FOR LONG-TERM (CURRENT) USE OF HIGH-RISK MEDICATION: ICD-10-CM

## 2018-12-17 DIAGNOSIS — G89.29 CHRONIC RIGHT-SIDED LOW BACK PAIN, WITH SCIATICA PRESENCE UNSPECIFIED: Primary | ICD-10-CM

## 2018-12-17 DIAGNOSIS — M54.5 CHRONIC RIGHT-SIDED LOW BACK PAIN, WITH SCIATICA PRESENCE UNSPECIFIED: Primary | ICD-10-CM

## 2018-12-17 RX ORDER — GABAPENTIN 300 MG/1
CAPSULE ORAL
Qty: 60 CAP | Refills: 3 | Status: SHIPPED | OUTPATIENT
Start: 2018-12-17 | End: 2021-07-20

## 2018-12-17 NOTE — PROGRESS NOTES
Nursing Notes    Patient presents to the office today in follow-up. Patient rates her pain at 5/10 on the numerical pain scale. Reviewed medications with counts as follows:    Rx Date filled Qty Dispensed Pill Count Last Dose Short   Acetaminophen-codine #3 9/23/18 120 12 today no                     Last opioid agreement 9/19/18  Last urine drug screen 9/19/18  PHQ over the last two weeks 12/17/2018   PHQ Not Done Active Diagnosis of Depression or Bipolar Disorder   Little interest or pleasure in doing things -   Feeling down, depressed, irritable, or hopeless -   Total Score PHQ 2 -   Trouble falling or staying asleep, or sleeping too much -   Feeling tired or having little energy -   Poor appetite, weight loss, or overeating -   Feeling bad about yourself - or that you are a failure or have let yourself or your family down -   Trouble concentrating on things such as school, work, reading, or watching TV -   Moving or speaking so slowly that other people could have noticed; or the opposite being so fidgety that others notice -   Thoughts of being better off dead, or hurting yourself in some way -   PHQ 9 Score -       Comments:     POC UDS was not performed in office today    Any new labs or imaging since last appointment? NO    Have you been to an emergency room (ER) or urgent care clinic since your last visit? NO            Have you been hospitalized since your last visit? NO     If yes, where, when, and reason for visit? Have you seen or consulted any other health care providers outside of the [address] [place] [address]  since your last visit? NO     If yes, where, when, and reason for visit? Ms. Albino Gregory has a reminder for a \"due or due soon\" health maintenance. I have asked that she contact her primary care provider for follow-up on this health maintenance.

## 2018-12-17 NOTE — PATIENT INSTRUCTIONS
Learning About Sleeping Well  What does sleeping well mean? Sleeping well means getting enough sleep. How much sleep is enough varies among people. The number of hours you sleep is not as important as how you feel when you wake up. If you do not feel refreshed, you probably need more sleep. Another sign of not getting enough sleep is feeling tired during the day. The average total nightly sleep time is 7½ to 8 hours. Healthy adults may need a little more or a little less than this. Why is getting enough sleep important? Getting enough quality sleep is a basic part of good health. When your sleep suffers, your mood and your thoughts can suffer too. You may find yourself feeling more grumpy or stressed. Not getting enough sleep also can lead to serious problems, including injury, accidents, anxiety, and depression. What might cause poor sleeping? Many things can cause sleep problems, including:  · Stress. Stress can be caused by fear about a single event, such as giving a speech. Or you may have ongoing stress, such as worry about work or school. · Depression, anxiety, and other mental or emotional conditions. · Changes in your sleep habits or surroundings. This includes changes that happen where you sleep, such as noise, light, or sleeping in a different bed. It also includes changes in your sleep pattern, such as having jet lag or working a late shift. · Health problems, such as pain, breathing problems, and restless legs syndrome. · Lack of regular exercise. How can you help yourself? Here are some tips that may help you sleep more soundly and wake up feeling more refreshed. Your sleeping area  · Use your bedroom only for sleeping and sex. A bit of light reading may help you fall asleep. But if it doesn't, do your reading elsewhere in the house. Don't watch TV in bed. · Be sure your bed is big enough to stretch out comfortably, especially if you have a sleep partner.   · Keep your bedroom quiet, dark, and cool. Use curtains, blinds, or a sleep mask to block out light. To block out noise, use earplugs, soothing music, or a \"white noise\" machine. Your evening and bedtime routine  · Create a relaxing bedtime routine. You might want to take a warm shower or bath, listen to soothing music, or drink a cup of noncaffeinated tea. · Go to bed at the same time every night. And get up at the same time every morning, even if you feel tired. What to avoid  · Limit caffeine (coffee, tea, caffeinated sodas) during the day, and don't have any for at least 4 to 6 hours before bedtime. · Don't drink alcohol before bedtime. Alcohol can cause you to wake up more often during the night. · Don't smoke or use tobacco, especially in the evening. Nicotine can keep you awake. · Don't take naps during the day, especially close to bedtime. · Don't lie in bed awake for too long. If you can't fall asleep, or if you wake up in the middle of the night and can't get back to sleep within 15 minutes or so, get out of bed and go to another room until you feel sleepy. · Don't take medicine right before bed that may keep you awake or make you feel hyper or energized. Your doctor can tell you if your medicine may do this and if you can take it earlier in the day. If you can't sleep  · Imagine yourself in a peaceful, pleasant scene. Focus on the details and feelings of being in a place that is relaxing. · Get up and do a quiet or boring activity until you feel sleepy. · Don't drink any liquids after 6 p.m. if you wake up often because you have to go to the bathroom. Where can you learn more? Go to http://leesa-jean carlos.info/. Enter M908 in the search box to learn more about \"Learning About Sleeping Well. \"  Current as of: December 7, 2017  Content Version: 11.8  © 4130-6927 Healthwise, [employer].  Care instructions adapted under license by [facility] (which disclaims liability or warranty for this information). If you have questions about a medical condition or this instruction, always ask your healthcare professional. [address] any warranty or liability for your use of this information. Safe Use of Opioid Pain Medicine: Care Instructions  Your Care Instructions  Pain is your body's way of warning you that something is wrong. Pain feels different for everybody. Only you can describe your pain. A doctor can suggest or prescribe many types of medicines for pain. These range from over-the-counter medicines like acetaminophen (Tylenol) to powerful medicines called opioids. Examples of opioids are fentanyl, hydrocodone, morphine, and oxycodone. Heroin is an illegal opioid  Opioids are strong medicines. They can help you manage pain when you use them the right way. But if you misuse them, they can cause serious harm and even death. For these reasons, doctors are very careful about how they prescribe opioids. If you decide to take opioids, here are some things to remember. · Keep your doctor informed. You can get addicted to opioids. The risk is higher if you have a history of substance use. Your doctor will monitor you closely for signs of misuse and addiction and to figure out when you no longer need to take opioids. · Make a treatment plan. The goal of your plan is to be able to function and do the things you need to do, even if you still have some pain. You might be able to manage your pain with other non-opioid options like physical therapy, relaxation, or over-the-counter pain medicines. · Be aware of the side effects. Opioids can cause serious side effects, such as constipation, dry mouth, and nausea. And over time, you may need a higher dose to get pain relief. This is called tolerance. Your body also gets used to opioids. This is called physical dependence. If you suddenly stop taking them, you may have withdrawal symptoms.   The doctor carefully considered what pain medicine is right for you. You may not have received opioids if your doctor was concerned about drug interactions or your safety, or if he or she had other concerns. It is best to have one doctor or clinic treat your pain. This way you will get the pain medicine that will help you the most. And a doctor will be able to watch for any problems that the medicine might cause. The doctor has checked you carefully, but problems can develop later. If you notice any problems or new symptoms,  get medical treatment right away. Follow-up care is a key part of your treatment and safety. Be sure to make and go to all appointments, and call your doctor if you are having problems. It's also a good idea to know your test results and keep a list of the medicines you take. How can you care for yourself at home? · If you need to take opioids to manage your pain, remember these safety tips. ? Follow directions carefully. It's easy to misuse opioids if you take a dose other than what's prescribed by your doctor. This can lead to overdose and even death. Even sharing them with someone they weren't meant for is misuse. ? Be cautious. Opioids may affect your judgment and decision making. Do not drive or operate machinery until you can think clearly. Talk with your doctor about when it is safe to drive. ? Reduce the risk of drug interactions. Opioids can be dangerous if you take them with alcohol or with certain drugs like sleeping pills and muscle relaxers. Make sure your doctor knows about all the other medicines you take, including over-the-counter medicines. Don't start any new medicines before you talk to your doctor or pharmacist.  ? Keep others safe. Store opioids in a safe and secure place. Make sure that pets, children, friends, and family can't get to them. When you're done using opioids, make sure to properly dispose of them.  You can either use a community drug take-back program or your drugstore's mail-back program. If one of these programs isn't available, you can flush opioid skin patches and unused opioid pills down the toilet. ? Reduce the risk of overdose. Misuse of opioids can be very dangerous. Protect yourself by asking your doctor about a naloxone rescue kit. It can help youand even save your lifeif you take too much of an opioid. · Try other ways to reduce pain. ? Relax, and reduce stress. Relaxation techniques such as deep breathing or meditation can help. ? Keep moving. Gentle, daily exercise can help reduce pain over the long run. Try low- or no-impact exercises such as walking, swimming, and stationary biking. Do stretches to stay flexible. ? Try heat, cold packs, and massage. ? Get enough sleep. Pain can make you tired and drain your energy. Talk with your doctor if you have trouble sleeping because of pain. ? Think positive. Your thoughts can affect your pain level. Do things that you enjoy to distract yourself when you have pain instead of focusing on the pain. See a movie, read a book, listen to music, or spend time with a friend. · If you are not taking a prescription pain medicine, ask your doctor if you can take an over-the-counter medicine. When should you call for help? Call your doctor now or seek immediate medical care if:    · You have a new kind of pain.     · You have new symptoms, such as a fever or rash, along with the pain.    Watch closely for changes in your health, and be sure to contact your doctor if:    · You think you might be using too much pain medicine, and you need help to use less or stop.     · Your pain gets worse.     · You would like a referral to a doctor or clinic that specializes in pain management. Where can you learn more? Go to http://leesa-jean carlos.info/. Enter R108 in the search box to learn more about \"Safe Use of Opioid Pain Medicine: Care Instructions. \"  Current as of: September 10, 2017  Content Version: 11.8  © 2453-1656 [employer]. Care instructions adapted under license by [employer] (which disclaims liability or warranty for this information). If you have questions about a medical condition or this instruction, always ask your healthcare professional. [address] any warranty or liability for your use of this information.

## 2018-12-17 NOTE — PROGRESS NOTES
Referral date 09/18/17, source Dr Alessia Velasco and for Low back pain. HPI:  Sylwia Ballard is a 48 y.o. female here for f/u visit for ongoing evaluation of chronic low back pain. Pt was last seen here on 09/196/18. Pt denies interval changes on the character or distribution of pain. Pain is located right side of lower back with radiation down right leg stopping right past the knee. The pain is described as aching to lower back, pins and needles to right lower extremity. Pain at its best is 4/10. Pain at its worse is 7/10. The pain is worsened by prolonged sitting, bending, lifting, prolonged walking. Symptoms are improved by rest, opioids, gabapentin, ice. Patient has tried physical therapy with no perceived benefit. Since last visit, patient fell last month with no injury. Patient is awaiting SCS trial.  Patient has history of lumbar laminectomy L4-5 and 2016.      Social History     Socioeconomic History    Marital status:      Spouse name: Not on file    Number of children: Not on file    Years of education: Not on file    Highest education level: Not on file   Social Needs    Financial resource strain: Not on file    Food insecurity - worry: Not on file    Food insecurity - inability: Not on file    Transportation needs - medical: Not on file   [employer] needs - non-medical: Not on file   Occupational History    Occupation: unemployed     Comment: long term disability   Tobacco Use    Smoking status: Never Smoker    Smokeless tobacco: Never Used   Substance and Sexual Activity    Alcohol use: No    Drug use: No    Sexual activity: Not on file     Comment: Hysterectomy   Other Topics Concern    Not on file   Social History Narrative    Not on file     Family History   Problem Relation Age of Onset    Diabetes Mother     Elevated Lipids Mother     Hypertension Mother     Cancer Father         pancreatic    Diabetes Sister     Hypertension Sister     Diabetes Brother     Diabetes Daughter     Hypertension Daughter      Allergies   Allergen Reactions    Skelaxin [Metaxalone] Other (comments)     jittery    Tramadol Other (comments)     Halluctations     Past Medical History:   Diagnosis Date    Asthma     Back pain with radiation     Cardiac echocardiogram, [facility] study 12/29/2016    EF 55-60%. No WMA. Right to left atrial septal shunt suggests PFO. Similar to study of 10/18/16.  Cardiovascular LE venous duplex 10/18/2016    No DVT bilaterally.  Contact dermatitis and other eczema, due to unspecified cause     CTS (carpal tunnel syndrome)     HSV-2 (herpes simplex virus 2) infection     Hypercholesterolemia     Hypothyroidism     Ill-defined condition     Vertigo    L4-L5 disc bulge     Leg swelling     Migraines     Positive PPD, treated     S/P lumbar fusion 1/13/2016    1. L4-5 bilateral hemilaminotomy, medial facetectomy, foraminotomy, diskectomy L4-5. 2. Transforaminal lumbar interbody fusion with PEEK cage and demineralized bone graft L4-L5 posterolateral fusion. 3. Segmental spinal instrumentation, DePuy Expedium type L4-L5. 01/13/2016 By Dr. Patric Lujan Sciatica     EMG '17 , mild sciatic EMG findings    Stroke [facility])     12/2016    Vertigo      Past Surgical History:   Procedure Laterality Date    HX GYN      partial hysterectomy due to abnormal pap    HX LUMBAR LAMINECTOMY  01-13-16    L4/5     Current Outpatient Medications on File Prior to Visit   Medication Sig    acetaminophen-codeine (TYLENOL #3) 300-30 mg per tablet 1-2  Bid  prn    gabapentin (NEURONTIN) 300 mg capsule TAKE 1 CAPSULE BY MOUTH EVERY 12 HOURS FOR 30 DAYS    propranolol (INDERAL) 20 mg tablet Take 1 Tab by mouth three (3) times daily. Indications: MIGRAINE PREVENTION    aspirin (ASPIRIN) 325 mg tablet Take 1 Tab by mouth daily.  atorvastatin (LIPITOR) 80 mg tablet Take 1 Tab by mouth nightly.  furosemide (LASIX) 20 mg tablet Take 40 mg by mouth daily.     budesonide-formoterol (SYMBICORT) 160-4.5 mcg/actuation HFA inhaler Take 2 Puffs by inhalation two (2) times a day.  naloxone (NARCAN) 4 mg/actuation nasal spray Use 1 spray intranasally into 1 nostril. Use a new Narcan nasal spray for subsequent doses and administer into alternating nostrils. May repeat every 2 to 3 minutes as needed.  albuterol (PROVENTIL HFA, VENTOLIN HFA, PROAIR HFA) 90 mcg/actuation inhaler Take 1 Puff by inhalation every four (4) hours as needed for Wheezing.  FERROUS SULFATE PO Take 325 mg by mouth.  potassium chloride SR (K-TAB) 20 mEq tablet Take 20 mEq by mouth daily.  levothyroxine (SYNTHROID) 50 mcg tablet TAKE 1 TABLET BY MOUTH EVERY DAY    cholecalciferol (VITAMIN D3) 1,000 unit cap Take 2,000 Units by mouth daily.  meclizine (ANTIVERT) 25 mg tablet Take 25 mg by mouth daily as needed.  DULoxetine (CYMBALTA) 60 mg capsule Take 60 mg by mouth nightly.  topiramate (TOPAMAX) 50 mg tablet Take 1 Tab by mouth two (2) times a day. Indications: MIGRAINE PREVENTION     No current facility-administered medications on file prior to visit. ROS:  Denies fever, chills, nausea, vomiting, diarrhea, constipation, abdominal pain, chest pain, weakness, trouble swallowing, changes in vision, changes in hearing, falls, dizziness, bladder incontinence, bowel incontinence, depression, anxiety, suicidal ideations, homicidal ideations or alcohol use. Positive findings include shortness of breath/trouble breathing chronic not new    Opioid specific risk: Asthma, vertigo. Opioid specific history: Concurrent use opioids since approximately 2017, no opioid holiday.     Vitals:    12/17/18 0856   BP: 124/79   Pulse: 70   Resp: 16   Temp: 97.3 °F (36.3 °C)   TempSrc: Oral   SpO2: 100%   Weight: 108.9 kg (240 lb)   Height: 5' 5\" (1.651 m)   PainSc:   5   PainLoc: Back        Imaging:  \"\"\"\" 2/13/17 MRI lumbar spine W WO contrast  IMPRESSION:     1. Interval posterior and transforaminal interbody fusion at L4/5. Interval  decompression of the central canal and foramina. No recurrent or residual disc  herniation.  -Mild expected right lateral epidural fibrosis at this level.  -No high-grade central canal or foraminal stenosis at any level. 2. Perhaps tiny L5/S1 subarticular/foraminal protrusion but no nerve  impingement. Moderate facet arthrosis at this level. 3. Mild L3/4 central disc protrusion. \"\"\"\"    Labs:   BUN/Cr: \"\"\"8/30/18   11/1.05\"\"\"  AST/ALT: \"\"\"\" 7/23/17   15/19\"\"\"      Physical exam:  AFVSS, no acute distress, obese body habitus. A&OXs 3. Normocephalic, atraumatic. Conjugate gaze, clear sclerae. Speech is clear and appropriate. Mood is appropriate and patient is cooperative. Gait and balance are within functional limits. Non-labored breathing. Lungs CTA B/L. No wheezing, rales or rhonchi. LE:   Strength for right lower extremity is 5/5 for hip flexion, knee extension, dorsiflexion, extensor hallucis longus, plantar flexion. Strength for left lower extremity is 5/5 for hip flexion, knee extension, dorsiflexion, extensor hallucis longus, plantar flexion. Sensation to light touch is intact for right L2, 3, 5, S1, S2.  Decreased sensation L4  Sensation to light touch is intact for left L2-S2. Muscle Stretch reflexes for right lower extremity are 1+ for L4, S1.   Muscle Stretch reflexes for left lower extremity are 1+ for L4, S1.   Negative ankle clonus on the right and the left. Downgoing plantar response on the right and the left. Negative seated straight leg raise bilaterally. Full AROM lumbar flexion decreased by 75% to endrange reproduction of primary pain. Full AROM lumbar extension decreased by 75% to endrange reproduction of primary pain. TTP midline lumbar.     Calculated MEQ -approximately 18  Naloxone rescue - yes  Prophylactic bowel program - no  Date of last OCA 9/9/18  Last UDS 9/19/18, consistent    date checked today, findings consistent    Primary Care Physician  Cherelle Bonner  [address] [facility] [address] [phone]    Today   Last Visit  Prior Visit  ORT -        PGIC -3 and 4       JUSTYN -46%  38%    COMM - 6  0       PHQ -- . PHQ over the last two weeks 12/17/2018   PHQ Not Done Active Diagnosis of Depression or Bipolar Disorder   Little interest or pleasure in doing things -   Feeling down, depressed, irritable, or hopeless -   Total Score PHQ 2 -   Trouble falling or staying asleep, or sleeping too much -   Feeling tired or having little energy -   Poor appetite, weight loss, or overeating -   Feeling bad about yourself - or that you are a failure or have let yourself or your family down -   Trouble concentrating on things such as school, work, reading, or watching TV -   Moving or speaking so slowly that other people could have noticed; or the opposite being so fidgety that others notice -   Thoughts of being better off dead, or hurting yourself in some way -   PHQ 9 Score -       DSM V-OUD Screen -deferred    Assessment/Plan:     ICD-10-CM ICD-9-CM    1. Chronic right-sided low back pain, with sciatica presence unspecified M54.5 724.2     G89.29 338.29    2. Encounter for long-term (current) use of high-risk medication Z79.899 V58.69    3. Chronic pain syndrome G89.4 338.4    4. Neuropathic pain M79.2 729.2       Referred patient to Dr. Darshana Dos Santos for SCS trial per patient request    Patient to trial OTC Tylenol 500 mg 1-2 tablets up to twice daily as needed. Max daily dose 2000 mg. Patient to trial Aleve as directed on bottle as needed for pain. Ordered compound cream to be used 3-4 times daily as needed for pain ([facility])    Patient does not refill opioid medication often approximately every 3 months, still has refill prescription in hand. Reordered gabapentin 300 mg capsules 1 capsule every 12 hours for neuropathic pain    Do not recommend long term opioid therapy for this patient at this time. Pt currently taking acetaminophen/codeine No. 3 1 tablet up to 4 times daily as needed. Their MME is 18. Today, we will continue the weaning of patients opioid medication with a goal of being opioid free, pending safety and compliance. Pt instructed to call if they experience any signs of withdrawal (diarrhea, nausea, vomiting, sweating or chills, agitation, itching). Today, patient already has prescription for acetaminophen/codeine No. 3 1 tablet up to 4 times daily as needed just awaiting finances to be for a refill. Their new MME is 18. Pt instructed to call 5-7 days before they run out of their medications for refill. At this time pt will be provided with acetaminophen/codeine No. 3 1 tablet 4 times daily as needed with a total of 110 tablets to be budgeted over 30 days pending safety and compliance. At following refill, pt will be provided with acetaminophen/codeine No. 3 1 tablet 4 times daily as needed with a total of 100 tablets to be budgeted over 30 days pending safety and compliance. At next office visit, the plan is to provide patient with acetaminophen/codeine No. 3 1 tablet 3 times daily as needed. Their new MME will be 13.5. If patient has difficulty with the wean or difficulty with cravings we will consider referral to mental health for ongoing assessment and treatment for opioid use disorder. Consider another trial of physical therapy, CBT    Follow up ongoing assessment and ongoing development of integrative and comprehensive plan of care for chronic pain. Goals: To establish complementary and integrative plan of care to address chronic pain issues while minimizing pharmaceuticals to maximize patient's function improve quality of life. Education:  Patient again educated on the importance of strict compliance with the opioid care agreement while on opioid therapy. Patient also again educated that they should avoid driving while on chronic opioid therapy.  Also advised to avoid alcohol and to avoid benzodiazepines while on opioid therapy. Handouts given regarding opioid safety and sleep health. Patient Homework:  Continue to independently investigate yoga for persons with chronic pain. Follow-up Disposition:  Return in about 3 months (around 3/17/2019). [address] was used for portions of this report. Unintended errors may occur.

## 2019-03-13 ENCOUNTER — TELEPHONE (OUTPATIENT)
Dept: PAIN MANAGEMENT | Age: 54
End: 2019-03-13

## 2019-03-13 NOTE — TELEPHONE ENCOUNTER
Patient identified using two patient identifiers (name and )    Contacted patient regarding missed appt this morning; patient states that she was ill and could not make appt; patient rescheduled to 03/15/19 at [phone].

## 2019-03-14 NOTE — PROGRESS NOTES
Referral date 09/18/17, source Dr Woo Reese and for Low back pain. Calculated MEQ -unknown patient has filled prescription from September and has not received a prescription from this clinic. Date of last OCA 9/9/18  Last UDS 09/19/19, consistent    date checked today, findings not consistent patient filled opioid prescription from September      Today   Last Visit  Prior Visit  ORT -        PGIC -4 and 5  3 and 4     JUSTYN - incomplete 46%   38%  COMM - 1  6   0      HPI:  Liat Seals is a 48 y.o. female here for f/u visit for ongoing evaluation of chronic low back pain. Pt was last seen here on 12/17/18. Pt denies interval changes on the character or distribution of pain. Pain is located Left-sided lower back with radiation down left inner thigh before knee. The pain is described as  stabbing. Pain at its best is  5/10. Pain at its worse is  6/10. The pain is worsened by prolonged sitting, bending, lifting, prolonged walking. Symptoms are improved by rest, opioids, gabapentin, ice. Patient has tried physical therapy, aleve, compound cream  with no perceived benefit. Patient is awaiting SCS trial.Patient has history of lumbar laminectomy L4-5 and 2016. Since last visit, Pt is awaiting SCS trial, patient states she is waiting on paperwork to be transferred over to  [facility] for SCS trial.      ROS:  Positive findings include fever, nausea, vomiting, diarrhea, abdominal pain patient currently getting over sickness  Denies chills, constipation, chest pain, shortness or breath/trouble breathing, weakness, trouble swallowing, changes in vision, changes in hearing, falls, dizziness, bladder incontinence, bowel incontinence, depression, anxiety, suicidal ideations, homicidal ideations or alcohol use. Opioid specific risk: Asthma, vertigo. Opioid specific history: Concurrent use opioids since approximately 2017, no opioid holiday.       Vitals:    03/15/19 0805   BP: 122/81   Pulse: 72   Resp: 16   Temp: 98.3 °F (36.8 °C)   TempSrc: Oral   SpO2: 97%   Weight: 108 kg (238 lb)   Height: 5' 5\" (1.651 m)   PainSc:   5   PainLoc: Hip        Imaging:  \"\"\"\" 2/13/17 MRI lumbar spine W WO contrast  IMPRESSION:     1. Interval posterior and transforaminal interbody fusion at L4/5. Interval  decompression of the central canal and foramina. No recurrent or residual disc  herniation.  -Mild expected right lateral epidural fibrosis at this level.  -No high-grade central canal or foraminal stenosis at any level. 2. Perhaps tiny L5/S1 subarticular/foraminal protrusion but no nerve  impingement. Moderate facet arthrosis at this level. 3. Mild L3/4 central disc protrusion. \"\"\"\"     Labs:   BUN/Cr: \"\"\"8/30/18   11/1.05\"\"\"  AST/ALT: \"\"\"\" 7/23/17   15/19\"\"\"       Physical exam:  AFVSS, no acute distress,overweight body habitus. A&OXs 3. Normocephalic, atraumatic. Conjugate gaze, clear sclerae. Speech is clear and appropriate. Mood is appropriate and patient is cooperative. Gait and balance are within functional limits. Non-labored breathing. LE:   Full AROM lumbar flexion decreased by 75% to endrange reproduction of primary pain. Full AROM lumbar extension decreased by 75% to endrange reproduction of primary pain. Strength for right lower extremity is 5/5 for hip flexion, knee extension, dorsiflexion, extensor hallucis longus, plantar flexion. Strength for left lower extremity is 5/5 for hip flexion, knee extension, dorsiflexion, extensor hallucis longus, plantar flexion. Sensation to light touch is intact for right L2-S2. Sensation to light touch is intact for left L2-S2. Negative ankle clonus on the right and the left. Downgoing plantar response on the right and the left. Negative seated straight leg raise bilaterally. TTP midline lumbar, left SIJ.     Primary Care Physician  Apoorva Pascual  [facility] [address]  [phone]      PHQ -- .  3 most recent [facility] Screens 3/15/2019   PHQ Not Done - Little interest or pleasure in doing things Not at all   Feeling down, depressed, irritable, or hopeless Not at all   Total Score PHQ 2 0   Trouble falling or staying asleep, or sleeping too much -   Feeling tired or having little energy -   Poor appetite, weight loss, or overeating -   Feeling bad about yourself - or that you are a failure or have let yourself or your family down -   Trouble concentrating on things such as school, work, reading, or watching TV -   Moving or speaking so slowly that other people could have noticed; or the opposite being so fidgety that others notice -   Thoughts of being better off dead, or hurting yourself in some way -   PHQ 9 Score -       Assessment/Plan:     ICD-10-CM ICD-9-CM    1. Chronic right-sided low back pain, with sciatica presence unspecified M54.5 724.2 DRUG SCREEN    G89.29 338.29 AMB POC DRUG SCREEN ()      acetaminophen (TYLENOL EXTRA STRENGTH) 500 mg tablet   2. Neuropathic pain M79.2 729.2 DRUG SCREEN      AMB POC DRUG SCREEN ()   3. Sacroiliitis (HCC) M46.1 720.2    4. Chronic pain syndrome G89.4 338.4 DRUG SCREEN      AMB POC DRUG SCREEN ()      acetaminophen (TYLENOL EXTRA STRENGTH) 500 mg tablet   5. Encounter for long-term (current) use of high-risk medication Z79.899 V58.69 DRUG SCREEN      AMB POC DRUG SCREEN ()      Ordered Tylenol 500 mg 1-2 tablets p.o. 3 times daily PRN. Max daily dose 3000 mg     continue gabapentin 300 mg capsules 1 capsule every 24 hours for neuropathic pain. Consider another trial of physical therapy, CBT    Pt may benefit from massage therapy; list of [facility] locations given to patient that provide this service. Patient given information on (treat your own back) book by Joe Caro for her to obtain on her own. Opioids will be discontinued from this office as they are no longer a safe option.   Unsure how many prescriptions patient has at home since she just refilled from September 19, 2018 on 3/11/19. We will continue to treat patient's chronic pain with other modalities not to include opioids. Will discontinue opioid care agreement in this office for this patient.     Follow up ongoing assessment and ongoing development of integrative and comprehensive plan of care for chronic pain. Goals: To establish complementary and integrative plan of care to address chronic pain issues while minimizing pharmaceuticals to maximize patient's function improve quality of life. Education:  Handouts given regarding opioid safety and sleep health. Patient Homework:  Continue to independently investigate yoga for persons with chronic pain. Follow-up Disposition:  Return in about 3 months (around 6/15/2019). [address] was used for portions of this report. Unintended errors may occur.     Social History     Socioeconomic History    Marital status:      Spouse name: Not on file    Number of children: Not on file    Years of education: Not on file    Highest education level: Not on file   Social Needs    Financial resource strain: Not on file    Food insecurity - worry: Not on file    Food insecurity - inability: Not on file    Transportation needs - medical: Not on file   [employer] needs - non-medical: Not on file   Occupational History    Occupation: unemployed     Comment: long term disability   Tobacco Use    Smoking status: Never Smoker    Smokeless tobacco: Never Used   Substance and Sexual Activity    Alcohol use: No    Drug use: No    Sexual activity: Not on file     Comment: Hysterectomy   Other Topics Concern    Not on file   Social History Narrative    Not on file     Family History   Problem Relation Age of Onset    Diabetes Mother     Elevated Lipids Mother     Hypertension Mother     Cancer Father         pancreatic    Diabetes Sister     Hypertension Sister     Diabetes Brother     Diabetes Daughter     Hypertension Daughter      Allergies   Allergen Reactions    Skelaxin [Metaxalone] Other (comments)     jittery    Tramadol Other (comments)     Halluctations     Past Medical History:   Diagnosis Date    Asthma     Back pain with radiation     Cardiac echocardiogram, [facility] study 12/29/2016    EF 55-60%. No WMA. Right to left atrial septal shunt suggests PFO. Similar to study of 10/18/16.  Cardiovascular LE venous duplex 10/18/2016    No DVT bilaterally.  Contact dermatitis and other eczema, due to unspecified cause     CTS (carpal tunnel syndrome)     HSV-2 (herpes simplex virus 2) infection     Hypercholesterolemia     Hypothyroidism     Ill-defined condition     Vertigo    L4-L5 disc bulge     Leg swelling     Migraines     Positive PPD, treated     S/P lumbar fusion 1/13/2016    1. L4-5 bilateral hemilaminotomy, medial facetectomy, foraminotomy, diskectomy L4-5. 2. Transforaminal lumbar interbody fusion with PEEK cage and demineralized bone graft L4-L5 posterolateral fusion. 3. Segmental spinal instrumentation, DePuy Expedium type L4-L5. 01/13/2016 By Dr. Aman Edwards Sciatica     EMG '17 , mild sciatic EMG findings    Stroke [facility])     12/2016    Vertigo      Past Surgical History:   Procedure Laterality Date    HX GYN      partial hysterectomy due to abnormal pap    HX LUMBAR LAMINECTOMY  01-13-16    L4/5     Current Outpatient Medications on File Prior to Visit   Medication Sig    gabapentin (NEURONTIN) 300 mg capsule TAKE 1 CAPSULE BY MOUTH EVERY 12 HOURS FOR 30 DAYS    acetaminophen-codeine (TYLENOL #3) 300-30 mg per tablet 1-2  Bid  prn    propranolol (INDERAL) 20 mg tablet Take 1 Tab by mouth three (3) times daily. Indications: MIGRAINE PREVENTION    aspirin (ASPIRIN) 325 mg tablet Take 1 Tab by mouth daily.  atorvastatin (LIPITOR) 80 mg tablet Take 1 Tab by mouth nightly.  furosemide (LASIX) 20 mg tablet Take 40 mg by mouth daily.     budesonide-formoterol (SYMBICORT) 160-4.5 mcg/actuation HFA inhaler Take 2 Puffs by inhalation two (2) times a day.  naloxone (NARCAN) 4 mg/actuation nasal spray Use 1 spray intranasally into 1 nostril. Use a new Narcan nasal spray for subsequent doses and administer into alternating nostrils. May repeat every 2 to 3 minutes as needed.  albuterol (PROVENTIL HFA, VENTOLIN HFA, PROAIR HFA) 90 mcg/actuation inhaler Take 1 Puff by inhalation every four (4) hours as needed for Wheezing.  FERROUS SULFATE PO Take 325 mg by mouth.  potassium chloride SR (K-TAB) 20 mEq tablet Take 20 mEq by mouth daily.  levothyroxine (SYNTHROID) 50 mcg tablet TAKE 1 TABLET BY MOUTH EVERY DAY    cholecalciferol (VITAMIN D3) 1,000 unit cap Take 2,000 Units by mouth daily.  meclizine (ANTIVERT) 25 mg tablet Take 25 mg by mouth daily as needed.  DULoxetine (CYMBALTA) 60 mg capsule Take 60 mg by mouth nightly. No current facility-administered medications on file prior to visit.

## 2019-03-15 ENCOUNTER — OFFICE VISIT (OUTPATIENT)
Dept: PAIN MANAGEMENT | Age: 54
End: 2019-03-15

## 2019-03-15 VITALS
HEART RATE: 72 BPM | RESPIRATION RATE: 16 BRPM | SYSTOLIC BLOOD PRESSURE: 122 MMHG | HEIGHT: 65 IN | BODY MASS INDEX: 39.65 KG/M2 | TEMPERATURE: 98.3 F | DIASTOLIC BLOOD PRESSURE: 81 MMHG | OXYGEN SATURATION: 97 % | WEIGHT: 238 LBS

## 2019-03-15 DIAGNOSIS — G89.29 CHRONIC RIGHT-SIDED LOW BACK PAIN, WITH SCIATICA PRESENCE UNSPECIFIED: Primary | ICD-10-CM

## 2019-03-15 DIAGNOSIS — M46.1 SACROILIITIS (HCC): ICD-10-CM

## 2019-03-15 DIAGNOSIS — G89.4 CHRONIC PAIN SYNDROME: ICD-10-CM

## 2019-03-15 DIAGNOSIS — M79.2 NEUROPATHIC PAIN: ICD-10-CM

## 2019-03-15 DIAGNOSIS — Z79.899 ENCOUNTER FOR LONG-TERM (CURRENT) USE OF HIGH-RISK MEDICATION: ICD-10-CM

## 2019-03-15 DIAGNOSIS — M54.5 CHRONIC RIGHT-SIDED LOW BACK PAIN, WITH SCIATICA PRESENCE UNSPECIFIED: Primary | ICD-10-CM

## 2019-03-15 RX ORDER — ACETAMINOPHEN 500 MG
1000 TABLET ORAL EVERY 8 HOURS
Qty: 180 TAB | Refills: 2 | Status: SHIPPED | OUTPATIENT
Start: 2019-03-15 | End: 2019-04-14

## 2019-03-15 NOTE — PATIENT INSTRUCTIONS
Learning About Sleeping Well  What does sleeping well mean? Sleeping well means getting enough sleep. How much sleep is enough varies among people. The number of hours you sleep is not as important as how you feel when you wake up. If you do not feel refreshed, you probably need more sleep. Another sign of not getting enough sleep is feeling tired during the day. The average total nightly sleep time is 7½ to 8 hours. Healthy adults may need a little more or a little less than this. Why is getting enough sleep important? Getting enough quality sleep is a basic part of good health. When your sleep suffers, your mood and your thoughts can suffer too. You may find yourself feeling more grumpy or stressed. Not getting enough sleep also can lead to serious problems, including injury, accidents, anxiety, and depression. What might cause poor sleeping? Many things can cause sleep problems, including:  · Stress. Stress can be caused by fear about a single event, such as giving a speech. Or you may have ongoing stress, such as worry about work or school. · Depression, anxiety, and other mental or emotional conditions. · Changes in your sleep habits or surroundings. This includes changes that happen where you sleep, such as noise, light, or sleeping in a different bed. It also includes changes in your sleep pattern, such as having jet lag or working a late shift. · Health problems, such as pain, breathing problems, and restless legs syndrome. · Lack of regular exercise. How can you help yourself? Here are some tips that may help you sleep more soundly and wake up feeling more refreshed. Your sleeping area  · Use your bedroom only for sleeping and sex. A bit of light reading may help you fall asleep. But if it doesn't, do your reading elsewhere in the house. Don't watch TV in bed. · Be sure your bed is big enough to stretch out comfortably, especially if you have a sleep partner.   · Keep your bedroom quiet, dark, and cool. Use curtains, blinds, or a sleep mask to block out light. To block out noise, use earplugs, soothing music, or a \"white noise\" machine. Your evening and bedtime routine  · Create a relaxing bedtime routine. You might want to take a warm shower or bath, listen to soothing music, or drink a cup of noncaffeinated tea. · Go to bed at the same time every night. And get up at the same time every morning, even if you feel tired. What to avoid  · Limit caffeine (coffee, tea, caffeinated sodas) during the day, and don't have any for at least 4 to 6 hours before bedtime. · Don't drink alcohol before bedtime. Alcohol can cause you to wake up more often during the night. · Don't smoke or use tobacco, especially in the evening. Nicotine can keep you awake. · Don't take naps during the day, especially close to bedtime. · Don't lie in bed awake for too long. If you can't fall asleep, or if you wake up in the middle of the night and can't get back to sleep within 15 minutes or so, get out of bed and go to another room until you feel sleepy. · Don't take medicine right before bed that may keep you awake or make you feel hyper or energized. Your doctor can tell you if your medicine may do this and if you can take it earlier in the day. If you can't sleep  · Imagine yourself in a peaceful, pleasant scene. Focus on the details and feelings of being in a place that is relaxing. · Get up and do a quiet or boring activity until you feel sleepy. · Don't drink any liquids after 6 p.m. if you wake up often because you have to go to the bathroom. Where can you learn more? Go to http://leesa-jean carlos.info/. Enter K897 in the search box to learn more about \"Learning About Sleeping Well. \"  Current as of: September 11, 2018  Content Version: 11.9  © 2545-0501 [employer], [facility].  Care instructions adapted under license by [employer] (which disclaims liability or warranty for this information). If you have questions about a medical condition or this instruction, always ask your healthcare professional. [address] any warranty or liability for your use of this information. Safe Use of Opioid Pain Medicine: Care Instructions  Your Care Instructions  Pain is your body's way of warning you that something is wrong. Pain feels different for everybody. Only you can describe your pain. A doctor can suggest or prescribe many types of medicines for pain. These range from over-the-counter medicines like acetaminophen (Tylenol) to powerful medicines called opioids. Examples of opioids are fentanyl, hydrocodone, morphine, and oxycodone. Heroin is an illegal opioid  Opioids are strong medicines. They can help you manage pain when you use them the right way. But if you misuse them, they can cause serious harm and even death. For these reasons, doctors are very careful about how they prescribe opioids. If you decide to take opioids, here are some things to remember. · Keep your doctor informed. You can get addicted to opioids. The risk is higher if you have a history of substance use. Your doctor will monitor you closely for signs of misuse and addiction and to figure out when you no longer need to take opioids. · Make a treatment plan. The goal of your plan is to be able to function and do the things you need to do, even if you still have some pain. You might be able to manage your pain with other non-opioid options like physical therapy, relaxation, or over-the-counter pain medicines. · Be aware of the side effects. Opioids can cause serious side effects, such as constipation, dry mouth, and nausea. And over time, you may need a higher dose to get pain relief. This is called tolerance. Your body also gets used to opioids. This is called physical dependence. If you suddenly stop taking them, you may have withdrawal symptoms.   The doctor carefully considered what pain medicine is right for you. You may not have received opioids if your doctor was concerned about drug interactions or your safety, or if he or she had other concerns. It is best to have one doctor or clinic treat your pain. This way you will get the pain medicine that will help you the most. And a doctor will be able to watch for any problems that the medicine might cause. The doctor has checked you carefully, but problems can develop later. If you notice any problems or new symptoms,  get medical treatment right away. Follow-up care is a key part of your treatment and safety. Be sure to make and go to all appointments, and call your doctor if you are having problems. It's also a good idea to know your test results and keep a list of the medicines you take. How can you care for yourself at home? If you need to take opioids to manage your pain, remember these safety tips. · Follow directions carefully. It's easy to misuse opioids if you take a dose other than what's prescribed by your doctor. This can lead to overdose and even death. Even sharing them with someone they weren't meant for is misuse. · Be cautious. Opioids may affect your judgment and decision making. Do not drive or operate machinery until you can think clearly. Talk with your doctor about when it is safe to drive. · Reduce the risk of drug interactions. Opioids can be dangerous if you take them with alcohol or with certain drugs like sleeping pills and muscle relaxers. Make sure your doctor knows about all the other medicines you take, including over-the-counter medicines. Don't start any new medicines before you talk to your doctor or pharmacist.  · Safely store and dispose of opioids. Store opioids in a safe and secure place. Make sure that pets, children, friends, and family can't get to them. When you're done using opioids, make sure to dispose of them safely and as quickly as possible. The [place]  Food and Drug Administration (FDA) recommends these disposal options. ? The best option is to take your medicine to a drop-off box or take-back program that is authorized by the [address] ([employer]). ? If these programs aren't available in your area and your medicine doesn't have specific disposal instructions (such as flushing), you can throw them into your household trash if you follow the FDA's instructions. Visit fda.gov and search for \"unused medicine disposal.\"  ? If you have opioid patches (used or unused), your options are to take them to a [employer]-authorized site or flush them down the toilet. Do not throw them in the trash. ? Only flush your medicine down the toilet if you can't get to a JAI-approved site or your medicine instructions state clearly to flush them. · Reduce the risk of overdose. Misuse of opioids can be very dangerous. Protect yourself by asking your doctor about a naloxone rescue kit. It can help you--and even save your life--if you take too much of an opioid. Try other ways to reduce pain. · Relax, and reduce stress. Relaxation techniques such as deep breathing or meditation can help. · Keep moving. Gentle, daily exercise can help reduce pain over the long run. Try low- or no-impact exercises such as walking, swimming, and stationary biking. Do stretches to stay flexible. · Try heat, cold packs, and massage. · Get enough sleep. Pain can make you tired and drain your energy. Talk with your doctor if you have trouble sleeping because of pain. · Think positive. Your thoughts can affect your pain level. Do things that you enjoy to distract yourself when you have pain instead of focusing on the pain. See a movie, read a book, listen to music, or spend time with a friend. If you are not taking a prescription pain medicine, ask your doctor if you can take an over-the-counter medicine. When should you call for help?   Call your doctor now or seek immediate medical care if:    · You have a new kind of pain.     · You have new symptoms, such as a fever or rash, along with the pain.    Watch closely for changes in your health, and be sure to contact your doctor if:    · You think you might be using too much pain medicine, and you need help to use less or stop.     · Your pain gets worse.     · You would like a referral to a doctor or clinic that specializes in pain management. Where can you learn more? Go to http://leesa-jean carlos.info/. Enter R108 in the search box to learn more about \"Safe Use of Opioid Pain Medicine: Care Instructions. \"  Current as of: Perlita 3, 2018  Content Version: 11.9  © 1006-2836 [employer]. Care instructions adapted under license by [employer] (which disclaims liability or warranty for this information). If you have questions about a medical condition or this instruction, always ask your healthcare professional. [place] [address] any warranty or liability for your use of this information. Safe Use of Opioid Pain Medicine: Care Instructions  Your Care Instructions  Pain is your body's way of warning you that something is wrong. Pain feels different for everybody. Only you can describe your pain. A doctor can suggest or prescribe many types of medicines for pain. These range from over-the-counter medicines like acetaminophen (Tylenol) to powerful medicines called opioids. Examples of opioids are fentanyl, hydrocodone, morphine, and oxycodone. Heroin is an illegal opioid  Opioids are strong medicines. They can help you manage pain when you use them the right way. But if you misuse them, they can cause serious harm and even death. For these reasons, doctors are very careful about how they prescribe opioids. If you decide to take opioids, here are some things to remember. · Keep your doctor informed. You can get addicted to opioids. The risk is higher if you have a history of substance use.  Your doctor will monitor you closely for signs of misuse and addiction and to figure out when you no longer need to take opioids. · Make a treatment plan. The goal of your plan is to be able to function and do the things you need to do, even if you still have some pain. You might be able to manage your pain with other non-opioid options like physical therapy, relaxation, or over-the-counter pain medicines. · Be aware of the side effects. Opioids can cause serious side effects, such as constipation, dry mouth, and nausea. And over time, you may need a higher dose to get pain relief. This is called tolerance. Your body also gets used to opioids. This is called physical dependence. If you suddenly stop taking them, you may have withdrawal symptoms. The doctor carefully considered what pain medicine is right for you. You may not have received opioids if your doctor was concerned about drug interactions or your safety, or if he or she had other concerns. It is best to have one doctor or clinic treat your pain. This way you will get the pain medicine that will help you the most. And a doctor will be able to watch for any problems that the medicine might cause. The doctor has checked you carefully, but problems can develop later. If you notice any problems or new symptoms,  get medical treatment right away. Follow-up care is a key part of your treatment and safety. Be sure to make and go to all appointments, and call your doctor if you are having problems. It's also a good idea to know your test results and keep a list of the medicines you take. How can you care for yourself at home? If you need to take opioids to manage your pain, remember these safety tips. · Follow directions carefully. It's easy to misuse opioids if you take a dose other than what's prescribed by your doctor. This can lead to overdose and even death. Even sharing them with someone they weren't meant for is misuse. · Be cautious.  Opioids may affect your judgment and decision making. Do not drive or operate machinery until you can think clearly. Talk with your doctor about when it is safe to drive. · Reduce the risk of drug interactions. Opioids can be dangerous if you take them with alcohol or with certain drugs like sleeping pills and muscle relaxers. Make sure your doctor knows about all the other medicines you take, including over-the-counter medicines. Don't start any new medicines before you talk to your doctor or pharmacist.  · Safely store and dispose of opioids. Store opioids in a safe and secure place. Make sure that pets, children, friends, and family can't get to them. When you're done using opioids, make sure to dispose of them safely and as quickly as possible. The U.S. Food and Drug Administration (FDA) recommends these disposal options. ? The best option is to take your medicine to a drop-off box or take-back program that is authorized by the [employer] ([employer]). ? If these programs aren't available in your area and your medicine doesn't have specific disposal instructions (such as flushing), you can throw them into your household trash if you follow the FDA's instructions. Visit fda.gov and search for \"unused medicine disposal.\"  ? If you have opioid patches (used or unused), your options are to take them to a [employer]-authorized site or flush them down the toilet. Do not throw them in the trash. ? Only flush your medicine down the toilet if you can't get to a [employer]-approved site or your medicine instructions state clearly to flush them. · Reduce the risk of overdose. Misuse of opioids can be very dangerous. Protect yourself by asking your doctor about a naloxone rescue kit. It can help you--and even save your life--if you take too much of an opioid. Try other ways to reduce pain. · Relax, and reduce stress. Relaxation techniques such as deep breathing or meditation can help. · Keep moving.  Gentle, daily exercise can help reduce pain over the long run. Try low- or no-impact exercises such as walking, swimming, and stationary biking. Do stretches to stay flexible. · Try heat, cold packs, and massage. · Get enough sleep. Pain can make you tired and drain your energy. Talk with your doctor if you have trouble sleeping because of pain. · Think positive. Your thoughts can affect your pain level. Do things that you enjoy to distract yourself when you have pain instead of focusing on the pain. See a movie, read a book, listen to music, or spend time with a friend. If you are not taking a prescription pain medicine, ask your doctor if you can take an over-the-counter medicine. When should you call for help? Call your doctor now or seek immediate medical care if:    · You have a new kind of pain.     · You have new symptoms, such as a fever or rash, along with the pain.    Watch closely for changes in your health, and be sure to contact your doctor if:    · You think you might be using too much pain medicine, and you need help to use less or stop.     · Your pain gets worse.     · You would like a referral to a doctor or clinic that specializes in pain management. Where can you learn more? Go to http://leesa-jean carlos.info/. Enter R108 in the search box to learn more about \"Safe Use of Opioid Pain Medicine: Care Instructions. \"  Current as of: Perlita 3, 2018  Content Version: 11.9  © 6323-5314 [employer]. Care instructions adapted under license by [employer] (which disclaims liability or warranty for this information). If you have questions about a medical condition or this instruction, always ask your healthcare professional. [address] any warranty or liability for your use of this information.

## 2019-03-15 NOTE — PROGRESS NOTES
Nursing Notes    Patient presents to the office today in follow-up. Patient rates her pain at 7/10 on the numerical pain scale. Reviewed medications with counts as follows:    Rx Date filled Qty Dispensed Pill Count Last Dose Short   Acetaminophen cod#3 3/11/19 +1 refill on bottle 28 23 yesterday no                                       reviewed YES  Any aberrancies noted on  YES, Patient filled prescription written 9/19/18 by Grecia Lara  Last opioid agreement 10/25/18  Last urine drug screen 9/19/18  3 most recent PHQ Screens 3/15/2019   PHQ Not Done -   Little interest or pleasure in doing things Not at all   Feeling down, depressed, irritable, or hopeless Not at all   Total Score PHQ 2 0   Trouble falling or staying asleep, or sleeping too much -   Feeling tired or having little energy -   Poor appetite, weight loss, or overeating -   Feeling bad about yourself - or that you are a failure or have let yourself or your family down -   Trouble concentrating on things such as school, work, reading, or watching TV -   Moving or speaking so slowly that other people could have noticed; or the opposite being so fidgety that others notice -   Thoughts of being better off dead, or hurting yourself in some way -   PHQ 9 Score -       Comments:     POC UDS was performed in office today    Any new labs or imaging since last appointment? NO    Have you been to an emergency room (ER) or urgent care clinic since your last visit? NO            Have you been hospitalized since your last visit? NO     If yes, where, when, and reason for visit? Have you seen or consulted any other health care providers outside of the [address] [place] [address]  since your last visit? NO     If yes, where, when, and reason for visit? Ms. Marek Reece has a reminder for a \"due or due soon\" health maintenance. I have asked that she contact her primary care provider for follow-up on this health maintenance.

## 2019-05-02 ENCOUNTER — HOSPITAL ENCOUNTER (OUTPATIENT)
Dept: MAMMOGRAPHY | Age: 54
Discharge: HOME | End: 2019-05-02
Attending: FAMILY MEDICINE
Payer: MEDICARE

## 2019-05-02 DIAGNOSIS — Z12.31 VISIT FOR SCREENING MAMMOGRAM: ICD-10-CM

## 2019-05-02 PROCEDURE — 77067 SCR MAMMO BI INCL CAD: CPT

## 2019-06-20 ENCOUNTER — OFFICE VISIT (OUTPATIENT)
Dept: PAIN MANAGEMENT | Age: 54
End: 2019-06-20

## 2019-06-20 VITALS
BODY MASS INDEX: 38.99 KG/M2 | OXYGEN SATURATION: 95 % | RESPIRATION RATE: 14 BRPM | HEIGHT: 65 IN | DIASTOLIC BLOOD PRESSURE: 80 MMHG | TEMPERATURE: 97.4 F | HEART RATE: 72 BPM | SYSTOLIC BLOOD PRESSURE: 128 MMHG | WEIGHT: 234 LBS

## 2019-06-20 DIAGNOSIS — G89.29 CHRONIC RIGHT-SIDED LOW BACK PAIN, WITH SCIATICA PRESENCE UNSPECIFIED: Primary | ICD-10-CM

## 2019-06-20 DIAGNOSIS — Z79.899 ENCOUNTER FOR LONG-TERM (CURRENT) USE OF HIGH-RISK MEDICATION: ICD-10-CM

## 2019-06-20 DIAGNOSIS — M54.5 CHRONIC RIGHT-SIDED LOW BACK PAIN, WITH SCIATICA PRESENCE UNSPECIFIED: Primary | ICD-10-CM

## 2019-06-20 DIAGNOSIS — M46.1 SACROILIITIS (HCC): ICD-10-CM

## 2019-06-20 DIAGNOSIS — M79.2 NEUROPATHIC PAIN: ICD-10-CM

## 2019-06-20 DIAGNOSIS — G89.4 CHRONIC PAIN SYNDROME: ICD-10-CM

## 2019-06-20 NOTE — PATIENT INSTRUCTIONS
Learning About Sleeping Well  What does sleeping well mean? Sleeping well means getting enough sleep. How much sleep is enough varies among people. The number of hours you sleep is not as important as how you feel when you wake up. If you do not feel refreshed, you probably need more sleep. Another sign of not getting enough sleep is feeling tired during the day. The average total nightly sleep time is 7½ to 8 hours. Healthy adults may need a little more or a little less than this. Why is getting enough sleep important? Getting enough quality sleep is a basic part of good health. When your sleep suffers, your mood and your thoughts can suffer too. You may find yourself feeling more grumpy or stressed. Not getting enough sleep also can lead to serious problems, including injury, accidents, anxiety, and depression. What might cause poor sleeping? Many things can cause sleep problems, including:  · Stress. Stress can be caused by fear about a single event, such as giving a speech. Or you may have ongoing stress, such as worry about work or school. · Depression, anxiety, and other mental or emotional conditions. · Changes in your sleep habits or surroundings. This includes changes that happen where you sleep, such as noise, light, or sleeping in a different bed. It also includes changes in your sleep pattern, such as having jet lag or working a late shift. · Health problems, such as pain, breathing problems, and restless legs syndrome. · Lack of regular exercise. How can you help yourself? Here are some tips that may help you sleep more soundly and wake up feeling more refreshed. Your sleeping area  · Use your bedroom only for sleeping and sex. A bit of light reading may help you fall asleep. But if it doesn't, do your reading elsewhere in the house. Don't watch TV in bed. · Be sure your bed is big enough to stretch out comfortably, especially if you have a sleep partner.   · Keep your bedroom quiet, dark, and cool. Use curtains, blinds, or a sleep mask to block out light. To block out noise, use earplugs, soothing music, or a \"white noise\" machine. Your evening and bedtime routine  · Create a relaxing bedtime routine. You might want to take a warm shower or bath, listen to soothing music, or drink a cup of noncaffeinated tea. · Go to bed at the same time every night. And get up at the same time every morning, even if you feel tired. What to avoid  · Limit caffeine (coffee, tea, caffeinated sodas) during the day, and don't have any for at least 4 to 6 hours before bedtime. · Don't drink alcohol before bedtime. Alcohol can cause you to wake up more often during the night. · Don't smoke or use tobacco, especially in the evening. Nicotine can keep you awake. · Don't take naps during the day, especially close to bedtime. · Don't lie in bed awake for too long. If you can't fall asleep, or if you wake up in the middle of the night and can't get back to sleep within 15 minutes or so, get out of bed and go to another room until you feel sleepy. · Don't take medicine right before bed that may keep you awake or make you feel hyper or energized. Your doctor can tell you if your medicine may do this and if you can take it earlier in the day. If you can't sleep  · Imagine yourself in a peaceful, pleasant scene. Focus on the details and feelings of being in a place that is relaxing. · Get up and do a quiet or boring activity until you feel sleepy. · Don't drink any liquids after 6 p.m. if you wake up often because you have to go to the bathroom. Where can you learn more? Go to http://leesa-jean carlos.info/. Enter V617 in the search box to learn more about \"Learning About Sleeping Well. \"  Current as of: September 11, 2018  Content Version: 11.9  © 5235-5441 [employer], [email or web address].  Care instructions adapted under license by [employer] (which disclaims liability or warranty for this information). If you have questions about a medical condition or this instruction, always ask your healthcare professional. [address] any warranty or liability for your use of this information. Safe Use of Opioid Pain Medicine: Care Instructions  Your Care Instructions  Pain is your body's way of warning you that something is wrong. Pain feels different for everybody. Only you can describe your pain. A doctor can suggest or prescribe many types of medicines for pain. These range from over-the-counter medicines like acetaminophen (Tylenol) to powerful medicines called opioids. Examples of opioids are fentanyl, hydrocodone, morphine, and oxycodone. Heroin is an illegal opioid  Opioids are strong medicines. They can help you manage pain when you use them the right way. But if you misuse them, they can cause serious harm and even death. For these reasons, doctors are very careful about how they prescribe opioids. If you decide to take opioids, here are some things to remember. · Keep your doctor informed. You can get addicted to opioids. The risk is higher if you have a history of substance use. Your doctor will monitor you closely for signs of misuse and addiction and to figure out when you no longer need to take opioids. · Make a treatment plan. The goal of your plan is to be able to function and do the things you need to do, even if you still have some pain. You might be able to manage your pain with other non-opioid options like physical therapy, relaxation, or over-the-counter pain medicines. · Be aware of the side effects. Opioids can cause serious side effects, such as constipation, dry mouth, and nausea. And over time, you may need a higher dose to get pain relief. This is called tolerance. Your body also gets used to opioids. This is called physical dependence. If you suddenly stop taking them, you may have withdrawal symptoms.   The doctor carefully considered what pain medicine is right for you. You may not have received opioids if your doctor was concerned about drug interactions or your safety, or if he or she had other concerns. It is best to have one doctor or clinic treat your pain. This way you will get the pain medicine that will help you the most. And a doctor will be able to watch for any problems that the medicine might cause. The doctor has checked you carefully, but problems can develop later. If you notice any problems or new symptoms,  get medical treatment right away. Follow-up care is a key part of your treatment and safety. Be sure to make and go to all appointments, and call your doctor if you are having problems. It's also a good idea to know your test results and keep a list of the medicines you take. How can you care for yourself at home? If you need to take opioids to manage your pain, remember these safety tips. · Follow directions carefully. It's easy to misuse opioids if you take a dose other than what's prescribed by your doctor. This can lead to overdose and even death. Even sharing them with someone they weren't meant for is misuse. · Be cautious. Opioids may affect your judgment and decision making. Do not drive or operate machinery until you can think clearly. Talk with your doctor about when it is safe to drive. · Reduce the risk of drug interactions. Opioids can be dangerous if you take them with alcohol or with certain drugs like sleeping pills and muscle relaxers. Make sure your doctor knows about all the other medicines you take, including over-the-counter medicines. Don't start any new medicines before you talk to your doctor or pharmacist.  · Safely store and dispose of opioids. Store opioids in a safe and secure place. Make sure that pets, children, friends, and family can't get to them. When you're done using opioids, make sure to dispose of them safely and as quickly as possible. The [place]  Food and Drug Administration (FDA) recommends these disposal options. ? The best option is to take your medicine to a drop-off box or take-back program that is authorized by the [address] ([facility]). ? If these programs aren't available in your area and your medicine doesn't have specific disposal instructions (such as flushing), you can throw them into your household trash if you follow the FDA's instructions. Visit fda.gov and search for \"unused medicine disposal.\"  ? If you have opioid patches (used or unused), your options are to take them to a [employer]-authorized site or flush them down the toilet. Do not throw them in the trash. ? Only flush your medicine down the toilet if you can't get to a [employer]-approved site or your medicine instructions state clearly to flush them. · Reduce the risk of overdose. Misuse of opioids can be very dangerous. Protect yourself by asking your doctor about a naloxone rescue kit. It can help you--and even save your life--if you take too much of an opioid. Try other ways to reduce pain. · Relax, and reduce stress. Relaxation techniques such as deep breathing or meditation can help. · Keep moving. Gentle, daily exercise can help reduce pain over the long run. Try low- or no-impact exercises such as walking, swimming, and stationary biking. Do stretches to stay flexible. · Try heat, cold packs, and massage. · Get enough sleep. Pain can make you tired and drain your energy. Talk with your doctor if you have trouble sleeping because of pain. · Think positive. Your thoughts can affect your pain level. Do things that you enjoy to distract yourself when you have pain instead of focusing on the pain. See a movie, read a book, listen to music, or spend time with a friend. If you are not taking a prescription pain medicine, ask your doctor if you can take an over-the-counter medicine. When should you call for help?   Call your doctor now or seek immediate medical care if:    · You have a new kind of pain.     · You have new symptoms, such as a fever or rash, along with the pain.    Watch closely for changes in your health, and be sure to contact your doctor if:    · You think you might be using too much pain medicine, and you need help to use less or stop.     · Your pain gets worse.     · You would like a referral to a doctor or clinic that specializes in pain management. Where can you learn more? Go to http://leesa-jean carlos.info/. Enter R108 in the search box to learn more about \"Safe Use of Opioid Pain Medicine: Care Instructions. \"  Current as of: Perlita 3, 2018  Content Version: 11.9  © 1044-3482 [employer]. Care instructions adapted under license by [employer] (which disclaims liability or warranty for this information). If you have questions about a medical condition or this instruction, always ask your healthcare professional. [place] [address] any warranty or liability for your use of this information.

## 2019-06-20 NOTE — PROGRESS NOTES
Referral date 09/18/17, source Dr Mary Wren and for Low back pain. Calculated MEQ -0  Last UDS 3/15/2019, consistent    date checked today, findings consistent       Today   Last Visit  Prior Visit  ORT -        PGIC - 4 and 4  4 and 5  3 and 4     JUSTYN -40%   incomplete  46%     COMM -0   1   6         HPI:  Yulia Barfield is a 48 y.o. female here for f/u visit for ongoing evaluation of chronic low back pain. Pt was last seen here on  3/15/2019. Pt denies interval changes on the character or distribution of pain. Pain is located  right-sided lower back with radiation right lower extremity stopping above the knee. The pain is described as  aching and pins-and-needles. Pain at its best is  4/10. Pain at its worse is  8/10. The pain is worsened by prolonged sitting, bending, lifting, prolonged walking. Symptoms are improved by rest, opioids, gabapentin, ice. Patient has tried physical therapy, aleve, compound cream  with no perceived benefit. Patient has history of lumbar laminectomy L4-5 and 2016. Patient has never received interventional injections. Patient was previously awaiting a referral for spinal cord stimulator trial but upon review of referral looks to be closed. Since last visit, patient denies changes in the characteristic or distribution of her chronic pain. ROS:  Denies fever, chills, nausea, vomiting, diarrhea, constipation, abdominal pain, chest pain, shortness or breath/trouble breathing, weakness, trouble swallowing, changes in vision, changes in hearing, falls, dizziness, bladder incontinence, bowel incontinence, depression, anxiety, suicidal ideations, homicidal ideations or alcohol use. Opioid specific risk: Asthma, vertigo.       Vitals:    06/20/19 0931   BP: 128/80   Pulse: 72   Resp: 14   Temp: 97.4 °F (36.3 °C)   TempSrc: Oral   SpO2: 95%   Weight: 106.1 kg (234 lb)   Height: 5' 5\" (1.651 m)   PainSc:   8   PainLoc: Back        Imaging:  \"\"\"\" 2/13/17 MRI lumbar spine W WO contrast  IMPRESSION:     1. Interval posterior and transforaminal interbody fusion at L4/5. Interval  decompression of the central canal and foramina. No recurrent or residual disc  herniation.  -Mild expected right lateral epidural fibrosis at this level.  -No high-grade central canal or foraminal stenosis at any level. 2. Perhaps tiny L5/S1 subarticular/foraminal protrusion but no nerve  impingement. Moderate facet arthrosis at this level. 3. Mild L3/4 central disc protrusion. \"\"\"\"     Labs:   BUN/Cr: \"\"\"8/30/18   11/1.05\"\"\"  AST/ALT: \"\"\"\" 7/23/17   15/19\"\"\"       Physical exam:  AFVSS, no acute distress,overweight body habitus. A&OXs 3. Normocephalic, atraumatic. Conjugate gaze, clear sclerae. Speech is clear and appropriate. Mood is appropriate and patient is cooperative. Gait and balance are within functional limits. Non-labored breathing. LE:   Strength for right lower extremity is 5/5 for hip flexion, knee extension, dorsiflexion, extensor hallucis longus, plantar flexion. Strength for left lower extremity is 5/5 for hip flexion, knee extension, dorsiflexion, extensor hallucis longus, plantar flexion. Sensation to light touch is intact for right L2, 4, 5, S1.  Decreased sensation L3, S2.  Sensation to light touch is intact for left L2-S2. Negative seated straight leg raise left. Positive seated straight leg raise right. Positive Luis's on the right. Positive Harrison finger for right SIJ. Standing leg raise positive on the right. Right SIJ significantly tender to palpation.     Primary Care Physician  Biran Kaplan  [address]   [phone]      PHQ -- .  3 most recent PHQ Screens 6/20/2019   PHQ Not Done -   Little interest or pleasure in doing things Not at all   Feeling down, depressed, irritable, or hopeless Not at all   Total Score PHQ 2 0   Trouble falling or staying asleep, or sleeping too much -   Feeling tired or having little energy - Poor appetite, weight loss, or overeating -   Feeling bad about yourself - or that you are a failure or have let yourself or your family down -   Trouble concentrating on things such as school, work, reading, or watching TV -   Moving or speaking so slowly that other people could have noticed; or the opposite being so fidgety that others notice -   Thoughts of being better off dead, or hurting yourself in some way -   PHQ 9 Score -       Assessment/Plan:     ICD-10-CM ICD-9-CM    1. Chronic right-sided low back pain, with sciatica presence unspecified M54.5 724.2     G89.29 338.29    2. Sacroiliitis (HCC) M46.1 720.2    3. Neuropathic pain M79.2 729.2    4. Chronic pain syndrome G89.4 338.4 XR SACRUM AND COCCYX      CANCELED: XR SPINE LUMB 2 OR 3 V   5. Encounter for long-term (current) use of high-risk medication Z79.899 V58.69         --We will schedule Next available diagnostic/therapeutic right SIJ injection with Dr. Margy Arrington    --Depending on patient's response to the right SIJ injection she is considering spinal cord stimulator trial work-up. --Continue Tylenol 500 mg 1-2 tablets p.o. 3 times daily PRN. Max daily dose 3000 mg     --Continue gabapentin 300 mg capsules 1 capsule every 24 hours for neuropathic pain. --Ongoing consideration for  another trial of physical therapy, CBT    --Previous office visit patient was given a list of [facility] and motion locations of provide massage therapy patient reports she is unable to afford this option. --Previous office visit patient was given information on (treat your own back) book by Wyatt Kilgore for her to obtain on her own, patient did not obtain     --Discontinued opioid care agreement at last office visit, Unsure how many prescriptions patient has at home since she just refilled from September 19, 2018 on 3/11/19.   We will continue to treat patient's chronic pain with other modalities not to include opioids.     Follow up ongoing assessment and ongoing development of integrative and comprehensive plan of care for chronic pain. Goals: To establish complementary and integrative plan of care to address chronic pain issues while minimizing pharmaceuticals to maximize patient's function improve quality of life. Education:  Handouts given regarding opioid safety and sleep health. Patient Homework:  Continue to independently investigate yoga for persons with chronic pain. Follow-up and Dispositions    · Return in about 3 months (around 9/20/2019). [address] was used for portions of this report. Unintended errors may occur.     Social History     Socioeconomic History    Marital status:      Spouse name: Not on file    Number of children: Not on file    Years of education: Not on file    Highest education level: Not on file   Occupational History    Occupation: unemployed     Comment: long term disability   Social Needs    Financial resource strain: Not on file    Food insecurity:     Worry: Not on file     Inability: Not on file   Adeyoh needs:     Medical: Not on file     Non-medical: Not on file   Tobacco Use    Smoking status: Never Smoker    Smokeless tobacco: Never Used   Substance and Sexual Activity    Alcohol use: No    Drug use: No    Sexual activity: Not on file     Comment: Hysterectomy   Lifestyle    Physical activity:     Days per week: Not on file     Minutes per session: Not on file    Stress: Not on file   Relationships    Social connections:     Talks on phone: Not on file     Gets together: Not on file     Attends Yarsani service: Not on file     Active member of club or organization: Not on file     Attends meetings of clubs or organizations: Not on file     Relationship status: Not on file    Intimate partner violence:     Fear of current or ex partner: Not on file     Emotionally abused: Not on file     Physically abused: Not on file     Forced sexual activity: Not on file   Other Topics Concern    Not on file   Social History Narrative    Not on file     Family History   Problem Relation Age of Onset    Diabetes Mother     Elevated Lipids Mother     Hypertension Mother     Cancer Father         pancreatic    Diabetes Sister     Hypertension Sister     Diabetes Brother     Diabetes Daughter     Hypertension Daughter      Allergies   Allergen Reactions    Skelaxin [Metaxalone] Other (comments)     jittery    Tramadol Other (comments)     Halluctations     Past Medical History:   Diagnosis Date    Asthma     Back pain with radiation     Cardiac echocardiogram, ltd study 12/29/2016    EF 55-60%. No WMA. Right to left atrial septal shunt suggests PFO. Similar to study of 10/18/16.  Cardiovascular LE venous duplex 10/18/2016    No DVT bilaterally.  Contact dermatitis and other eczema, due to unspecified cause     CTS (carpal tunnel syndrome)     HSV-2 (herpes simplex virus 2) infection     Hypercholesterolemia     Hypothyroidism     Ill-defined condition     Vertigo    L4-L5 disc bulge     Leg swelling     Migraines     Positive PPD, treated     S/P lumbar fusion 1/13/2016    1. L4-5 bilateral hemilaminotomy, medial facetectomy, foraminotomy, diskectomy L4-5. 2. Transforaminal lumbar interbody fusion with PEEK cage and demineralized bone graft L4-L5 posterolateral fusion.  3. Segmental spinal instrumentation, DePuy Expedium type L4-L5. 01/13/2016 By Dr. Toña Barfield Sciatica     EMG '17 , mild sciatic EMG findings    Stroke [facility])     12/2016    Vertigo      Past Surgical History:   Procedure Laterality Date    HX GYN      partial hysterectomy due to abnormal pap    HX LUMBAR LAMINECTOMY  01-13-16    L4/5     Current Outpatient Medications on File Prior to Visit   Medication Sig    gabapentin (NEURONTIN) 300 mg capsule TAKE 1 CAPSULE BY MOUTH EVERY 12 HOURS FOR 30 DAYS    acetaminophen-codeine (TYLENOL #3) 300-30 mg per tablet 1-2  Bid  prn  propranolol (INDERAL) 20 mg tablet Take 1 Tab by mouth three (3) times daily. Indications: MIGRAINE PREVENTION    aspirin (ASPIRIN) 325 mg tablet Take 1 Tab by mouth daily.  atorvastatin (LIPITOR) 80 mg tablet Take 1 Tab by mouth nightly.  furosemide (LASIX) 20 mg tablet Take 40 mg by mouth daily.  budesonide-formoterol (SYMBICORT) 160-4.5 mcg/actuation HFA inhaler Take 2 Puffs by inhalation two (2) times a day.  naloxone (NARCAN) 4 mg/actuation nasal spray Use 1 spray intranasally into 1 nostril. Use a new Narcan nasal spray for subsequent doses and administer into alternating nostrils. May repeat every 2 to 3 minutes as needed.  albuterol (PROVENTIL HFA, VENTOLIN HFA, PROAIR HFA) 90 mcg/actuation inhaler Take 1 Puff by inhalation every four (4) hours as needed for Wheezing.  FERROUS SULFATE PO Take 325 mg by mouth.  potassium chloride SR (K-TAB) 20 mEq tablet Take 20 mEq by mouth daily.  levothyroxine (SYNTHROID) 50 mcg tablet TAKE 1 TABLET BY MOUTH EVERY DAY    cholecalciferol (VITAMIN D3) 1,000 unit cap Take 2,000 Units by mouth daily.  meclizine (ANTIVERT) 25 mg tablet Take 25 mg by mouth daily as needed.  DULoxetine (CYMBALTA) 60 mg capsule Take 60 mg by mouth nightly. No current facility-administered medications on file prior to visit.

## 2019-06-20 NOTE — PROGRESS NOTES
Iván Notes    Patient presents to the office today in follow-up. Patient rates her pain at 8/10 on the numerical pain scale. Reviewed medications with counts as follows:    Rx Date filled Qty Dispensed Pill Count Last Dose Short   Tylenol #3 3/15/19 180 18 yesterday no                                       reviewed YES  Any aberrancies noted on  NO  Last opioid agreement 10/25/18  Last urine drug screen 3/18/19  3 most recent PHQ Screens 6/20/2019   PHQ Not Done -   Little interest or pleasure in doing things Not at all   Feeling down, depressed, irritable, or hopeless Not at all   Total Score PHQ 2 0   Trouble falling or staying asleep, or sleeping too much -   Feeling tired or having little energy -   Poor appetite, weight loss, or overeating -   Feeling bad about yourself - or that you are a failure or have let yourself or your family down -   Trouble concentrating on things such as school, work, reading, or watching TV -   Moving or speaking so slowly that other people could have noticed; or the opposite being so fidgety that others notice -   Thoughts of being better off dead, or hurting yourself in some way -   PHQ 9 Score -       Comments:     POC UDS was not performed in office today    Any new labs or imaging since last appointment? YES,lab    Have you been to an emergency room (ER) or urgent care clinic since your last visit? NO            Have you been hospitalized since your last visit? NO     If yes, where, when, and reason for visit? Have you seen or consulted any other health care providers outside of the [address] [place] [address]  since your last visit? NO     If yes, where, when, and reason for visit? Ms. Jean Marie Bhatia has a reminder for a \"due or due soon\" health maintenance. I have asked that she contact her primary care provider for follow-up on this health maintenance.

## 2019-06-27 ENCOUNTER — HOSPITAL ENCOUNTER (OUTPATIENT)
Dept: GENERAL RADIOLOGY | Age: 54
Discharge: HOME | End: 2019-06-27
Payer: MEDICARE

## 2019-06-27 DIAGNOSIS — G89.4 CHRONIC PAIN SYNDROME: ICD-10-CM

## 2019-06-27 PROCEDURE — 72220 X-RAY EXAM SACRUM TAILBONE: CPT

## 2019-07-29 ENCOUNTER — HOSPITAL ENCOUNTER (OUTPATIENT)
Age: 54
Setting detail: OUTPATIENT SURGERY
Discharge: HOME | End: 2019-07-29
Attending: PHYSICAL MEDICINE & REHABILITATION | Admitting: PHYSICAL MEDICINE & REHABILITATION
Payer: MEDICARE

## 2019-07-29 ENCOUNTER — APPOINTMENT (OUTPATIENT)
Dept: GENERAL RADIOLOGY | Age: 54
End: 2019-07-29
Attending: PHYSICAL MEDICINE & REHABILITATION
Payer: MEDICARE

## 2019-07-29 VITALS
TEMPERATURE: 98.1 F | SYSTOLIC BLOOD PRESSURE: 144 MMHG | DIASTOLIC BLOOD PRESSURE: 77 MMHG | HEIGHT: 66 IN | RESPIRATION RATE: 16 BRPM | BODY MASS INDEX: 37.61 KG/M2 | WEIGHT: 234 LBS | HEART RATE: 65 BPM | OXYGEN SATURATION: 100 %

## 2019-07-29 PROCEDURE — 74011250636 HC RX REV CODE- 250/636: Performed by: PHYSICAL MEDICINE & REHABILITATION

## 2019-07-29 PROCEDURE — 77030003666 HC NDL SPINAL BD -A: Performed by: PHYSICAL MEDICINE & REHABILITATION

## 2019-07-29 PROCEDURE — 74011636320 HC RX REV CODE- 636/320: Performed by: PHYSICAL MEDICINE & REHABILITATION

## 2019-07-29 PROCEDURE — 76010000009 HC PAIN MGT 0 TO 30 MIN PROC: Performed by: PHYSICAL MEDICINE & REHABILITATION

## 2019-07-29 RX ORDER — LIDOCAINE HYDROCHLORIDE 10 MG/ML
INJECTION, SOLUTION EPIDURAL; INFILTRATION; INTRACAUDAL; PERINEURAL AS NEEDED
Status: DISCONTINUED | OUTPATIENT
Start: 2019-07-29 | End: 2019-07-29 | Stop reason: HOSPADM

## 2019-07-29 RX ORDER — TRIAMCINOLONE ACETONIDE 40 MG/ML
INJECTION, SUSPENSION INTRA-ARTICULAR; INTRAMUSCULAR AS NEEDED
Status: DISCONTINUED | OUTPATIENT
Start: 2019-07-29 | End: 2019-07-29 | Stop reason: HOSPADM

## 2019-07-29 NOTE — PROCEDURES
[address] for Pain Management  Brief Pre-Procedure History & Physical    PATIENT NAME:  Yadira Zhou   YOB: 1965  DATE OF SERVICE:  7/29/2019      CHIEF COMPLAINT:  Pain    HISTORY OF PRESENT ILLNESS:  Pierre Waller presents today for a previously diagnosed problem contributing to some or all of this patients pain. The location and pattern of the pain has not changed substantially since the last visit in our office. No other significant medical changes have occurred in the last 30 days. PAST MEDICAL HISTORY:  The patient  has a past medical history of Asthma, Back pain with radiation, Cardiac echocardiogram, ltd study (12/29/2016), Cardiovascular LE venous duplex (10/18/2016), Contact dermatitis and other eczema, due to unspecified cause, CTS (carpal tunnel syndrome), HSV-2 (herpes simplex virus 2) infection, Hypercholesterolemia, Hypothyroidism, Ill-defined condition, L4-L5 disc bulge, Leg swelling, Migraines, Positive PPD, treated, S/P lumbar fusion (1/13/2016), Sciatica, Stroke ([facility] [address] and Vertigo. PAST SURGICAL HISTORY:  The patient  has a past surgical history that includes hx lumbar laminectomy (01-13-16) and hx gyn. CURRENT MEDICATIONS:  See Medication Administration Record [facility]) in the patient's electronic record. ALLERGIES:    Allergies   Allergen Reactions    Skelaxin [Metaxalone] Other (comments)     jittery    Tramadol Other (comments)     Halluctations       FAMILY HISTORY:  The patient family history includes Cancer in her father; Diabetes in her brother, daughter, mother, and sister; Elevated Lipids in her mother; Hypertension in her daughter, mother, and sister. SOCIAL HISTORY:  The patient  reports that she has never smoked. She has never used smokeless tobacco. The patient  reports that she does not drink alcohol. She  reports that she does not use drugs.      REVIEW OF SYSTEMS:  Pierre Waller denies any fever, chills, unexplained weight loss, use of antibiotics for recent infection or bleeding abnormalities. PHYSICAL EXAM:  VS:   Visit Vitals  /77 (BP 1 Location: Left arm, BP Patient Position: Prone)   Pulse 65   Temp 98.1 °F (36.7 °C)   Resp 16   Ht 5' 6\" (1.676 m)   Wt 106.1 kg (234 lb)   SpO2 100%   BMI 37.77 kg/m²     Gen: Well-developed. Body habitus consistent with recorded height and weight and the calculated BMI. No apparent distress. Head: Normocephalic, atraumatic. Skin: No obvious rashes, lesions or infection. Pulm: Respirations are even and unlabored. Psych:    Mood, affect and speech  Appropriate. Positive right Luis's. Tender to palpation overlying the right sacroiliac joint. ASSESSMENT:   1. Stable for right sacroiliac joint injection as discussed. PLAN:  Proceed with scheduled procedure. James Olvera DO 7/29/2019 Time: [address] [place] [address] FOR PAIN MANAGEMENT    SACROILIAC JOINT INJECTION PROCEDURE REPORT      PATIENT:  Lb Restrepor  YOB: 1965  DATE OF SERVICE:  7/29/2019  SITE:  [facility] Special Procedures Suite    PRE-PROCEDURE DIAGNOSIS:  Sacroiliitis    POST-PROCEDURE DIAGNOSIS:  Same as Above                PROCEDURE:    1. Right sacroiliac joint injection (38709)    ANESTHESIA:  See Medication Administration Record    COMPLICATIONS: None. PHYSICIAN:  James Olvera DO    PRE-PROCEDURE NOTE:  Pre-procedural assessment of the patient was performed including a limited history and physical examination. The details of the procedure were discussed with the patient, including the risks, benefits and alternative options and an informed consent was obtained. The patients NPO status and availability of a responsible adult to escort the patient following the procedure were confirmed. PROCEDURE NOTE:  The patient was brought to the procedure suite and positioned on the fluoroscopy table in the prone position.  Physiologic monitors were applied and supplemental oxygen was administered via nasal cannula. The skin was prepped in the standard surgical fashion and sterile drapes were applied over the procedure site. Please refer to the Flowsheet for documentation of the patients vital signs and the Medication Administration Record for any oral and/or intravenous sedation administered prior to or during the procedure. 1% Lidocaine was utilized for local anesthesia. Under slight contralateral oblique fluoroscopic guidance, a 25-gauge, 3.5-inch short bevel spinal needle was advanced into the SI joint from the posteriomedial approach. After negative aspiration 0.5-1.0 mL of contrast dye solution was injected to ensure intra-articular uptake and nonvascular spread. Following this, 4 mL of a solution comprised of a mixture of triamcinolone (40 mg/mL) and 1% lidocaine was injected after negative aspiration of blood, air, or fluid. The needle was flushed and removed intact. The area was thoroughly cleaned and sterile bandages applied as necessary. The patient tolerated the procedure well and vital signs remained stable throughout the procedure. POST-PROCEDURE COURSE:   The patient was escorted from the procedure suite in satisfactory condition and recovered per facility protocol based on the type of procedure performed and/or the sedation utilized. The patient did not experience any adverse events and remained hemodynamically stable during the post-procedure period. Postprocedure pain relief with provocation was 100%. DISCHARGE NOTE:  Upon discharge, the patient was able to tolerate fluids and was in no acute distress. The patient was oriented to person, place and time and vital signs were stable. Appropriate post-procedure instructions were provided and explained to the patient in detail and all questions were answered.     Bibiana Samayoa DO 7/29/2019 1417

## 2019-07-29 NOTE — DISCHARGE INSTRUCTIONS
[facility]      Post Procedures Instructions    *Resume Diet and Activity as tolerated. Rest for the remainder of the day. *You may fell worse before you feel better as the numbing medications wear off before the steroids take effect if used for your procedures. *Do not use affected extremity until numbness or loss of sensation has completely resolved without assistance. *DO NOT DRIVE, operate machinery/heavey equipment for 24 hours. *DO NOT DRINK ALCOHOL for 24 hours as it may interact with the sedation if you received it and also thins your blood and may cause you to bleed. *WAIT 24 hours before starting back ANY Blood thinning medications:   (Heparin, Coumadin, Warfarin, Lovenox, Plavix, Aggrenox)    *Resume Pre-Procedure Medications as prescribed except Blood Thinners unless directed by your Physician or Cardiologist.     *Avoid Hot tubs and Heating pad for 24 hours to prevent dissipation of medications, you may shower to remove bandages and remaining prep residue on the skin. * If you develop a Headache, drink plenty of fluids including beverages with caffeine (Coffee, Mt. Dew etc.) and rest.  If the headache persists longer than 24 hoursor intensifies - Please call [facility] ([facility]) [phone]    * If you are DIABETIC, check your blood sugar three times a day for the next three days, the steroids will increase your blood sugar. If your blood sugar is greater than 400 have someone drive you to the nearest [address]. * If you experience any of the following problems, call the [facility] [phone] between 8:00 am - 4:30pm or After Hours [phone].     Shortness of breath    Fever of 101 F or higher    Nausea / Vomiting (not normal to you)    Increasing stiffness in the neck    Weakness or numbness in the arms or legs that is not resolving    Prolonged and increasing pain > than 4 days    ANYTHING OUT of the ORDINARY TO YOU    If YOU are experiencing a severe reaction / complication that you have never had before post procedure, call 911 or go to the nearest emergency room! All patients must have a  for transportation [place] regardless if you do or do not receive sedation. DISCHARGE SUMMARY from Nurse      PATIENT INSTRUCTIONS:    After Oral  or intravenous sedation, for 24 hours or while taking prescription Narcotics:  · Limit your activities  · Do not drive and operate hazardous machinery  · Do not make important personal or business decisions  · Do  not drink alcoholic beverages  · If you have not urinated within 8 hours after discharge, please contact your surgeon on call. Report the following to your surgeon:  · Excessive pain, swelling, redness or odor of or around the surgical area  · Temperature over 101  · Nausea and vomiting lasting longer than 4 hours or if unable to take medications  · Any signs of decreased circulation or nerve impairment to extremity: change in color, persistent  numbness, tingling, coldness or increase pain  · Any questions        What to do at Home:  Recommended activity: Activity as tolerated, NO DRIVING FOR 24 Hours post injection          *  Please give a list of your current medications to your Primary Care Provider. *  Please update this list whenever your medications are discontinued, doses are      changed, or new medications (including over-the-counter products) are added. *  Please carry medication information at all times in case of emergency situations. These are general instructions for a healthy lifestyle:    No smoking/ No tobacco products/ Avoid exposure to second hand smoke    Surgeon General's Warning:  Quitting smoking now greatly reduces serious risk to your health.     Obesity, smoking, and sedentary lifestyle greatly increases your risk for illness    A healthy diet, regular physical exercise & weight monitoring are important for maintaining a healthy lifestyle    You may be retaining fluid if you have a history of heart failure or if you experience any of the following symptoms:  Weight gain of 3 pounds or more overnight or 5 pounds in a week, increased swelling in our hands or feet or shortness of breath while lying flat in bed. Please call your doctor as soon as you notice any of these symptoms; do not wait until your next office visit. Recognize signs and symptoms of STROKE:    F-face looks uneven    A-arms unable to move or move unevenly    S-speech slurred or non-existent    T-time-call 911 as soon as signs and symptoms begin-DO NOT go       Back to bed or wait to see if you get better-TIME IS BRAIN.

## 2019-09-10 ENCOUNTER — TELEPHONE (OUTPATIENT)
Dept: PAIN MANAGEMENT | Age: 54
End: 2019-09-10

## 2019-09-10 NOTE — TELEPHONE ENCOUNTER
Patient rescheduled her appointment for 9/11/19 because she has another appointment.  Patient rescheduled for 9/19/19 @ 11:30am.

## 2019-09-19 ENCOUNTER — OFFICE VISIT (OUTPATIENT)
Dept: PAIN MANAGEMENT | Age: 54
End: 2019-09-19

## 2019-09-19 VITALS
TEMPERATURE: 97.1 F | HEART RATE: 72 BPM | RESPIRATION RATE: 16 BRPM | DIASTOLIC BLOOD PRESSURE: 81 MMHG | HEIGHT: 66 IN | BODY MASS INDEX: 37.61 KG/M2 | SYSTOLIC BLOOD PRESSURE: 121 MMHG | WEIGHT: 234 LBS | OXYGEN SATURATION: 96 %

## 2019-09-19 DIAGNOSIS — M79.2 NEUROPATHIC PAIN: ICD-10-CM

## 2019-09-19 DIAGNOSIS — M46.1 SACROILIITIS (HCC): ICD-10-CM

## 2019-09-19 DIAGNOSIS — G89.4 CHRONIC PAIN SYNDROME: ICD-10-CM

## 2019-09-19 DIAGNOSIS — G89.29 CHRONIC RIGHT-SIDED LOW BACK PAIN, WITH SCIATICA PRESENCE UNSPECIFIED: Primary | ICD-10-CM

## 2019-09-19 DIAGNOSIS — M54.5 CHRONIC RIGHT-SIDED LOW BACK PAIN, WITH SCIATICA PRESENCE UNSPECIFIED: Primary | ICD-10-CM

## 2019-09-19 DIAGNOSIS — M79.18 PIRIFORMIS MUSCLE PAIN: ICD-10-CM

## 2019-09-19 RX ORDER — DEXAMETHASONE SODIUM PHOSPHATE 10 MG/ML
10 INJECTION INTRAMUSCULAR; INTRAVENOUS ONCE
Qty: 1 ML | Refills: 0
Start: 2019-09-19 | End: 2019-09-19

## 2019-09-19 RX ORDER — KETOROLAC TROMETHAMINE 30 MG/ML
30 INJECTION, SOLUTION INTRAMUSCULAR; INTRAVENOUS ONCE
Qty: 1 ML | Refills: 0
Start: 2019-09-19 | End: 2019-09-19

## 2019-09-19 NOTE — PROGRESS NOTES
Nursing Notes    Patient presents to the office today in follow-up. Patient rates her pain at 5/10 on the numerical pain scale. Comments:   Patient rated pre-injection pain a 5/10 per 0-10 scale. Patient received 30 mg/ 1 mL IM Injection of Toradol in left gluteus  Patient received 10 mg/1 mlL IM Injection of Decadron in right gluteus        POC UDS was not performed in office today    Any new labs or imaging since last appointment? YES, Lab work from PCP. Have you been to an emergency room (ER) or urgent care clinic since your last visit? NO            Have you been hospitalized since your last visit? NO     If yes, where, when, and reason for visit? Have you seen or consulted any other health care providers outside of the [address] [place] [address]  since your last visit? YES, PCP, and Podiatrist d/t hairline in Fx in Left foot. If yes, where, when, and reason for visit? Ms. Amy Hahn has a reminder for a \"due or due soon\" health maintenance. I have asked that she contact her primary care provider for follow-up on this health maintenance.     3 most recent PHQ Screens 9/19/2019   PHQ Not Done -   Little interest or pleasure in doing things Not at all   Feeling down, depressed, irritable, or hopeless Not at all   Total Score PHQ 2 0   Trouble falling or staying asleep, or sleeping too much Nearly every day   Feeling tired or having little energy Nearly every day   Poor appetite, weight loss, or overeating Not at all   Feeling bad about yourself - or that you are a failure or have let yourself or your family down Not at all   Trouble concentrating on things such as school, work, reading, or watching TV Not at all   Moving or speaking so slowly that other people could have noticed; or the opposite being so fidgety that others notice Not at all   Thoughts of being better off dead, or hurting yourself in some way Not at all   PHQ 9 Score 6   How difficult have these problems made it for you to do your work, take care of your home and get along with others Not difficult at all

## 2019-09-19 NOTE — PROGRESS NOTES
Referral date 09/18/17, source Dr Virgilio Núñez and for Low back pain.  date checked today, findings consistent       Today   Last Visit  Prior Visit  ORT -        PGIC -3 and 5   4 and 4  4 and 5       JUSTYN -46%  40%   incomplete       COMM -0  0   1            HPI:  Olu Garay is a 47 y.o. female here for f/u visit for ongoing evaluation of chronic low back pain. Pt was last seen here on  06/20/2019. Pt denies interval changes on the character or distribution of pain. Pain is located  right-sided lower back with radiation right lower extremity stopping above the knee. The pain is described as aching and pins-and-needles. Pain at its best is  5/10. Pain at its worse is  6/10. The pain is worsened by prolonged sitting, bending, lifting, prolonged walking. Symptoms are improved by rest,gabapentin (slightly), ice, Aleve. Patient has tried physical therapy, compound cream  with no perceived benefit. Patient has history of lumbar laminectomy L4-5 in 2016. Patient was previously awaiting a referral for spinal cord stimulator trial but upon review of referral looks to be closed. Since last visit, patient reports about two weeks of relief from her right SIJ injection and her symptoms have returned to baseline. Interventional procedures:  07/29/19 Right sacroiliac joint injection (35537), Postprocedure pain relief with provocation was 100%. ROS:  Denies fever, chills, nausea, vomiting, diarrhea, constipation, abdominal pain, chest pain, shortness or breath/trouble breathing, weakness, trouble swallowing, changes in vision, changes in hearing, falls, dizziness, bladder incontinence, bowel incontinence, depression, anxiety, suicidal ideations, homicidal ideations or alcohol use. Opioid specific risk: Asthma, vertigo.     Vitals:    09/19/19 1049   BP: 121/81   Pulse: 72   Resp: 16   Temp: 97.1 °F (36.2 °C)   TempSrc: Oral   SpO2: 96%   Weight: 106.1 kg (234 lb)   Height: 5' 6\" (1.676 m)   PainSc:   5 PainLoc: Back        Labs:   BUN/Cr: \"\"\"8/30/18   11/1.05\"\"\"  AST/ALT: \"\"\"\" 7/23/17   15/19\"\"\"       Physical exam:  Constitutional  -  AFVSS, no acute distress, overeweight body habitus. A&OXs 3. Speech is clear and appropriate. HEENT -  Normocephalic, atraumatic. Conjugate gaze, clear sclerae. EOM intact. Neuro/Psych -  Mood is appropriate and patient is cooperative. Gait is within functional limits. Balance is within functional limits. Respiratory -  Non-labored breathing. Even rise of chest wall. Right SIJ, right piriformis TTP. Primary Care Physician  [place], [address]  [phone]      PHQ -- .  3 most recent PHQ Screens 9/19/2019   PHQ Not Done -   Little interest or pleasure in doing things Not at all   Feeling down, depressed, irritable, or hopeless Not at all   Total Score PHQ 2 0   Trouble falling or staying asleep, or sleeping too much Nearly every day   Feeling tired or having little energy Nearly every day   Poor appetite, weight loss, or overeating Not at all   Feeling bad about yourself - or that you are a failure or have let yourself or your family down Not at all   Trouble concentrating on things such as school, work, reading, or watching TV Not at all   Moving or speaking so slowly that other people could have noticed; or the opposite being so fidgety that others notice Not at all   Thoughts of being better off dead, or hurting yourself in some way Not at all   PHQ 9 Score 6   How difficult have these problems made it for you to do your work, take care of your home and get along with others Not difficult at all       Assessment/Plan:     ICD-10-CM ICD-9-CM    1.  Chronic right-sided low back pain, with sciatica presence unspecified M54.5 724.2 REFERRAL TO PAIN MANAGEMENT    G89.29 338.29 ketorolac tromethamine (TORADOL) 60 mg/2 mL soln      KETOROLAC TROMETHAMINE INJ      DE THER/PROPH/DIAG INJECTION, SUBCUT/IM dexamethasone, PF, (DECADRON) 10 mg/mL injection      DEXAMETHASONE SODIUM PHOSPHATE INJECTION 1 MG      IN THER/PROPH/DIAG INJECTION, SUBCUT/IM   2. Neuropathic pain M79.2 729.2    3. Sacroiliitis (HCC) M46.1 720.2    4. Piriformis muscle pain M79.18 729.1    5. Chronic pain syndrome G89.4 338.4 REFERRAL TO PAIN MANAGEMENT      ketorolac tromethamine (TORADOL) 60 mg/2 mL soln      KETOROLAC TROMETHAMINE INJ      IN THER/PROPH/DIAG INJECTION, SUBCUT/IM      dexamethasone, PF, (DECADRON) 10 mg/mL injection      DEXAMETHASONE SODIUM PHOSPHATE INJECTION 1 MG      IN THER/PROPH/DIAG INJECTION, SUBCUT/IM          1) Medications (opiod and non-opiod)  --I do not recommend long term opioid therapy for this patient at this time for their chronic pain; the risks outweigh the potential benefits. --Toradol IM x1 30 mg and decadron 10 mg x1 IM for acute on chronic pain. ---Continue Tylenol 500 mg 1-2 tablets p.o. 3 times daily PRN. Max daily dose 3000 mg    --Continue Aleve as needed as directed on the bottle      --Continue gabapentin 300 mg capsules 1 capsule every 24 hours for neuropathic pain. 2) Restorative Therapies  --Patient given educational handout on piriformis stretching to add to her daily routine. --Ongoing consideration for  another trial of physical therapy. 3) Interventional Procedures  --Referral to Dr Edilberto Escalera for SCS trial, per patient request patient reports she has been waiting on this referral for some    4) Behavorial Health Approaches  --Patient may benefit from pain pscycology in the future. 5) Complementary & Integrative Health  --Continue to independently investigate yoga for persons with chronic pain. --Follow up ongoing assessment and ongoing development of integrative and comprehensive plan of care for chronic pain. Goals:   To establish complementary and integrative plan of care to address chronic pain issues while minimizing pharmaceuticals to maximize patient's function improve quality of life. Education:  Handouts given regarding  sleep health. Follow-up and Dispositions    · Return in about 4 months (around 1/19/2020). [address] was used for portions of this report. Unintended errors may occur.     Social History     Socioeconomic History    Marital status:      Spouse name: Not on file    Number of children: Not on file    Years of education: Not on file    Highest education level: Not on file   Occupational History    Occupation: unemployed     Comment: long term disability   Social Needs    Financial resource strain: Not on file    Food insecurity:     Worry: Not on file     Inability: Not on file   [employer] needs:     Medical: Not on file     Non-medical: Not on file   Tobacco Use    Smoking status: Never Smoker    Smokeless tobacco: Never Used   Substance and Sexual Activity    Alcohol use: No    Drug use: No    Sexual activity: Not on file     Comment: Hysterectomy   Lifestyle    Physical activity:     Days per week: Not on file     Minutes per session: Not on file    Stress: Not on file   Relationships    Social connections:     Talks on phone: Not on file     Gets together: Not on file     Attends Congregation service: Not on file     Active member of club or organization: Not on file     Attends meetings of clubs or organizations: Not on file     Relationship status: Not on file    Intimate partner violence:     Fear of current or ex partner: Not on file     Emotionally abused: Not on file     Physically abused: Not on file     Forced sexual activity: Not on file   Other Topics Concern    Not on file   Social History Narrative    Not on file     Family History   Problem Relation Age of Onset    Diabetes Mother     Elevated Lipids Mother     Hypertension Mother     Cancer Father         pancreatic    Diabetes Sister     Hypertension Sister     Diabetes Brother     Diabetes Daughter     Hypertension Daughter      Allergies   Allergen Reactions    Skelaxin [Metaxalone] Other (comments)     jittery    Tramadol Other (comments)     Halluctations     Past Medical History:   Diagnosis Date    Asthma     Back pain with radiation     Cardiac echocardiogram, [facility] study 12/29/2016    EF 55-60%. No WMA. Right to left atrial septal shunt suggests PFO. Similar to study of 10/18/16.  Cardiovascular LE venous duplex 10/18/2016    No DVT bilaterally.  Contact dermatitis and other eczema, due to unspecified cause     CTS (carpal tunnel syndrome)     HSV-2 (herpes simplex virus 2) infection     Hypercholesterolemia     Hypothyroidism     Ill-defined condition     Vertigo    L4-L5 disc bulge     Leg swelling     Migraines     Positive PPD, treated     S/P lumbar fusion 1/13/2016    1. L4-5 bilateral hemilaminotomy, medial facetectomy, foraminotomy, diskectomy L4-5. 2. Transforaminal lumbar interbody fusion with PEEK cage and demineralized bone graft L4-L5 posterolateral fusion. 3. Segmental spinal instrumentation, DePuy Expedium type L4-L5. 01/13/2016 By Dr. Earlene Kim Sciatica     EMG '17 , mild sciatic EMG findings    Stroke [facility])     12/2016    Vertigo      Past Surgical History:   Procedure Laterality Date    HX GYN      partial hysterectomy due to abnormal pap    HX LUMBAR LAMINECTOMY  01-13-16    L4/5     Current Outpatient Medications on File Prior to Visit   Medication Sig    gabapentin (NEURONTIN) 300 mg capsule TAKE 1 CAPSULE BY MOUTH EVERY 12 HOURS FOR 30 DAYS    propranolol (INDERAL) 20 mg tablet Take 1 Tab by mouth three (3) times daily. Indications: MIGRAINE PREVENTION    aspirin (ASPIRIN) 325 mg tablet Take 1 Tab by mouth daily.  atorvastatin (LIPITOR) 80 mg tablet Take 1 Tab by mouth nightly.  furosemide (LASIX) 20 mg tablet Take 40 mg by mouth daily.     budesonide-formoterol (SYMBICORT) 160-4.5 mcg/actuation HFA inhaler Take 2 Puffs by inhalation two (2) times a day.    albuterol (PROVENTIL HFA, VENTOLIN HFA, PROAIR HFA) 90 mcg/actuation inhaler Take 1 Puff by inhalation every four (4) hours as needed for Wheezing.  FERROUS SULFATE PO Take 325 mg by mouth.  potassium chloride SR (K-TAB) 20 mEq tablet Take 20 mEq by mouth daily.  levothyroxine (SYNTHROID) 50 mcg tablet TAKE 1 TABLET BY MOUTH EVERY DAY    cholecalciferol (VITAMIN D3) 1,000 unit cap Take 2,000 Units by mouth daily.  meclizine (ANTIVERT) 25 mg tablet Take 25 mg by mouth daily as needed.  DULoxetine (CYMBALTA) 60 mg capsule Take 60 mg by mouth nightly.  naloxone (NARCAN) 4 mg/actuation nasal spray Use 1 spray intranasally into 1 nostril. Use a new Narcan nasal spray for subsequent doses and administer into alternating nostrils. May repeat every 2 to 3 minutes as needed. No current facility-administered medications on file prior to visit.

## 2019-09-19 NOTE — PATIENT INSTRUCTIONS
Learning About Sleeping Well  What does sleeping well mean? Sleeping well means getting enough sleep. How much sleep is enough varies among people. The number of hours you sleep is not as important as how you feel when you wake up. If you do not feel refreshed, you probably need more sleep. Another sign of not getting enough sleep is feeling tired during the day. The average total nightly sleep time is 7½ to 8 hours. Healthy adults may need a little more or a little less than this. Why is getting enough sleep important? Getting enough quality sleep is a basic part of good health. When your sleep suffers, your mood and your thoughts can suffer too. You may find yourself feeling more grumpy or stressed. Not getting enough sleep also can lead to serious problems, including injury, accidents, anxiety, and depression. What might cause poor sleeping? Many things can cause sleep problems, including:  · Stress. Stress can be caused by fear about a single event, such as giving a speech. Or you may have ongoing stress, such as worry about work or school. · Depression, anxiety, and other mental or emotional conditions. · Changes in your sleep habits or surroundings. This includes changes that happen where you sleep, such as noise, light, or sleeping in a different bed. It also includes changes in your sleep pattern, such as having jet lag or working a late shift. · Health problems, such as pain, breathing problems, and restless legs syndrome. · Lack of regular exercise. How can you help yourself? Here are some tips that may help you sleep more soundly and wake up feeling more refreshed. Your sleeping area  · Use your bedroom only for sleeping and sex. A bit of light reading may help you fall asleep. But if it doesn't, do your reading elsewhere in the house. Don't watch TV in bed. · Be sure your bed is big enough to stretch out comfortably, especially if you have a sleep partner.   · Keep your bedroom quiet, dark, and cool. Use curtains, blinds, or a sleep mask to block out light. To block out noise, use earplugs, soothing music, or a \"white noise\" machine. Your evening and bedtime routine  · Create a relaxing bedtime routine. You might want to take a warm shower or bath, listen to soothing music, or drink a cup of noncaffeinated tea. · Go to bed at the same time every night. And get up at the same time every morning, even if you feel tired. What to avoid  · Limit caffeine (coffee, tea, caffeinated sodas) during the day, and don't have any for at least 4 to 6 hours before bedtime. · Don't drink alcohol before bedtime. Alcohol can cause you to wake up more often during the night. · Don't smoke or use tobacco, especially in the evening. Nicotine can keep you awake. · Don't take naps during the day, especially close to bedtime. · Don't lie in bed awake for too long. If you can't fall asleep, or if you wake up in the middle of the night and can't get back to sleep within 15 minutes or so, get out of bed and go to another room until you feel sleepy. · Don't take medicine right before bed that may keep you awake or make you feel hyper or energized. Your doctor can tell you if your medicine may do this and if you can take it earlier in the day. If you can't sleep  · Imagine yourself in a peaceful, pleasant scene. Focus on the details and feelings of being in a place that is relaxing. · Get up and do a quiet or boring activity until you feel sleepy. · Don't drink any liquids after 6 p.m. if you wake up often because you have to go to the bathroom. Where can you learn more? Go to http://leesa-jean carlos.info/. Enter R401 in the search box to learn more about \"Learning About Sleeping Well. \"  Current as of: September 11, 2018  Content Version: 12.1  © 0058-7484 [employer], [employer].  Care instructions adapted under license by [employer] (which disclaims liability or warranty for this information). If you have questions about a medical condition or this instruction, always ask your healthcare professional. [address] any warranty or liability for your use of this information. Piriformis Syndrome: Exercises  Introduction  Here are some examples of exercises for you to try. The exercises may be suggested for a condition or for rehabilitation. Start each exercise slowly. Ease off the exercises if you start to have pain. You will be told when to start these exercises and which ones will work best for you. How to do the exercises  Hip rotator stretch    1. Lie on your back with both knees bent and your feet flat on the floor. 2. Put the ankle of your affected leg on your opposite thigh near your knee. 3. Use your hand to gently push your knee (on your affected leg) away from your body until you feel a gentle stretch around your hip. 4. Hold the stretch for 15 to 30 seconds. 5. Repeat 2 to 4 times. 6. Switch legs and repeat steps 1 through 5. Piriformis stretch    1. Lie on your back with your legs straight. 2. Lift your affected leg and bend your knee. With your opposite hand, reach across your body, and then gently pull your knee toward your opposite shoulder. 3. Hold the stretch for 15 to 30 seconds. 4. Repeat with your other leg. 5. Repeat 2 to 4 times on each side. Lower abdominal strengthening    1. Lie on your back with your knees bent and your feet flat on the floor. 2. Tighten your belly muscles by pulling your belly button in toward your spine. 3. Lift one foot off the floor and bring your knee toward your chest, so that your knee is straight above your hip and your leg is bent like the letter \"L. \"  4. Lift the other knee up to the same position. 5. Lower one leg at a time to the starting position. 6. Keep alternating legs until you have lifted each leg 8 to 12 times.   7. Be sure to keep your belly muscles tight and your back still as you are moving your legs. Be sure to breathe normally. Follow-up care is a key part of your treatment and safety. Be sure to make and go to all appointments, and call your doctor if you are having problems. It's also a good idea to know your test results and keep a list of the medicines you take. Current as of: September 20, 2018  Content Version: 12.1  © 7351-4323 [employer]. Care instructions adapted under license by [employer] (which disclaims liability or warranty for this information). If you have questions about a medical condition or this instruction, always ask your healthcare professional. [place] [address] any warranty or liability for your use of this information. Post Injection Instructions    If you develop any abnormal symptoms, such as itching, swelling, redness, rash, or shortness of breath, please call our office. Normally, these are temporary symptoms, which resolve within several hours to a day, but our office is more than happy to answer any questions you may have. The provider would like you to observe the injection area for redness, swelling, or increased heat. If any or all of these reactions occur, please call our office as soon as possible. This reaction may indicate the first signs of infection. Although very rare, it is  best if caught early. I have reviewed these instructions and the patient verbalizes understanding.

## 2019-09-30 ENCOUNTER — DOCUMENTATION ONLY (OUTPATIENT)
Dept: PAIN MANAGEMENT | Age: 54
End: 2019-09-30

## 2019-09-30 NOTE — PROGRESS NOTES
Faxed last office visit note, demographic sheet, and referral for pain management to 's office, 9/27/19, and received confirmation that fax went through at [phone].

## 2019-10-18 NOTE — TELEPHONE ENCOUNTER
The pt called the office to request a refill on her tylenol ES. A chart review was done and the last issued prescription was done back in March 2019 with 2 refills.

## 2019-10-22 RX ORDER — ACETAMINOPHEN 500 MG
1000 TABLET ORAL
Qty: 180 TAB | Refills: 2 | Status: SHIPPED | OUTPATIENT
Start: 2019-10-22 | End: 2019-11-21

## 2019-11-10 ENCOUNTER — HOSPITAL ENCOUNTER (EMERGENCY)
Age: 54
Discharge: HOME | End: 2019-11-11
Attending: EMERGENCY MEDICINE
Payer: MEDICARE

## 2019-11-10 ENCOUNTER — APPOINTMENT (OUTPATIENT)
Dept: GENERAL RADIOLOGY | Age: 54
End: 2019-11-10
Attending: EMERGENCY MEDICINE
Payer: MEDICARE

## 2019-11-10 VITALS
RESPIRATION RATE: 18 BRPM | TEMPERATURE: 98.3 F | HEART RATE: 91 BPM | SYSTOLIC BLOOD PRESSURE: 133 MMHG | OXYGEN SATURATION: 98 % | WEIGHT: 240 LBS | DIASTOLIC BLOOD PRESSURE: 90 MMHG | BODY MASS INDEX: 38.74 KG/M2

## 2019-11-10 DIAGNOSIS — J45.31 MILD PERSISTENT ASTHMA WITH ACUTE EXACERBATION: Primary | ICD-10-CM

## 2019-11-10 PROCEDURE — 99282 EMERGENCY DEPT VISIT SF MDM: CPT

## 2019-11-10 PROCEDURE — 90471 IMMUNIZATION ADMIN: CPT

## 2019-11-10 PROCEDURE — 74011000250 HC RX REV CODE- 250: Performed by: EMERGENCY MEDICINE

## 2019-11-10 PROCEDURE — 80053 COMPREHEN METABOLIC PANEL: CPT

## 2019-11-10 PROCEDURE — 85025 COMPLETE CBC W/AUTO DIFF WBC: CPT

## 2019-11-10 PROCEDURE — 96374 THER/PROPH/DIAG INJ IV PUSH: CPT

## 2019-11-10 PROCEDURE — 71045 X-RAY EXAM CHEST 1 VIEW: CPT

## 2019-11-10 RX ORDER — IPRATROPIUM BROMIDE AND ALBUTEROL SULFATE 2.5; .5 MG/3ML; MG/3ML
3 SOLUTION RESPIRATORY (INHALATION)
Status: COMPLETED | OUTPATIENT
Start: 2019-11-10 | End: 2019-11-10

## 2019-11-10 RX ADMIN — IPRATROPIUM BROMIDE AND ALBUTEROL SULFATE 3 ML: .5; 3 SOLUTION RESPIRATORY (INHALATION) at 23:41

## 2019-11-11 LAB
ALBUMIN SERPL-MCNC: 3.3 G/DL (ref 3.4–5)
ALBUMIN/GLOB SERPL: 0.8 {RATIO} (ref 0.8–1.7)
ALP SERPL-CCNC: 154 U/L (ref 45–117)
ALT SERPL-CCNC: 50 U/L (ref 13–56)
ANION GAP SERPL CALC-SCNC: 4 MMOL/L (ref 3–18)
AST SERPL-CCNC: 34 U/L (ref 10–38)
BASOPHILS # BLD: 0.1 K/UL (ref 0–0.06)
BASOPHILS NFR BLD: 1 % (ref 0–3)
BILIRUB SERPL-MCNC: 0.3 MG/DL (ref 0.2–1)
BUN SERPL-MCNC: 17 MG/DL (ref 7–18)
BUN/CREAT SERPL: 17 (ref 12–20)
CALCIUM SERPL-MCNC: 8.9 MG/DL (ref 8.5–10.1)
CHLORIDE SERPL-SCNC: 108 MMOL/L (ref 100–111)
CO2 SERPL-SCNC: 27 MMOL/L (ref 21–32)
CREAT SERPL-MCNC: 1.01 MG/DL (ref 0.6–1.3)
DIFFERENTIAL METHOD BLD: ABNORMAL
EOSINOPHIL # BLD: 0 K/UL (ref 0–0.4)
EOSINOPHIL NFR BLD: 0 % (ref 0–5)
ERYTHROCYTE [DISTWIDTH] IN BLOOD BY AUTOMATED COUNT: 14.6 % (ref 11.6–14.5)
GLOBULIN SER CALC-MCNC: 4.3 G/DL (ref 2–4)
GLUCOSE SERPL-MCNC: 132 MG/DL (ref 74–99)
HCT VFR BLD AUTO: 36 % (ref 35–45)
HGB BLD-MCNC: 11.8 G/DL (ref 12–16)
LYMPHOCYTES # BLD: 5.7 K/UL (ref 0.8–3.5)
LYMPHOCYTES NFR BLD: 63 % (ref 20–51)
MCH RBC QN AUTO: 23.6 PG (ref 24–34)
MCHC RBC AUTO-ENTMCNC: 32.8 G/DL (ref 31–37)
MCV RBC AUTO: 72.1 FL (ref 74–97)
MONOCYTES # BLD: 0.4 K/UL (ref 0–1)
MONOCYTES NFR BLD: 4 % (ref 2–9)
NEUTS SEG # BLD: 2.9 K/UL (ref 1.8–8)
NEUTS SEG NFR BLD: 32 % (ref 42–75)
PLATELET # BLD AUTO: 305 K/UL (ref 135–420)
PLATELET COMMENTS,PCOM: ABNORMAL
PMV BLD AUTO: 10.2 FL (ref 9.2–11.8)
POTASSIUM SERPL-SCNC: 3.7 MMOL/L (ref 3.5–5.5)
PROT SERPL-MCNC: 7.6 G/DL (ref 6.4–8.2)
RBC # BLD AUTO: 4.99 M/UL (ref 4.2–5.3)
RBC MORPH BLD: ABNORMAL
SODIUM SERPL-SCNC: 139 MMOL/L (ref 136–145)
WBC # BLD AUTO: 9.1 K/UL (ref 4.6–13.2)

## 2019-11-11 PROCEDURE — 96374 THER/PROPH/DIAG INJ IV PUSH: CPT

## 2019-11-11 PROCEDURE — 90715 TDAP VACCINE 7 YRS/> IM: CPT | Performed by: STUDENT IN AN ORGANIZED HEALTH CARE EDUCATION/TRAINING PROGRAM

## 2019-11-11 PROCEDURE — 74011000250 HC RX REV CODE- 250: Performed by: STUDENT IN AN ORGANIZED HEALTH CARE EDUCATION/TRAINING PROGRAM

## 2019-11-11 PROCEDURE — 90471 IMMUNIZATION ADMIN: CPT

## 2019-11-11 PROCEDURE — 74011250636 HC RX REV CODE- 250/636: Performed by: STUDENT IN AN ORGANIZED HEALTH CARE EDUCATION/TRAINING PROGRAM

## 2019-11-11 RX ORDER — IPRATROPIUM BROMIDE 0.5 MG/2.5ML
0.5 SOLUTION RESPIRATORY (INHALATION)
Status: COMPLETED | OUTPATIENT
Start: 2019-11-11 | End: 2019-11-11

## 2019-11-11 RX ORDER — CODEINE PHOSPHATE AND GUAIFENESIN 10; 100 MG/5ML; MG/5ML
5 SOLUTION ORAL
Qty: 120 ML | Refills: 0 | Status: SHIPPED | OUTPATIENT
Start: 2019-11-11 | End: 2019-11-19

## 2019-11-11 RX ORDER — BENZONATATE 100 MG/1
200 CAPSULE ORAL
Qty: 30 CAP | Refills: 0 | Status: SHIPPED | OUTPATIENT
Start: 2019-11-11 | End: 2019-11-18

## 2019-11-11 RX ORDER — PREDNISONE 50 MG/1
50 TABLET ORAL DAILY
Qty: 5 TAB | Refills: 0 | Status: SHIPPED | OUTPATIENT
Start: 2019-11-11 | End: 2019-11-16

## 2019-11-11 RX ORDER — IPRATROPIUM BROMIDE 0.5 MG/2.5ML
0.5 SOLUTION RESPIRATORY (INHALATION)
Status: DISCONTINUED | OUTPATIENT
Start: 2019-11-11 | End: 2019-11-11

## 2019-11-11 RX ADMIN — IPRATROPIUM BROMIDE 0.5 MG: 0.5 SOLUTION RESPIRATORY (INHALATION) at 01:19

## 2019-11-11 RX ADMIN — METHYLPREDNISOLONE SODIUM SUCCINATE 125 MG: 125 INJECTION, POWDER, FOR SOLUTION INTRAMUSCULAR; INTRAVENOUS at 01:19

## 2019-11-11 RX ADMIN — TETANUS TOXOID, REDUCED DIPHTHERIA TOXOID AND ACELLULAR PERTUSSIS VACCINE, ADSORBED 0.5 ML: 5; 2.5; 8; 8; 2.5 SUSPENSION INTRAMUSCULAR at 04:10

## 2019-11-11 NOTE — ED TRIAGE NOTES
Patient states she has had a cold since last week. Was given medication at [facility],. States she cannot stop coughing,  She coughs until she throws up. Pt slightly short of breath.    Non productive cough

## 2019-11-11 NOTE — DISCHARGE INSTRUCTIONS
Patient Education        Learning About Asthma Triggers  What are asthma triggers? When you have asthma, certain things can make your symptoms worse. These are called triggers. Learn what triggers an asthma attack for you, and avoid the triggers when you can. Common triggers include colds, smoke, air pollution, dust, pollen, pets, stress, and cold air. How do asthma triggers affect you? Triggers can make it harder for your lungs to work as they should. They can lead to sudden breathing problems and other symptoms. When you are around a trigger, an asthma attack is more likely. If your symptoms are severe, you may need emergency treatment or have to go to the hospital for treatment. What can you do to avoid triggers? The first thing is to know your triggers. When you are having symptoms, note the things around you that might be causing them. Then look for patterns that may be triggering your symptoms. Record your triggers on a piece of paper or in an asthma diary. When you have your list of possible triggers, work with your doctor to find ways to avoid them. Avoid colds and flu. Get a pneumococcal vaccine shot. If you have had one before, ask your doctor whether you need a second dose. Get a flu vaccine every year, as soon as it's available. If you must be around people with colds or the flu, wash your hands often. Here are some ways to avoid a few common triggers. · Do not smoke or allow others to smoke around you. If you need help quitting, talk to your doctor about stop-smoking programs and medicines. These can increase your chances of quitting for good. · If there is a lot of pollution, pollen, or dust outside, stay at home and keep your windows closed. Use an air conditioner or air filter in your home. Check your local weather report or newspaper for air quality and pollen reports. What else should you know? · Take your controller medicine every day, not just when you have symptoms.  It helps prevent problems before they occur. · Your doctor may suggest that you check how well your lungs are working by measuring your peak expiratory flow (PEF) throughout the day. Your PEF may drop when you are near things that trigger symptoms. Where can you learn more? Go to http://leesa-jean carlos.info/. Enter S369 in the search box to learn more about \"Learning About Asthma Triggers. \"  Current as of: June 9, 2019  Content Version: 12.2  © 3826-8250 [employer], [employer]. Care instructions adapted under license by [employer] (which disclaims liability or warranty for this information). If you have questions about a medical condition or this instruction, always ask your healthcare professional. [address] any warranty or liability for your use of this information.

## 2019-11-11 NOTE — ED NOTES
Pt sttd incessant cough. Has been using inhaler at home without relief. Pt able to speak in full sentences. No signs of resp distress.

## 2019-11-21 ENCOUNTER — TELEPHONE (OUTPATIENT)
Dept: PAIN MANAGEMENT | Age: 54
End: 2019-11-21

## 2021-01-15 ENCOUNTER — HOSPITAL ENCOUNTER (OUTPATIENT)
Dept: MAMMOGRAPHY | Age: 56
Discharge: HOME | End: 2021-01-15
Attending: INTERNAL MEDICINE
Payer: MEDICARE

## 2021-01-15 DIAGNOSIS — Z12.31 SCREENING MAMMOGRAM, ENCOUNTER FOR: ICD-10-CM

## 2021-01-15 PROCEDURE — 77063 BREAST TOMOSYNTHESIS BI: CPT

## 2021-07-20 ENCOUNTER — APPOINTMENT (OUTPATIENT)
Dept: CT IMAGING | Age: 56
DRG: 103 | End: 2021-07-20
Attending: EMERGENCY MEDICINE
Payer: MEDICARE

## 2021-07-20 ENCOUNTER — HOSPITAL ENCOUNTER (EMERGENCY)
Dept: CT IMAGING | Age: 56
Discharge: HOME | DRG: 103 | End: 2021-07-20
Attending: EMERGENCY MEDICINE
Payer: MEDICARE

## 2021-07-20 ENCOUNTER — HOSPITAL ENCOUNTER (INPATIENT)
Age: 56
LOS: 1 days | Discharge: HOME HEALTH | DRG: 103 | End: 2021-07-21
Attending: EMERGENCY MEDICINE | Admitting: INTERNAL MEDICINE
Payer: MEDICARE

## 2021-07-20 DIAGNOSIS — G43.711 INTRACTABLE CHRONIC MIGRAINE WITHOUT AURA AND WITH STATUS MIGRAINOSUS: ICD-10-CM

## 2021-07-20 DIAGNOSIS — I63.9 ACUTE ISCHEMIC STROKE (HCC): Primary | ICD-10-CM

## 2021-07-20 LAB
ANION GAP SERPL CALC-SCNC: 3 MMOL/L (ref 3–18)
APPEARANCE UR: CLEAR
ATRIAL RATE: 76 BPM
BACTERIA URNS QL MICRO: ABNORMAL /HPF
BASOPHILS # BLD: 0.1 K/UL (ref 0–0.1)
BASOPHILS NFR BLD: 1 % (ref 0–2)
BILIRUB UR QL: NEGATIVE
BUN SERPL-MCNC: 18 MG/DL (ref 7–18)
BUN/CREAT SERPL: 20 (ref 12–20)
CALCIUM SERPL-MCNC: 9.3 MG/DL (ref 8.5–10.1)
CALCULATED P AXIS, ECG09: 50 DEGREES
CALCULATED R AXIS, ECG10: 6 DEGREES
CALCULATED T AXIS, ECG11: 23 DEGREES
CHLORIDE SERPL-SCNC: 107 MMOL/L (ref 100–111)
CO2 SERPL-SCNC: 28 MMOL/L (ref 21–32)
COLOR UR: YELLOW
CREAT SERPL-MCNC: 0.89 MG/DL (ref 0.6–1.3)
DIAGNOSIS, 93000: NORMAL
DIFFERENTIAL METHOD BLD: ABNORMAL
EOSINOPHIL # BLD: 0.2 K/UL (ref 0–0.4)
EOSINOPHIL NFR BLD: 2 % (ref 0–5)
EPITH CASTS URNS QL MICRO: ABNORMAL /LPF (ref 0–5)
ERYTHROCYTE [DISTWIDTH] IN BLOOD BY AUTOMATED COUNT: 15.1 % (ref 11.6–14.5)
EST. AVERAGE GLUCOSE BLD GHB EST-MCNC: 117 MG/DL
GLUCOSE BLD STRIP.AUTO-MCNC: 112 MG/DL (ref 70–110)
GLUCOSE BLD STRIP.AUTO-MCNC: 89 MG/DL (ref 70–110)
GLUCOSE BLD STRIP.AUTO-MCNC: 99 MG/DL (ref 70–110)
GLUCOSE SERPL-MCNC: 102 MG/DL (ref 74–99)
GLUCOSE UR STRIP.AUTO-MCNC: NEGATIVE MG/DL
HBA1C MFR BLD: 5.7 % (ref 4.2–5.6)
HCT VFR BLD AUTO: 39.1 % (ref 35–45)
HGB BLD-MCNC: 12.2 G/DL (ref 12–16)
HGB UR QL STRIP: ABNORMAL
INR PPP: 1 (ref 0.8–1.2)
KETONES UR QL STRIP.AUTO: NEGATIVE MG/DL
LEUKOCYTE ESTERASE UR QL STRIP.AUTO: ABNORMAL
LYMPHOCYTES # BLD: 3.8 K/UL (ref 0.9–3.6)
LYMPHOCYTES NFR BLD: 45 % (ref 21–52)
MCH RBC QN AUTO: 23 PG (ref 24–34)
MCHC RBC AUTO-ENTMCNC: 31.2 G/DL (ref 31–37)
MCV RBC AUTO: 73.8 FL (ref 74–97)
MONOCYTES # BLD: 0.7 K/UL (ref 0.05–1.2)
MONOCYTES NFR BLD: 9 % (ref 3–10)
NEUTS SEG # BLD: 3.6 K/UL (ref 1.8–8)
NEUTS SEG NFR BLD: 43 % (ref 40–73)
NITRITE UR QL STRIP.AUTO: NEGATIVE
P-R INTERVAL, ECG05: 174 MS
PH UR STRIP: 7.5 [PH] (ref 5–8)
PLATELET # BLD AUTO: 320 K/UL (ref 135–420)
PMV BLD AUTO: 10 FL (ref 9.2–11.8)
POTASSIUM SERPL-SCNC: 4 MMOL/L (ref 3.5–5.5)
PROT UR STRIP-MCNC: NEGATIVE MG/DL
PROTHROMBIN TIME: 12.9 SEC (ref 11.5–15.2)
Q-T INTERVAL, ECG07: 384 MS
QRS DURATION, ECG06: 76 MS
QTC CALCULATION (BEZET), ECG08: 432 MS
RBC # BLD AUTO: 5.3 M/UL (ref 4.2–5.3)
RBC #/AREA URNS HPF: ABNORMAL /HPF (ref 0–5)
SODIUM SERPL-SCNC: 138 MMOL/L (ref 136–145)
SP GR UR REFRACTOMETRY: 1.02 (ref 1–1.03)
TROPONIN I SERPL-MCNC: <0.02 NG/ML (ref 0–0.04)
TSH SERPL DL<=0.05 MIU/L-ACNC: 1.44 UIU/ML (ref 0.36–3.74)
UROBILINOGEN UR QL STRIP.AUTO: 0.2 EU/DL (ref 0.2–1)
VENTRICULAR RATE, ECG03: 76 BPM
WBC # BLD AUTO: 8.3 K/UL (ref 4.6–13.2)
WBC URNS QL MICRO: ABNORMAL /HPF (ref 0–4)

## 2021-07-20 PROCEDURE — 99223 1ST HOSP IP/OBS HIGH 75: CPT | Performed by: INTERNAL MEDICINE

## 2021-07-20 PROCEDURE — 92522 EVALUATE SPEECH PRODUCTION: CPT

## 2021-07-20 PROCEDURE — 94761 N-INVAS EAR/PLS OXIMETRY MLT: CPT

## 2021-07-20 PROCEDURE — 70498 CT ANGIOGRAPHY NECK: CPT

## 2021-07-20 PROCEDURE — 97116 GAIT TRAINING THERAPY: CPT

## 2021-07-20 PROCEDURE — 84484 ASSAY OF TROPONIN QUANT: CPT

## 2021-07-20 PROCEDURE — 74011000250 HC RX REV CODE- 250: Performed by: INTERNAL MEDICINE

## 2021-07-20 PROCEDURE — 81001 URINALYSIS AUTO W/SCOPE: CPT

## 2021-07-20 PROCEDURE — 87086 URINE CULTURE/COLONY COUNT: CPT

## 2021-07-20 PROCEDURE — 83036 HEMOGLOBIN GLYCOSYLATED A1C: CPT

## 2021-07-20 PROCEDURE — 96365 THER/PROPH/DIAG IV INF INIT: CPT

## 2021-07-20 PROCEDURE — 74011250636 HC RX REV CODE- 250/636: Performed by: PSYCHIATRY & NEUROLOGY

## 2021-07-20 PROCEDURE — 99222 1ST HOSP IP/OBS MODERATE 55: CPT | Performed by: PSYCHIATRY & NEUROLOGY

## 2021-07-20 PROCEDURE — 82962 GLUCOSE BLOOD TEST: CPT

## 2021-07-20 PROCEDURE — 92610 EVALUATE SWALLOWING FUNCTION: CPT

## 2021-07-20 PROCEDURE — 99285 EMERGENCY DEPT VISIT HI MDM: CPT

## 2021-07-20 PROCEDURE — 97161 PT EVAL LOW COMPLEX 20 MIN: CPT

## 2021-07-20 PROCEDURE — 74011000636 HC RX REV CODE- 636: Performed by: INTERNAL MEDICINE

## 2021-07-20 PROCEDURE — 93005 ELECTROCARDIOGRAM TRACING: CPT

## 2021-07-20 PROCEDURE — 74011250636 HC RX REV CODE- 250/636: Performed by: INTERNAL MEDICINE

## 2021-07-20 PROCEDURE — 70450 CT HEAD/BRAIN W/O DYE: CPT

## 2021-07-20 PROCEDURE — 65660000000 HC RM CCU STEPDOWN

## 2021-07-20 PROCEDURE — 80048 BASIC METABOLIC PNL TOTAL CA: CPT

## 2021-07-20 PROCEDURE — 85610 PROTHROMBIN TIME: CPT

## 2021-07-20 PROCEDURE — 74011000636 HC RX REV CODE- 636: Performed by: EMERGENCY MEDICINE

## 2021-07-20 PROCEDURE — 85025 COMPLETE CBC W/AUTO DIFF WBC: CPT

## 2021-07-20 PROCEDURE — 74011250637 HC RX REV CODE- 250/637: Performed by: INTERNAL MEDICINE

## 2021-07-20 PROCEDURE — 2709999900 HC NON-CHARGEABLE SUPPLY

## 2021-07-20 PROCEDURE — 94640 AIRWAY INHALATION TREATMENT: CPT

## 2021-07-20 PROCEDURE — 84443 ASSAY THYROID STIM HORMONE: CPT

## 2021-07-20 PROCEDURE — 74011250636 HC RX REV CODE- 250/636: Performed by: EMERGENCY MEDICINE

## 2021-07-20 RX ORDER — TIZANIDINE 2 MG/1
TABLET ORAL
COMMUNITY
Start: 2021-05-24 | End: 2022-04-21

## 2021-07-20 RX ORDER — FUROSEMIDE 20 MG/1
20 TABLET ORAL DAILY
Status: DISCONTINUED | OUTPATIENT
Start: 2021-07-21 | End: 2021-07-21 | Stop reason: HOSPADM

## 2021-07-20 RX ORDER — ATORVASTATIN CALCIUM 40 MG/1
80 TABLET, FILM COATED ORAL
Status: DISCONTINUED | OUTPATIENT
Start: 2021-07-20 | End: 2021-07-21 | Stop reason: HOSPADM

## 2021-07-20 RX ORDER — ASPIRIN 325 MG
325 TABLET ORAL DAILY
Status: DISCONTINUED | OUTPATIENT
Start: 2021-07-21 | End: 2021-07-20

## 2021-07-20 RX ORDER — DESLORATADINE 5 MG/1
5 TABLET ORAL DAILY
COMMUNITY
Start: 2021-05-14

## 2021-07-20 RX ORDER — POTASSIUM CHLORIDE 750 MG/1
20 TABLET, FILM COATED, EXTENDED RELEASE ORAL
Status: DISCONTINUED | OUTPATIENT
Start: 2021-07-21 | End: 2021-07-21 | Stop reason: HOSPADM

## 2021-07-20 RX ORDER — ASPIRIN 81 MG/1
81 TABLET ORAL DAILY
COMMUNITY

## 2021-07-20 RX ORDER — FLUTICASONE PROPIONATE 50 MCG
SPRAY, SUSPENSION (ML) NASAL
COMMUNITY
Start: 2021-05-14 | End: 2022-07-23

## 2021-07-20 RX ORDER — ASPIRIN 600 MG/1
300 SUPPOSITORY RECTAL ONCE
Status: DISCONTINUED | OUTPATIENT
Start: 2021-07-20 | End: 2021-07-20

## 2021-07-20 RX ORDER — GUAIFENESIN 100 MG/5ML
81 LIQUID (ML) ORAL DAILY
Status: DISCONTINUED | OUTPATIENT
Start: 2021-07-21 | End: 2021-07-21 | Stop reason: HOSPADM

## 2021-07-20 RX ORDER — OMEPRAZOLE 40 MG/1
40 CAPSULE, DELAYED RELEASE ORAL DAILY
COMMUNITY
Start: 2021-05-19

## 2021-07-20 RX ORDER — SODIUM CHLORIDE 9 MG/ML
125 INJECTION, SOLUTION INTRAVENOUS CONTINUOUS
Status: DISCONTINUED | OUTPATIENT
Start: 2021-07-20 | End: 2021-07-21

## 2021-07-20 RX ORDER — KETOROLAC TROMETHAMINE 15 MG/ML
15 INJECTION, SOLUTION INTRAMUSCULAR; INTRAVENOUS EVERY 6 HOURS
Status: DISCONTINUED | OUTPATIENT
Start: 2021-07-20 | End: 2021-07-21

## 2021-07-20 RX ORDER — MAGNESIUM SULFATE HEPTAHYDRATE 40 MG/ML
2 INJECTION, SOLUTION INTRAVENOUS ONCE
Status: COMPLETED | OUTPATIENT
Start: 2021-07-20 | End: 2021-07-20

## 2021-07-20 RX ORDER — PROPRANOLOL HYDROCHLORIDE 10 MG/1
20 TABLET ORAL 3 TIMES DAILY
Status: DISCONTINUED | OUTPATIENT
Start: 2021-07-20 | End: 2021-07-21 | Stop reason: HOSPADM

## 2021-07-20 RX ORDER — MELOXICAM 15 MG/1
TABLET ORAL
COMMUNITY
Start: 2021-04-24 | End: 2021-07-21

## 2021-07-20 RX ORDER — ASPIRIN 325 MG
325 TABLET ORAL
Status: DISCONTINUED | OUTPATIENT
Start: 2021-07-20 | End: 2021-07-20 | Stop reason: CLARIF

## 2021-07-20 RX ORDER — LEVOTHYROXINE SODIUM 50 UG/1
50 TABLET ORAL
Status: DISCONTINUED | OUTPATIENT
Start: 2021-07-21 | End: 2021-07-21 | Stop reason: HOSPADM

## 2021-07-20 RX ORDER — GABAPENTIN 300 MG/1
300 CAPSULE ORAL
COMMUNITY

## 2021-07-20 RX ORDER — ACETAMINOPHEN 500 MG
1000 TABLET ORAL
Status: DISCONTINUED | OUTPATIENT
Start: 2021-07-20 | End: 2021-07-21 | Stop reason: HOSPADM

## 2021-07-20 RX ORDER — BUDESONIDE 0.5 MG/2ML
500 INHALANT ORAL
Status: DISCONTINUED | OUTPATIENT
Start: 2021-07-20 | End: 2021-07-21 | Stop reason: HOSPADM

## 2021-07-20 RX ADMIN — KETOROLAC TROMETHAMINE 15 MG: 15 INJECTION, SOLUTION INTRAMUSCULAR; INTRAVENOUS at 18:20

## 2021-07-20 RX ADMIN — ACETAMINOPHEN 1000 MG: 500 TABLET ORAL at 19:58

## 2021-07-20 RX ADMIN — BUDESONIDE 500 MCG: 0.5 SUSPENSION RESPIRATORY (INHALATION) at 13:09

## 2021-07-20 RX ADMIN — PROPRANOLOL HYDROCHLORIDE 20 MG: 10 TABLET ORAL at 22:06

## 2021-07-20 RX ADMIN — IOPAMIDOL 100 ML: 755 INJECTION, SOLUTION INTRAVENOUS at 09:24

## 2021-07-20 RX ADMIN — WATER 1 G: 1 INJECTION INTRAMUSCULAR; INTRAVENOUS; SUBCUTANEOUS at 18:20

## 2021-07-20 RX ADMIN — BUDESONIDE 500 MCG: 0.5 SUSPENSION RESPIRATORY (INHALATION) at 19:34

## 2021-07-20 RX ADMIN — ATORVASTATIN CALCIUM 80 MG: 40 TABLET, FILM COATED ORAL at 22:06

## 2021-07-20 RX ADMIN — IOPAMIDOL 100 ML: 612 INJECTION, SOLUTION INTRAVENOUS at 09:24

## 2021-07-20 RX ADMIN — SODIUM CHLORIDE 125 ML/HR: 900 INJECTION, SOLUTION INTRAVENOUS at 09:57

## 2021-07-20 RX ADMIN — PROPRANOLOL HYDROCHLORIDE 20 MG: 10 TABLET ORAL at 13:20

## 2021-07-20 RX ADMIN — KETOROLAC TROMETHAMINE 15 MG: 15 INJECTION, SOLUTION INTRAMUSCULAR; INTRAVENOUS at 11:43

## 2021-07-20 RX ADMIN — MAGNESIUM SULFATE HEPTAHYDRATE 2 G: 40 INJECTION, SOLUTION INTRAVENOUS at 10:46

## 2021-07-20 NOTE — ROUTINE PROCESS
Bedside and Verbal shift change report given to Kell Tai RN (oncoming nurse) by KAITLIN Devlin RN (offgoing nurse). Report included the following information SBAR, Kardex, MAR and Recent Results.

## 2021-07-20 NOTE — ED NOTES
Per EMS, BGL at scene was 108 and BP was 170/110. 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 Per the pt, she awoke this morning feeling like her face was swollen and numb on the right side. She ate 1/2 banana but did not take her morning pills. Once at the , she began to feel weak and like her face was twisting and then her arm and leg began getting weaker.

## 2021-07-20 NOTE — PROGRESS NOTES
[facility] REFERRAL CONTACT NOTE  [id number] [place] [address] for Physical Rehabilitation    RE: Yadira Zhou  Referral received to review this patient's case for admission to [address] [place] [address] Physical Rehabilitation. Current status reviewed.  When appropriate, will need PT/OT/ST evaluation/treatment on this patient to complete the pre-admission evaluation.  Will continue to follow. Thank you for this referral.  Should you have any questions please do not hesitate to call. Sincerely,  Melanie Poon  [address] [facility]  [phone]

## 2021-07-20 NOTE — H&P
History & Physical    Patient: Vandana Garcia MRN: [id number]  CSN: [id number]    YOB: 1965  Age: 64 y.o. Sex: female      DOA: 7/20/2021  CC: headache, and right sided weakness     PCP: Caio Juarez MD       HPI:     Vandana Garcia is a 64 y.o. female with medical co-morbidities including HTN, h/o TIA negative MRI brain, hypercholesteremia, Migraine, lumbar stenosis with lumbar fusion, chronic back pain, presented from home with headache and stuttering speech, acute right sided weakness. Apparently, she had this similar episode in 2018 where she ended up getting tPA and MRI brain was negative for stroke. This time, her symptom started about 1 hr prior to presentation. She has associated headache with photophobia, phonophobia. No nausea/vomiting. No fever/chill. In the ER, CT head was negative, CTA without obstruction. She was seen by in house neurologist with impression of complicated migraine. No further stroke workup indicated at this time. Review of Systems  GENERAL: No fever, No chill, No malaise   HEENT: No change in vision, no ear ache, tinnitus, no sore throat or sinus congestion. NECK: No pain or stiffness. PULMONARY: No shortness of breath, no cough or wheeze. Cardiovascular: no pnd / orthopnea, no Chest Pain  GASTROINTESTINAL: No abd pain, No nausea/vomiting, No diarrhea, No melena or bright red blood per rectum. GENITOURINARY: No urinary frequency, No urgency or pain with urination. MUSCULOSKELETAL: No joint or muscle pain, no back pain, no recent trauma. DERMATOLOGIC: No rash, no itching, no lesions. ENDOCRINE: No polyuria, polydipsia, NO heat or cold intolerance. No recent change in weight. HEMATOLOGICAL: No easy bruising or bleeding.    NEUROLOGIC: No headache, No seizures, No generalized weakness         Past Medical History:   Diagnosis Date    Asthma     Back pain with radiation     Cardiac echocardiogram, [facility] study 12/29/2016    EF 55-60%. No WMA. Right to left atrial septal shunt suggests PFO. Similar to study of 10/18/16.  Cardiovascular LE venous duplex 10/18/2016    No DVT bilaterally.  Contact dermatitis and other eczema, due to unspecified cause     CTS (carpal tunnel syndrome)     HSV-2 (herpes simplex virus 2) infection     Hypercholesterolemia     Hypothyroidism     Ill-defined condition     Vertigo    L4-L5 disc bulge     Leg swelling     Migraines     Positive PPD, treated     S/P lumbar fusion 1/13/2016    1. L4-5 bilateral hemilaminotomy, medial facetectomy, foraminotomy, diskectomy L4-5. 2. Transforaminal lumbar interbody fusion with PEEK cage and demineralized bone graft L4-L5 posterolateral fusion.  3. Segmental spinal instrumentation, DePuy Expedium type L4-L5. 01/13/2016 By Dr. Dennis Bravo     EMG '17 , mild sciatic EMG findings    Stroke ([facility] [address]     12/2016    Vertigo        Past Surgical History:   Procedure Laterality Date    HX GYN      partial hysterectomy due to abnormal pap    HX LUMBAR LAMINECTOMY  01-13-16    L4/5       Family History   Problem Relation Age of Onset    Diabetes Mother     Elevated Lipids Mother     Hypertension Mother     Cancer Father         pancreatic    Diabetes Sister     Hypertension Sister     Diabetes Brother     Diabetes Daughter     Hypertension Daughter        Social History     Socioeconomic History    Marital status:      Spouse name: Not on file    Number of children: Not on file    Years of education: Not on file    Highest education level: Not on file   Occupational History    Occupation: unemployed     Comment: long term disability   Tobacco Use    Smoking status: Never Smoker    Smokeless tobacco: Never Used   Substance and Sexual Activity    Alcohol use: No    Drug use: No     Social Determinants of Health     Financial Resource Strain:     Difficulty of Paying Living Expenses:    Food Insecurity:     Worried About Running Out of Food in the Last Year:    [address] in the Last Year:    Transportation Needs:     Lack of Transportation (Medical):  Lack of Transportation (Non-Medical):    Physical Activity:     Days of Exercise per Week:     Minutes of Exercise per Session:    Stress:     Feeling of Stress :    Social Connections:     Frequency of Communication with Friends and Family:     Frequency of Social Gatherings with Friends and Family:     Attends Confucianist Services:     Active Member of Clubs or Organizations:     Attends Club or Organization Meetings:     Marital Status:        Prior to Admission medications    Medication Sig Start Date End Date Taking? Authorizing Provider   gabapentin (NEURONTIN) 300 mg capsule TAKE 1 CAPSULE BY MOUTH EVERY 12 HOURS FOR 30 DAYS 12/17/18   Leta Castleman, NP   propranolol (INDERAL) 20 mg tablet Take 1 Tab by mouth three (3) times daily. Indications: MIGRAINE PREVENTION 8/27/18   Mica Matta MD   aspirin (ASPIRIN) 325 mg tablet Take 1 Tab by mouth daily. 7/17/18   Art Rico MD   atorvastatin (LIPITOR) 80 mg tablet Take 1 Tab by mouth nightly. 7/16/18   Art Rico MD   furosemide (LASIX) 20 mg tablet Take 40 mg by mouth daily. Provider, Historical   budesonide-formoterol (SYMBICORT) 160-4.5 mcg/actuation HFA inhaler Take 2 Puffs by inhalation two (2) times a day. Provider, Historical   naloxone (NARCAN) 4 mg/actuation nasal spray Use 1 spray intranasally into 1 nostril. Use a new Narcan nasal spray for subsequent doses and administer into alternating nostrils. May repeat every 2 to 3 minutes as needed. 9/18/17   Lopez Deng MD   albuterol (PROVENTIL HFA, VENTOLIN HFA, PROAIR HFA) 90 mcg/actuation inhaler Take 1 Puff by inhalation every four (4) hours as needed for Wheezing. 7/2/17   Pushpa Mckinney MD   FERROUS SULFATE PO Take 325 mg by mouth. Provider, Historical   potassium chloride SR (K-TAB) 20 mEq tablet Take 20 mEq by mouth daily. 10/23/16   Ernst Sales NP   levothyroxine (SYNTHROID) 50 mcg tablet TAKE 1 TABLET BY MOUTH EVERY DAY 11/28/16   Provider, Historical   cholecalciferol (VITAMIN D3) 1,000 unit cap Take 2,000 Units by mouth daily. Provider, Historical   meclizine (ANTIVERT) 25 mg tablet Take 25 mg by mouth daily as needed. 1/4/16   Provider, Historical   DULoxetine (CYMBALTA) 60 mg capsule Take 60 mg by mouth nightly. Provider, Historical       Allergies   Allergen Reactions    Skelaxin [Metaxalone] Other (comments)     jittery    Tramadol Other (comments)     Halluctations              Physical Exam:      Visit Vitals  BP (!) 153/89 (BP 1 Location: Left upper arm, BP Patient Position: At rest)   Pulse 75   Temp 99.1 °F (37.3 °C)   Resp 14   Wt 109.3 kg (241 lb)   SpO2 100%   BMI 38.90 kg/m²       Physical Exam:  Tele: sinus  General:  Cooperative, Not in acute distress, speaks in short sentence while in bed  HEENT: PERRL, EOMI, supple neck, no JVD, dry oral mucosa  Cardiovascular: S1S2 regular, no rub/gallop   Pulmonary: air entry bilaterally, no wheezing, no crackle  GI:  Soft, non tender, non distended, +bs, no guarding   Extremities:  No pedal edema, +distal pulses appreciated   Neuro: AOx3, moving all extremities, 3/5 strength at proximal/distal part of Rt upper/lower extremity    Lab/Data Review:  Labs: Results:       Chemistry Recent Labs     07/20/21  0903   *      K 4.0      CO2 28   BUN 18   CREA 0.89   CA 9.3   AGAP 3   BUCR 20      CBC w/Diff Recent Labs     07/20/21  0903   WBC 8.3   RBC 5.30   HGB 12.2   HCT 39.1      GRANS 43   LYMPH 45   EOS 2      Coagulation Recent Labs     07/20/21  0903   PTP 12.9   INR 1.0       Iron/Ferritin No results for input(s): IRON in the last 72 hours. No lab exists for component: TIBCCALC   BNP No results for input(s): BNPP in the last 72 hours. Cardiac Enzymes No results for input(s): CPK, CKND1, VITOR in the last 72 hours.     No lab exists for component: CKRMB, TROIP   Liver Enzymes No results for input(s): TP, ALB, TBIL, AP in the last 72 hours. No lab exists for component: SGOT, GPT, DBIL   Thyroid Studies Lab Results   Component Value Date/Time    TSH 1.12 07/15/2018 03:00 AM          All Micro Results     None          Imaging Reviewed:  CT Results (most recent):  Results from Hospital Encounter encounter on 07/20/21    CTA HEAD NECK W CONT    Narrative  CTA NECK AND BRAIN    CPT CODE: 65597,74553    REASON FOR EXAM: R sided weakness  HISTORY: Difficulty speaking    COMPARISON: CT head without contrast earlier same day 7/20/2021. Brain MRI  7/15/2018, CTA head and neck 7/14/2018    TECHNIQUE: Helical CT scan of the brain and neck were performed during rapid IV  bolus contrast administration. These data were reconstructed for vascular  analysis. 3-D postprocessed images, including surface shaded displays, were  produced on a dedicated workstation, permanently archived, and interpreted. CT dose reduction was achieved through use of a standardized protocol tailored  for this examination and automatic exposure control for dose modulation. CONTRAST: 100  mL Isovue 370 iodinated contrast IV. CTA SOFT TISSUE ANALYSIS: Lung apices are clear. No mucosal mass lesion  identified in the upper aerodigestive tract mucosa. Submandibular glands are not  enlarged area there are nonspecific nodular densities within both parotid glands  that may be intraparotid lymph nodes. Similar to previous exams there is an  exophytic soft tissue mass that projects from the left earlobe. Measures  approximately 2 cm diameter. There are scattered subcentimeter bilateral  cervical chain lymph nodes, none are clearly pathologic and may be reactive  lymph nodes. Similar findings on previous exams. The thyroid gland is  unremarkable. Superior mediastinum unremarkable. No focal osseous pathology  seen. No significant cervical central canal stenosis seen.   Paranasal sinuses  are clear. Normal brain attenuations. No pathologic enhancement or mass lesion. Normal CSF  spaces. No mass effect or midline shift. CTA NECK VASCULAR ANALYSIS:  Aortic arch: Typical arch anatomy. No arch stenosis or aneurysm. Innominate artery: Patent, no stenosis. Right subclavian artery: Patent, no stenosis. Left subclavian artery: Patent, no stenosis. Right carotid:  Right CCA: No stenosis  Right ICA origin: 0% diameter stenosis SIMRAN by NASCET criteria. Right ICA: Patent to the skull base. Left carotid:  Left CCA: No stenosis  Left ICA origin: 0% diameter stenosis LICA by NASCET criteria. Left ICA: Patent to the skull base. Right vertebral artery: Patent. No stenosis. Left vertebral artery: Patent. No stenosis. CTA BRAIN VASCULAR ANALYSIS:    RIGHT ANTERIOR CIRCULATION: Patent  Distal ICA: No stenosis. MCA: No stenosis. VINH: No stenosis. ACOM: Patent    LEFT ANTERIOR CIRCULATION: Patent  Distal ICA: No stenosis. MCA: No stenosis. VINH: No stenosis. POSTERIOR CIRCULATION:  RVA: Patent. No stenosis. LVA: Patent. No stenosis. Basilar: Patent. No stenosis. Right PCA: Patent. No stenosis. Left PCA: Patent. No stenosis. Impression  1. Patent intracranial circulation. No intracranial large arterial vessel  occlusion. 2. Patent cervical carotid and vertebral arteries. 0% NASCET ICA narrowing  bilaterally. 3. 2 cm exophytic soft tissue at the left earlobe is nonspecific and likely  clearly evident clinically but indeterminate by CTA. Please correlate  clinically. Dr. Binta Pride provided a concordant preliminary report regarding no intracranial  large vessel occlusion at 9:31 AM, 7/20/2021          Assessment:   Active Problems:    1. Acute right-sided weakness with migraine, complicated migraine       Low likelihood of acute stroke   2. Hypertension   3. Hyperlipidemia   4. Obesity   5. Mild intermittent asthma  6. Chronic lower back pain   7. Hypothyroidism   8. Acute cystitis with hematuria   9.   2 cm exophytic soft tissue of left earlobe, outpatient care     Plan:     Admitted to stroke service for further evaluation. Monitor on telemetry,   Hold off further MRI brain.   Control blood pressure, resume her home medications as tolerate   Appreciate neurology consult   IV fluid and IV mag supplement   Bronchodilator prn   Add ceftriaxone, urine culture follow up  Can have toradol for migraine headache       Risk of deterioration:  []Low    [x]Moderate  []High     Prophylaxis:  []Lovenox  []Coumadin  []Hep SQ  [x]SCDs  [x]H2B/PPI     Disposition:  []Home w/ Family   [x] PT,OT,RN   []SNF/LTC   []SAH/Rehab     Discussed Code Status:         [x]Full Code      []DNR         ___________________________________________________     Care Plan discussed with:    [x]Patient   [x]Family    []ED Care Manager  [x]ED Doc   [x]Specialist :  Total Time Coordinating Admission:  70    minutes    []Total Critical Care Time:       Jacque Francisco MD  7/20/2021, 10:55 AM

## 2021-07-20 NOTE — CONSULTS
NEUROLOGY CONSULT NOTE    Patient ID:  Aleks Singh  [id number]  64 y.o.  1965    Date of Consultation:  July 20, 2021    Referring Physician: Derek Kat MD    Reason for Consultation:  Right sided weakness        Subjective:       History of Present Illness:     Ms Palmira Cooley is a 51-year-old woman, with a past medical history of migraine headaches, chronic pain on gabapentin, history of lumbar degenerative disc disease status post laminectomy, who is  evaluated today for right-sided weakness and stuttering. The patient felt that her symptoms started 1 hour prior to arrival to ER however the family stated that she sort of developed milder symptoms last night before going to bed and when she woke up she had right-sided weakness. Head CT negative for intracranial pathology. CTA head and neck with no atherosclerotic disease. Today the patient states that she is feeling slightly better and feels like he is developing a headache. In 2018 she had similar presentation for which was treated with TPA and brain MRI was negative for a stroke. A1c at that time was is 5.7. LDL was 84.8. For migraine prophylaxis she is on Inderal however did not take her medication this morning. Did not have breakfast but only half a banana.     Patient Active Problem List    Diagnosis Date Noted    Obesity, morbid ([address] 08/27/2018    Acute ischemic stroke ([facility] 07/14/2018    Chronic midline low back pain with right-sided sciatica 09/18/2017    Encounter for long-term (current) use of high-risk medication 09/18/2017    Chronic pain syndrome 09/18/2017    History of lumbar surgery 09/18/2017    Protrusion of lumbar intervertebral disc 09/18/2017    Sciatic neuropathy 06/21/2017    Ischemic stroke ([facility] [address] 12/29/2016    Migraines     Leg swelling     Hypothyroidism     CTS (carpal tunnel syndrome)     Hypercholesterolemia     Ill-defined condition     Sciatica     Neurological symptoms 12/28/2016    Acute right-sided low back pain with sciatica 12/08/2016    Lumbar stenosis 01/13/2016    S/P lumbar fusion 01/13/2016    Spinal stenosis, lumbar region, without neurogenic claudication 11/03/2015    Back pain with radiation     Asthma 05/06/2010    Hyperlipidemia 05/06/2010    Eczema 05/06/2010     Past Medical History:   Diagnosis Date    Asthma     Back pain with radiation     Cardiac echocardiogram, ltd study 12/29/2016    EF 55-60%. No WMA. Right to left atrial septal shunt suggests PFO. Similar to study of 10/18/16.  Cardiovascular LE venous duplex 10/18/2016    No DVT bilaterally.  Contact dermatitis and other eczema, due to unspecified cause     CTS (carpal tunnel syndrome)     HSV-2 (herpes simplex virus 2) infection     Hypercholesterolemia     Hypothyroidism     Ill-defined condition     Vertigo    L4-L5 disc bulge     Leg swelling     Migraines     Positive PPD, treated     S/P lumbar fusion 1/13/2016    1. L4-5 bilateral hemilaminotomy, medial facetectomy, foraminotomy, diskectomy L4-5. 2. Transforaminal lumbar interbody fusion with PEEK cage and demineralized bone graft L4-L5 posterolateral fusion. 3. Segmental spinal instrumentation, DePuy Expedium type L4-L5. 01/13/2016 By Dr. Prasanna Stokes     EMG '17 , mild sciatic EMG findings    Stroke [facility])     12/2016    Vertigo       Past Surgical History:   Procedure Laterality Date    HX GYN      partial hysterectomy due to abnormal pap    HX LUMBAR LAMINECTOMY  01-13-16    L4/5      Prior to Admission medications    Medication Sig Start Date End Date Taking? Authorizing Provider   aspirin delayed-release 81 mg tablet Take 81 mg by mouth daily. Yes Other, MD Eddie   gabapentin (NEURONTIN) 300 mg capsule TAKE 1 CAPSULE BY MOUTH EVERY 12 HOURS FOR 30 DAYS  Patient taking differently: Take 300 mg by mouth nightly.  Indications: neuropathic pain 12/17/18  Yes Amadeo Winn NP   propranolol (INDERAL) 20 mg tablet Take 1 Tab by mouth three (3) times daily. Indications: MIGRAINE PREVENTION 8/27/18  Yes Salvador Espinal MD   atorvastatin (LIPITOR) 80 mg tablet Take 1 Tab by mouth nightly. 7/16/18  Yes Shayna Boss MD   furosemide (LASIX) 20 mg tablet Take 40 mg by mouth daily. Yes Provider, Historical   budesonide-formoterol (SYMBICORT) 160-4.5 mcg/actuation HFA inhaler Take 2 Puffs by inhalation two (2) times a day. Yes Provider, Historical   potassium chloride SR (K-TAB) 20 mEq tablet Take 20 mEq by mouth daily. 10/23/16  Yes Marilyn Pastrana NP   cholecalciferol (VITAMIN D3) 1,000 unit cap Take 2,000 Units by mouth daily. Yes Provider, Historical   naloxone (NARCAN) 4 mg/actuation nasal spray Use 1 spray intranasally into 1 nostril. Use a new Narcan nasal spray for subsequent doses and administer into alternating nostrils. May repeat every 2 to 3 minutes as needed. 9/18/17   Margie Barron MD   albuterol (PROVENTIL HFA, VENTOLIN HFA, PROAIR HFA) 90 mcg/actuation inhaler Take 1 Puff by inhalation every four (4) hours as needed for Wheezing. 7/2/17   Estrellita Kearns MD   FERROUS SULFATE PO Take 325 mg by mouth. Provider, Historical   levothyroxine (SYNTHROID) 50 mcg tablet TAKE 1 TABLET BY MOUTH EVERY DAY 11/28/16   Provider, Historical   DULoxetine (CYMBALTA) 60 mg capsule Take 60 mg by mouth nightly.     Provider, Historical     Allergies   Allergen Reactions    Skelaxin [Metaxalone] Other (comments)     jittery    Tramadol Other (comments)     Halluctations      Social History     Tobacco Use    Smoking status: Never Smoker    Smokeless tobacco: Never Used   Substance Use Topics    Alcohol use: No      Family History   Problem Relation Age of Onset    Diabetes Mother     Elevated Lipids Mother     Hypertension Mother     Cancer Father         pancreatic    Diabetes Sister     Hypertension Sister     Diabetes Brother     Diabetes Daughter     Hypertension Daughter         Review of Systems    Constitutional: No recent weight change, fever,fatigue, sleep difficulties, or loss of appetite. ENT/Mouth:  No hearing loss, ringing in the ears, chronic sinus problem, nose bleeds sore throat, voice change, hoarseness, swollen glands in neck, or difficulties with chewing and swallowing. Cardiovascular:  No chest pain/angina pectoris, palpitations,swelling of feet/ankles/hands, or calf pain while walking. Respiratory: No chronic or frequent coughs, spitting up blood, shortness of breath, asthma, or wheezing. Gastrointestinal: No abdominal pain, heartburn, nausea, vomiting, constipation, frequent diarrhea, rectal bleeding, or blood in stool. Genitourinary: No frequent urination, burning or painful urination, blood in urine, incontinence or dribbling. Musculoskeletal:   No joint pain, stiffness/swelling, weakness of muscles, muscle pain/cramp, or back pain. Integument:   No rash/itching, change in skin color, change in hair/nails, or change in color/size of moles. Neurological:   Reports right upper and lower extremity weakness and face weakness, stuttering,Patient states that she is also developing a headache. Has light sensitivity. Psychiatric:   No nervousness, depression, hallucinations, paranoia or suspiciousness. Endocrine: No excessive thirst or urination, heat or cold intolerance. Hematologic/Lymphatic: No bleeding/bruising tendency, phlebitis, or past transfusion. Objective:     Patient Vitals for the past 8 hrs:   BP Temp Pulse Resp SpO2 Weight   07/20/21 0949 -- -- -- -- 100 % --   07/20/21 0925 (!) 153/89 99.1 °F (37.3 °C) 75 14 100 % 109.3 kg (241 lb)       General Exam  No acute distress, normal body habitus    HEENT: Normocephalic, atraumatic, Sclera anicteric, normal conjunctiva  Mucous membranes: normal color and hydration     CV: No carotid bruits,   Heart: regular to rate and rhythm.  No murmurs     RESP:  CTAB     Neurologic Exam:    MENTAL STATUS:    The patient is awake, alert, and oriented x 4. Fund of knowledge is adequate. Speech is fluent and memory appears to be intact, both long and short term. Stuttering intermittently. CRANIAL NERVES:   II -Pupils are midline, symmetric, both reactive to light and accommodation. Visual fields are normal.  Funduscopic examination reveals flat disks bilaterally. III, IV, VI - Extraocular movements are intact and there is no nystagmus. V - Facial sensation is intact to pinprick and light touch. VII - Face is with some asymmetry, which is impersistence, activation of facial muscle favoring the right periorbicularis oris. This functional weakness is distractible and resolves intermittently. VIII - Hearing is present. IX, X, XII- Palate rises symmetrically. Gag is present. Tongue is in the midline. I performed a swallowing study at the bedside and the patient swallow liquid and apple sauce without difficulties. No choking or cough. XI - Shoulder shrugging and head turning intact    MOTOR:          Tone is normal.  Muscle bulk is normal.  The patient is 5/5 on the left upper and lower extremity. Right upper and lower extremity: Stuttering, motor impersistence with functional weakness left face, right upper and lower extremity weakness with motor impersistence, functional weakness with now not checked for, and distractibility on using right upper extremity. SENSATION:  Sensory examination is intact to pinprick, light touch and position sense testing. REFLEXES: 2+ and symmetrical, with exception of 1+ right patella and trace bilateral ankle. Plantars are down going. COORDINATION: Cerebellar examination reveals no gross ataxia or dysmetria. GAIT: Gait is not tested at this time.       Data Review:    Recent Results (from the past 24 hour(s))   CBC WITH AUTOMATED DIFF    Collection Time: 07/20/21  9:03 AM   Result Value Ref Range    WBC 8.3 4.6 - 13.2 K/uL    RBC 5.30 4.20 - 5.30 M/uL    HGB 12.2 12.0 - 16.0 g/dL    HCT 39.1 35.0 - 45.0 %    MCV 73.8 (L) 74.0 - 97.0 FL    MCH 23.0 (L) 24.0 - 34.0 PG    MCHC 31.2 31.0 - 37.0 g/dL    RDW 15.1 (H) 11.6 - 14.5 %    PLATELET 933 573 - 256 K/uL    MPV 10.0 9.2 - 11.8 FL    NEUTROPHILS 43 40 - 73 %    LYMPHOCYTES 45 21 - 52 %    MONOCYTES 9 3 - 10 %    EOSINOPHILS 2 0 - 5 %    BASOPHILS 1 0 - 2 %    ABS. NEUTROPHILS 3.6 1.8 - 8.0 K/UL    ABS. LYMPHOCYTES 3.8 (H) 0.9 - 3.6 K/UL    ABS. MONOCYTES 0.7 0.05 - 1.2 K/UL    ABS. EOSINOPHILS 0.2 0.0 - 0.4 K/UL    ABS.  BASOPHILS 0.1 0.0 - 0.1 K/UL    DF AUTOMATED     METABOLIC PANEL, BASIC    Collection Time: 07/20/21  9:03 AM   Result Value Ref Range    Sodium 138 136 - 145 mmol/L    Potassium 4.0 3.5 - 5.5 mmol/L    Chloride 107 100 - 111 mmol/L    CO2 28 21 - 32 mmol/L    Anion gap 3 3.0 - 18 mmol/L    Glucose 102 (H) 74 - 99 mg/dL    BUN 18 7.0 - 18 MG/DL    Creatinine 0.89 0.6 - 1.3 MG/DL    BUN/Creatinine ratio 20 12 - 20      GFR est AA >60 >60 ml/min/1.73m2    GFR est non-AA >60 >60 ml/min/1.73m2    Calcium 9.3 8.5 - 10.1 MG/DL   PROTHROMBIN TIME + INR    Collection Time: 07/20/21  9:03 AM   Result Value Ref Range    Prothrombin time 12.9 11.5 - 15.2 sec    INR 1.0 0.8 - 1.2     TROPONIN I    Collection Time: 07/20/21  9:03 AM   Result Value Ref Range    Troponin-I, QT <0.02 0.0 - 0.045 NG/ML   EKG, 12 LEAD, INITIAL    Collection Time: 07/20/21  9:32 AM   Result Value Ref Range    Ventricular Rate 76 BPM    Atrial Rate 76 BPM    P-R Interval 174 ms    QRS Duration 76 ms    Q-T Interval 384 ms    QTC Calculation (Bezet) 432 ms    Calculated P Axis 50 degrees    Calculated R Axis 6 degrees    Calculated T Axis 23 degrees    Diagnosis       Normal sinus rhythm  Normal ECG  When compared with ECG of 14-JUL-2018 12:55,  No significant change was found           Radiology studies:   Head CT and CTA head and neck reviewed, normal.  2018 MRI brain for similar symptoms was normal.    80 minutes were spent on the patient of which more than 50% was spent in coordination of care and counseling (time spent with patient/family face to face, physical exam, reviewing laboratory and imaging investigations, speaking with physicians and nursing staff involved in this patient's care)    Assessment: This is a 80-year-old woman with history of migraine headaches, chronic pain, evaluated today for right upper and lower extremity weakness. The neurological exam is significant for some impersistent weakness and stuttering on and off. I performed myself the bedside swallowing and patient swallow liquids and applesauce with no difficulties, no cough or choking. In 2018 the patient has similar presentation for which was treated with TPA and the brain MRI was normal.    With this presentation the patient also reports a headache, with light sensitivity which is consistent with a migraine headache for which I recommend treatment with magnesium sulfate 2 g IV, resume her Inderal and monitor for 12 to 24 hours. The patient clinical presentation is consistent with a stroke mimicker, there is no need for monitoring with NIH stroke scale, no need for echo or telemetry.     Plan:     -Resume the patient home medications  -Start 2 g of IV magnesium sulfate for migraine headaches  -Observation for 12 to 24 hours  -Physical therapy and encourage walking  -Tylenol 1000 mg every 6 hours as needed headache or ibuprofen 800 mg every 8 hours as needed headaches          Avtar Massey MD  Adult Neurologist  7/20/2021

## 2021-07-20 NOTE — PROGRESS NOTES
completed the initial Spiritual Assessment of the patient in the emergency room as patient came in under a code stroke protocal and offered Pastoral Care support to patient and family member. Patient does not have an advance directive on file here. Patient does not have any Yazidism/cultural needs that will affect patients preferences in health care. Chaplains will continue to follow and will provide pastoral care on an as needed/requested basis.     [facility]  [phone]

## 2021-07-20 NOTE — ED TRIAGE NOTES
Pt arrived via [employer]. Per medic, pt started having right sided facial droop, right sided weakness, and dysarthria roughly around 08:30 this morning.

## 2021-07-20 NOTE — PROGRESS NOTES
Problem: Motor Speech Impaired (Adult)  Goal: *Acute Goals and Plan of Care (Insert Text)  Description:     Motor Speech Goals:   1. Utilize compensatory strategies (decrease rate, overarticulate, increase intensity) to increase intelligibility to >90% at word level with mod A visual/verbal cues in 4/5 trials. 2. Perform oral motor exercises (with and without resistance) in therapy and at home to increase oral motor strength/range-of-motion for articulation tasks with mod A with visual/verbal cues in 4/5 trials. 3. Complete articulatory agility tasks (reading-conversation) with mod A with visual/verbal cues in 4/5 trials. 4. Demonstrate functional increase in articulation skills for effective participation in communication ADLs with mod A.     7/20/2021 1543 by Ronny PIKE  Outcome: Progressing Towards Goal     Speech LAnguage Pathology evaluation    Patient: Almas Sterling (64 y.o. female)  Date: 7/20/2021  Primary Diagnosis: Acute ischemic stroke [facility]) [I63.9]        Precautions:   Fall    ASSESSMENT :  Based on the objective data described below, the patient presents with min dysarthria c/b blended word boundaries and imprecise articulation. Speech intelligibility >90% at baseline; however, pt and family did report slurred speech. Speech was fluent; no apraxia observed. Expressive/receptive language function appeared [facility]. Pt communicated in sentences of appropriate form, content, and use. Social conversation was appropriate. No anomic events observed. Rec SLP services to address min dysarthria. Patient will benefit from skilled intervention to address the above impairments.   Patients rehabilitation potential is considered to be Good  Factors which may influence rehabilitation potential include:   []              None noted  []              Mental ability/status  [x]              Medical condition  []              Home/family situation and support systems  []              Safety awareness  [] Pain tolerance/management  []              Other:      PLAN :  Recommendations and Planned Interventions: See above  Frequency/Duration: Patient will be followed by speech-language pathology 1-2 times per day/3-5 days per week to address goals. Discharge Recommendations: Outpatient and To Be Determined     SUBJECTIVE:   Patient stated I do want to keep an eye on my speech because it is different from what it should be. OBJECTIVE:     Past Medical History:   Diagnosis Date    Asthma     Back pain with radiation     Cardiac echocardiogram, ltd study 12/29/2016    EF 55-60%. No WMA. Right to left atrial septal shunt suggests PFO. Similar to study of 10/18/16. Cardiovascular LE venous duplex 10/18/2016    No DVT bilaterally. Contact dermatitis and other eczema, due to unspecified cause     CTS (carpal tunnel syndrome)     HSV-2 (herpes simplex virus 2) infection     Hypercholesterolemia     Hypothyroidism     Ill-defined condition     Vertigo    L4-L5 disc bulge     Leg swelling     Migraines     Positive PPD, treated     S/P lumbar fusion 1/13/2016    1. L4-5 bilateral hemilaminotomy, medial facetectomy, foraminotomy, diskectomy L4-5. 2. Transforaminal lumbar interbody fusion with PEEK cage and demineralized bone graft L4-L5 posterolateral fusion. 3. Segmental spinal instrumentation, DePuy Expedium type L4-L5. 01/13/2016 By Dr. Nay Culver     Sciatica     EMG '17 , mild sciatic EMG findings    Stroke ([facility] [address]     12/2016    Vertigo      Past Surgical History:   Procedure Laterality Date    HX GYN      partial hysterectomy due to abnormal pap    HX LUMBAR LAMINECTOMY  01-13-16    L4/5     Prior Level of Function/Home Situation: History of CVA in 2016 with no dysarthria or dysphagia.    Mental Status:  Neurologic State: Alert  Orientation Level: Oriented X4  Cognition: Follows commands  Perception: Appears intact  Perseveration: No perseveration noted  Safety/Judgement: Fall prevention, Awareness of environment  Motor Speech:  Oral-Motor Structure/Motor Speech  Labial: Left droop  Dentition: Natural  Oral Hygiene: adequate  Lingual: No impairment  Velum: No impairment  Mandible: No impairment  Apraxic Characteristics: None  Dysarthric Characteristics: Blended word boundaries; Imprecise;Decreased rate  Intelligibility: No impairment  Intonation: WFL  Rate: Decreased  Prosody: WFL  Overall Impairment Severity: Minimal  Language Comprehension and Expression: see above  Voice:  Vocal Quality: No impairment    The severity rating is based on the following outcomes:    Clinical judgment     PAIN:  Pt reports 0/10 pain or discomfort prior to evaluation. Pt reports 0/10 pain or discomfort post evaluation. After treatment:   []              Patient left in no apparent distress sitting up in chair  [x]              Patient left in no apparent distress in bed  [x]              Call bell left within reach  [x]              Nursing notified  []              Caregiver present  []              Bed alarm activated    COMMUNICATION/EDUCATION:   [x] Patient educated regarding dysarthria and compensatory speech strategies. [x] Patient/family have participated as able in goal setting and plan of care. []  Patient/family agree to work toward stated goals and plan of care. []  Patient understands intent and goals of therapy, but is neutral about his/her participation. []  Patient is unable to participate in goal setting and plan of care.     Thank you for this referral.    Garima Talley M.S. CCC-SLP/L  Speech-Language Pathologist

## 2021-07-20 NOTE — PROGRESS NOTES
Problem: Dysphagia (Adult)  Goal: *Acute Goals and Plan of Care (Insert Text)  Description:     Patient will:  1. Tolerate PO trials with 0 s/s overt distress in 4/5 trials  2. Utilize compensatory swallow strategies/maneuvers (decrease bite/sip, size/rate, alt. liq/sol) with min cues in 4/5 trials    Rec:     Reg solid with thin liquids  Aspiration precautions  HOB >45 during po intake, remain >30 for 30-45 minutes after po   Small bites/sips; alternate liquid/solid with slow feeding rate   Oral care TID  Meds per pt preference    Outcome: Progressing Towards Goal     SPEECH LANGUAGE PATHOLOGY BEDSIDE SWALLOW EVALUATION    Patient: Aleks Singh (60 y.o. female)  Date: 7/20/2021  Primary Diagnosis: Acute ischemic stroke [facility]) [I63.9]        Precautions: aspiration  Fall  PLOF: As per H&P    ASSESSMENT :  Based on the objective data described below, the patient presents with oropharyngeal swallow fxn essentially WFL. Strength, ROM, and coordination of the orofacial musculature were all found to be [facility]. Min left orofacial weakness observed during oral mech exam despite right sided weakness in extremities; however, she has had a history of previous CVA's. Pt tolerated reg solid, puree, and thin liquids +/- straw consecutive swallows without any overt s/sx of aspiration. Mastication was appropriate with timely a-p transit. Positive oral clearance observed across all trials. Pt safe for initiation of reg solid diet with thin liquids, aspiration precautions, oral care TID, and meds as tolerated. ST will continue to follow x 1-2 visits to ensure safety of diet tolerance. Patient will benefit from skilled intervention to address the above impairments.   Patient's rehabilitation potential is considered to be Fair  Factors which may influence rehabilitation potential include:   []            None noted  []            Mental ability/status  [x]            Medical condition  []            Home/family situation and support systems  []            Safety awareness  []            Pain tolerance/management  []            Other:      PLAN :  Recommendations and Planned Interventions: See above  Frequency/Duration: Patient will be followed by speech-language pathology x 1-2 more visits to address goals. Discharge Recommendations: None     SUBJECTIVE:   Patient stated My son just left, you can come in. OBJECTIVE:     Past Medical History:   Diagnosis Date    Asthma     Back pain with radiation     Cardiac echocardiogram, ltd study 12/29/2016    EF 55-60%. No WMA. Right to left atrial septal shunt suggests PFO. Similar to study of 10/18/16.  Cardiovascular LE venous duplex 10/18/2016    No DVT bilaterally.  Contact dermatitis and other eczema, due to unspecified cause     CTS (carpal tunnel syndrome)     HSV-2 (herpes simplex virus 2) infection     Hypercholesterolemia     Hypothyroidism     Ill-defined condition     Vertigo    L4-L5 disc bulge     Leg swelling     Migraines     Positive PPD, treated     S/P lumbar fusion 1/13/2016    1. L4-5 bilateral hemilaminotomy, medial facetectomy, foraminotomy, diskectomy L4-5. 2. Transforaminal lumbar interbody fusion with PEEK cage and demineralized bone graft L4-L5 posterolateral fusion.  3. Segmental spinal instrumentation, DePuy Expedium type L4-L5. 01/13/2016 By Dr. Prasanna Stokes     EMG '17 , mild sciatic EMG findings    Stroke [facility])     12/2016    Vertigo      Past Surgical History:   Procedure Laterality Date    HX GYN      partial hysterectomy due to abnormal pap    HX LUMBAR LAMINECTOMY  01-13-16    L4/5     Prior Level of Function/Home Situation: pmhx of CVA in 2016  - no history of dysphagia or speech deficits  Diet prior to admission: regular solid with thin liquids  Current Diet:  Regular solid with thin liquids   Cognitive and Communication Status:  Neurologic State: Alert  Orientation Level: Oriented X4  Cognition: Follows commands  Perception: Appears intact  Perseveration: No perseveration noted  Safety/Judgement: Fall prevention, Awareness of environment  Oral Assessment:  Oral Assessment  Labial: Left droop  Dentition: Natural  Oral Hygiene: adequate  Lingual: No impairment  Velum: No impairment  Mandible: No impairment  P.O. Trials:  Patient Position: [facility] at [facility]  Vocal quality prior to P.O.: No impairment  Consistency Presented: Thin liquid;Puree; Solid  How Presented: Self-fed/presented; Successive swallows;Straw  Bolus Acceptance: No impairment  Bolus Formation/Control: No impairment  Propulsion: No impairment  Oral Residue: None  Initiation of Swallow: No impairment  Laryngeal Elevation: Functional  Aspiration Signs/Symptoms: None  Pharyngeal Phase Characteristics: No impairment, issues, or problems   Effective Modifications: None  Cues for Modifications: None     Oral Phase Severity: No impairment  Pharyngeal Phase Severity : No impairment    PAIN:  Start of Eval: 0  End of Eval: 0     After treatment:   []            Patient left in no apparent distress sitting up in chair  [x]            Patient left in no apparent distress in bed  [x]            Call bell left within reach  [x]            Nursing notified  []            Family present  []            Caregiver present  []            Bed alarm activated    COMMUNICATION/EDUCATION:   [x]            Aspiration precautions; swallow safety; compensatory techniques. [x]            Patient/family have participated as able in goal setting and plan of care. []            Patient/family agree to work toward stated goals and plan of care. []            Patient understands intent and goals of therapy; neutral about participation. []            Patient unable to participate in goal setting/plan of care; educ ongoing with interdisciplinary staff  [x]         Posted safety precautions in patient's room.     Thank you for this referral.    Osiris Quiroz M.S. CCC-SLP/L  Speech-Language Pathologist

## 2021-07-20 NOTE — CALL BACK NOTE
Ms Roel Marie has hx of lumbar stenosis , migraine headache and presented to ER this am with one hr hx of right sided weakness. In 2018 had similar presentation for which received tPA, and brain MRI was negative. Highly likely this is a stroke mimicker.      teleneurology eval pending      Sridevi Braswell   Adult Neurologist

## 2021-07-20 NOTE — PROGRESS NOTES
Problem: Mobility Impaired (Adult and Pediatric)  Goal: *Acute Goals and Plan of Care (Insert Text)  Description: Physical Therapy Goals  Initiated 7/20/2021 and to be accomplished within 7 day(s)  1. Patient will move from supine to sit and sit to supine  in bed with modified independence. 2.  Patient will transfer from bed to chair and chair to bed with modified independence using the least restrictive device. 3.  Patient will perform sit to stand with modified independence. 4.  Patient will ambulate with modified independence for 200 feet with the least restrictive device. 5.  Patient will ascend/descend 10 stairs with handrail(s) with supervision/set-up. PLOF: Patient reports she was independent with functional mobility no NAD. She lives with her adult daughter in a 2 story home. Outcome: Progressing Towards Goal     PHYSICAL THERAPY EVALUATION    Patient: Tomasz Zheng (60 y.o. female)  Date: 7/20/2021  Primary Diagnosis: Acute ischemic stroke [facility]) [I63.9]        Precautions:   Fall    ASSESSMENT :  Based on the objective data described below, the patient presents with decreased right LE strength, impaired balance, resulting in decreased independence with functional mobility. Nursing cleared pt for mobility and pt in agreeable to participate. Patient transfers to sitting EOB with SBA for objective assessment. She presents with grossly 4/5 right LE strength and increased right LE tremor/clonus during gait. She ambulates with RW for balance and presents with short B step length and decreased step clearance right LE. Instructed patient to use call bell for nursing assistance with mobility. Nurse informed of patient performance. Patient will benefit from skilled intervention to address the above impairments.   Patient's rehabilitation potential is considered to be Good  Factors which may influence rehabilitation potential include:   []         None noted  []         Mental ability/status  [x]         Medical condition  [x]         Home/family situation and support systems  []         Safety awareness  []         Pain tolerance/management  []         Other:      PLAN :  Recommendations and Planned Interventions:   [x]           Bed Mobility Training             []    Neuromuscular Re-Education  [x]           Transfer Training                   []    Orthotic/Prosthetic Training  [x]           Gait Training                          []    Modalities  [x]           Therapeutic Exercises           []    Edema Management/Control  [x]           Therapeutic Activities            []    Family Training/Education  [x]           Patient Education  []           Other (comment):    Frequency/Duration: Patient will be followed by physical therapy 1-2 times per day/4-7 days per week to address goals. Discharge Recommendations: Home Health with family support as needed  Further Equipment Recommendations for Discharge: rolling walker     SUBJECTIVE:   Patient stated I don't usually use one \" AD\".     OBJECTIVE DATA SUMMARY:     Past Medical History:   Diagnosis Date    Asthma     Back pain with radiation     Cardiac echocardiogram, [facility] study 12/29/2016    EF 55-60%. No WMA. Right to left atrial septal shunt suggests PFO. Similar to study of 10/18/16.  Cardiovascular LE venous duplex 10/18/2016    No DVT bilaterally.  Contact dermatitis and other eczema, due to unspecified cause     CTS (carpal tunnel syndrome)     HSV-2 (herpes simplex virus 2) infection     Hypercholesterolemia     Hypothyroidism     Ill-defined condition     Vertigo    L4-L5 disc bulge     Leg swelling     Migraines     Positive PPD, treated     S/P lumbar fusion 1/13/2016    1. L4-5 bilateral hemilaminotomy, medial facetectomy, foraminotomy, diskectomy L4-5. 2. Transforaminal lumbar interbody fusion with PEEK cage and demineralized bone graft L4-L5 posterolateral fusion.  3. Segmental spinal instrumentation, DePuy Expedium type L4-L5. 01/13/2016 By Dr. Millie Castillo     EMG '17 , mild sciatic EMG findings    Stroke [facility])     12/2016    Vertigo      Past Surgical History:   Procedure Laterality Date    HX GYN      partial hysterectomy due to abnormal pap    HX LUMBAR LAMINECTOMY  01-13-16    L4/5     Barriers to Learning/Limitations: None  Compensate with: N/A  Home Situation:     Critical Behavior:  Neurologic State: Alert  Orientation Level: Oriented X4  Cognition: Follows commands  Safety/Judgement: Fall prevention; Awareness of environment  Psychosocial  Patient Behaviors: Calm; Cooperative  Purposeful Interaction: Yes  Pt Identified Daily Priority: Clinical issues (comment)  Caritas Process: Nurture loving kindness; Attend basic human needs;Create healing environment  Caring Interventions: Reassure; Therapeutic modalities  Therapeutic Modalities: Humor                 Strength BLE:    Strength: Generally decreased, functional (right LE 4/5; left LE grossly 5/5)          Tone & Sensation BLE:     Sensation: Intact        Range Of Motion BLE:  AROM: Within functional limits             Functional Mobility:  Bed Mobility:     Supine to Sit: Stand-by assistance  Sit to Supine: Stand-by assistance     Transfers:  Sit to Stand: Contact guard assistance  Stand to Sit: Contact guard assistance          Balance:   Sitting: Intact  Standing: Impaired; With support  Standing - Static: Good  Standing - Dynamic : Fair       Ambulation/Gait Training:  Distance (ft): 40 Feet (ft)  Assistive Device: Walker, rolling  Ambulation - Level of Assistance: Contact guard assistance  Gait Abnormalities: Decreased step clearance         Pain:  Pain level pre-treatment: 7/10  headahce  Pain level post-treatment: 7/10   Pain Intervention(s) : Medication (see MAR);  Rest, Ice, Repositioning  Response to intervention: Nurse notified, See doc flow    Activity Tolerance:   Fair  Please refer to the flowsheet for vital signs taken during this treatment. After treatment:   [x]         Patient left in no apparent distress sitting up in chair  []         Patient left in no apparent distress in bed  [x]         Call bell left within reach  [x]         Nursing notified  []         Caregiver present  []         Bed alarm activated  []         SCDs applied    COMMUNICATION/EDUCATION:   [x]         Role of Physical Therapy in the acute care setting. [x]         Fall prevention education was provided and the patient/caregiver indicated understanding. [x]         Patient/family have participated as able in goal setting and plan of care. [x]         Patient/family agree to work toward stated goals and plan of care. []         Patient understands intent and goals of therapy, but is neutral about his/her participation. []         Patient is unable to participate in goal setting/plan of care: ongoing with therapy staff.  []         Other:     Thank you for this referral.  Megan Baker, PT   Time Calculation: 23 mins      Eval Complexity: History: LOW Complexity : Zero comorbidities / personal factors that will impact the outcome / POCExam:LOW Complexity : 1-2 Standardized tests and measures addressing body structure, function, activity limitation and / or participation in recreation  Presentation: LOW Complexity : Stable, uncomplicated  Clinical Decision Making:Low Complexity    Overall Complexity:LOW

## 2021-07-20 NOTE — ED PROVIDER NOTES
EMERGENCY DEPARTMENT HISTORY AND PHYSICAL EXAM    9:07 AM      Date: 7/20/2021  Patient Name: Thania Butler    History of Presenting Illness     Chief Complaint   Patient presents with    Dysarthria    Facial Droop         History Provided By: Patient  Location/Duration/Severity/Modifying factors   Patient is a 59-year-old female with a history of asthma, hypertension, stroke per history on Plavix, chronic pain, on gabapentin, the presents emergency department with a complaint of right-sided weakness and difficulty speaking that was initially thought to be with last known well being 1 hour prior to arrival.  The patient's family and the patient were able to eventually provide history that she started feeling something before bed and then awakened with symptoms of right-sided weakness. The patient did not take her morning medications and denies any chest pain or shortness of breath. There are no other known aggravating or alleviating factors. A Code S was called on arrival and the patient was taken directly to CAT scan for imaging. Patient is not a smoker, drinker, or drug user. Chronic pain, asthma, hypertension  PCP: Richard Loco MD    Current Facility-Administered Medications   Medication Dose Route Frequency Provider Last Rate Last Admin    iopamidoL (ISOVUE 300) 61 % contrast injection 100 mL  100 mL IntraVENous RAD ONCE Camden Torres MD        alteplase (ACTIVASE) 100 mg infusion             aspirin tablet 325 mg  325 mg Oral NOW Irineo Jacobs MD        0.9% sodium chloride infusion  125 mL/hr IntraVENous CONTINUOUS Irineo Jacobs  mL/hr at 07/20/21 0957 125 mL/hr at 07/20/21 0957     Current Outpatient Medications   Medication Sig Dispense Refill    gabapentin (NEURONTIN) 300 mg capsule TAKE 1 CAPSULE BY MOUTH EVERY 12 HOURS FOR 30 DAYS 60 Cap 3    propranolol (INDERAL) 20 mg tablet Take 1 Tab by mouth three (3) times daily.  Indications: MIGRAINE PREVENTION 90 Tab 3    aspirin (ASPIRIN) 325 mg tablet Take 1 Tab by mouth daily. 30 Tab 0    atorvastatin (LIPITOR) 80 mg tablet Take 1 Tab by mouth nightly. 30 Tab 0    furosemide (LASIX) 20 mg tablet Take 40 mg by mouth daily.  budesonide-formoterol (SYMBICORT) 160-4.5 mcg/actuation HFA inhaler Take 2 Puffs by inhalation two (2) times a day.  naloxone (NARCAN) 4 mg/actuation nasal spray Use 1 spray intranasally into 1 nostril. Use a new Narcan nasal spray for subsequent doses and administer into alternating nostrils. May repeat every 2 to 3 minutes as needed. 1 Each 0    albuterol (PROVENTIL HFA, VENTOLIN HFA, PROAIR HFA) 90 mcg/actuation inhaler Take 1 Puff by inhalation every four (4) hours as needed for Wheezing. 1 Inhaler 0    FERROUS SULFATE PO Take 325 mg by mouth.  potassium chloride SR (K-TAB) 20 mEq tablet Take 20 mEq by mouth daily.  levothyroxine (SYNTHROID) 50 mcg tablet TAKE 1 TABLET BY MOUTH EVERY DAY  3    cholecalciferol (VITAMIN D3) 1,000 unit cap Take 2,000 Units by mouth daily.  meclizine (ANTIVERT) 25 mg tablet Take 25 mg by mouth daily as needed. 3    DULoxetine (CYMBALTA) 60 mg capsule Take 60 mg by mouth nightly. Past History     Past Medical History:  Past Medical History:   Diagnosis Date    Asthma     Back pain with radiation     Cardiac echocardiogram, [facility] study 12/29/2016    EF 55-60%. No WMA. Right to left atrial septal shunt suggests PFO. Similar to study of 10/18/16.  Cardiovascular LE venous duplex 10/18/2016    No DVT bilaterally.  Contact dermatitis and other eczema, due to unspecified cause     CTS (carpal tunnel syndrome)     HSV-2 (herpes simplex virus 2) infection     Hypercholesterolemia     Hypothyroidism     Ill-defined condition     Vertigo    L4-L5 disc bulge     Leg swelling     Migraines     Positive PPD, treated     S/P lumbar fusion 1/13/2016    1.  L4-5 bilateral hemilaminotomy, medial facetectomy, foraminotomy, diskectomy L4-5. 2. Transforaminal lumbar interbody fusion with PEEK cage and demineralized bone graft L4-L5 posterolateral fusion. 3. Segmental spinal instrumentation, DePuy Expedium type L4-L5. 01/13/2016 By Dr. Saurav Fong     EMG '17 , mild sciatic EMG findings    Stroke [facility])     12/2016    Vertigo        Past Surgical History:  Past Surgical History:   Procedure Laterality Date    HX GYN      partial hysterectomy due to abnormal pap    HX LUMBAR LAMINECTOMY  01-13-16    L4/5       Family History:  Family History   Problem Relation Age of Onset    Diabetes Mother     Elevated Lipids Mother     Hypertension Mother     Cancer Father         pancreatic    Diabetes Sister     Hypertension Sister     Diabetes Brother     Diabetes Daughter     Hypertension Daughter        Social History:  Social History     Tobacco Use    Smoking status: Never Smoker    Smokeless tobacco: Never Used   Substance Use Topics    Alcohol use: No    Drug use: No       Allergies: Allergies   Allergen Reactions    Skelaxin [Metaxalone] Other (comments)     jittery    Tramadol Other (comments)     Halluctations         Review of Systems       Review of Systems   Constitutional: Negative for activity change, fatigue and fever. HENT: Negative for congestion and rhinorrhea. Eyes: Negative for visual disturbance. Respiratory: Negative for shortness of breath. Cardiovascular: Negative for chest pain and palpitations. Gastrointestinal: Negative for abdominal pain, diarrhea, nausea and vomiting. Genitourinary: Negative for dysuria and hematuria. Musculoskeletal: Negative for back pain. Skin: Negative for rash. Neurological: Positive for speech difficulty, weakness and light-headedness. Negative for dizziness. All other systems reviewed and are negative.         Physical Exam     Visit Vitals  BP (!) 153/89 (BP 1 Location: Left upper arm, BP Patient Position: At rest)   Pulse 75   Temp 99.1 °F (37.3 °C)   Resp 14   Wt 109.3 kg (241 lb)   SpO2 100%   BMI 38.90 kg/m²         Physical Exam  Vitals and nursing note reviewed. Constitutional:       General: She is not in acute distress. Appearance: She is well-developed. Comments: Mild drooling, difficulty speaking, right-sided weakness   HENT:      Head: Normocephalic and atraumatic. Right Ear: External ear normal.      Left Ear: External ear normal.      Nose: Nose normal.   Eyes:      General: No scleral icterus. Conjunctiva/sclera: Conjunctivae normal.      Pupils: Pupils are equal, round, and reactive to light. Neck:      Thyroid: No thyromegaly. Vascular: No JVD. Trachea: No tracheal deviation. Cardiovascular:      Rate and Rhythm: Normal rate and regular rhythm. Heart sounds: Normal heart sounds. No murmur heard. No friction rub. No gallop. Pulmonary:      Effort: Pulmonary effort is normal.      Breath sounds: Normal breath sounds. Chest:      Chest wall: No tenderness. Abdominal:      General: Bowel sounds are normal. There is no distension. Palpations: Abdomen is soft. Tenderness: There is no abdominal tenderness. There is no guarding or rebound. Musculoskeletal:         General: No tenderness. Cervical back: Normal range of motion and neck supple. Lymphadenopathy:      Cervical: No cervical adenopathy. Skin:     General: Skin is warm and dry. Neurological:      Mental Status: She is alert.       Comments: Slurred speech, right-sided facial droop, not moving her right arm or right leg however good tone noted in the right lower extremity, and NIH SS 17   Psychiatric:      Comments: Supportive family at the bedside           Diagnostic Study Results     Labs -  Recent Results (from the past 12 hour(s))   CBC WITH AUTOMATED DIFF    Collection Time: 07/20/21  9:03 AM   Result Value Ref Range    WBC 8.3 4.6 - 13.2 K/uL    RBC 5.30 4.20 - 5.30 M/uL    HGB 12.2 12.0 - 16.0 g/dL    HCT 39.1 35.0 - 45.0 %    MCV 73.8 (L) 74.0 - 97.0 FL    MCH 23.0 (L) 24.0 - 34.0 PG    MCHC 31.2 31.0 - 37.0 g/dL    RDW 15.1 (H) 11.6 - 14.5 %    PLATELET 889 620 - 688 K/uL    MPV 10.0 9.2 - 11.8 FL    NEUTROPHILS 43 40 - 73 %    LYMPHOCYTES 45 21 - 52 %    MONOCYTES 9 3 - 10 %    EOSINOPHILS 2 0 - 5 %    BASOPHILS 1 0 - 2 %    ABS. NEUTROPHILS 3.6 1.8 - 8.0 K/UL    ABS. LYMPHOCYTES 3.8 (H) 0.9 - 3.6 K/UL    ABS. MONOCYTES 0.7 0.05 - 1.2 K/UL    ABS. EOSINOPHILS 0.2 0.0 - 0.4 K/UL    ABS. BASOPHILS 0.1 0.0 - 0.1 K/UL    DF AUTOMATED     METABOLIC PANEL, BASIC    Collection Time: 07/20/21  9:03 AM   Result Value Ref Range    Sodium 138 136 - 145 mmol/L    Potassium 4.0 3.5 - 5.5 mmol/L    Chloride 107 100 - 111 mmol/L    CO2 28 21 - 32 mmol/L    Anion gap 3 3.0 - 18 mmol/L    Glucose 102 (H) 74 - 99 mg/dL    BUN 18 7.0 - 18 MG/DL    Creatinine 0.89 0.6 - 1.3 MG/DL    BUN/Creatinine ratio 20 12 - 20      GFR est AA >60 >60 ml/min/1.73m2    GFR est non-AA >60 >60 ml/min/1.73m2    Calcium 9.3 8.5 - 10.1 MG/DL   PROTHROMBIN TIME + INR    Collection Time: 07/20/21  9:03 AM   Result Value Ref Range    Prothrombin time 12.9 11.5 - 15.2 sec    INR 1.0 0.8 - 1.2     TROPONIN I    Collection Time: 07/20/21  9:03 AM   Result Value Ref Range    Troponin-I, QT <0.02 0.0 - 0.045 NG/ML   EKG, 12 LEAD, INITIAL    Collection Time: 07/20/21  9:32 AM   Result Value Ref Range    Ventricular Rate 76 BPM    Atrial Rate 76 BPM    P-R Interval 174 ms    QRS Duration 76 ms    Q-T Interval 384 ms    QTC Calculation (Bezet) 432 ms    Calculated P Axis 50 degrees    Calculated R Axis 6 degrees    Calculated T Axis 23 degrees    Diagnosis       Normal sinus rhythm  Normal ECG  When compared with ECG of 14-JUL-2018 12:55,  No significant change was found         Radiologic Studies -   CTA HEAD NECK W CONT   Final Result      1. Patent intracranial circulation. No intracranial large arterial vessel   occlusion.    2. Patent cervical carotid and vertebral arteries. 0% NASCET ICA narrowing   bilaterally. 3. 2 cm exophytic soft tissue at the left earlobe is nonspecific and likely   clearly evident clinically but indeterminate by CTA. Please correlate   clinically. Dr. Tanvir Oconnor provided a concordant preliminary report regarding no intracranial   large vessel occlusion at 9:31 AM, 7/20/2021         CT HEAD WO CONT   Final Result      1. No acute intracranial pathology. Sridevi MEDEL notified. Medical Decision Making   I am the first provider for this patient. I reviewed the vital signs, available nursing notes, past medical history, past surgical history, family history and social history. Vital Signs-Reviewed the patient's vital signs. EKG: Normal sinus rhythm at 76, no STEMI, interpreted by me    Records Reviewed: Nursing Notes, Old Medical Records, Previous Radiology Studies and Previous Laboratory Studies (Time of Review: 9:07 AM)    ED Course: Progress Notes, Reevaluation, and Consults:     Patient was seen on arrival and has right-sided weakness however some intermittent facial movements. Patient has good tone in her right lower extremity and is tearful with a difficult time speaking. A Code S was called and the patient is glucose is 108 and family said that she is had several strokes in the past however previous MRIs of the brain are reassuring. This may be seizure as a stroke mimic and will follow teleneurology recommendations regarding neck steps in management however will get a CT angiogram as well. Letty Arzola, DO 9:08 AM    I was called by radiology as patient has no acute findings on CT scan and no LVO on CT angiogram.  The case was discussed with teleneurology and will evaluate the patient.   Patient's last known well was less than 1 hour prior to arrival.Shane Torres, DO 9:31 AM    Discussed case with Dr. Jhony Arzola from teleneurology and reviewed previous care and the patient was given TPA in the past. She will follow the patient is admitted and defers further care to Dr. Monalisa Song from teleneurology. Kristin Salcedo, DO 9:33 AM    Patient has no large vessel occlusion and with interviewed by teleneurology and family the patient had symptoms upon waking this morning making her not a candidate for TPA. We will continue to follow patient and treatment plans closely. Patient now has LKW of 830P so will hold TPA. Dr. Monalisa Song recommends, IVF,  and admit for further workup. Kristin Salcedo, DO 9:50 AM    I discussed the case with Dr. Beti Vincent and will admit the patient to a telemetry bed for further care. Kristin Salcedo, DO 10:07 AM        Provider Notes (Medical Decision Making):   MDM  Number of Diagnoses or Management Options  Diagnosis management comments: The patient is a 80-year-old female with a history of stroke with MRI negative in the past, hypertension, chronic pain, asthma, the presents emergency department complaint of right-sided weakness with onset found to be evening prior. Patient had a CT CTA done on arrival which were reassuring the patient was seen by teleneurology initially considering giving TPA and then once we found the symptoms were longer standing that we thought TPA was held. After discussion with teleneurologist recommend permissive hypertension, IV-duration, and admit the patient for continued stroke care with 325 mg dose of aspirin. Kristin Salcedo, DO 10:07 AM        Procedures    Critical Care Time: Critical Care Time:  The services I provided to this patient were to treat and/or prevent clinically significant deterioration that could result in the failure of one or more body systems and/or organ systems due to stroke evaluation with intervention and TPA consideration    Services included the following:  -reviewing nursing notes and old charts  -vital sign assessments  -direct patient care  -medication orders and management  -interpreting and reviewing diagnostic studies/labs  -re-evaluations  -documentation time    Aggregate critical care time was 48 minutes, which includes only time during which I was engaged in work directly related to the patient's care as described above, whether I was at bedside or elsewhere in the Emergency Department. It did not include time spent performing other reported procedures or the services of residents, students, nurses, or advance practice providers. Layton Trujillo, DO 10:08 AM        Diagnosis     Clinical Impression:   1. Acute ischemic stroke (HCC)        Disposition: Admit     Follow-up Information    None          Patient's Medications   Start Taking    No medications on file   Continue Taking    ALBUTEROL (PROVENTIL HFA, VENTOLIN HFA, PROAIR HFA) 90 MCG/ACTUATION INHALER    Take 1 Puff by inhalation every four (4) hours as needed for Wheezing. ASPIRIN (ASPIRIN) 325 MG TABLET    Take 1 Tab by mouth daily. ATORVASTATIN (LIPITOR) 80 MG TABLET    Take 1 Tab by mouth nightly. BUDESONIDE-FORMOTEROL (SYMBICORT) 160-4.5 MCG/ACTUATION HFA INHALER    Take 2 Puffs by inhalation two (2) times a day. CHOLECALCIFEROL (VITAMIN D3) 1,000 UNIT CAP    Take 2,000 Units by mouth daily. DULOXETINE (CYMBALTA) 60 MG CAPSULE    Take 60 mg by mouth nightly. FERROUS SULFATE PO    Take 325 mg by mouth. FUROSEMIDE (LASIX) 20 MG TABLET    Take 40 mg by mouth daily. GABAPENTIN (NEURONTIN) 300 MG CAPSULE    TAKE 1 CAPSULE BY MOUTH EVERY 12 HOURS FOR 30 DAYS    LEVOTHYROXINE (SYNTHROID) 50 MCG TABLET    TAKE 1 TABLET BY MOUTH EVERY DAY    MECLIZINE (ANTIVERT) 25 MG TABLET    Take 25 mg by mouth daily as needed. NALOXONE (NARCAN) 4 MG/ACTUATION NASAL SPRAY    Use 1 spray intranasally into 1 nostril. Use a new Narcan nasal spray for subsequent doses and administer into alternating nostrils. May repeat every 2 to 3 minutes as needed. POTASSIUM CHLORIDE SR (K-TAB) 20 MEQ TABLET    Take 20 mEq by mouth daily.     PROPRANOLOL (INDERAL) 20 MG TABLET    Take 1 Tab by mouth three (3) times daily. Indications: MIGRAINE PREVENTION   These Medications have changed    No medications on file   Stop Taking    No medications on file     Disclaimer: Sections of this note are dictated using utilizing voice recognition software. Minor typographical errors may be present. If questions arise, please do not hesitate to contact me or call our department.

## 2021-07-20 NOTE — PROGRESS NOTES
PT orders received and chart reviewed. Per RN, transport coming to transfer to floor. Will follow up.      Thank you for this referral.   Ting Level PT DPT

## 2021-07-21 VITALS
OXYGEN SATURATION: 98 % | DIASTOLIC BLOOD PRESSURE: 95 MMHG | RESPIRATION RATE: 17 BRPM | HEART RATE: 68 BPM | BODY MASS INDEX: 38.73 KG/M2 | TEMPERATURE: 98.2 F | SYSTOLIC BLOOD PRESSURE: 159 MMHG | WEIGHT: 241 LBS | HEIGHT: 66 IN

## 2021-07-21 PROBLEM — G43.109 COMPLICATED MIGRAINE: Status: ACTIVE | Noted: 2021-07-21

## 2021-07-21 LAB
ANION GAP SERPL CALC-SCNC: 4 MMOL/L (ref 3–18)
BACTERIA SPEC CULT: NORMAL
BUN SERPL-MCNC: 13 MG/DL (ref 7–18)
BUN/CREAT SERPL: 16 (ref 12–20)
CALCIUM SERPL-MCNC: 9.3 MG/DL (ref 8.5–10.1)
CHLORIDE SERPL-SCNC: 109 MMOL/L (ref 100–111)
CHOLEST SERPL-MCNC: 153 MG/DL
CO2 SERPL-SCNC: 29 MMOL/L (ref 21–32)
CREAT SERPL-MCNC: 0.83 MG/DL (ref 0.6–1.3)
ERYTHROCYTE [DISTWIDTH] IN BLOOD BY AUTOMATED COUNT: 15.2 % (ref 11.6–14.5)
GLUCOSE BLD STRIP.AUTO-MCNC: 113 MG/DL (ref 70–110)
GLUCOSE SERPL-MCNC: 87 MG/DL (ref 74–99)
HCT VFR BLD AUTO: 37.6 % (ref 35–45)
HDLC SERPL-MCNC: 41 MG/DL (ref 40–60)
HDLC SERPL: 3.7 {RATIO} (ref 0–5)
HGB BLD-MCNC: 11.3 G/DL (ref 12–16)
LDLC SERPL CALC-MCNC: 98.2 MG/DL (ref 0–100)
LIPID PROFILE,FLP: NORMAL
MCH RBC QN AUTO: 22.6 PG (ref 24–34)
MCHC RBC AUTO-ENTMCNC: 30.1 G/DL (ref 31–37)
MCV RBC AUTO: 75.2 FL (ref 74–97)
PLATELET # BLD AUTO: 338 K/UL (ref 135–420)
PMV BLD AUTO: 10.7 FL (ref 9.2–11.8)
POTASSIUM SERPL-SCNC: 4.7 MMOL/L (ref 3.5–5.5)
PROCALCITONIN SERPL-MCNC: <0.05 NG/ML
RBC # BLD AUTO: 5 M/UL (ref 4.2–5.3)
SERVICE CMNT-IMP: NORMAL
SODIUM SERPL-SCNC: 142 MMOL/L (ref 136–145)
TRIGL SERPL-MCNC: 69 MG/DL (ref ?–150)
VLDLC SERPL CALC-MCNC: 13.8 MG/DL
WBC # BLD AUTO: 7.2 K/UL (ref 4.6–13.2)

## 2021-07-21 PROCEDURE — 74011000250 HC RX REV CODE- 250: Performed by: INTERNAL MEDICINE

## 2021-07-21 PROCEDURE — 99218 HC RM OBSERVATION: CPT

## 2021-07-21 PROCEDURE — 85027 COMPLETE CBC AUTOMATED: CPT

## 2021-07-21 PROCEDURE — 97110 THERAPEUTIC EXERCISES: CPT

## 2021-07-21 PROCEDURE — 74011250637 HC RX REV CODE- 250/637: Performed by: INTERNAL MEDICINE

## 2021-07-21 PROCEDURE — 97116 GAIT TRAINING THERAPY: CPT

## 2021-07-21 PROCEDURE — 84145 PROCALCITONIN (PCT): CPT

## 2021-07-21 PROCEDURE — 82962 GLUCOSE BLOOD TEST: CPT

## 2021-07-21 PROCEDURE — 74011250636 HC RX REV CODE- 250/636: Performed by: INTERNAL MEDICINE

## 2021-07-21 PROCEDURE — 97535 SELF CARE MNGMENT TRAINING: CPT

## 2021-07-21 PROCEDURE — 99239 HOSP IP/OBS DSCHRG MGMT >30: CPT | Performed by: INTERNAL MEDICINE

## 2021-07-21 PROCEDURE — 99231 SBSQ HOSP IP/OBS SF/LOW 25: CPT | Performed by: PSYCHIATRY & NEUROLOGY

## 2021-07-21 PROCEDURE — 97165 OT EVAL LOW COMPLEX 30 MIN: CPT

## 2021-07-21 PROCEDURE — 92526 ORAL FUNCTION THERAPY: CPT

## 2021-07-21 PROCEDURE — 94640 AIRWAY INHALATION TREATMENT: CPT

## 2021-07-21 PROCEDURE — 80048 BASIC METABOLIC PNL TOTAL CA: CPT

## 2021-07-21 PROCEDURE — 80061 LIPID PANEL: CPT

## 2021-07-21 PROCEDURE — 36415 COLL VENOUS BLD VENIPUNCTURE: CPT

## 2021-07-21 PROCEDURE — 92507 TX SP LANG VOICE COMM INDIV: CPT

## 2021-07-21 RX ORDER — PROPRANOLOL HYDROCHLORIDE 20 MG/1
20 TABLET ORAL 3 TIMES DAILY
Qty: 90 TABLET | Refills: 3 | Status: SHIPPED | OUTPATIENT
Start: 2021-07-21 | End: 2021-07-29 | Stop reason: ALTCHOICE

## 2021-07-21 RX ORDER — FUROSEMIDE 20 MG/1
20 TABLET ORAL DAILY
Qty: 30 TABLET | Refills: 0 | Status: SHIPPED | OUTPATIENT
Start: 2021-07-21

## 2021-07-21 RX ORDER — ACETAMINOPHEN 500 MG
1000 TABLET ORAL
Qty: 90 TABLET | Refills: 0 | Status: SHIPPED | OUTPATIENT
Start: 2021-07-21 | End: 2021-08-20

## 2021-07-21 RX ADMIN — POTASSIUM CHLORIDE 20 MEQ: 750 TABLET, FILM COATED, EXTENDED RELEASE ORAL at 09:41

## 2021-07-21 RX ADMIN — FUROSEMIDE 20 MG: 20 TABLET ORAL at 09:41

## 2021-07-21 RX ADMIN — LEVOTHYROXINE SODIUM 50 MCG: 0.05 TABLET ORAL at 05:51

## 2021-07-21 RX ADMIN — PROPRANOLOL HYDROCHLORIDE 20 MG: 10 TABLET ORAL at 09:41

## 2021-07-21 RX ADMIN — ASPIRIN 81 MG: 81 TABLET, CHEWABLE ORAL at 09:41

## 2021-07-21 RX ADMIN — BUDESONIDE 500 MCG: 0.5 SUSPENSION RESPIRATORY (INHALATION) at 08:02

## 2021-07-21 RX ADMIN — KETOROLAC TROMETHAMINE 15 MG: 15 INJECTION, SOLUTION INTRAMUSCULAR; INTRAVENOUS at 05:54

## 2021-07-21 NOTE — PROGRESS NOTES
Problem: Dysphagia (Adult)  Goal: *Acute Goals and Plan of Care (Insert Text)  Description: Patient will:  1. Tolerate PO trials with 0 s/s overt distress in 4/5 trials - met  2. Utilize compensatory swallow strategies/maneuvers (decrease bite/sip, size/rate, alt. liq/sol) with min cues in 4/5 trials - met    Rec:     Reg solid with thin liquids  Aspiration precautions  HOB >45 during po intake, remain >30 for 30-45 minutes after po   Small bites/sips; alternate liquid/solid with slow feeding rate   Oral care TID  Meds per pt preference  Outcome: Resolved/Met     Problem: Motor Speech Impaired (Adult)  Goal: *Acute Goals and Plan of Care (Insert Text)  Description:   Motor Speech Goals:   1. Utilize compensatory strategies (decrease rate, overarticulate, increase intensity) to increase intelligibility to >90% at word level with mod A visual/verbal cues in 4/5 trials. - met  2. Perform oral motor exercises (with and without resistance) in therapy and at home to increase oral motor strength/range-of-motion for articulation tasks with mod A with visual/verbal cues in 4/5 trials. - n/a  3. Complete articulatory agility tasks (reading-conversation) with mod A with visual/verbal cues in 4/5 trials. - n/a  4. Demonstrate functional increase in articulation skills for effective participation in communication ADLs with mod A.  - n/a      Outcome: Resolved/Met     SPEECH LANGUAGE PATHOLOGY   SPEECH-LANGUAGE TREATMENT AND DISCHARGE  SWALLOW TREATMENT AND DISCHARGE    Patient: Chelsea Langford (60 y.o. female)  Date: 7/21/2021  Primary Diagnosis: Acute ischemic stroke (HCC) [L24.8]  Complicated migraine [Z60.836]        Precautions: aspiration  Fall    Speech-Language Tx:  Pt was seen at bedside for follow up motor speech therapy. Dysarthria has completely resolved per pt. No facial droop observed. Speech was fluent with no slowed rate or anomic events.  Reviewed compensatory speech strategies of slowing speech rate, overarticulation, and speaking loudly with verbalized comp. She was also provided with worksheet of oral motor exercises incase she has any type of facial droop in the future. Pt able to teach back and end of session. No further skilled SLP services are indicated at this time. Please re-consult if needed. Swallow Tx:   Pt also seen for follow up swallow tx at bedside. Observed pt tolerating breakfast of regular solids and thin liquids with no overt s/sx of aspiration. Mastication was timely with adequate a-p transit and swallow initiation. Laryngeal elevation was functional/timely to palpation across all trials. Recommend to continue regular solid diet with thin liquids, aspiration precautions, oral care TID, and meds as tolerated. No further skilled SLP services are indicated at this time. Please re-consult if needed. PLAN:  Recommendations and Planned Interventions: See above  Patient continues to benefit from skilled intervention to address the above impairments. Continue treatment per established plan of care. Discharge Recommendations:  None     SUBJECTIVE:   Patient stated Everything feels way better today. OBJECTIVE:     Past Medical History:   Diagnosis Date    Asthma     Back pain with radiation     [facility] 12/[facility]/2016    EF 55-60%. No WMA. Right to left atrial septal shunt suggests PFO. Similar to study of 10/18/16.  Cardiovascular LE venous duplex 10/18/2016    No DVT bilaterally.  Contact dermatitis and other eczema, due to unspecified cause     CTS (carpal tunnel syndrome)     HSV-2 (herpes simplex virus 2) infection     Hypercholesterolemia     Hypothyroidism     Ill-defined condition     Vertigo    L4-L5 disc bulge     Leg swelling     Migraines     Positive PPD, treated     S/P lumbar fusion 1/13/2016    1.  L4-5 bilateral hemilaminotomy, medial facetectomy, foraminotomy, diskectomy L4-5. 2. Transforaminal lumbar interbody fusion with PEEK cage and demineralized bone graft L4-L5 posterolateral fusion. 3. Segmental spinal instrumentation, DePuy Expedium type L4-L5. 01/13/2016 By Dr. Noelle Eden     EMG '17 , mild sciatic EMG findings    Stroke [facility])     12/2016    Vertigo      Past Surgical History:   Procedure Laterality Date    HX GYN      partial hysterectomy due to abnormal pap    HX LUMBAR LAMINECTOMY  01-13-16    L4/5       Cognitive and Communication Status:  Neurologic State: Alert  Orientation Level: Oriented X4  Cognition: Follows commands  Perception: Appears intact  Perseveration: No perseveration noted  Safety/Judgement: Fall prevention, Awareness of environment    Motor Speech: see above    Dysphagia Treatment:  Oral Assessment:  Oral Assessment  Labial: WFL  Dentition: Natural  Oral Hygiene: adequate  Lingual: No impairment  Velum: No impairment  Mandible: No impairment  P.O. Trials:   Patient Position: 55 at [facility]   Vocal quality prior to P.O.: No impairment   Consistency Presented:  Thin liquid, Puree, Solid   How Presented: Self-fed/presented, Successive swallows, Straw   Bolus Acceptance: No impairment   Bolus Formation/Control: No impairment   Propulsion: No impairment   Oral Residue: None   Initiation of Swallow: No impairment   Laryngeal Elevation: Functional   Aspiration Signs/Symptoms: None   Pharyngeal Phase Characteristics: No impairment, issues, or problems    Effective Modifications: None   Cues for Modifications: None      Oral Phase Severity: No impairment   Pharyngeal Phase Severity : No impairment      PAIN:  Pain level pre-treatment: 0/10   Pain level post-treatment: 0/10     After treatment:   []            Patient left in no apparent distress sitting up in chair  [x]            Patient left in no apparent distress in bed  [x]            Call bell left within reach  [x]            Nursing notified  []            Family present  []            Caregiver present  []            Bed alarm activated    COMMUNICATION/EDUCATION:   [x]            Aspiration precautions; swallow safety; compensatory techniques. [x]            Receptive/Expressive communication strategies  [x]            Patient/family have participated as able in goal setting and plan of care. []            Patient/family agree to work toward stated goals and plan of care. []            Patient understands intent and goals of therapy; neutral about participation. []            Patient unable to participate in goal setting/plan of care; educ ongoing with interdisciplinary staff  []         Posted safety precautions in patient's room.     Thank you for this referral.    Zayra Lynn M.S. CCC-SLP/L  Speech-Language Pathologist

## 2021-07-21 NOTE — PROGRESS NOTES
Problem: Self Care Deficits Care Plan (Adult)  Goal: *Acute Goals and Plan of Care (Insert Text)  Outcome: Resolved/Met     OCCUPATIONAL THERAPY EVALUATION/DISCHARGE    Patient: Jasmina Gordon (64 y.o. female)  Date: 7/21/2021  Primary Diagnosis: Acute ischemic stroke ([facility] [address] [E85.6]  Complicated migraine [X02.834]  Precautions: Fall  PLOF: Patient was independent with self-care and functional mobility PTA. ASSESSMENT AND RECOMMENDATIONS:  Patient cleared to participate in OT evaluation by RN. Upon entering the room, the patient was supine in bed, alert, and agreeable to participate in OT evaluation. Patient educated on the role of OT, evaluation process, and safety during this admission with patient verbalizing understanding. Patient performed bed mobility with modified independence and able to sit EOB with good balance for vision assessment, gross MMT/ ROM assessment and self-care tasks. Patient observed with decreased R fine motor/coordination and decreased RUE strength, however functional. Patient able to perform upper body dressing including management of buttons/strings and lower body dressing this session with modified independence. Additional time needed. Patient supervision for sit <> stand and stand by assist for functional transfers. The patient presents with good static standing and fair dynamic standing balance, however will defer to PT for functional balance and functional mobility tasks. Spoke with patient regarding daughter's supervision at home for stairs and stepping over into the tub. Patient appreciative. Patient also given therapeutic exercises to maintain strength/ROM for selfcare tasks. Based on the objective data described below, the patient presents with no deficits that impede pt function with ADLs. OT to d/c from caseload at this time and recommend Home with family support and outpatient follow up as needed. Skilled occupational therapy is not indicated at this time.   Discharge Recommendations: Home with family support and Outpatient follow up for RUE deficits  Further Equipment Recommendations for Discharge: rolling walker      SUBJECTIVE:   Patient stated i've been going to the bathroom without using anything    OBJECTIVE DATA SUMMARY:     Past Medical History:   Diagnosis Date    Asthma     Back pain with radiation     Cardiac echocardiogram, ltd study 12/29/2016    EF 55-60%. No WMA. Right to left atrial septal shunt suggests PFO. Similar to study of 10/18/16.  Cardiovascular LE venous duplex 10/18/2016    No DVT bilaterally.  Contact dermatitis and other eczema, due to unspecified cause     CTS (carpal tunnel syndrome)     HSV-2 (herpes simplex virus 2) infection     Hypercholesterolemia     Hypothyroidism     Ill-defined condition     Vertigo    L4-L5 disc bulge     Leg swelling     Migraines     Positive PPD, treated     S/P lumbar fusion 1/13/2016    1. L4-5 bilateral hemilaminotomy, medial facetectomy, foraminotomy, diskectomy L4-5. 2. Transforaminal lumbar interbody fusion with PEEK cage and demineralized bone graft L4-L5 posterolateral fusion.  3. Segmental spinal instrumentation, DePuy Expedium type L4-L5. 01/13/2016 By Dr. Dennis Bravo     EMG '17 , mild sciatic EMG findings    Stroke [facility])     12/2016    Vertigo      Past Surgical History:   Procedure Laterality Date    HX GYN      partial hysterectomy due to abnormal pap    HX LUMBAR LAMINECTOMY  01-13-16    L4/5     Barriers to Learning/Limitations: None  Compensate with: visual, verbal, tactile, kinesthetic cues/model    Home Situation:   Home Situation  Home Environment: Apartment  One/Two Story Residence: Two story  Living Alone: No  Current DME Used/Available at Home: None  Tub or Shower Type: Tub/Shower combination  [x]     Right hand dominant   []     Left hand dominant    Cognitive/Behavioral Status:  Neurologic State: Alert  Orientation Level: Oriented X4  Cognition: Follows commands  Safety/Judgement: Fall prevention; Awareness of environment    Skin: Intact  Edema: None noted    Vision/Perceptual:    Acuity: Within Defined Limits;Able to read clock/calendar on wall without difficulty; Able to read employee name badge without difficulty (reports blurred vision at admission, now resolved)    Corrective Lenses: Reading glasses    Coordination: BUE  Fine Motor Skills-Upper: Left Intact; Right Intact (pt able to perform opposition with additional time needed)    Gross Motor Skills-Upper: Left Intact; Right Intact    Balance:  Sitting: Intact  Standing: Impaired; Without support  Standing - Static: Good  Standing - Dynamic : Fair    Strength: BUE  Strength: Generally decreased, functional (LUE 5/5, RUE 4-/5)     Tone & Sensation: BUE  Tone: Normal  Sensation: Intact    Range of Motion: BUE  AROM: Within functional limits      Functional Mobility and Transfers for ADLs:  Bed Mobility:  Supine to Sit: Modified independent  Sit to Supine: Modified independent  Scooting: Modified independent    Transfers:  Sit to Stand: Supervision  Stand to Sit: Supervision      ADL Assessment:  Feeding: Modified independent    Oral Facial Hygiene/Grooming: Modified Independent    Bathing: Modified independent    Upper Body Dressing: Modified independent    Lower Body Dressing: Modified independent    Toileting: Modified independent                ADL Intervention:  Feeding  Feeding Assistance: Modified independent  Container Management: Modified independent    Upper Body Dressing Assistance  Dressing Assistance: Modified independent  Hospital Gown: Modified independent (looping and making a bow with back neck straps)    Lower Body Dressing Assistance  Dressing Assistance: Modified independent  Socks: Modified independent (observed physically lifting RLE)  Leg Crossed Method Used: Yes  Position Performed: Seated edge of bed    Cognitive Retraining  Safety/Judgement: Fall prevention; Awareness of environment      Pain:  Pain level pre-treatment: 0/10   Pain level post-treatment: 0/10   Pain Intervention(s): Medication (see MAR); Response to intervention: Nurse notified, See doc flow    Activity Tolerance:   Good    Please refer to the flowsheet for vital signs taken during this treatment. After treatment:   []  Patient left in no apparent distress sitting up in chair  [x]  Patient left in no apparent distress in bed  [x]  Call bell left within reach  [x]  Nursing notified  []  Caregiver present  []  Bed alarm activated    COMMUNICATION/EDUCATION:   [x]      Role of Occupational Therapy in the acute care setting  [x]      Home safety education was provided and the patient/caregiver indicated understanding. [x]      Patient/family have participated as able and agree with findings and recommendations. []      Patient is unable to participate in plan of care at this time. Thank you for this referral.  Barbie Burdick OTR/L  Time Calculation: 23 mins      Eval Complexity: History: MEDIUM Complexity : Expanded review of history including physical, cognitive and psychosocial  history ; Examination: LOW Complexity : 1-3 performance deficits relating to physical, cognitive , or psychosocial skils that result in activity limitations and / or participation restrictions ;    Decision Making:LOW Complexity : No comorbidities that affect functional and no verbal or physical assistance needed to complete eval tasks

## 2021-07-21 NOTE — PROGRESS NOTES
Reason for Admission:  Acute ischemic stroke ([facility] [address] [K44.2]  Complicated migraine [W53.122]                 RUR Score:    7            Plan for utilizing home health:    yes                      Likelihood of Readmission:   LOW                         Transition of Care Plan:              Initial assessment completed with patient. Cognitive status of patient: oriented to time, place, person and situation. Face sheet information confirmed:  yes. The patient designates daughter to participate in her discharge plan and to receive any needed information. This patient lives in a single family home with patient and daughter. Patient is able to navigate steps as needed. Prior to hospitalization, patient was considered to be independent with ADLs/IADLS : yes . Patient has a current ACP document on file: no      Healthcare Decision Maker:     Click here to complete [address] including selection of the Healthcare Decision Maker Relationship (ie \"Primary\")    The patient and daughter will be available to transport patient home upon discharge. The patient already has none reported,  medical equipment available in the home. Patient is not currently active with home health. Patient has not stayed in a skilled nursing facility or rehab. This patient is on dialysis :no    List of available Home Health agencies were provided and reviewed with the patient prior to discharge. Freedom of choice signed: yes, for Susan. Currently, the discharge plan is Home with [address] [place] [address] Sandeep De Gaonishruthi. The patient states that she can obtain her medications from the pharmacy, and take her medications as directed. Patient's current insurance is [employer]. Patient is [id number] [facility] Management Interventions  PCP Verified by CM:  Yes  Mode of Transport at Discharge: Self  Physical Therapy Consult: Yes  Occupational Therapy Consult: Yes  Speech Therapy Consult: Yes  Current Support Network: Relative's Home  Confirm Follow Up Transport: Family  The Plan for Transition of Care is Related to the Following Treatment Goals : Home health  The Patient and/or Patient Representative was Provided with a Choice of Provider and Agrees with the Discharge Plan?: Yes  Freedom of Choice List was Provided with Basic Dialogue that Supports the Patient's Individualized Plan of Care/Goals, Treatment Preferences and Shares the Quality Data Associated with the Providers?: Yes  Discharge Location  Discharge Placement: Home with home health        Orlando Gutierrez RN BSN  Care Manager  [phone]

## 2021-07-21 NOTE — ROUTINE PROCESS
Bedside and Verbal shift change report given to JACKELINE RN (oncoming nurse) by Emiliano Champion RN (offgoing nurse). Report given with SBAR, Kardex, Intake/Output, MAR, Accordion and Recent Results.

## 2021-07-21 NOTE — PROGRESS NOTES
ARU/IPR REFERRAL CONTACT NOTE  [address] [place] [address] for Physical Rehabilitation    RE: Yadira Zhou  Referral received to review this patient's case for admission to [address] [place] [address] Physical Rehabilitation. Current status reviewed.  When appropriate, will need OT evaluation/treatment on this patient to complete the pre-admission evaluation.  Will continue to follow. Thank you for this referral.  Should you have any questions please do not hesitate to call. Sincerely,  Cb Ardon.  [address] [place] [address] Physical Rehabilitation  [phone]

## 2021-07-21 NOTE — PROGRESS NOTES
Problem: Patient Education: Go to Patient Education Activity  Goal: Patient/Family Education  7/21/2021 0845 by Juliaette Litten, RN  Outcome: Progressing Towards Goal  7/21/2021 0845 by Juliaette Litten, RN  Outcome: Progressing Towards Goal     Problem: Patient Education: Go to Patient Education Activity  Goal: Patient/Family Education  7/21/2021 0845 by Juliaette Litten, RN  Outcome: Progressing Towards Goal  7/21/2021 0845 by Juliaette Litten, RN  Outcome: Progressing Towards Goal     Problem: Patient Education: Go to Patient Education Activity  Goal: Patient/Family Education  Outcome: Progressing Towards Goal

## 2021-07-21 NOTE — PROGRESS NOTES
Discharge order noted for today. Pt has been accepted to [facility] agency. Met with patient  and are agreeable to the transition plan today. Transport has been arranged through [facility]. Patient's discharge summary and home health  orders have been forwarded to [address] [place] [address] via [place]. Updated bedside RN,,  to the transition plan.   Discharge information has been documented on the AVS.       Debbie Olivares RN BSN  Care Manager  [phone]

## 2021-07-21 NOTE — PROGRESS NOTES
Problem: Mobility Impaired (Adult and Pediatric)  Goal: *Acute Goals and Plan of Care (Insert Text)  Description: Physical Therapy Goals  Initiated 7/20/2021 and to be accomplished within 7 day(s)  1. Patient will move from supine to sit and sit to supine  in bed with modified independence. 2.  Patient will transfer from bed to chair and chair to bed with modified independence using the least restrictive device. 3.  Patient will perform sit to stand with modified independence. 4.  Patient will ambulate with modified independence for 200 feet with the least restrictive device. 5.  Patient will ascend/descend 10 stairs with handrail(s) with supervision/set-up. PLOF: Patient reports she was independent with functional mobility no NAD. She lives with her adult daughter in a 2 story home. Outcome: Progressing Towards Goal   PHYSICAL THERAPY TREATMENT    Patient: Thania Butler (60 y.o. female)  Date: 7/21/2021  Diagnosis: Acute ischemic stroke [facility]) [J28.6]  Complicated migraine [H56.569] <principal problem not specified>       Precautions: Fall      ASSESSMENT:  Pt continues to progress well with mobility training as evidenced by improved stability, balance, and muscular control. Pt displays difficulty with right LE DF during terminal stance initially, however improves with increased repetitions and distance. During gait training, pt displayed minimal path deviations and no instability or imbalance, thus no AD required/recommended. Pt returned to room to recliner and performed synchronized LE movements to facilitate neural connection during dynamic activities. Pt voiced understanding and was left in recliner with daughter at side with all needs in reach. From a PT standpoint, pt is safe for home mobility with assistance and would greatly benefit from further PT services in an outpatient to maximize safety and mobility to overcome neuromuscular deficits.        Right LE muscular strength: 4/5 for knee ext, flexion, and DF  Left LE strength: WNL  Progression toward goals: good   [x]      Improving appropriately and progressing toward goals  []      Improving slowly and progressing toward goals  []      Not making progress toward goals and plan of care will be adjusted     PLAN:  Patient continues to benefit from skilled intervention to address the above impairments. Continue treatment per established plan of care. Discharge Recommendations:  Outpatient  Further Equipment Recommendations for Discharge: none      SUBJECTIVE:   Patient stated I am going to stay with my daughter.     OBJECTIVE DATA SUMMARY:   Critical Behavior:  Neurologic State: Alert  Orientation Level: Oriented X4  Cognition: Follows commands  Safety/Judgement: Fall prevention, Awareness of environment  Functional Mobility Training:  Bed Mobility:  Supine to Sit: Modified independent  Sit to Supine: Modified independent  Scooting: Modified independent  Transfers:  Sit to Stand: Supervision  Stand to Sit: Supervision  Balance:  Sitting: Intact  Standing: Impaired; Without support  Standing - Static: Good  Standing - Dynamic : Fair (+)   Range Of Motion:   AROM: Within functional limits  Ambulation/Gait Training:  Distance (ft): 200 Feet (ft)  Assistive Device:  (none )  Ambulation - Level of Assistance: Contact guard assistance;Stand-by assistance  Gait Abnormalities: Decreased step clearance; Path deviations (decreased DF during terminal stance)  Therapeutic Exercises:       EXERCISE   Sets   Reps   Active Active Assist   Passive Self ROM   Comments   Ankle Pumps 1 10  [x] [] [] []    Quad Sets/Glut Sets    [] [] [] [] Hold for 5 secs   Hamstring Sets   [] [] [] []    Short Arc Quads   [] [] [] []    Heel Slides   [] [] [] []    Straight Leg Raises   [] [] [] []    Hip Add   [] [] [] [] Hold for 5 secs, w/ pillow squeeze   Long Arc Quads 1 10 [x] [] [] []    Seated Marching   [] [] [] []    Standing Marching   [] [] [] []       [] [] [] [] Pain:  Pain level pre-treatment: 0/10  Pain level post-treatment: 0/10   Voiced paraesthesia type symptoms in right hip    Activity Tolerance:   good  Please refer to the flowsheet for vital signs taken during this treatment. After treatment:   [x] Patient left in no apparent distress sitting up in chair  [] Patient left in no apparent distress in bed  [x] Call bell left within reach  [x] Nursing notified  [x] Caregiver present  [] Bed alarm activated  [] SCDs applied      COMMUNICATION/EDUCATION:   [x]         Role of Physical Therapy in the acute care setting. [x]         Fall prevention education was provided and the patient/caregiver indicated understanding. [x]         Patient/family have participated as able in working toward goals and plan of care. [x]         Patient/family agree to work toward stated goals and plan of care. []         Patient understands intent and goals of therapy, but is neutral about his/her participation.   []         Patient is unable to participate in stated goals/plan of care: ongoing with therapy staff.  []         Other:        Yue Kramer PTA   Time Calculation: 23 mins

## 2021-07-21 NOTE — DISCHARGE SUMMARY
Discharge Summary    Patient: Larissa Alcala               Sex: female          DOA: 7/20/2021         YOB: 1965      Age:  64 y.o.        LOS:  LOS: 1 day                Admit Date: 7/20/2021    Discharge Date: 7/21/2021    Primary care physician: Charan Yee MD    Discharge Diagnoses:    1. Ruled out Acute ischemic stroke   2. Complicated migraine with headache and right-sided weakness, improved   3. Negative cystitis, asymptomatic bacteruria   4. Hypertension     Discharge Condition: Good  Disposition: home with home health  Code Status: full code    Follow up for Primary Care Physician:  1) she needs close follow up for her antihypertensive mediation adjustment, goal SBP<140. Please avoid dehydration with diuretic   2)  She needs follow up with [facility] as needed. Hospital Course:   64 y.o. female with medical co-morbidities including HTN, h/o TIA negative MRI brain, hypercholesteremia, Migraine, lumbar stenosis with lumbar fusion, chronic back pain, presented from home with headache and stuttering speech, acute right sided weakness. Apparently, she had this similar episode in 2018 where she ended up getting tPA and MRI brain was negative for stroke. This time, her symptom started about 1 hr prior to presentation. She has associated headache with photophobia, phonophobia. No nausea/vomiting. No fever/chill. In the ER, CT head was negative, CTA without obstruction. She was seen by in house neurologist with impression of complicated migraine. No further stroke workup indicated at this time. Neurology evaluated and consider her clinical presentation as migraine headache with complicated by stroke mimicker. No further MRI or Echo needed. She was given IV magnesium and  IV fluid. She was resume back on her propanolol and tylenol with improvement of her headache and symptoms  She has dirty UA but asymptomatic.  Ceftriaxone was given x1 but discontinued since she has no urinary symptom       Last 24 Hours: right side weakness improved with improved headache. PT/OT evaluated and recommending outpatient care. Educated to continue with propanolol for now. Lasix decreased to help with dehydration     ROS:  No current fever/chills, no headache, no dizziness, no facial pain, no sinus congestion,   No swallowing pain, No chest pain, no palpitation, no shortness of breath, no abd pain,  No diarrhea, no urinary complaint, no leg pain or swelling    VS:   Visit Vitals  BP (!) 159/95   Pulse 68   Temp 98.2 °F (36.8 °C)   Resp 17   Ht 5' 6\" (1.676 m)   Wt 109.3 kg (241 lb)   SpO2 98%   Breastfeeding No   BMI 38.90 kg/m²      Tmax/24hrs: Temp (24hrs), Av.1 °F (36.7 °C), Min:97.8 °F (36.6 °C), Max:98.2 °F (36.8 °C)  No intake or output data in the 24 hours ending 21 1906    Tele: sinus  General:  Cooperative, Not in acute distress, speaks in full sentence while in bed  HEENT: PERRL, EOMI, supple neck, no JVD, dry oral mucosa  Cardiovascular: S1S2 regular, no rub/gallop   Pulmonary: Clear air entry bilaterally, no wheezing, no crackle  GI:  Soft, non tender, non distended, +bs, no guarding   Extremities:  No pedal edema, +distal pulses appreciated   Neuro: AOx3, moving all extremities    Consults:   Neurology: Dr. Yehuda Hamm     Significant Diagnostic Studies:   CT Results (most recent):  Results from Hospital Encounter encounter on 21    CTA HEAD NECK W CONT    Narrative  CTA NECK AND BRAIN    CPT CODE: 79825,89806    REASON FOR EXAM: R sided weakness  HISTORY: Difficulty speaking    COMPARISON: CT head without contrast earlier same day 2021. Brain MRI  7/15/2018, CTA head and neck 2018    TECHNIQUE: Helical CT scan of the brain and neck were performed during rapid IV  bolus contrast administration. These data were reconstructed for vascular  analysis.   3-D postprocessed images, including surface shaded displays, were  produced on a dedicated workstation, permanently archived, and interpreted. CT dose reduction was achieved through use of a standardized protocol tailored  for this examination and automatic exposure control for dose modulation. CONTRAST: 100  mL Isovue 370 iodinated contrast IV. CTA SOFT TISSUE ANALYSIS: Lung apices are clear. No mucosal mass lesion  identified in the upper aerodigestive tract mucosa. Submandibular glands are not  enlarged area there are nonspecific nodular densities within both parotid glands  that may be intraparotid lymph nodes. Similar to previous exams there is an  exophytic soft tissue mass that projects from the left earlobe. Measures  approximately 2 cm diameter. There are scattered subcentimeter bilateral  cervical chain lymph nodes, none are clearly pathologic and may be reactive  lymph nodes. Similar findings on previous exams. The thyroid gland is  unremarkable. Superior mediastinum unremarkable. No focal osseous pathology  seen. No significant cervical central canal stenosis seen. Paranasal sinuses  are clear. Normal brain attenuations. No pathologic enhancement or mass lesion. Normal CSF  spaces. No mass effect or midline shift. CTA NECK VASCULAR ANALYSIS:  Aortic arch: Typical arch anatomy. No arch stenosis or aneurysm. Innominate artery: Patent, no stenosis. Right subclavian artery: Patent, no stenosis. Left subclavian artery: Patent, no stenosis. Right carotid:  Right CCA: No stenosis  Right ICA origin: 0% diameter stenosis SIMRAN by NASCET criteria. Right ICA: Patent to the skull base. Left carotid:  Left CCA: No stenosis  Left ICA origin: 0% diameter stenosis LICA by NASCET criteria. Left ICA: Patent to the skull base. Right vertebral artery: Patent. No stenosis. Left vertebral artery: Patent. No stenosis. CTA BRAIN VASCULAR ANALYSIS:    RIGHT ANTERIOR CIRCULATION: Patent  Distal ICA: No stenosis. MCA: No stenosis. VINH: No stenosis.     ACOM: Patent    LEFT ANTERIOR CIRCULATION: Patent  Distal ICA: No stenosis. MCA: No stenosis. VINH: No stenosis. POSTERIOR CIRCULATION:  RVA: Patent. No stenosis. LVA: Patent. No stenosis. Basilar: Patent. No stenosis. Right PCA: Patent. No stenosis. Left PCA: Patent. No stenosis. Impression  1. Patent intracranial circulation. No intracranial large arterial vessel  occlusion. 2. Patent cervical carotid and vertebral arteries. 0% NASCET ICA narrowing  bilaterally. 3. 2 cm exophytic soft tissue at the left earlobe is nonspecific and likely  clearly evident clinically but indeterminate by CTA. Please correlate  clinically. Dr. Brenden Shepard provided a concordant preliminary report regarding no intracranial  large vessel occlusion at 9:31 AM, 7/20/2021        Lab/Data Review:  Labs: Results:       Chemistry Recent Labs     07/21/21  1134 07/20/21  0903   GLU 87 102*    138   K 4.7 4.0    107   CO2 29 28   BUN 13 18   CREA 0.83 0.89   CA 9.3 9.3   AGAP 4 3   BUCR 16 20      CBC w/Diff Recent Labs     07/21/21  1134 07/20/21  0903   WBC 7.2 8.3   RBC 5.00 5.30   HGB 11.3* 12.2   HCT 37.6 39.1    320   GRANS  --  43   LYMPH  --  45   EOS  --  2      Coagulation Recent Labs     07/20/21  0903   PTP 12.9   INR 1.0       Iron/Ferritin No results for input(s): IRON in the last 72 hours. No lab exists for component: TIBCCALC   BNP No results for input(s): BNPP in the last 72 hours. Cardiac Enzymes No results for input(s): CPK, CKND1, VITOR in the last 72 hours. No lab exists for component: CKRMB, TROIP   Liver Enzymes No results for input(s): TP, ALB, TBIL, AP in the last 72 hours.     No lab exists for component: SGOT, GPT, DBIL   Thyroid Studies Recent Labs     07/20/21  0903   TSH 1.44          All Micro Results     Procedure Component Value Units Date/Time    CULTURE, URINE [[id number]] Collected: 07/20/21 1110    Order Status: Completed Specimen: Urine from Clean catch Updated: 07/20/21 6382                Medications at discharge  including reasons for change and indications for new ones:   Discharge Medication List as of 7/21/2021 11:37 AM      START taking these medications    Details   acetaminophen (TYLENOL) 500 mg tablet Take 2 Tablets by mouth every eight (8) hours as needed for Pain for up to 30 days. Indications: pain associated with arthritis, headache, Normal, Disp-90 Tablet, R-0         CONTINUE these medications which have CHANGED    Details   propranoloL (INDERAL) 20 mg tablet Take 1 Tablet by mouth three (3) times daily. Indications: migraine prevention, Normal, Disp-90 Tablet, R-3      furosemide (LASIX) 20 mg tablet Take 1 Tablet by mouth daily. Indications: high blood pressure, Normal, Disp-30 Tablet, R-0         CONTINUE these medications which have NOT CHANGED    Details   aspirin delayed-release 81 mg tablet Take 81 mg by mouth daily. , Historical Med      desloratadine (CLARINEX) 5 mg tablet Historical Med      fluticasone propionate (FLONASE) 50 mcg/actuation nasal spray Historical Med      omeprazole (PRILOSEC) 40 mg capsule Historical Med      tiZANidine (ZANAFLEX) 2 mg tablet Historical Med      gabapentin (NEURONTIN) 300 mg capsule Take 300 mg by mouth nightly., Historical Med      atorvastatin (LIPITOR) 80 mg tablet Take 1 Tab by mouth nightly. , Print, Disp-30 Tab, R-0      budesonide-formoterol (SYMBICORT) 160-4.5 mcg/actuation HFA inhaler Take 2 Puffs by inhalation two (2) times a day., Historical Med      naloxone (NARCAN) 4 mg/actuation nasal spray Use 1 spray intranasally into 1 nostril. Use a new Narcan nasal spray for subsequent doses and administer into alternating nostrils. May repeat every 2 to 3 minutes as needed. , Print, Disp-1 Each, R-0      albuterol (PROVENTIL HFA, VENTOLIN HFA, PROAIR HFA) 90 mcg/actuation inhaler Take 1 Puff by inhalation every four (4) hours as needed for Wheezing., Print, Disp-1 Inhaler, R-0      FERROUS SULFATE PO Take 325 mg by mouth., Historical Med      potassium chloride SR (K-TAB) 20 mEq tablet Take 20 mEq by mouth daily. , Historical Med      levothyroxine (SYNTHROID) 50 mcg tablet TAKE 1 TABLET BY MOUTH EVERY DAY, Historical Med, R-3      cholecalciferol (VITAMIN D3) 1,000 unit cap Take 2,000 Units by mouth daily. , Historical Med      DULoxetine (CYMBALTA) 60 mg capsule Take 60 mg by mouth nightly., Historical Med         STOP taking these medications       meloxicam (MOBIC) 15 mg tablet Comments:   Reason for Stopping:                       Pending laboratory work and tests: urine culture     Activity: Activity as tolerated    Diet: Cardiac Diet and Low fat, Low cholesterol    Wound Care: None needed      Time spent >30 minutes  Carmella Hylton MD  7/21/2021  7:06 PM

## 2021-07-21 NOTE — PROGRESS NOTES
Neurology Progress Note    Patient ID:  Lina Horton  [id number]  64 y.o.  1965    Subjective:      Ms Manson Kawasaki is a 70-year-old woman, seen today as follow-up for chief complaints of migraine headaches and hemiplegic migraine. She had 1 event in 2018 and had an untoward event with this admission. She is doing very well now back to baseline. This morning denies any headache. She is on propranolol 20 mg 3 times a day which can be increased gradually up to 80 mg 3 times a day as tolerated for migraine control. She should continue with aspirin. Current Facility-Administered Medications   Medication Dose Route Frequency    budesonide (PULMICORT) 500 mcg/2 ml nebulizer suspension  500 mcg Nebulization BID RT    atorvastatin (LIPITOR) tablet 80 mg  80 mg Oral QHS    aspirin chewable tablet 81 mg  81 mg Oral DAILY    furosemide (LASIX) tablet 20 mg  20 mg Oral DAILY    levothyroxine (SYNTHROID) tablet 50 mcg  50 mcg Oral 6am    potassium chloride SR (KLOR-CON 10) tablet 20 mEq  20 mEq Oral DAILY WITH BREAKFAST    propranoloL (INDERAL) tablet 20 mg  20 mg Oral TID    acetaminophen (TYLENOL) tablet 1,000 mg  1,000 mg Oral Q8H PRN          Objective: Active hospital medications were reviewed    Lab results and neuroradiology studies from the last 24 hours were reviewed. Prior to Admission medications    Medication Sig Start Date End Date Taking? Authorizing Provider   propranoloL (INDERAL) 20 mg tablet Take 1 Tablet by mouth three (3) times daily. Indications: migraine prevention 7/21/21  Yes Kenji Funez MD   acetaminophen (TYLENOL) 500 mg tablet Take 2 Tablets by mouth every eight (8) hours as needed for Pain for up to 30 days. Indications: pain associated with arthritis, headache 7/21/21 8/20/21 Yes Kenji Funez MD   furosemide (LASIX) 20 mg tablet Take 1 Tablet by mouth daily.  Indications: high blood pressure 7/21/21  Yes Kenji Funez MD   aspirin delayed-release 81 mg tablet Take 81 mg by mouth daily. Yes Alfred, MD Eddie   omeprazole (PRILOSEC) 40 mg capsule  5/19/21  Yes Provider, Historical   gabapentin (NEURONTIN) 300 mg capsule Take 300 mg by mouth nightly. Yes Provider, Historical   atorvastatin (LIPITOR) 80 mg tablet Take 1 Tab by mouth nightly. 7/16/18  Yes Beatris Vora MD   budesonide-formoterol [facility]) 160-4.5 mcg/actuation HFA inhaler Take 2 Puffs by inhalation two (2) times a day. Yes Provider, Historical   albuterol (PROVENTIL HFA, VENTOLIN HFA, PROAIR HFA) 90 mcg/actuation inhaler Take 1 Puff by inhalation every four (4) hours as needed for Wheezing. 7/2/17  Yes Eun Walters MD   potassium chloride SR (K-TAB) 20 mEq tablet Take 20 mEq by mouth daily. 10/23/16  Yes Dennys Albarran NP   cholecalciferol (VITAMIN D3) 1,000 unit cap Take 2,000 Units by mouth daily. Yes Provider, Historical   desloratadine (CLARINEX) 5 mg tablet  5/14/21   Provider, Historical   fluticasone propionate (FLONASE) 50 mcg/actuation nasal spray  5/14/21   Provider, Historical   meloxicam (MOBIC) 15 mg tablet TAKE 1 TABLET BY MOUTH DAILY AFTER MEAL 4/24/21   Provider, Historical   tiZANidine (ZANAFLEX) 2 mg tablet  5/24/21   Provider, Historical   naloxone (NARCAN) 4 mg/actuation nasal spray Use 1 spray intranasally into 1 nostril. Use a new Narcan nasal spray for subsequent doses and administer into alternating nostrils. May repeat every 2 to 3 minutes as needed. 9/18/17   Art Lopez MD   FERROUS SULFATE PO Take 325 mg by mouth. Provider, Historical   levothyroxine (SYNTHROID) 50 mcg tablet TAKE 1 TABLET BY MOUTH EVERY DAY 11/28/16   Provider, Historical   DULoxetine (CYMBALTA) 60 mg capsule Take 60 mg by mouth nightly.     Provider, Historical     Patient Vitals for the past 8 hrs:   BP Temp Pulse Resp SpO2   07/21/21 0800 (!) 159/95 98.2 °F (36.8 °C) 68 17 98 %   07/21/21 0400 124/82 98.1 °F (36.7 °C) 65 18 94 %   [id number] 1901 - 07/21 0700  In: 1131.3 [I.V.:1131.3]  Out: - 07/19 1901 - 07/21 0700  In: 1131.3 [I.V.:1131.3]  Out: - RESULTRCNT(24h)Active Problems:    Acute ischemic stroke ([facility] [address] (9/02/1104)      Complicated migraine (7/36/8648)        Additional comments:I reviewed the patient's new clinical lab test results. General Exam  No acute distress, mucous membranes normal color and hydration status        Neurologic Exam    Mental status:  Alert, oriented to person, place, time and circumstance  Language: normal fluency and comprehension  No visual spatial neglect or overt apraxia  Cranial nerves: PERRL, Extraocular movements intact and full, face symmetric to movement, Tongue midline with normal strength, palat symmetric    Motor: strength 5/5 throughout    Gait: Normal native gait      Assessment:     Chelsea Langford is a 64 y.o. woman, evaluated today as follow-up for migraine headaches and hemiplegic migraines clinically. She is back to her baseline. No headaches. Plan:     -Okay to be discharged home  -Follow-up in neurology clinic  -Continue propranolol 20 mg 3 times a day, this can be increased for headache control to 80 mg 3 times a day if tolerated  -Continue aspirin 81 mg  No other recommendations.       Signed:  Vinh Caballero MD  Adult Neurologist  7/21/2021  11:56 AM

## 2021-07-21 NOTE — PROGRESS NOTES
Problem: Falls - Risk of  Goal: *Absence of Falls  Description: Document Chip Armor Fall Risk and appropriate interventions in the flowsheet.   Outcome: Progressing Towards Goal  Note: Fall Risk Interventions:  Mobility Interventions: Communicate number of staff needed for ambulation/transfer         Medication Interventions: Evaluate medications/consider consulting pharmacy, Patient to call before getting OOB    Elimination Interventions: Call light in reach

## 2021-07-21 NOTE — DISCHARGE INSTRUCTIONS
Patient armband removed and shredded  MyChart Activation    Thank you for requesting access to [employer]. Please follow the instructions below to securely access and download your online medical record. [employer] allows you to send messages to your doctor, view your test results, renew your prescriptions, schedule appointments, and more. How Do I Sign Up? 1. In your internet browser, go to [email or web address]  2. Click on the First Time User? Click Here link in the Sign In box. You will be redirect to the New Member Sign Up page. 3. Enter your [employer] Access Code exactly as it appears below. You will not need to use this code after youve completed the sign-up process. If you do not sign up before the expiration date, you must request a new code. [employer] Access Code: Activation code not generated  Current [facility] Status: Active (This is the date your [employer] access code will )    4. Enter the last four digits of your Social Security Number (xxxx) and Date of Birth (mm/dd/yyyy) as indicated and click Submit. You will be taken to the next sign-up page. 5. Create a [employer] ID. This will be your [employer] login ID and cannot be changed, so think of one that is secure and easy to remember. 6. Create a [employer] password. You can change your password at any time. 7. Enter your Password Reset Question and Answer. This can be used at a later time if you forget your password. 8. Enter your e-mail address. You will receive e-mail notification when new information is available in [address] 9. Click Sign Up. You can now view and download portions of your medical record. 10. Click the Download Summary menu link to download a portable copy of your medical information. Additional Information    If you have questions, please visit the Frequently Asked Questions section of the [employer] website at [email or web address] Remember, [employer] is NOT to be used for urgent needs.  For medical emergencies, dial 911. Discharge medications reviewed with patient and appropriate educational materials and side effects teaching were provided. DISCHARGE SUMMARY from Nurse    PATIENT INSTRUCTIONS:    After general anesthesia or intravenous sedation, for 24 hours or while taking prescription Narcotics:  · Limit your activities  · Do not drive and operate hazardous machinery  · Do not make important personal or business decisions  · Do  not drink alcoholic beverages  · If you have not urinated within 8 hours after discharge, please contact your surgeon on call. Report the following to your surgeon:  · Excessive pain, swelling, redness or odor of or around the surgical area  · Temperature over 100.5  · Nausea and vomiting lasting longer than 4 hours or if unable to take medications  · Any signs of decreased circulation or nerve impairment to extremity: change in color, persistent  numbness, tingling, coldness or increase pain  · Any questions    What to do at Home:  Recommended activity: Activity as tolerated,     If you experience any of the following symptoms increase in weakness in extremities, slurred speech, and vision difficulty,, please seek help. *  Please give a list of your current medications to your Primary Care Provider. *  Please update this list whenever your medications are discontinued, doses are      changed, or new medications (including over-the-counter products) are added. *  Please carry medication information at all times in case of emergency situations. These are general instructions for a healthy lifestyle:    No smoking/ No tobacco products/ Avoid exposure to second hand smoke  Surgeon General's Warning:  Quitting smoking now greatly reduces serious risk to your health.     Obesity, smoking, and sedentary lifestyle greatly increases your risk for illness    A healthy diet, regular physical exercise & weight monitoring are important for maintaining a healthy lifestyle    You may be retaining fluid if you have a history of heart failure or if you experience any of the following symptoms:  Weight gain of 3 pounds or more overnight or 5 pounds in a week, increased swelling in our hands or feet or shortness of breath while lying flat in bed. Please call your doctor as soon as you notice any of these symptoms; do not wait until your next office visit. The discharge information has been reviewed with the patient. The patient verbalized understanding. Discharge medications reviewed with the patient and appropriate educational materials and side effects teaching were provided. ___________________________________________________________________________________________________________________________________  Discharge Instructions    Patient: Vandana Garcia MRN: [id number]  CSN: [id number]    YOB: 1965  Age: 64 y.o. Sex: female    DOA: 7/20/2021 LOS:  LOS: 1 day   Discharge Date:      ACUTE DIAGNOSES:  1.  Ruled out Acute ischemic stroke   2. Complicated migraine with headache and right-sided weakness, improved   3. Negative cystitis   4. Hypertension             DISCHARGE MEDICATIONS:       · It is important that you take the medication exactly as they are prescribed. · Keep your medication in the bottles provided by the pharmacist and keep a list of the medication names, dosages, and times to be taken in your wallet. · Do not take other medications without consulting your doctor. DIET:  Cardiac Diet and Low fat, Low cholesterol    ACTIVITY: Activity as tolerated, fall precaution     ADDITIONAL INFORMATION: If you experience any of the following symptoms then please call your primary care physician or return to the emergency room if you cannot get hold of your doctor: Fever, chills, nausea, vomiting, diarrhea, change in mentation, falling, bleeding, shortness of breath.     FOLLOW UP CARE:  Dr. Caio Juarez MD  you are to call and set up an appointment to see them in 2 weeks. Follow-up with [facility] in 4 weeks       Information obtained by :  I understand that if any problems occur once I am at home I am to contact my physician. I understand and acknowledge receipt of the instructions indicated above.                                                                                                                                            Physician's or R.N.'s Signature                                                                  Date/Time                                                                                                                                              Patient or Representative Signature                                                          Date/Time    Velma Mae MD  7/21/2021  11:02 AM

## 2021-07-29 ENCOUNTER — OFFICE VISIT (OUTPATIENT)
Dept: NEUROLOGY | Age: 56
End: 2021-07-29
Payer: MEDICARE

## 2021-07-29 VITALS
RESPIRATION RATE: 16 BRPM | DIASTOLIC BLOOD PRESSURE: 80 MMHG | HEART RATE: 73 BPM | BODY MASS INDEX: 37.96 KG/M2 | WEIGHT: 235.2 LBS | SYSTOLIC BLOOD PRESSURE: 112 MMHG | OXYGEN SATURATION: 94 %

## 2021-07-29 DIAGNOSIS — G43.401 HEMIPLEGIC MIGRAINE WITH STATUS MIGRAINOSUS, NOT INTRACTABLE: Primary | ICD-10-CM

## 2021-07-29 DIAGNOSIS — Z86.69 HISTORY OF SLEEP APNEA: ICD-10-CM

## 2021-07-29 PROCEDURE — G9899 SCRN MAM PERF RSLTS DOC: HCPCS | Performed by: NURSE PRACTITIONER

## 2021-07-29 PROCEDURE — G8417 CALC BMI ABV UP PARAM F/U: HCPCS | Performed by: NURSE PRACTITIONER

## 2021-07-29 PROCEDURE — 3017F COLORECTAL CA SCREEN DOC REV: CPT | Performed by: NURSE PRACTITIONER

## 2021-07-29 PROCEDURE — G8427 DOCREV CUR MEDS BY ELIG CLIN: HCPCS | Performed by: NURSE PRACTITIONER

## 2021-07-29 PROCEDURE — 99214 OFFICE O/P EST MOD 30 MIN: CPT | Performed by: NURSE PRACTITIONER

## 2021-07-29 PROCEDURE — G8432 DEP SCR NOT DOC, RNG: HCPCS | Performed by: NURSE PRACTITIONER

## 2021-07-29 PROCEDURE — 1111F DSCHRG MED/CURRENT MED MERGE: CPT | Performed by: NURSE PRACTITIONER

## 2021-07-29 RX ORDER — PROPRANOLOL HYDROCHLORIDE 120 MG/1
120 CAPSULE, EXTENDED RELEASE ORAL DAILY
Qty: 30 CAPSULE | Refills: 5 | Status: SHIPPED | OUTPATIENT
Start: 2021-07-29 | End: 2022-01-25

## 2021-07-29 NOTE — PROGRESS NOTES
Zehra Stringer is a 64 y.o. female who is in the office as a follow up. 1. Have you been to the ER, urgent care clinic since your last visit? Hospitalized since your last visit? No    2. Have you seen or consulted any other health care providers outside of the [address] [place] [address] since your last visit? Include any pap smears or colon screening.  No

## 2021-07-29 NOTE — PATIENT INSTRUCTIONS

## 2021-07-29 NOTE — PROGRESS NOTES
[facility]  [address] [facility], [address], [place], [address]  [address] [place], [address]  Office:  [phone]  Fax: [phone]  Chief Complaint   Patient presents with    Follow-up       HPI: Jasmina Gordon presents in hospital follow-up. She was seen in the hospital by Dr. Nasrin Samano on 7/20/2021. Her note was reviewed which indicates she presented with a 1 hour history of right-sided weakness at that time. In 2018 she had a similar presentation for which she received TPA and MRI of the brain was negative. It was noted that this was likely a stroke mimicker. She was seen by teleneurology. She came with right-sided weakness and stuttering. The consultation note indicates that her family indicated that she developed milder symptoms the night before going to bed. CTA of the head neck with no atherosclerotic disease. It was noted that she felt slightly better the next day and felt like she was developing a headache. At the 2018 presentation A1c was 5.7 at that time and LDL was 84.8. For migraine prophylaxis she has been on Inderal but she did not take her medication that morning. Her presentation at this recent hospitalization was consistent with a migraine so it was recommended to treat with magnesium sulfate 2 g IV and resume her Inderal and monitor her for 12 to 24 hours. She was given Tylenol or ibuprofen as needed. It was noted that the next day she was doing very well and was back at baseline. She was on propanolol 20 mg 3 times daily and it was noted that this can be increased gradually as tolerated for migraine control. She was to continue aspirin. She presents today in follow-up. She has been waking every day with a headache. That started 2 days after she got out of the hospital.  It lasts till after she takes her medication, last about an hour and 1/2 to 2 hours. She is taking propanolol 40 mg 3 times daily.   She is also taking two 500 mg Tylenol. She does have a history of asthma since the [de-identified], which got worse when she was pregnant with her daughter who is now 35. She is on Symbicort and she has to use albuterol as needed usually when the seasons change or when she gets sick. When she gets sick it is hard to get herself back together breathing wise. She has not been hospitalized for it but has been to the ER for breathing treatments, about twice a year or so since the diagnosis. She endorses that her asthma is not worse on propanolol. She was on Topamax from December 2016 until 2018 and then it was changed to propanolol because her insurance would not cover the Topamax. She has Cymbalta 60 mg on her list but she is not sure if she is taking it, she does not believe she is. She is taking gabapentin 300 mg nightly. She had her grandkids during the week but she is sleepy on gabapentin so she only takes it at night. She is not watching them any longer. It was prescribed by Dr. Aguila Russell for nerve pain. She reports not getting good sleep at night. She goes to bed today, turns off the TV at 10, can still be up by 3 in the morning. She endorses snoring and has a CPAP but when she had changes in insurance she could not go to her sleep specialist anymore. When she puts on the CPAP it feels like it is smothering her and she feels as though she needs changes with it. She has not been wearing it for the past few months. She denies recurrence of the symptoms which brought her to the hospital except only reporting that her right leg feels like it wants to buckle at times when she is walking. The 2018 event started with the same weakness. It improved at that time and it feels like it is back now. Also still with some weakness in the right hand. She is taking aspirin 81 mg daily. Denies smoking, alcohol, or drug use. The migraines start to the posterior head was on the right and radiate frontal.  They are throbbing.   Sometimes there is associated nausea and vomiting. There is associated sensitivity to lights and sounds. Past Medical History:   Diagnosis Date    Asthma     Back pain with radiation     Cardiac echocardiogram, [facility] study 12/29/2016    EF 55-60%. No WMA. Right to left atrial septal shunt suggests PFO. Similar to study of 10/18/16.  Cardiovascular LE venous duplex 10/18/2016    No DVT bilaterally.  Contact dermatitis and other eczema, due to unspecified cause     CTS (carpal tunnel syndrome)     HSV-2 (herpes simplex virus 2) infection     Hypercholesterolemia     Hypothyroidism     Ill-defined condition     Vertigo    L4-L5 disc bulge     Leg swelling     Migraines     Positive PPD, treated     S/P lumbar fusion 1/13/2016    1. L4-5 bilateral hemilaminotomy, medial facetectomy, foraminotomy, diskectomy L4-5. 2. Transforaminal lumbar interbody fusion with PEEK cage and demineralized bone graft L4-L5 posterolateral fusion. 3. Segmental spinal instrumentation, DePuy Expedium type L4-L5. 01/13/2016 By Dr. Naomi Kim     EMG '17 , mild sciatic EMG findings    Stroke [facility])     12/2016    Vertigo        Past Surgical History:   Procedure Laterality Date    HX GYN      partial hysterectomy due to abnormal pap    HX LUMBAR LAMINECTOMY  01-13-16    L4/5       Current Outpatient Medications   Medication Sig Dispense Refill    propranolol LA (INDERAL LA) 120 mg SR capsule Take 1 Capsule by mouth daily. 30 Capsule 5    acetaminophen (TYLENOL) 500 mg tablet Take 2 Tablets by mouth every eight (8) hours as needed for Pain for up to 30 days. Indications: pain associated with arthritis, headache 90 Tablet 0    furosemide (LASIX) 20 mg tablet Take 1 Tablet by mouth daily. Indications: high blood pressure 30 Tablet 0    aspirin delayed-release 81 mg tablet Take 81 mg by mouth daily.       desloratadine (CLARINEX) 5 mg tablet       fluticasone propionate (FLONASE) 50 mcg/actuation nasal spray       omeprazole (PRILOSEC) 40 mg capsule       tiZANidine (ZANAFLEX) 2 mg tablet       gabapentin (NEURONTIN) 300 mg capsule Take 300 mg by mouth nightly.  atorvastatin (LIPITOR) 80 mg tablet Take 1 Tab by mouth nightly. 30 Tab 0    budesonide-formoterol (SYMBICORT) 160-4.5 mcg/actuation HFA inhaler Take 2 Puffs by inhalation two (2) times a day.  naloxone (NARCAN) 4 mg/actuation nasal spray Use 1 spray intranasally into 1 nostril. Use a new Narcan nasal spray for subsequent doses and administer into alternating nostrils. May repeat every 2 to 3 minutes as needed. 1 Each 0    albuterol (PROVENTIL HFA, VENTOLIN HFA, PROAIR HFA) 90 mcg/actuation inhaler Take 1 Puff by inhalation every four (4) hours as needed for Wheezing. 1 Inhaler 0    FERROUS SULFATE PO Take 325 mg by mouth.  potassium chloride SR (K-TAB) 20 mEq tablet Take 20 mEq by mouth daily.  levothyroxine (SYNTHROID) 50 mcg tablet TAKE 1 TABLET BY MOUTH EVERY DAY  3    cholecalciferol (VITAMIN D3) 1,000 unit cap Take 2,000 Units by mouth daily. Allergies   Allergen Reactions    Skelaxin [Metaxalone] Other (comments)     jittery    Tramadol Other (comments)     Halluctations       Social History     Tobacco Use    Smoking status: Never Smoker    Smokeless tobacco: Never Used   Substance Use Topics    Alcohol use: No    Drug use: No       Family History   Problem Relation Age of Onset    Diabetes Mother     Elevated Lipids Mother     Hypertension Mother     Cancer Father         pancreatic    Diabetes Sister     Hypertension Sister     Diabetes Brother     Diabetes Daughter     Hypertension Daughter        Review of Systems:  GENERAL: Denies fever or fatigue  CARDIAC: No CP or SOB  PULMONARY: No cough or SOB  MUSCULOSKELETAL: No new joint pain. +right hip pain. NEURO: SEE HPI    Physical Examination:  Visit Vitals  /80   Pulse 73   Resp 16   Wt 106.7 kg (235 lb 3.2 oz)   SpO2 94%   BMI 37.96 kg/m²       Alert, in NAD.  Heart is regular. Oriented x3. Speech is fluent. Speech clear. EOMs are full, PERRL, VFFTC, no nystagmus. No facial asymmetry. Tongue is midline. Strength and tone are normal except mild R  weakness. No drift of the bilateral upper extremities. Fine finger movements symmetrical. FNF intact bilaterally. DTRs +2, gait symmetric. Impression/Plan: This is a 41-year-old female who presents in follow-up. She was recently in the hospital seen in neurology consultation for migraine headaches and hemiplegic migraines. She was discharged home on her regimen of propanolol. She was on 20 mg 3 times daily which is now at 40 mg 3 times daily. It was noted that this can be increased for headache control to 80 mg 3 times daily if tolerated. She continues on aspirin 81 mg. We will plan to adjust to the long-acting form, Inderal  mg daily. Advised to call in a few weeks if she feels that we need to increase this for better headache control. Discussed sleep hygiene. Will refer to a sleep specialist and recommended going back on treatment for her KISHOR with CPAP. Instructed to watch for changes related to her asthma such as shortness of breath or wheezing while on propanolol. Elavil nightly may be another consideration for migraine prevention. She uses Tylenol or ibuprofen as needed for acute treatment. Advised against medication overuse. Follow up in about 4 to 6 weeks, call sooner if needed. Diagnoses and all orders for this visit:    1. Hemiplegic migraine with status migrainosus, not intractable    2. History of sleep apnea  -     REFERRAL TO NEUROLOGY    Other orders  -     propranolol LA (INDERAL LA) 120 mg SR capsule; Take 1 Capsule by mouth daily. Total time 35 minutes with 20 minutes spent in counseling. Signed By: Hilario Patton NP        PLEASE NOTE:   Portions of this document may have been produced using voice recognition software. Unrecognized errors in transcription may be present.

## 2021-08-18 ENCOUNTER — HOSPITAL ENCOUNTER (OUTPATIENT)
Dept: LAB | Age: 56
Discharge: HOME | End: 2021-08-18
Payer: MEDICARE

## 2021-08-18 LAB
ANION GAP SERPL CALC-SCNC: 4 MMOL/L (ref 3–18)
BUN SERPL-MCNC: 16 MG/DL (ref 7–18)
BUN/CREAT SERPL: 17 (ref 12–20)
CALCIUM SERPL-MCNC: 9.6 MG/DL (ref 8.5–10.1)
CHLORIDE SERPL-SCNC: 105 MMOL/L (ref 100–111)
CO2 SERPL-SCNC: 31 MMOL/L (ref 21–32)
CREAT SERPL-MCNC: 0.92 MG/DL (ref 0.6–1.3)
FERRITIN SERPL-MCNC: 257 NG/ML (ref 8–388)
FOLATE SERPL-MCNC: >20 NG/ML (ref 3.1–17.5)
GLUCOSE SERPL-MCNC: 94 MG/DL (ref 74–99)
POTASSIUM SERPL-SCNC: 4.4 MMOL/L (ref 3.5–5.5)
SODIUM SERPL-SCNC: 140 MMOL/L (ref 136–145)
VIT B12 SERPL-MCNC: 1611 PG/ML (ref 211–911)

## 2021-08-18 PROCEDURE — 82728 ASSAY OF FERRITIN: CPT

## 2021-08-18 PROCEDURE — 82607 VITAMIN B-12: CPT

## 2021-08-18 PROCEDURE — 36415 COLL VENOUS BLD VENIPUNCTURE: CPT

## 2021-08-18 PROCEDURE — 80048 BASIC METABOLIC PNL TOTAL CA: CPT

## 2021-09-03 ENCOUNTER — TELEPHONE (OUTPATIENT)
Dept: NEUROLOGY | Age: 56
End: 2021-09-03

## 2021-09-21 ENCOUNTER — OFFICE VISIT (OUTPATIENT)
Dept: NEUROLOGY | Age: 56
End: 2021-09-21
Payer: MEDICARE

## 2021-09-21 VITALS
WEIGHT: 239.4 LBS | SYSTOLIC BLOOD PRESSURE: 118 MMHG | OXYGEN SATURATION: 98 % | BODY MASS INDEX: 38.47 KG/M2 | HEART RATE: 64 BPM | HEIGHT: 66 IN | RESPIRATION RATE: 20 BRPM | DIASTOLIC BLOOD PRESSURE: 72 MMHG

## 2021-09-21 DIAGNOSIS — G43.401 HEMIPLEGIC MIGRAINE WITH STATUS MIGRAINOSUS, NOT INTRACTABLE: Primary | ICD-10-CM

## 2021-09-21 DIAGNOSIS — Z86.69 HISTORY OF SLEEP APNEA: ICD-10-CM

## 2021-09-21 PROCEDURE — G8432 DEP SCR NOT DOC, RNG: HCPCS | Performed by: NURSE PRACTITIONER

## 2021-09-21 PROCEDURE — G8417 CALC BMI ABV UP PARAM F/U: HCPCS | Performed by: NURSE PRACTITIONER

## 2021-09-21 PROCEDURE — G9899 SCRN MAM PERF RSLTS DOC: HCPCS | Performed by: NURSE PRACTITIONER

## 2021-09-21 PROCEDURE — G8427 DOCREV CUR MEDS BY ELIG CLIN: HCPCS | Performed by: NURSE PRACTITIONER

## 2021-09-21 PROCEDURE — 99213 OFFICE O/P EST LOW 20 MIN: CPT | Performed by: NURSE PRACTITIONER

## 2021-09-21 PROCEDURE — 3017F COLORECTAL CA SCREEN DOC REV: CPT | Performed by: NURSE PRACTITIONER

## 2021-09-21 RX ORDER — AMITRIPTYLINE HYDROCHLORIDE 10 MG/1
10 TABLET, FILM COATED ORAL
Qty: 60 TABLET | Refills: 3 | Status: SHIPPED | OUTPATIENT
Start: 2021-09-21 | End: 2022-01-25 | Stop reason: SDUPTHER

## 2021-09-21 NOTE — PATIENT INSTRUCTIONS
Amitriptyline (By mouth)   Amitriptyline (am-i-TRIP-ti-matt)  Treats depression. This medicine is a TCA. Brand Name(s): Elavil   There may be other brand names for this medicine. When This Medicine Should Not Be Used: This medicine is not right for everyone. Do not use if you had an allergic reaction to amitriptyline. How to Use This Medicine:   Tablet  · Take your medicine as directed. Your dose may need to be changed several times to find what works best for you. · This medicine should come with a Medication Guide. Ask your pharmacist for a copy if you do not have one. · Store the medicine in a closed container at room temperature, away from heat, moisture, and direct light. · Missed dose: Take a dose as soon as you remember. If it is almost time for your next dose, wait until then and take a regular dose. Do not take extra medicine to make up for a missed dose. Drugs and Foods to Avoid:   Ask your doctor or pharmacist before using any other medicine, including over-the-counter medicines, vitamins, and herbal products. · Do not use this medicine with cisapride. Do not use this medicine and an MAO inhibitor (MAOI) within 14 days of each other. · Some medicines and foods can affect how amitriptyline works. Tell your doctor if you are using cimetidine, disulfiram, or guanethidine. Tell your doctor if you are using other medicine for depression (such as fluoxetine, paroxetine, sertraline), a phenothiazine medicine (such as promethazine, chlorpromazine), medicine for heart rhythm problems (such as flecainide, propafenone, quinidine), or thyroid medicine. · Tell your doctor if you use anything else that makes you sleepy. Some examples are allergy medicine, narcotic pain medicine, and alcohol.   Warnings While Using This Medicine:   · Tell your doctor if you are pregnant or breastfeeding, or if you have liver disease, heart disease, diabetes, narrow-angle glaucoma, a seizure disorder, a thyroid problem, or trouble urinating. · For some children, teenagers, and young adults, this medicine may increase mental or emotional problems. This may lead to thoughts of suicide and violence. Talk with your doctor right away if you have any thoughts or behavior changes that concern you. Tell your doctor if you or anyone in your family has a history of bipolar disorder or suicide attempts. · This medicine may cause the following problems:   ¨ Heart rhythm problems  ¨ High or low blood sugar levels  · This medicine may make you dizzy or drowsy. Do not drive or do anything else that could be dangerous until you know how this medicine affects you. · Do not stop using this medicine suddenly. Your doctor will need to slowly decrease your dose before you stop it completely. · Keep all medicine out of the reach of children. Never share your medicine with anyone. Possible Side Effects While Using This Medicine:   Call your doctor right away if you notice any of these side effects:  · Allergic reaction: Itching or hives, swelling in your face or hands, swelling or tingling in your mouth or throat, chest tightness, trouble breathing  · Anxiety, restlessness, seeing or hearing things that are not there  · Chest pain, trouble breathing  · Fast, pounding, or uneven heartbeat  · Feeling more excited or energetic than usual, racing thoughts, trouble sleeping  · Lightheadedness, dizziness, or fainting  · Seizures  · Thoughts of hurting yourself or others, unusual behavior  · Unusual bleeding or bruising  If you notice these less serious side effects, talk with your doctor:   · Blurred vision, dry mouth, fever  · Change in how much or how often you urinate  · Constipation, diarrhea, nausea, vomiting  · Drowsiness, sleepiness  · Sexual problems  If you notice other side effects that you think are caused by this medicine, tell your doctor. Call your doctor for medical advice about side effects.  You may report side effects to FDA at 1-800-FDA-1088  © 2017 [facility] Information is for End User's use only and may not be sold, redistributed or otherwise used for commercial purposes. The above information is an  only. It is not intended as medical advice for individual conditions or treatments. Talk to your doctor, nurse or pharmacist before following any medical regimen to see if it is safe and effective for you.

## 2021-09-21 NOTE — PROGRESS NOTES
[facility]  [address], [address], [place], [address]  [address], [address]  Office:  [phone]  Fax: [phone]  Chief Complaint   Patient presents with    Migraine       HPI: Lina Horton presents in hospital follow-up. She was seen in the hospital by Dr. Caridad Motta on 7/20/2021. Her note was reviewed which indicates she presented with a 1 hour history of right-sided weakness at that time. In 2018 she had a similar presentation for which she received TPA and MRI of the brain was negative. It was noted that this was likely a stroke mimicker. She was seen by teleneurology. She came with right-sided weakness and stuttering. The consultation note indicates that her family indicated that she developed milder symptoms the night before going to bed. CTA of the head neck with no atherosclerotic disease. It was noted that she felt slightly better the next day and felt like she was developing a headache. At the 2018 presentation A1c was 5.7 at that time and LDL was 84.8. For migraine prophylaxis she has been on Inderal but she did not take her medication that morning. Her presentation at this recent hospitalization was consistent with a migraine so it was recommended to treat with magnesium sulfate 2 g IV and resume her Inderal and monitor her for 12 to 24 hours. She was given Tylenol or ibuprofen as needed. It was noted that the next day she was doing very well and was back at baseline. She was on propanolol 20 mg 3 times daily and it was noted that this can be increased gradually as tolerated for migraine control. She was to continue aspirin. She was last seen here on 7/29/2021. She reported waking every day with a headache. That started 2 days after she got out of the hospital.  It was lasting until after she takes her medication, about an hour and 1/2 to 2 hours. She was taking propanolol 40 mg three times daily.   She was also taking two 500 mg Tylenol. She has a diagnosis of asthma which she reported was not worse with the addition of propanolol. She was on Topamax from December 2016 until 2018 and then it was changed to propanolol because her insurance would not cover the Topamax. She has Cymbalta 60 mg on her list but she is not sure if she is taking it, she does not believe she is. She is taking gabapentin 300 mg nightly. She had her grandkids during the week but she is sleepy on gabapentin so she only takes it at night. She is not watching them any longer. It was prescribed by Dr. Ben Coley for nerve pain. She reported not getting good sleep at night. She goes to bed, watches TV till ten, can still be up at 3 in the morning. She endorsed snoring and has a CPAP but could not go to her sleep specialist anymore when she had a change in her insurance. She could not tolerate the CPAP due to feeling smothered. She had not been wearing it for the past few months. She denied recurrence of symptoms which brought her to the hospital but reports that her right leg feels like it wants to buckle when she walks. The 2018 event started with the same weakness. She is taking aspirin 81 mg. The migraines start to the posterior head on the right and radiate frontal.  They are throbbing. Sometimes there is associated nausea and vomiting. There is associated sensitivity to lights and sounds. The Inderal was changed to the long-acting form, Inderal  mg daily at that time. She was to call in a few weeks if needed to determine if we need to increase this for better headache control. She was referred to a sleep specialist to reconsider resuming CPAP due to history of KISHOR. Instructed to watch for changes related to her asthma. She uses Tylenol or ibuprofen as needed for acute treatment. Advised against medication overuse.     We spoke via phone on 9/3/2021 after she called the office reporting that her migraines have gotten worse and the medication was not helping. The patient stated that she has been okay for the most part, but wakes up most mornings with a headache. It is not real bad but feels like she might get a migraine. She usually feels better when getting up and taking her medications and eating. Some days she gets a headache in the afternoon. She is taking the Inderal  mg daily in the morning before breakfast.  Endorses poor sleep at night, cannot get to sleep until 3 AM.  She is going to have the sleep study on the 27th. We have the option of increasing the Inderal  mg daily but she would like to hold off on adjustments at that time. She presents today in follow-up. She reports doing ok. She is still not sleeping well. She has the sleep study on Monday. She has been taking melatonin at night to sleep, has the 3 mg tabs and 5 mg tabs, may take up to 15 mg. Reports she doesn't get any sleep, up and down all night. Doesn't nap during the day. She has her granddaughter during the day. BP is 118/72 and pulse of 64 at the visit. She is taking Inderal  mg daily. Can't say it's not helping her. If she sleeps, she's fine, but the nights she doesn't sleep she wakes up with a migraine. She is taking aspirin 81 mg.  Gabapentin is 300 mg BID, it used to help her sleep at night but not anymore. She takes it for nerve pain in the right leg. Not taking tizanidine, had one prescription for it and didn't get it refilled. Reports the only 2 preventatives she has tried for migraines are Inderal and Topamax. Topamax was up at 150 mg BID at one point. Endorses propanolol never really did a lot. No more episodes of right-sided weakness; she reports right side (arm and leg) has always been weak since the first episode of migraine in 2018- not a lot of weakness but she can tell it's there because her leg will get tingly.       Past Medical History:   Diagnosis Date    Asthma     Back pain with radiation     Cardiac echocardiogram, [facility] study 12/29/2016    EF 55-60%. No WMA. Right to left atrial septal shunt suggests PFO. Similar to study of 10/18/16.  Cardiovascular LE venous duplex 10/18/2016    No DVT bilaterally.  Contact dermatitis and other eczema, due to unspecified cause     CTS (carpal tunnel syndrome)     HSV-2 (herpes simplex virus 2) infection     Hypercholesterolemia     Hypothyroidism     Ill-defined condition     Vertigo    L4-L5 disc bulge     Leg swelling     Migraines     Positive PPD, treated     S/P lumbar fusion 1/13/2016    1. L4-5 bilateral hemilaminotomy, medial facetectomy, foraminotomy, diskectomy L4-5. 2. Transforaminal lumbar interbody fusion with PEEK cage and demineralized bone graft L4-L5 posterolateral fusion. 3. Segmental spinal instrumentation, DePuy Expedium type L4-L5. 01/13/2016 By Dr. Carisa Love     EMG '17 , mild sciatic EMG findings    Stroke [facility])     12/2016    Vertigo        Past Surgical History:   Procedure Laterality Date    HX GYN      partial hysterectomy due to abnormal pap    HX LUMBAR LAMINECTOMY  01-13-16    L4/5       Current Outpatient Medications   Medication Sig Dispense Refill    amitriptyline (ELAVIL) 10 mg tablet Take 1 Tablet by mouth nightly. Can increase to 2 tabs nightly after 1 week if needed. 60 Tablet 3    propranolol LA (INDERAL LA) 120 mg SR capsule Take 1 Capsule by mouth daily. 30 Capsule 5    furosemide (LASIX) 20 mg tablet Take 1 Tablet by mouth daily. Indications: high blood pressure 30 Tablet 0    aspirin delayed-release 81 mg tablet Take 81 mg by mouth daily.  desloratadine (CLARINEX) 5 mg tablet       fluticasone propionate (FLONASE) 50 mcg/actuation nasal spray       omeprazole (PRILOSEC) 40 mg capsule       tiZANidine (ZANAFLEX) 2 mg tablet       gabapentin (NEURONTIN) 300 mg capsule Take 300 mg by mouth nightly.  atorvastatin (LIPITOR) 80 mg tablet Take 1 Tab by mouth nightly.  30 Tab 0    budesonide-formoterol (SYMBICORT) 160-4.5 mcg/actuation HFA inhaler Take 2 Puffs by inhalation two (2) times a day.  naloxone (NARCAN) 4 mg/actuation nasal spray Use 1 spray intranasally into 1 nostril. Use a new Narcan nasal spray for subsequent doses and administer into alternating nostrils. May repeat every 2 to 3 minutes as needed. 1 Each 0    albuterol (PROVENTIL HFA, VENTOLIN HFA, PROAIR HFA) 90 mcg/actuation inhaler Take 1 Puff by inhalation every four (4) hours as needed for Wheezing. 1 Inhaler 0    FERROUS SULFATE PO Take 325 mg by mouth.  potassium chloride SR (K-TAB) 20 mEq tablet Take 20 mEq by mouth daily.  levothyroxine (SYNTHROID) 50 mcg tablet TAKE 1 TABLET BY MOUTH EVERY DAY  3    cholecalciferol (VITAMIN D3) 1,000 unit cap Take 2,000 Units by mouth daily. Allergies   Allergen Reactions    Skelaxin [Metaxalone] Other (comments)     jittery    Tramadol Other (comments)     Halluctations       Social History     Tobacco Use    Smoking status: Never Smoker    Smokeless tobacco: Never Used   Substance Use Topics    Alcohol use: No    Drug use: No       Family History   Problem Relation Age of Onset    Diabetes Mother     Elevated Lipids Mother     Hypertension Mother     Cancer Father         pancreatic    Diabetes Sister     Hypertension Sister     Diabetes Brother     Diabetes Daughter     Hypertension Daughter        Review of Systems:  GENERAL: Denies fever or fatigue  CARDIAC: No CP or SOB  PULMONARY: No cough or SOB  MUSCULOSKELETAL: No new joint pain  NEURO: SEE HPI    Physical Examination:  Visit Vitals  /72 (BP 1 Location: Left upper arm, BP Patient Position: Sitting, BP Cuff Size: Large adult)   Pulse 64   Resp 20   Ht 5' 6\" (1.676 m)   Wt 108.6 kg (239 lb 6.4 oz)   SpO2 98%   BMI 38.64 kg/m²       Alert, in NAD. Heart is regular. Oriented x3. Speech is fluent. Speech clear. EOMs are full, PERRL, VFFTC, no nystagmus. No facial asymmetry.  Tongue is midline. Strength and tone are normal except mild R  weakness (reports R side chronically weaker). Fine finger movements symmetrical. FNF intact bilaterally. DTRs +2. Steady gait. Impression/Plan: This is a 44-year-old female who presents in follow-up for migraines. She was in the hospital in July 2021 for hemiplegic migraine. She was discharged home on a regimen of propanolol. She was on the dose of 40 mg 3 times daily. This  had been recently increased at that time from 20 mg 3 times daily to 40 mg 3 times daily so the current dose was continued but changed to the long-acting form, 120 mg daily and to monitor on this dose prior to further adjustments. She called recently due to this not helping much. She reports history of not sleeping well, it takes hours to get to sleep at night. She has a sleep study coming up on Monday. Will start a low-dose of Elavil for migraine prevention and to help with sleep. We will start at 10 mg nightly and can increase to 20 mg nightly if needed after 1 week. Recommended to discuss sleep with her sleep specialist and to ask about her melatonin dosing. We will see if she tolerates Elavil and will consider weaning the propanolol. Follow up in 6 weeks. Diagnoses and all orders for this visit:    1. Hemiplegic migraine with status migrainosus, not intractable    2. History of sleep apnea    Other orders  -     amitriptyline (ELAVIL) 10 mg tablet; Take 1 Tablet by mouth nightly. Can increase to 2 tabs nightly after 1 week if needed. Total time 25 minutes with 15 minutes spent in counseling. Signed By: Trevor Nunn NP        PLEASE NOTE:   Portions of this document may have been produced using voice recognition software. Unrecognized errors in transcription may be present.

## 2022-01-25 RX ORDER — PROPRANOLOL HYDROCHLORIDE 120 MG/1
CAPSULE, EXTENDED RELEASE ORAL
Qty: 90 CAPSULE | Refills: 0 | Status: SHIPPED | OUTPATIENT
Start: 2022-01-25 | End: 2022-01-25 | Stop reason: SDUPTHER

## 2022-01-25 RX ORDER — PROPRANOLOL HYDROCHLORIDE 120 MG/1
120 CAPSULE, EXTENDED RELEASE ORAL DAILY
Qty: 90 CAPSULE | Refills: 0 | Status: SHIPPED | OUTPATIENT
Start: 2022-01-25 | End: 2022-03-16

## 2022-01-25 RX ORDER — AMITRIPTYLINE HYDROCHLORIDE 10 MG/1
10 TABLET, FILM COATED ORAL
Qty: 60 TABLET | Refills: 3 | Status: SHIPPED | OUTPATIENT
Start: 2022-01-25 | End: 2022-04-11

## 2022-03-15 ENCOUNTER — TELEPHONE (OUTPATIENT)
Dept: NEUROLOGY | Age: 57
End: 2022-03-15

## 2022-03-15 NOTE — TELEPHONE ENCOUNTER
Patient called to see if she can have an alternative to the propranolol pt states it is too expensive for her

## 2022-03-16 RX ORDER — PROPRANOLOL HYDROCHLORIDE 40 MG/1
40 TABLET ORAL 3 TIMES DAILY
Qty: 90 TABLET | Refills: 3 | Status: SHIPPED | OUTPATIENT
Start: 2022-03-16 | End: 2022-04-21 | Stop reason: SDUPTHER

## 2022-03-16 NOTE — TELEPHONE ENCOUNTER
Spoke with pt let her know what the above message stated. Pt stated yes she would like the propranolol Immediate release 40mg 3 times a day. Pt would like to know do she still need to take amitriptyline?

## 2022-03-19 PROBLEM — G43.109 COMPLICATED MIGRAINE: Status: ACTIVE | Noted: 2021-07-21

## 2022-03-19 PROBLEM — E66.01 OBESITY, MORBID (HCC): Status: ACTIVE | Noted: 2018-08-27

## 2022-03-19 PROBLEM — Z98.890 HISTORY OF LUMBAR SURGERY: Status: ACTIVE | Noted: 2017-09-18

## 2022-03-19 PROBLEM — G89.29 CHRONIC MIDLINE LOW BACK PAIN WITH RIGHT-SIDED SCIATICA: Status: ACTIVE | Noted: 2017-09-18

## 2022-03-19 PROBLEM — Z79.899 ENCOUNTER FOR LONG-TERM (CURRENT) USE OF HIGH-RISK MEDICATION: Status: ACTIVE | Noted: 2017-09-18

## 2022-03-19 PROBLEM — I63.9 ACUTE ISCHEMIC STROKE (HCC): Status: ACTIVE | Noted: 2018-07-14

## 2022-03-19 PROBLEM — G57.00 SCIATIC NEUROPATHY: Status: ACTIVE | Noted: 2017-06-21

## 2022-03-19 PROBLEM — M54.41 CHRONIC MIDLINE LOW BACK PAIN WITH RIGHT-SIDED SCIATICA: Status: ACTIVE | Noted: 2017-09-18

## 2022-03-20 PROBLEM — M51.26 PROTRUSION OF LUMBAR INTERVERTEBRAL DISC: Status: ACTIVE | Noted: 2017-09-18

## 2022-03-20 PROBLEM — G89.4 CHRONIC PAIN SYNDROME: Status: ACTIVE | Noted: 2017-09-18

## 2022-04-21 ENCOUNTER — OFFICE VISIT (OUTPATIENT)
Dept: NEUROLOGY | Age: 57
End: 2022-04-21
Payer: MEDICARE

## 2022-04-21 VITALS
RESPIRATION RATE: 16 BRPM | SYSTOLIC BLOOD PRESSURE: 128 MMHG | BODY MASS INDEX: 39.74 KG/M2 | HEART RATE: 86 BPM | WEIGHT: 246.2 LBS | OXYGEN SATURATION: 97 % | DIASTOLIC BLOOD PRESSURE: 84 MMHG

## 2022-04-21 DIAGNOSIS — G43.401 HEMIPLEGIC MIGRAINE WITH STATUS MIGRAINOSUS, NOT INTRACTABLE: Primary | ICD-10-CM

## 2022-04-21 PROCEDURE — G8432 DEP SCR NOT DOC, RNG: HCPCS | Performed by: NURSE PRACTITIONER

## 2022-04-21 PROCEDURE — G8427 DOCREV CUR MEDS BY ELIG CLIN: HCPCS | Performed by: NURSE PRACTITIONER

## 2022-04-21 PROCEDURE — 99213 OFFICE O/P EST LOW 20 MIN: CPT | Performed by: NURSE PRACTITIONER

## 2022-04-21 PROCEDURE — 3017F COLORECTAL CA SCREEN DOC REV: CPT | Performed by: NURSE PRACTITIONER

## 2022-04-21 PROCEDURE — G9899 SCRN MAM PERF RSLTS DOC: HCPCS | Performed by: NURSE PRACTITIONER

## 2022-04-21 PROCEDURE — G8417 CALC BMI ABV UP PARAM F/U: HCPCS | Performed by: NURSE PRACTITIONER

## 2022-04-21 RX ORDER — PROPRANOLOL HYDROCHLORIDE 40 MG/1
40 TABLET ORAL 3 TIMES DAILY
Qty: 270 TABLET | Refills: 2 | Status: SHIPPED | OUTPATIENT
Start: 2022-04-21 | End: 2022-09-15 | Stop reason: SDUPTHER

## 2022-04-21 RX ORDER — PREDNISONE 10 MG/1
TABLET ORAL
COMMUNITY
Start: 2022-04-18 | End: 2022-07-23

## 2022-04-21 NOTE — PROGRESS NOTES
Nimesh Espinal is a 64 y.o. female who is in the office as follow up. 1. Have you been to the ER, urgent care clinic since your last visit? Hospitalized since your last visit? No    2. Have you seen or consulted any other health care providers outside of the [address] [place] [address] since your last visit? Include any pap smears or colon screening.  No

## 2022-04-21 NOTE — PROGRESS NOTES
[facility]  [address], [address], [place], [address]  [address] [place], [address]  Office:  [phone]  Fax: [phone]  Chief Complaint   Patient presents with    Follow-up    Migraine       HPI: Elba Trevino presents in hospital follow-up. Jack Mcgill was seen in the hospital by Dr. Hollis Addison note was reviewed which indicates she presented with a 1 hour history of right-sided weakness at that time.  In 2018 she had a similar presentation for which she received TPA and MRI of the brain was negative. Ivelisse Cook was noted that this was likely a stroke mimicker. Jack Mcgill was seen by teleneurology. Jack Mcgill came with right-sided weakness and stuttering.  The consultation note indicates that her family indicated that she developed milder symptoms the night before going to bed.  CTA of the head neck with no atherosclerotic disease.  It was noted that she felt slightly better the next day and felt like she was developing a headache.  At the 2018 presentation A1c was 5.7 at that time and LDL was 84. 8.  For migraine prophylaxis she has been on Inderal but she did not take her medication that morning.  Her presentation at this recent hospitalization was consistent with a migraine so it was recommended to treat with magnesium sulfate 2 g IV and resume her Inderal and monitor her for 12 to 24 hours.  She was given Tylenol or ibuprofen as needed.  It was noted that the next day she was doing very well and was back at baseline.  She was on propanolol 20 mg 3 times daily and it was noted that this can be increased gradually as tolerated for migraine control.  She was to continue aspirin. She was seen here on 7/29/2021. She reported waking every day with a headache. That started 2 days after she got out of the hospital.  It was lasting until after she takes her medication, about an hour and 1/2 to 2 hours. She was taking propanolol 40 mg three times daily.   She was also taking two 500 mg Tylenol. She has a diagnosis of asthma which she reported was not worse with the addition of propanolol. She was on Topamax from December 2016 until 2018 and then it was changed to propanolol because her insurance would not cover the Topamax.  She has Cymbalta 60 mg on her list but she is not sure if she is taking it, she does not believe she is. Xavi James is taking gabapentin 300 mg nightly.  She had her grandkids during the week but she is sleepy on gabapentin so she only takes it at night. She is not watching them any longer.  It was prescribed by Dr. Randolm Sacks for nerve pain. She reported not getting good sleep at night. She goes to bed, watches TV till ten, can still be up at 3 in the morning. She endorsed snoring and has a CPAP but could not go to her sleep specialist anymore when she had a change in her insurance. She could not tolerate the CPAP due to feeling smothered. She had not been wearing it for the past few months. She denied recurrence of symptoms which brought her to the hospital but reports that her right leg feels like it wants to buckle when she walks. The 2018 event started with the same weakness. She is taking aspirin 81 mg. The migraines start to the posterior head on the right and radiate frontal.  They are throbbing. Sometimes there is associated nausea and vomiting. There is associated sensitivity to lights and sounds. The Inderal was changed to the long-acting form, Inderal  mg daily at that time. She was to call in a few weeks if needed to determine if we need to increase this for better headache control. She was referred to a sleep specialist to reconsider resuming CPAP due to history of KISHOR. Instructed to watch for changes related to her asthma. She uses Tylenol or ibuprofen as needed for acute treatment. Advised against medication overuse.   We spoke via telephone encounter on 9/3/2021 because her migraines had gotten worse and the medication was not helping. We discussed considering increasing the Inderal.  This was kept the same at that time. She was going to have a sleep study. She was last seen here on 9/21/2021. She reported not sleeping well. She was taking Inderal  mg daily but felt it was not helping her. Reported if she did not sleep then she wakes with a migraine. Reports the only 2 preventatives she has tried for migraines are Inderal and Topamax. Topamax was up to 150 mg twice daily at 1 point. Endorsed that propranolol never really did a lot. Denied any more episodes of right-sided weakness. A low-dose Elavil was started. Recommended to discuss sleep with her sleep specialist.  Plan was to see if she tolerates Elavil and will consider weaning propranolol. There was a call on 3/15/2022 to see if there was an alternative to propranolol because it was too expensive. This was changed back to the immediate release 40 mg 3 times a day. She presents today in follow-up.  She has pill bottles. She has long-acting propranolol and immediate release, and amitriptyline. The long-acting was going to be a very high cost.  She has had no migraines since being on propranolol 40 mg TID. Has not been on amitriptyline. She was taking 10 mg at night. Was waking up with a migraine. Taking it since last visit, then ran out in about February, then did not get it back until recently but did not restart it, but migraines have been controlled. She had the sleep study. She had the CPAP pressure increased, has the CPAP, has been wearing it. Everything is going well lately. /84, pulse 86. Denies recent migraines. Denies dizziness or lightheadedness. None since the call in March and going on propranolol 40 mg TID but before that was on the long acting 120 mg daily but ran out and then the cost was very high. She is sleeping ok at night. She is taking aspirin 81 mg. No more right-sided weakness.   Reports she found out arthritis in right knee, right hip, and left elbow. She is on prednisone now with is helping the knee but not the arm, going to start therapy soon, sees PCP for this. Past Medical History:   Diagnosis Date    Asthma     Back pain with radiation     Cardiac echocardiogram, [facility] study 12/29/2016    EF 55-60%. No WMA. Right to left atrial septal shunt suggests PFO. Similar to study of 10/18/16.  Cardiovascular LE venous duplex 10/18/2016    No DVT bilaterally.  Contact dermatitis and other eczema, due to unspecified cause     CTS (carpal tunnel syndrome)     HSV-2 (herpes simplex virus 2) infection     Hypercholesterolemia     Hypothyroidism     Ill-defined condition     Vertigo    L4-L5 disc bulge     Leg swelling     Migraines     Positive PPD, treated     S/P lumbar fusion 1/13/2016    1. L4-5 bilateral hemilaminotomy, medial facetectomy, foraminotomy, diskectomy L4-5. 2. Transforaminal lumbar interbody fusion with PEEK cage and demineralized bone graft L4-L5 posterolateral fusion. 3. Segmental spinal instrumentation, DePuy Expedium type L4-L5. 01/13/2016 By Dr. Francisco Min Sciatica     EMG '17 , mild sciatic EMG findings    Stroke [facility])     12/2016    Vertigo        Past Surgical History:   Procedure Laterality Date    HX GYN      partial hysterectomy due to abnormal pap    HX LUMBAR LAMINECTOMY  01-13-16    L4/5       Current Outpatient Medications   Medication Sig Dispense Refill    predniSONE (DELTASONE) 10 mg tablet       propranoloL (INDERAL) 40 mg tablet Take 1 Tablet by mouth three (3) times daily. 270 Tablet 2    furosemide (LASIX) 20 mg tablet Take 1 Tablet by mouth daily. Indications: high blood pressure 30 Tablet 0    aspirin delayed-release 81 mg tablet Take 81 mg by mouth daily.       desloratadine (CLARINEX) 5 mg tablet       fluticasone propionate (FLONASE) 50 mcg/actuation nasal spray       omeprazole (PRILOSEC) 40 mg capsule       gabapentin (NEURONTIN) 300 mg capsule Take 300 mg by mouth nightly.  atorvastatin (LIPITOR) 80 mg tablet Take 1 Tab by mouth nightly. 30 Tab 0    budesonide-formoterol (SYMBICORT) 160-4.5 mcg/actuation HFA inhaler Take 2 Puffs by inhalation two (2) times a day.  naloxone (NARCAN) 4 mg/actuation nasal spray Use 1 spray intranasally into 1 nostril. Use a new Narcan nasal spray for subsequent doses and administer into alternating nostrils. May repeat every 2 to 3 minutes as needed. 1 Each 0    albuterol (PROVENTIL HFA, VENTOLIN HFA, PROAIR HFA) 90 mcg/actuation inhaler Take 1 Puff by inhalation every four (4) hours as needed for Wheezing. 1 Inhaler 0    FERROUS SULFATE PO Take 325 mg by mouth.  potassium chloride SR (K-TAB) 20 mEq tablet Take 20 mEq by mouth daily.  levothyroxine (SYNTHROID) 50 mcg tablet TAKE 1 TABLET BY MOUTH EVERY DAY  3    cholecalciferol (Vitamin D3) 25 mcg (1,000 unit) cap Take 2,000 Units by mouth daily. Allergies   Allergen Reactions    Skelaxin [Metaxalone] Other (comments)     jittery    Tramadol Other (comments)     Halluctations       Social History     Tobacco Use    Smoking status: Never Smoker    Smokeless tobacco: Never Used   Substance Use Topics    Alcohol use: No    Drug use: No       Family History   Problem Relation Age of Onset    Diabetes Mother     Elevated Lipids Mother     Hypertension Mother     Cancer Father         pancreatic    Diabetes Sister     Hypertension Sister     Diabetes Brother     Diabetes Daughter     Hypertension Daughter        Review of Systems:  GENERAL: Denies fever or fatigue  CARDIAC: No CP or SOB  PULMONARY: No cough or SOB  MUSCULOSKELETAL: No new joint pain. +joint pains as above. NEURO: SEE HPI    Physical Examination:  Visit Vitals  /84   Pulse 86   Resp 16   Wt 111.7 kg (246 lb 3.2 oz)   SpO2 97%   BMI 39.74 kg/m²       Alert, in NAD. Heart is regular. Oriented x3. Speech is fluent. Speech clear. EOMs are full, PERRL, VFFTC, no nystagmus.  No facial asymmetry. Tongue is midline. Strength and tone are normal except mild R  weakness, mild LUE weakness on motor exam (reports L elbow pain). Fine finger movements symmetrical. FNF intact bilaterally. DTRs +2. Steady gait. Impression/Plan: This is a 60-year-old right-handed female who presents in follow-up for migraines. She was in the hospital in July 2021 for hemiplegic migraine. She was discharged home on a regimen of propranolol. She is doing well currently on a regimen of propranolol 40 mg 3 times daily. Was on the long-acting form 120 mg daily at one point but the cost was high so back on 40 mg 3 times daily. She had the addition of Elavil for migraine prevention and to help with sleep, but now off of this. Migraines are controlled. Denies further episodes of right-sided weakness. She is sleeping okay. She is back on CPAP and had the pressure increased and is tolerating this. We will continue the current regimen of propranolol 40 mg 3 times daily. Follow up with sleep specialist.  Can remain off Elavil but we can consider adding this back if needed or adjusting propranolol. Continues on aspirin 81 mg daily. Continue follow-up with PCP for vascular risk factor management. Follow up in 6 months. Call sooner if needed. Diagnoses and all orders for this visit:    1. Hemiplegic migraine with status migrainosus, not intractable    Other orders  -     propranoloL (INDERAL) 40 mg tablet; Take 1 Tablet by mouth three (3) times daily. Signed By: Oliva Corado NP        PLEASE NOTE:   Portions of this document may have been produced using voice recognition software. Unrecognized errors in transcription may be present.

## 2022-07-08 ENCOUNTER — TRANSCRIBE ORDER (OUTPATIENT)
Dept: SCHEDULING | Age: 57
End: 2022-07-08

## 2022-07-08 DIAGNOSIS — Z12.31 VISIT FOR SCREENING MAMMOGRAM: Primary | ICD-10-CM

## 2022-07-20 ENCOUNTER — HOSPITAL ENCOUNTER (OUTPATIENT)
Dept: MAMMOGRAPHY | Age: 57
Discharge: HOME | End: 2022-07-20
Attending: INTERNAL MEDICINE
Payer: MEDICARE

## 2022-07-20 DIAGNOSIS — Z12.31 VISIT FOR SCREENING MAMMOGRAM: ICD-10-CM

## 2022-07-20 PROCEDURE — 77063 BREAST TOMOSYNTHESIS BI: CPT

## 2022-07-21 ENCOUNTER — APPOINTMENT (OUTPATIENT)
Dept: NON INVASIVE DIAGNOSTICS | Age: 57
DRG: 061 | End: 2022-07-21
Attending: PHYSICIAN ASSISTANT
Payer: MEDICARE

## 2022-07-21 ENCOUNTER — APPOINTMENT (OUTPATIENT)
Dept: CT IMAGING | Age: 57
DRG: 061 | End: 2022-07-21
Attending: STUDENT IN AN ORGANIZED HEALTH CARE EDUCATION/TRAINING PROGRAM
Payer: MEDICARE

## 2022-07-21 ENCOUNTER — HOSPITAL ENCOUNTER (INPATIENT)
Age: 57
LOS: 2 days | Discharge: HOME | DRG: 061 | End: 2022-07-23
Attending: STUDENT IN AN ORGANIZED HEALTH CARE EDUCATION/TRAINING PROGRAM | Admitting: INTERNAL MEDICINE
Payer: MEDICARE

## 2022-07-21 DIAGNOSIS — I63.9 ACUTE ISCHEMIC STROKE (HCC): Primary | ICD-10-CM

## 2022-07-21 LAB
ALBUMIN SERPL-MCNC: 3.8 G/DL (ref 3.4–5)
ALBUMIN/GLOB SERPL: 0.8 {RATIO} (ref 0.8–1.7)
ALP SERPL-CCNC: 118 U/L (ref 45–117)
ALT SERPL-CCNC: 49 U/L (ref 13–56)
ANION GAP SERPL CALC-SCNC: 3 MMOL/L (ref 3–18)
APPEARANCE UR: CLEAR
AST SERPL-CCNC: 29 U/L (ref 10–38)
ATRIAL RATE: 71 BPM
BACTERIA URNS QL MICRO: NEGATIVE /HPF
BASOPHILS # BLD: 0.1 K/UL (ref 0–0.1)
BASOPHILS NFR BLD: 1 % (ref 0–2)
BILIRUB SERPL-MCNC: 0.6 MG/DL (ref 0.2–1)
BILIRUB UR QL: NEGATIVE
BUN SERPL-MCNC: 12 MG/DL (ref 7–18)
BUN/CREAT SERPL: 11 (ref 12–20)
CALCIUM SERPL-MCNC: 9.1 MG/DL (ref 8.5–10.1)
CALCULATED P AXIS, ECG09: 47 DEGREES
CALCULATED R AXIS, ECG10: 28 DEGREES
CALCULATED T AXIS, ECG11: 39 DEGREES
CHLORIDE SERPL-SCNC: 107 MMOL/L (ref 100–111)
CO2 SERPL-SCNC: 30 MMOL/L (ref 21–32)
COLOR UR: YELLOW
CREAT SERPL-MCNC: 1.12 MG/DL (ref 0.6–1.3)
DIAGNOSIS, 93000: NORMAL
DIFFERENTIAL METHOD BLD: ABNORMAL
EOSINOPHIL # BLD: 0.1 K/UL (ref 0–0.4)
EOSINOPHIL NFR BLD: 2 % (ref 0–5)
EPITH CASTS URNS QL MICRO: NORMAL /LPF (ref 0–5)
ERYTHROCYTE [DISTWIDTH] IN BLOOD BY AUTOMATED COUNT: 15.3 % (ref 11.6–14.5)
GLOBULIN SER CALC-MCNC: 4.7 G/DL (ref 2–4)
GLUCOSE BLD STRIP.AUTO-MCNC: 118 MG/DL (ref 70–110)
GLUCOSE SERPL-MCNC: 111 MG/DL (ref 74–99)
GLUCOSE UR STRIP.AUTO-MCNC: NEGATIVE MG/DL
HCT VFR BLD AUTO: 39.7 % (ref 35–45)
HGB BLD-MCNC: 12.2 G/DL (ref 12–16)
HGB UR QL STRIP: ABNORMAL
IMM GRANULOCYTES # BLD AUTO: 0 K/UL (ref 0–0.04)
IMM GRANULOCYTES NFR BLD AUTO: 0 % (ref 0–0.5)
KETONES UR QL STRIP.AUTO: NEGATIVE MG/DL
LEUKOCYTE ESTERASE UR QL STRIP.AUTO: ABNORMAL
LYMPHOCYTES # BLD: 4 K/UL (ref 0.9–3.6)
LYMPHOCYTES NFR BLD: 53 % (ref 21–52)
MAGNESIUM SERPL-MCNC: 2.2 MG/DL (ref 1.6–2.6)
MCH RBC QN AUTO: 23 PG (ref 24–34)
MCHC RBC AUTO-ENTMCNC: 30.7 G/DL (ref 31–37)
MCV RBC AUTO: 74.9 FL (ref 78–100)
MONOCYTES # BLD: 0.7 K/UL (ref 0.05–1.2)
MONOCYTES NFR BLD: 10 % (ref 3–10)
NEUTS SEG # BLD: 2.5 K/UL (ref 1.8–8)
NEUTS SEG NFR BLD: 34 % (ref 40–73)
NITRITE UR QL STRIP.AUTO: NEGATIVE
NRBC # BLD: 0 K/UL (ref 0–0.01)
NRBC BLD-RTO: 0 PER 100 WBC
P-R INTERVAL, ECG05: 176 MS
PH UR STRIP: 6 [PH] (ref 5–8)
PLATELET # BLD AUTO: 372 K/UL (ref 135–420)
PMV BLD AUTO: 10.6 FL (ref 9.2–11.8)
POTASSIUM SERPL-SCNC: 4.3 MMOL/L (ref 3.5–5.5)
PROT SERPL-MCNC: 8.5 G/DL (ref 6.4–8.2)
PROT UR STRIP-MCNC: NEGATIVE MG/DL
Q-T INTERVAL, ECG07: 410 MS
QRS DURATION, ECG06: 84 MS
QTC CALCULATION (BEZET), ECG08: 445 MS
RBC # BLD AUTO: 5.3 M/UL (ref 4.2–5.3)
RBC #/AREA URNS HPF: NORMAL /HPF (ref 0–5)
SODIUM SERPL-SCNC: 140 MMOL/L (ref 136–145)
SP GR UR REFRACTOMETRY: 1.02 (ref 1–1.03)
UROBILINOGEN UR QL STRIP.AUTO: 0.2 EU/DL (ref 0.2–1)
VENTRICULAR RATE, ECG03: 71 BPM
WBC # BLD AUTO: 7.5 K/UL (ref 4.6–13.2)
WBC URNS QL MICRO: NORMAL /HPF (ref 0–4)

## 2022-07-21 PROCEDURE — 99285 EMERGENCY DEPT VISIT HI MDM: CPT

## 2022-07-21 PROCEDURE — 92977 HC DISSOLVE CLOT HEART VESSEL: CPT

## 2022-07-21 PROCEDURE — 74011000250 HC RX REV CODE- 250: Performed by: PHYSICIAN ASSISTANT

## 2022-07-21 PROCEDURE — 74011250636 HC RX REV CODE- 250/636: Performed by: PHYSICIAN ASSISTANT

## 2022-07-21 PROCEDURE — 74011000636 HC RX REV CODE- 636: Performed by: STUDENT IN AN ORGANIZED HEALTH CARE EDUCATION/TRAINING PROGRAM

## 2022-07-21 PROCEDURE — 74011250636 HC RX REV CODE- 250/636: Performed by: STUDENT IN AN ORGANIZED HEALTH CARE EDUCATION/TRAINING PROGRAM

## 2022-07-21 PROCEDURE — 3E03317 INTRODUCTION OF OTHER THROMBOLYTIC INTO PERIPHERAL VEIN, PERCUTANEOUS APPROACH: ICD-10-PCS | Performed by: HOSPITALIST

## 2022-07-21 PROCEDURE — 83735 ASSAY OF MAGNESIUM: CPT

## 2022-07-21 PROCEDURE — 65610000006 HC RM INTENSIVE CARE

## 2022-07-21 PROCEDURE — 70450 CT HEAD/BRAIN W/O DYE: CPT

## 2022-07-21 PROCEDURE — 99291 CRITICAL CARE FIRST HOUR: CPT | Performed by: INTERNAL MEDICINE

## 2022-07-21 PROCEDURE — 82962 GLUCOSE BLOOD TEST: CPT

## 2022-07-21 PROCEDURE — 81001 URINALYSIS AUTO W/SCOPE: CPT

## 2022-07-21 PROCEDURE — 94762 N-INVAS EAR/PLS OXIMTRY CONT: CPT

## 2022-07-21 PROCEDURE — 74011000250 HC RX REV CODE- 250: Performed by: STUDENT IN AN ORGANIZED HEALTH CARE EDUCATION/TRAINING PROGRAM

## 2022-07-21 PROCEDURE — 80053 COMPREHEN METABOLIC PANEL: CPT

## 2022-07-21 PROCEDURE — 99222 1ST HOSP IP/OBS MODERATE 55: CPT | Performed by: PSYCHIATRY & NEUROLOGY

## 2022-07-21 PROCEDURE — 93005 ELECTROCARDIOGRAM TRACING: CPT

## 2022-07-21 PROCEDURE — 74011250637 HC RX REV CODE- 250/637: Performed by: PHYSICIAN ASSISTANT

## 2022-07-21 PROCEDURE — 70496 CT ANGIOGRAPHY HEAD: CPT

## 2022-07-21 PROCEDURE — 85025 COMPLETE CBC W/AUTO DIFF WBC: CPT

## 2022-07-21 PROCEDURE — 87635 SARS-COV-2 COVID-19 AMP PRB: CPT

## 2022-07-21 RX ORDER — SODIUM CHLORIDE 0.9 % (FLUSH) 0.9 %
5-40 SYRINGE (ML) INJECTION AS NEEDED
Status: DISCONTINUED | OUTPATIENT
Start: 2022-07-21 | End: 2022-07-23 | Stop reason: HOSPADM

## 2022-07-21 RX ORDER — SODIUM CHLORIDE 0.9 % (FLUSH) 0.9 %
10 SYRINGE (ML) INJECTION ONCE
Status: COMPLETED | OUTPATIENT
Start: 2022-07-21 | End: 2022-07-21

## 2022-07-21 RX ORDER — SODIUM CHLORIDE 0.9 % (FLUSH) 0.9 %
5-40 SYRINGE (ML) INJECTION EVERY 8 HOURS
Status: DISCONTINUED | OUTPATIENT
Start: 2022-07-21 | End: 2022-07-23 | Stop reason: HOSPADM

## 2022-07-21 RX ORDER — SODIUM CHLORIDE, SODIUM LACTATE, POTASSIUM CHLORIDE, CALCIUM CHLORIDE 600; 310; 30; 20 MG/100ML; MG/100ML; MG/100ML; MG/100ML
75 INJECTION, SOLUTION INTRAVENOUS CONTINUOUS
Status: DISCONTINUED | OUTPATIENT
Start: 2022-07-21 | End: 2022-07-23 | Stop reason: HOSPADM

## 2022-07-21 RX ORDER — ONDANSETRON 2 MG/ML
4 INJECTION INTRAMUSCULAR; INTRAVENOUS
Status: DISCONTINUED | OUTPATIENT
Start: 2022-07-21 | End: 2022-07-23 | Stop reason: HOSPADM

## 2022-07-21 RX ORDER — FLUOXETINE 10 MG/1
10 CAPSULE ORAL
COMMUNITY

## 2022-07-21 RX ORDER — DICLOFENAC SODIUM 75 MG/1
75 TABLET, DELAYED RELEASE ORAL 2 TIMES DAILY
COMMUNITY
End: 2022-07-23

## 2022-07-21 RX ORDER — DEXTROSE MONOHYDRATE 100 MG/ML
0-250 INJECTION, SOLUTION INTRAVENOUS AS NEEDED
Status: DISCONTINUED | OUTPATIENT
Start: 2022-07-21 | End: 2022-07-23 | Stop reason: HOSPADM

## 2022-07-21 RX ORDER — MONTELUKAST SODIUM 10 MG/1
10 TABLET ORAL
COMMUNITY

## 2022-07-21 RX ORDER — ONDANSETRON 4 MG/1
4 TABLET, ORALLY DISINTEGRATING ORAL
Status: DISCONTINUED | OUTPATIENT
Start: 2022-07-21 | End: 2022-07-23 | Stop reason: HOSPADM

## 2022-07-21 RX ADMIN — FAMOTIDINE 20 MG: 10 INJECTION, SOLUTION INTRAVENOUS at 22:31

## 2022-07-21 RX ADMIN — SODIUM CHLORIDE, PRESERVATIVE FREE 10 ML: 5 INJECTION INTRAVENOUS at 22:30

## 2022-07-21 RX ADMIN — SODIUM CHLORIDE, POTASSIUM CHLORIDE, SODIUM LACTATE AND CALCIUM CHLORIDE 75 ML/HR: 600; 310; 30; 20 INJECTION, SOLUTION INTRAVENOUS at 12:52

## 2022-07-21 RX ADMIN — SODIUM CHLORIDE, PRESERVATIVE FREE 10 ML: 5 INJECTION INTRAVENOUS at 13:28

## 2022-07-21 RX ADMIN — ACETAMINOPHEN 650 MG: 650 SOLUTION ORAL at 22:31

## 2022-07-21 RX ADMIN — SODIUM CHLORIDE, PRESERVATIVE FREE 10 ML: 5 INJECTION INTRAVENOUS at 11:51

## 2022-07-21 RX ADMIN — IOPAMIDOL 90 ML: 755 INJECTION, SOLUTION INTRAVENOUS at 11:22

## 2022-07-21 RX ADMIN — ONDANSETRON 4 MG: 2 INJECTION INTRAMUSCULAR; INTRAVENOUS at 12:46

## 2022-07-21 RX ADMIN — FAMOTIDINE 20 MG: 10 INJECTION, SOLUTION INTRAVENOUS at 13:23

## 2022-07-21 RX ADMIN — SODIUM CHLORIDE, PRESERVATIVE FREE 10 ML: 5 INJECTION INTRAVENOUS at 11:53

## 2022-07-21 RX ADMIN — Medication 25 MG: at 11:50

## 2022-07-21 NOTE — PROGRESS NOTES
responded to Code STROKE for  Tomas, who is a 62 y.o.,female,     The  provided the following Interventions:  Provided crisis pastoral care and pastoral support. Offered prayers on behalf for the patient. Chart reviewed. Assessment:  There are no spiritual or Buddhist issues which require intervention at this time. Plan:  Chaplains will continue to follow and will provide pastoral care on an as needed/requested basis.  recommends bedside caregivers page  on duty if patient shows signs of acute spiritual or emotional distress. Gavin Marshall MDiv.   [facility]   [phone]

## 2022-07-21 NOTE — H&P
[facility]  Pulmonary, Critical Care, and Sleep Medicine     Name: Eddie Ndiaye MRN: [id number]   : 1965 Hospital: [address] [place] [address]   Date: 2022           Critical Care History and Physical        IMPRESSION:   Acute ischemic stroke vs TIA- s/p TPA, CT head and CTA head/neck (-)  Hx of HTN  Hx of Hyperlipidemia  Hx of Asthma  Hx of TIA  Hx of hypothyroidism   Hx of hemiplegic migraine           Patient Active Problem List   Diagnosis Code    Asthma J45.909    Hyperlipidemia E78.5    Eczema L30.9    Back pain with radiation M54.9    Spinal stenosis, lumbar region, without neurogenic claudication M48.061    Lumbar stenosis M48.061    S/P lumbar fusion Z98.1    Acute right-sided low back pain with sciatica M54.40    Neurological symptoms R29.90    Ischemic stroke (HCC) I63.9    Migraines G43.909    Leg swelling M79.89    Hypothyroidism E03.9    CTS (carpal tunnel syndrome) G56.00    Hypercholesterolemia E78.00    Ill-defined condition R69    Sciatica M54.30    Sciatic neuropathy G57.00    Chronic midline low back pain with right-sided sciatica M54.41, G89.29    Encounter for long-term (current) use of high-risk medication Z79.899    Chronic pain syndrome G89.4    History of lumbar surgery Z98.890    Protrusion of lumbar intervertebral disc M51.26    Acute ischemic stroke (HCC) I63.9    Obesity, morbid (HCC) O68.84    Complicated migraine S03.016    Stroke ([facility] [address] I63.9          RECOMMENDATIONS:   Neuro: Monitor neuro status with serial exams  Pulm: Supplemental O2 via NC PRN, titrate flow for goal SPO2> 90% Bronchodilators, steroids, pulmonary hygiene care, Aspiration precautions, Keep HOB >30 degrees  CVS :Actively titrate vasopressors aim MAP >65mmHg, Check cardiac panel, ECHO results. Fluids: LR @ 75/hr  GI: SUP, Trend LFTs, Zofran PRN for N/V, NPO  Renal:  Trend Renal indices, Strict Is/Os, Saha (-)  Hem/Onc: Monitor for s/o active bleeding.   I/D:Trend WBCs and temperature curve. Endocrine: Q6 glucoses, SSI. Check TSH level  Metabolic:  Daily BMP, mag, phos. Trend lytes, replace as needed. Musc/Skin: no acute issues, wound care  Full Code      Best practice : APPLICABLE TO PATIENT     Glycemic control  Sress ulcer prophylaxis. Pepcid  DVT prophylaxis. SCDs  Need for Lines, buchanan assessed. Palliative care evaluation. Restraints not needed              Subjective/History: This patient has been seen and evaluated at the request of Dr. Salvatore Selby for Acute ischemic stroke vs TIA.    07/21/22     Patient is a 62 y.o. female with a past medical history of HTN, TIA, Asthma, hypothyroidism, came in to the ED with complaints R sided weakness, facial droop, and aphasia starting around 10am today. Upon arrival to the ED, pt's VSS. Code S was activated, tele neuro consulted and pt received tPA. Labs were unremarkable. CT head and CTA head/neck (-). Dr. Savanna Dunham was consulted as well. Upon my initial evaluation, pt is AO x4, hemodynamically stable, O2 sats 98% on RA, no acute distress. She states she has had a TIA x4 in the past and has received tPA before. She takes Aspirin outpt. Pt denies recent history of fever, chills, SOB, chest pain, nausea, vomiting, abdominal pain. Denies hx of MI, DVT, PE. Denies tobacco/drug/alcohol use. Past Medical History:   Diagnosis Date    Asthma      Back pain with radiation      Cardiac echocardiogram, ltd study 12/29/2016     EF 55-60%. No WMA. Right to left atrial septal shunt suggests PFO. Similar to study of 10/18/16. Cardiovascular LE venous duplex 10/18/2016     No DVT bilaterally.     Contact dermatitis and other eczema, due to unspecified cause      CTS (carpal tunnel syndrome)      HSV-2 (herpes simplex virus 2) infection      Hypercholesterolemia      Hypothyroidism      Ill-defined condition       Vertigo    L4-L5 disc bulge      Leg swelling      Migraines      Positive PPD, treated      S/P lumbar fusion 1/13/2016     1. L4-5 bilateral hemilaminotomy, medial facetectomy, foraminotomy, diskectomy L4-5. 2. Transforaminal lumbar interbody fusion with PEEK cage and demineralized bone graft L4-L5 posterolateral fusion. 3. Segmental spinal instrumentation, DePuy Expedium type L4-L5. 01/13/2016 By Dr. Pedro Nathan    Sciatica       EMG '17 , mild sciatic EMG findings    Stroke ([facility] [address]       12/2016    Vertigo                 Past Surgical History:   Procedure Laterality Date    HX GYN         partial hysterectomy due to abnormal pap    HX LUMBAR LAMINECTOMY   01-13-16     L4/5                 Prior to Admission medications   Medication Sig Start Date End Date Taking? Authorizing Provider   predniSONE (DELTASONE) 10 mg tablet   4/18/22     Provider, Historical   propranoloL (INDERAL) 40 mg tablet Take 1 Tablet by mouth three (3) times daily. 4/21/22     Annie Rasheed NP   furosemide (LASIX) 20 mg tablet Take 1 Tablet by mouth daily. Indications: high blood pressure 7/21/21     David Greenberg MD   aspirin delayed-release 81 mg tablet Take 81 mg by mouth daily. Other, MD Eddie   desloratadine (CLARINEX) 5 mg tablet   5/14/21     Provider, Historical   fluticasone propionate (FLONASE) 50 mcg/actuation nasal spray   5/14/21     Provider, Historical   omeprazole (PRILOSEC) 40 mg capsule   5/19/21     Provider, Historical   gabapentin (NEURONTIN) 300 mg capsule Take 300 mg by mouth nightly. Provider, Historical   atorvastatin (LIPITOR) 80 mg tablet Take 1 Tab by mouth nightly. 7/16/18     Edwin Barnett MD   budesonide-formoterol (SYMBICORT) 160-4.5 mcg/actuation HFA inhaler Take 2 Puffs by inhalation two (2) times a day. Provider, Historical   naloxone (NARCAN) 4 mg/actuation nasal spray Use 1 spray intranasally into 1 nostril. Use a new Narcan nasal spray for subsequent doses and administer into alternating nostrils. May repeat every 2 to 3 minutes as needed.  9/18/17     Armani Rodríguez MD albuterol (PROVENTIL HFA, VENTOLIN HFA, PROAIR HFA) 90 mcg/actuation inhaler Take 1 Puff by inhalation every four (4) hours as needed for Wheezing. 17     Annie Marie MD   FERROUS SULFATE PO Take 325 mg by mouth. Provider, Historical   potassium chloride SR (K-TAB) 20 mEq tablet Take 20 mEq by mouth daily. 10/23/16     Francoise Constantino NP   levothyroxine (SYNTHROID) 50 mcg tablet TAKE 1 TABLET BY MOUTH EVERY DAY 16     Provider, Historical   cholecalciferol (Vitamin D3) 25 mcg (1,000 unit) cap Take 2,000 Units by mouth daily. Provider, Historical                Current Facility-Administered Medications   Medication Dose Route Frequency    sodium chloride (NS) flush 5-40 mL  5-40 mL IntraVENous Q8H    lactated Ringers infusion  75 mL/hr IntraVENous CONTINUOUS    sodium chloride (NS) flush 5-40 mL  5-40 mL IntraVENous Q8H    famotidine (PF) (PEPCID) 20 mg in 0.9% sodium chloride 10 mL injection  20 mg IntraVENous Q12H               Allergies   Allergen Reactions    Skelaxin [Metaxalone] Other (comments)       jittery    Tramadol Other (comments)       Halluctations         Social History           Tobacco Use    Smoking status: Never    Smokeless tobacco: Never   Substance Use Topics    Alcohol use:  No               Family History   Problem Relation Age of Onset    Diabetes Mother      Elevated Lipids Mother      Hypertension Mother      Cancer Father           pancreatic    Diabetes Sister      Hypertension Sister      Diabetes Brother      Diabetes Daughter      Hypertension Daughter              Objective:   Vital Signs:    Visit Vitals  /86 (BP 1 Location: Right upper arm, BP Patient Position: At rest)   Pulse 68   Temp 97.8 °F (36.6 °C)   Resp 15   Ht 5' 6\" (1.676 m)   Wt 112.5 kg (248 lb)   SpO2 96%   BMI 40.03 kg/m²       O2 Device: None      Temp (24hrs), Av.1 °F (36.7 °C), Min:97.8 °F (36.6 °C), Max:98.7 °F (37.1 °C)         Intake/Output:   Last shift:      No intake/output data recorded. Last 3 shifts: No intake/output data recorded. No intake or output data in the 24 hours ending 07/21/22 1256           Physical Exam:     General: Alert, cooperative, no distress. Head: Normocephalic, without obvious abnormality, atraumatic. Eyes: Conjunctivae/corneas clear. PERRL,   Nose: Nares normal. Septum midline. Mucosa normal. No drainage or sinus tenderness. Throat: Lips, mucosa, and tongue normal. Teeth and gums normal.   Neck: Supple, symmetrical, trachea midline, no adenopathy, thyroid: no enlargment/tenderness/nodules, no carotid bruit and no JVD. Back:   Symmetric, no curvature. ROM normal.   Lungs:   Clear to auscultation bilaterally. Chest wall: No tenderness or deformity. Heart: Regular rate and rhythm, S1, S2 normal, no murmur, click, rub or gallop. Abdomen:   Obese. Soft, non-tender. Bowel sounds normal. No masses,  No organomegaly. Extremities: Extremities normal, atraumatic, no cyanosis or edema. - restraints   Pulses: 2+ and symmetric all extremities. Skin: Skin color, texture, turgor normal. No rashes or lesions   Neurologic: (+)R sided facial droop, (+)3/5 strength RUE and RLE, (+)5/5 strength LUE and LLE, sensation intact and symmetrical. (-)aphasia.        Devices:  ETT: (-)  OGT: (-)  Lines: PIV  Drains: (-)  Saha: (-)     Data:      Recent Results          Recent Results (from the past 24 hour(s))   CBC WITH AUTOMATED DIFF     Collection Time: 07/21/22 11:00 AM   Result Value Ref Range     WBC 7.5 4.6 - 13.2 K/uL     RBC 5.30 4.20 - 5.30 M/uL     HGB 12.2 12.0 - 16.0 g/dL     HCT 39.7 35.0 - 45.0 %     MCV 74.9 (L) 78.0 - 100.0 FL     MCH 23.0 (L) 24.0 - 34.0 PG     MCHC 30.7 (L) 31.0 - 37.0 g/dL     RDW 15.3 (H) 11.6 - 14.5 %     PLATELET 039 469 - 397 K/uL     MPV 10.6 9.2 - 11.8 FL     NRBC 0.0 0  WBC     ABSOLUTE NRBC 0.00 0.00 - 0.01 K/uL     NEUTROPHILS 34 (L) 40 - 73 %     LYMPHOCYTES 53 (H) 21 - 52 %     MONOCYTES 10 3 - 10 % EOSINOPHILS 2 0 - 5 %     BASOPHILS 1 0 - 2 %     IMMATURE GRANULOCYTES 0 0.0 - 0.5 %     ABS. NEUTROPHILS 2.5 1.8 - 8.0 K/UL     ABS. LYMPHOCYTES 4.0 (H) 0.9 - 3.6 K/UL     ABS. MONOCYTES 0.7 0.05 - 1.2 K/UL     ABS. EOSINOPHILS 0.1 0.0 - 0.4 K/UL     ABS. BASOPHILS 0.1 0.0 - 0.1 K/UL     ABS. IMM. GRANS. 0.0 0.00 - 0.04 K/UL     DF AUTOMATED     METABOLIC PANEL, COMPREHENSIVE     Collection Time: 07/21/22 11:00 AM   Result Value Ref Range     Sodium 140 136 - 145 mmol/L     Potassium 4.3 3.5 - 5.5 mmol/L     Chloride 107 100 - 111 mmol/L     CO2 30 21 - 32 mmol/L     Anion gap 3 3.0 - 18 mmol/L     Glucose 111 (H) 74 - 99 mg/dL     BUN 12 7.0 - 18 MG/DL     Creatinine 1.12 0.6 - 1.3 MG/DL     BUN/Creatinine ratio 11 (L) 12 - 20       GFR est AA >60 >60 ml/min/1.73m2     GFR est non-AA 50 (L) >60 ml/min/1.73m2     Calcium 9.1 8.5 - 10.1 MG/DL     Bilirubin, total 0.6 0.2 - 1.0 MG/DL     ALT (SGPT) 49 13 - 56 U/L     AST (SGOT) 29 10 - 38 U/L     Alk. phosphatase 118 (H) 45 - 117 U/L     Protein, total 8.5 (H) 6.4 - 8.2 g/dL     Albumin 3.8 3.4 - 5.0 g/dL     Globulin 4.7 (H) 2.0 - 4.0 g/dL     A-G Ratio 0.8 0.8 - 1.7     MAGNESIUM     Collection Time: 07/21/22 11:00 AM   Result Value Ref Range     Magnesium 2.2 1.6 - 2.6 mg/dL   EKG, 12 LEAD, INITIAL     Collection Time: 07/21/22 11:41 AM   Result Value Ref Range     Ventricular Rate 71 BPM     Atrial Rate 71 BPM     P-R Interval 176 ms     QRS Duration 84 ms     Q-T Interval 410 ms     QTC Calculation (Bezet) 445 ms     Calculated P Axis 47 degrees     Calculated R Axis 28 degrees     Calculated T Axis 39 degrees     Diagnosis           Normal sinus rhythm  Normal ECG  When compared with ECG of 20-JUL-2021 09:32,  No significant change was found                 No results for input(s): FIO2I, IFO2, HCO3I, IHCO3, HCOPOC, PCO2I, PCOPOC, IPHI, PHI, PHPOC, PO2I, PO2POC in the last 72 hours.      No lab exists for component: IPOC2     Telemetry:normal sinus rhythm Imaging:  I have personally reviewed the patients radiographs and have reviewed the reports:     XR Results (most recent):  Results from Hospital Encounter encounter on 11/10/19     XR CHEST PORT     Narrative  EXAM: CHEST ONE VIEW  portable 2301 hours     CLINICAL HISTORY/INDICATION: cough with upper respiratory infection x1 week,  shortness of breath     COMPARISON: Chest x-ray July 23, 2017. TECHNIQUE: One view obtained. FINDINGS:     The cardiac and mediastinal silhouette is normal. The lungs are clear. Pulmonary  vascularity is normal. The costophrenic angles are sharply defined. No bony  abnormalities are seen. Impression  IMPRESSION:     Negative chest.     CT Results (most recent):  Results from Hospital Encounter encounter on 07/21/22     CTA HEAD NECK W CONT (Preliminary)  This result has not been signed. Information might be incomplete. Narrative  Preliminary report  -3-dimensional imaging pending  Left-sided weakness, right-sided facial droop, AMS  Comparison with prior CTA July 2021  -Patent cervical brain vasculature, no hemodynamically significant stenosis of  cervical carotid arteries  -No acute intracranial large vessel occlusion  -Unchanged morphology of left pinna, with 2 cm nodular appearance of its  superior margin perhaps \"cauliflower ear\" amenable to direct inspection. Relevant vascular findings preliminarily discussed by me with Dr. Dorothey Ganser  7/21/2022 at 1137 hours                            Total of   30  min critical care time spent at bedside during the course of care providing evaluation,management and care decisions and ordering appropriate treatment related to critical care problems exclusive of procedures.   The reason for providing this level of medical care for this critically ill patient was due a critical illness that impaired one or more vital organ systems such that there was a high probability of imminent or life threatening deterioration in the patients condition. This care involved high complexity decision making to assess, manipulate, and support vital system functions, to treat this degree vital organ system failure and to prevent further life threatening deterioration of the patients condition.            Afshin Palma PA-C  07/21/22  Pulmonary, Critical Care Medicine  [facility]

## 2022-07-21 NOTE — ED NOTES
Bedside and Verbal shift change report given to Emelia Summers Rn (oncoming nurse) by Naomi Hinson RN (offgoing nurse). Report included the following information SBAR, Kardex, ED Summary, Procedure Summary, Intake/Output and MAR. Patient resting on stretcher, NAD noted, call bell in reach, bed low and locked with side rails up x 2; Reports pain 0/10. Updated on plan of care, patient verbalizes understanding. This nurse will continue to monitor.

## 2022-07-21 NOTE — PROGRESS NOTES
[facility]  Pulmonary, Critical Care, and Sleep Medicine    Name: Lepooldo Dobbs MRN: [id number]   : 1965 Hospital: [facility]   Date: 2022        Critical Care History and Physical      IMPRESSION:   Acute ischemic stroke vs TIA- s/p TPA, CT head and CTA head/neck (-)  Hx of HTN  Hx of Hyperlipidemia  Hx of Asthma  Hx of TIA  Hx of hypothyroidism   Hx of hemiplegic migraine     Patient Active Problem List   Diagnosis Code    Asthma J45.909    Hyperlipidemia E78.5    Eczema L30.9    Back pain with radiation M54.9    Spinal stenosis, lumbar region, without neurogenic claudication M48.061    Lumbar stenosis M48.061    S/P lumbar fusion Z98.1    Acute right-sided low back pain with sciatica M54.40    Neurological symptoms R29.90    Ischemic stroke (HCC) I63.9    Migraines G43.909    Leg swelling M79.89    Hypothyroidism E03.9    CTS (carpal tunnel syndrome) G56.00    Hypercholesterolemia E78.00    Ill-defined condition R69    Sciatica M54.30    Sciatic neuropathy G57.00    Chronic midline low back pain with right-sided sciatica M54.41, G89.29    Encounter for long-term (current) use of high-risk medication Z79.899    Chronic pain syndrome G89.4    History of lumbar surgery Z98.890    Protrusion of lumbar intervertebral disc M51.26    Acute ischemic stroke (HCC) I63.9    Obesity, morbid (HCC) O46.44    Complicated migraine N88.381    Stroke ([facility] [address] I63.9        RECOMMENDATIONS:   Neuro: Monitor neuro status with serial exams  Pulm: Supplemental O2 via NC PRN, titrate flow for goal SPO2> 90% Bronchodilators, steroids, pulmonary hygiene care, Aspiration precautions, Keep HOB >30 degrees  CVS :Actively titrate vasopressors aim MAP >65mmHg, Check cardiac panel, ECHO results. Fluids: LR @ 75/hr  GI: SUP, Trend LFTs, Zofran PRN for N/V, NPO  Renal:  Trend Renal indices, Strict Is/Os, Saha (-)  Hem/Onc: Monitor for s/o active bleeding.    I/D:Trend WBCs and temperature curve.  Endocrine: Q6 glucoses, SSI. Check TSH level  Metabolic:  Daily BMP, mag, phos. Trend lytes, replace as needed. Musc/Skin: no acute issues, wound care  Full Code     Best practice : APPLICABLE TO PATIENT    Glycemic control  Sress ulcer prophylaxis. Pepcid  DVT prophylaxis. SCDs  Need for Lines, buchanan assessed. Palliative care evaluation. Restraints not needed          Subjective/History: This patient has been seen and evaluated at the request of Dr. Tana Mcrae for Acute ischemic stroke vs TIA.    07/21/22    Patient is a 62 y.o. female with a past medical history of HTN, TIA, Asthma, hypothyroidism, came in to the ED with complaints R sided weakness, facial droop, and aphasia starting around 10am today. Upon arrival to the ED, pt's VSS. Code S was activated, tele neuro consulted and pt received tPA. Labs were unremarkable. CT head and CTA head/neck (-). Dr. Jair Rosales was consulted as well. Upon my initial evaluation, pt is AO x4, hemodynamically stable, O2 sats 98% on RA, no acute distress. She states she has had a TIA x4 in the past and has received tPA before. She takes Aspirin outpt. Pt denies recent history of fever, chills, SOB, chest pain, nausea, vomiting, abdominal pain. Denies hx of MI, DVT, PE. Denies tobacco/drug/alcohol use. Past Medical History:   Diagnosis Date    Asthma     Back pain with radiation     Cardiac echocardiogram, ltd study 12/29/2016    EF 55-60%. No WMA. Right to left atrial septal shunt suggests PFO. Similar to study of 10/18/16. Cardiovascular LE venous duplex 10/18/2016    No DVT bilaterally. Contact dermatitis and other eczema, due to unspecified cause     CTS (carpal tunnel syndrome)     HSV-2 (herpes simplex virus 2) infection     Hypercholesterolemia     Hypothyroidism     Ill-defined condition     Vertigo    L4-L5 disc bulge     Leg swelling     Migraines     Positive PPD, treated     S/P lumbar fusion 1/13/2016    1.  L4-5 bilateral hemilaminotomy, medial facetectomy, foraminotomy, diskectomy L4-5. 2. Transforaminal lumbar interbody fusion with PEEK cage and demineralized bone graft L4-L5 posterolateral fusion. 3. Segmental spinal instrumentation, DePuy Expedium type L4-L5. 01/13/2016 By Dr. Thang Rao     Sciatica     EMG '17 , mild sciatic EMG findings    Stroke ([facility] [address]     12/2016    Vertigo         Past Surgical History:   Procedure Laterality Date    HX GYN      partial hysterectomy due to abnormal pap    HX LUMBAR LAMINECTOMY  01-13-16    L4/5        Prior to Admission medications    Medication Sig Start Date End Date Taking? Authorizing Provider   predniSONE (DELTASONE) 10 mg tablet  4/18/22   Provider, Historical   propranoloL (INDERAL) 40 mg tablet Take 1 Tablet by mouth three (3) times daily. 4/21/22   Ryan Garces NP   furosemide (LASIX) 20 mg tablet Take 1 Tablet by mouth daily. Indications: high blood pressure 7/21/21   Rhea Muñoz MD   aspirin delayed-release 81 mg tablet Take 81 mg by mouth daily. Other, MD Eddie   desloratadine (CLARINEX) 5 mg tablet  5/14/21   Provider, Historical   fluticasone propionate (FLONASE) 50 mcg/actuation nasal spray  5/14/21   Provider, Historical   omeprazole (PRILOSEC) 40 mg capsule  5/19/21   Provider, Historical   gabapentin (NEURONTIN) 300 mg capsule Take 300 mg by mouth nightly. Provider, Historical   atorvastatin (LIPITOR) 80 mg tablet Take 1 Tab by mouth nightly. 7/16/18   Mike Haley MD   budesonide-formoterol (SYMBICORT) 160-4.5 mcg/actuation HFA inhaler Take 2 Puffs by inhalation two (2) times a day. Provider, Historical   naloxone (NARCAN) 4 mg/actuation nasal spray Use 1 spray intranasally into 1 nostril. Use a new Narcan nasal spray for subsequent doses and administer into alternating nostrils. May repeat every 2 to 3 minutes as needed.  9/18/17   Wilfred Kidd MD   albuterol (PROVENTIL HFA, VENTOLIN HFA, PROAIR HFA) 90 mcg/actuation inhaler Take 1 Puff by inhalation every four (4) hours as needed for Wheezing. 17   Wing Phil MD   FERROUS SULFATE PO Take 325 mg by mouth. Provider, Historical   potassium chloride SR (K-TAB) 20 mEq tablet Take 20 mEq by mouth daily. 10/23/16   Brie Arita NP   levothyroxine (SYNTHROID) 50 mcg tablet TAKE 1 TABLET BY MOUTH EVERY DAY 16   Provider, Historical   cholecalciferol (Vitamin D3) 25 mcg (1,000 unit) cap Take 2,000 Units by mouth daily. Provider, Historical       Current Facility-Administered Medications   Medication Dose Route Frequency    sodium chloride (NS) flush 5-40 mL  5-40 mL IntraVENous Q8H    lactated Ringers infusion  75 mL/hr IntraVENous CONTINUOUS    sodium chloride (NS) flush 5-40 mL  5-40 mL IntraVENous Q8H    famotidine (PF) (PEPCID) 20 mg in 0.9% sodium chloride 10 mL injection  20 mg IntraVENous Q12H       Allergies   Allergen Reactions    Skelaxin [Metaxalone] Other (comments)     jittery    Tramadol Other (comments)     Halluctations        Social History     Tobacco Use    Smoking status: Never    Smokeless tobacco: Never   Substance Use Topics    Alcohol use: No        Family History   Problem Relation Age of Onset    Diabetes Mother     Elevated Lipids Mother     Hypertension Mother     Cancer Father         pancreatic    Diabetes Sister     Hypertension Sister     Diabetes Brother     Diabetes Daughter     Hypertension Daughter           Objective:   Vital Signs:    Visit Vitals  /86 (BP 1 Location: Right upper arm, BP Patient Position: At rest)   Pulse 68   Temp 97.8 °F (36.6 °C)   Resp 15   Ht 5' 6\" (1.676 m)   Wt 112.5 kg (248 lb)   SpO2 96%   BMI 40.03 kg/m²       O2 Device: None       Temp (24hrs), Av.1 °F (36.7 °C), Min:97.8 °F (36.6 °C), Max:98.7 °F (37.1 °C)       Intake/Output:   Last shift:      No intake/output data recorded. Last 3 shifts: No intake/output data recorded.   No intake or output data in the 24 hours ending 22 1256        Physical Exam:     General:  Alert, cooperative, no distress. Head:  Normocephalic, without obvious abnormality, atraumatic. Eyes:  Conjunctivae/corneas clear. PERRL,   Nose: Nares normal. Septum midline. Mucosa normal. No drainage or sinus tenderness. Throat: Lips, mucosa, and tongue normal. Teeth and gums normal.   Neck: Supple, symmetrical, trachea midline, no adenopathy, thyroid: no enlargment/tenderness/nodules, no carotid bruit and no JVD. Back:   Symmetric, no curvature. ROM normal.   Lungs:   Clear to auscultation bilaterally. Chest wall:  No tenderness or deformity. Heart:  Regular rate and rhythm, S1, S2 normal, no murmur, click, rub or gallop. Abdomen:   Obese. Soft, non-tender. Bowel sounds normal. No masses,  No organomegaly. Extremities: Extremities normal, atraumatic, no cyanosis or edema. - restraints    Pulses: 2+ and symmetric all extremities. Skin: Skin color, texture, turgor normal. No rashes or lesions   Neurologic: (+)R sided facial droop, (+)3/5 strength RUE and RLE, (+)5/5 strength LUE and LLE, sensation intact and symmetrical. (-)aphasia. Devices:  ETT: (-)  OGT: (-)  Lines: PIV  Drains: (-)  Saha: (-)    Data:     Recent Results (from the past 24 hour(s))   CBC WITH AUTOMATED DIFF    Collection Time: 07/21/22 11:00 AM   Result Value Ref Range    WBC 7.5 4.6 - 13.2 K/uL    RBC 5.30 4.20 - 5.30 M/uL    HGB 12.2 12.0 - 16.0 g/dL    HCT 39.7 35.0 - 45.0 %    MCV 74.9 (L) 78.0 - 100.0 FL    MCH 23.0 (L) 24.0 - 34.0 PG    MCHC 30.7 (L) 31.0 - 37.0 g/dL    RDW 15.3 (H) 11.6 - 14.5 %    PLATELET 304 791 - 193 K/uL    MPV 10.6 9.2 - 11.8 FL    NRBC 0.0 0  WBC    ABSOLUTE NRBC 0.00 0.00 - 0.01 K/uL    NEUTROPHILS 34 (L) 40 - 73 %    LYMPHOCYTES 53 (H) 21 - 52 %    MONOCYTES 10 3 - 10 %    EOSINOPHILS 2 0 - 5 %    BASOPHILS 1 0 - 2 %    IMMATURE GRANULOCYTES 0 0.0 - 0.5 %    ABS. NEUTROPHILS 2.5 1.8 - 8.0 K/UL    ABS. LYMPHOCYTES 4.0 (H) 0.9 - 3.6 K/UL    ABS. MONOCYTES 0.7 0.05 - 1.2 K/UL    ABS. EOSINOPHILS 0.1 0.0 - 0.4 K/UL    ABS. BASOPHILS 0.1 0.0 - 0.1 K/UL    ABS. IMM. GRANS. 0.0 0.00 - 0.04 K/UL    DF AUTOMATED     METABOLIC PANEL, COMPREHENSIVE    Collection Time: 07/21/22 11:00 AM   Result Value Ref Range    Sodium 140 136 - 145 mmol/L    Potassium 4.3 3.5 - 5.5 mmol/L    Chloride 107 100 - 111 mmol/L    CO2 30 21 - 32 mmol/L    Anion gap 3 3.0 - 18 mmol/L    Glucose 111 (H) 74 - 99 mg/dL    BUN 12 7.0 - 18 MG/DL    Creatinine 1.12 0.6 - 1.3 MG/DL    BUN/Creatinine ratio 11 (L) 12 - 20      GFR est AA >60 >60 ml/min/1.73m2    GFR est non-AA 50 (L) >60 ml/min/1.73m2    Calcium 9.1 8.5 - 10.1 MG/DL    Bilirubin, total 0.6 0.2 - 1.0 MG/DL    ALT (SGPT) 49 13 - 56 U/L    AST (SGOT) 29 10 - 38 U/L    Alk. phosphatase 118 (H) 45 - 117 U/L    Protein, total 8.5 (H) 6.4 - 8.2 g/dL    Albumin 3.8 3.4 - 5.0 g/dL    Globulin 4.7 (H) 2.0 - 4.0 g/dL    A-G Ratio 0.8 0.8 - 1.7     MAGNESIUM    Collection Time: 07/21/22 11:00 AM   Result Value Ref Range    Magnesium 2.2 1.6 - 2.6 mg/dL   EKG, 12 LEAD, INITIAL    Collection Time: 07/21/22 11:41 AM   Result Value Ref Range    Ventricular Rate 71 BPM    Atrial Rate 71 BPM    P-R Interval 176 ms    QRS Duration 84 ms    Q-T Interval 410 ms    QTC Calculation (Bezet) 445 ms    Calculated P Axis 47 degrees    Calculated R Axis 28 degrees    Calculated T Axis 39 degrees    Diagnosis       Normal sinus rhythm  Normal ECG  When compared with ECG of 20-JUL-2021 09:32,  No significant change was found             No results for input(s): FIO2I, IFO2, HCO3I, IHCO3, HCOPOC, PCO2I, PCOPOC, IPHI, PHI, PHPOC, PO2I, PO2POC in the last 72 hours.     No lab exists for component: IPOC2    Telemetry:normal sinus rhythm    Imaging:  I have personally reviewed the patients radiographs and have reviewed the reports:    XR Results (most recent):  Results from Hospital Encounter encounter on 11/10/19    XR CHEST PORT    Narrative  EXAM: CHEST ONE VIEW  portable 2301 hours    CLINICAL HISTORY/INDICATION: cough with upper respiratory infection x1 week,  shortness of breath    COMPARISON: Chest x-ray July 23, 2017. TECHNIQUE: One view obtained. FINDINGS:    The cardiac and mediastinal silhouette is normal. The lungs are clear. Pulmonary  vascularity is normal. The costophrenic angles are sharply defined. No bony  abnormalities are seen. Impression  IMPRESSION:    Negative chest.    CT Results (most recent):  Results from Hospital Encounter encounter on 07/21/22    CTA HEAD NECK W CONT (Preliminary)  This result has not been signed. Information might be incomplete. Narrative  Preliminary report  -3-dimensional imaging pending  Left-sided weakness, right-sided facial droop, AMS  Comparison with prior CTA July 2021  -Patent cervical brain vasculature, no hemodynamically significant stenosis of  cervical carotid arteries  -No acute intracranial large vessel occlusion  -Unchanged morphology of left pinna, with 2 cm nodular appearance of its  superior margin perhaps \"cauliflower ear\" amenable to direct inspection. Relevant vascular findings preliminarily discussed by me with Dr. Espinal Hint  7/21/2022 at 1137 hours                     Total of   30  min critical care time spent at bedside during the course of care providing evaluation,management and care decisions and ordering appropriate treatment related to critical care problems exclusive of procedures. The reason for providing this level of medical care for this critically ill patient was due a critical illness that impaired one or more vital organ systems such that there was a high probability of imminent or life threatening deterioration in the patients condition. This care involved high complexity decision making to assess, manipulate, and support vital system functions, to treat this degree vital organ system failure and to prevent further life threatening deterioration of the patients condition.         Wu Manzo, LANA  07/21/22  Pulmonary, Critical Care Medicine  [facility]

## 2022-07-21 NOTE — ED TRIAGE NOTES
Patient arrives to ED with rpeort of right sided facial droop and left sided weakness onset 10am, FSBS 119.

## 2022-07-21 NOTE — ED PROVIDER NOTES
Patient is a 59-year-old female with history of hemiplegic stroke, TIA, hypothyroidism, hypercholesteremia, asthma, and chronic back pain. Patient presents today with primary complaint of sudden onset of facial droop, generalized weakness, and aphasia that started at 10 AM and was witnessed by bystanders. At presentation patient is able to follow commands but remains nonverbal therefore all history obtained on chart review and from EMS. Patient's blood sugar in route was 119 mg/dL. Past Medical History:   Diagnosis Date    Asthma     Back pain with radiation     Cardiac echocardiogram, ltd study 12/29/2016    EF 55-60%. No WMA. Right to left atrial septal shunt suggests PFO. Similar to study of 10/18/16. Cardiovascular LE venous duplex 10/18/2016    No DVT bilaterally. Contact dermatitis and other eczema, due to unspecified cause     CTS (carpal tunnel syndrome)     HSV-2 (herpes simplex virus 2) infection     Hypercholesterolemia     Hypothyroidism     Ill-defined condition     Vertigo    L4-L5 disc bulge     Leg swelling     Migraines     Positive PPD, treated     S/P lumbar fusion 1/13/2016    1. L4-5 bilateral hemilaminotomy, medial facetectomy, foraminotomy, diskectomy L4-5. 2. Transforaminal lumbar interbody fusion with PEEK cage and demineralized bone graft L4-L5 posterolateral fusion.  3. Segmental spinal instrumentation, DePuy Expedium type L4-L5. 01/13/2016 By Dr. Bacilio Fair     Sciatica     EMG '17 , mild sciatic EMG findings    Stroke ([facility] [address]     12/2016    Vertigo        Past Surgical History:   Procedure Laterality Date    HX GYN      partial hysterectomy due to abnormal pap    HX LUMBAR LAMINECTOMY  01-13-16    L4/5         Family History:   Problem Relation Age of Onset    Diabetes Mother     Elevated Lipids Mother     Hypertension Mother     Cancer Father         pancreatic    Diabetes Sister     Hypertension Sister     Diabetes Brother     Diabetes Daughter     Hypertension Daughter Social History     Socioeconomic History    Marital status:      Spouse name: Not on file    Number of children: Not on file    Years of education: Not on file    Highest education level: Not on file   Occupational History    Occupation: unemployed     Comment: long term disability   Tobacco Use    Smoking status: Never    Smokeless tobacco: Never   Substance and Sexual Activity    Alcohol use: No    Drug use: No    Sexual activity: Not on file     Comment: Hysterectomy   Other Topics Concern    Not on file   Social History Narrative    Not on file     Social Determinants of Health     Financial Resource Strain: Not on file   Food Insecurity: Not on file   Transportation Needs: Not on file   Physical Activity: Not on file   Stress: Not on file   Social Connections: Not on file   Intimate Partner Violence: Not on file   Housing Stability: Not on file         ALLERGIES: Skelaxin [metaxalone] and Tramadol    Review of Systems   Unable to perform ROS: Patient nonverbal     There were no vitals filed for this visit. Physical Exam  Constitutional:       General: She is not in acute distress. Appearance: She is not ill-appearing, toxic-appearing or diaphoretic. HENT:      Head: Normocephalic and atraumatic. Right Ear: External ear normal.      Left Ear: External ear normal.      Nose: No congestion or rhinorrhea. Mouth/Throat:      Mouth: Mucous membranes are moist.      Pharynx: No oropharyngeal exudate or posterior oropharyngeal erythema. Eyes:      General:         Right eye: No discharge. Left eye: No discharge. Pupils: Pupils are equal, round, and reactive to light. Cardiovascular:      Rate and Rhythm: Normal rate and regular rhythm. Heart sounds: No murmur heard. No friction rub. No gallop. Pulmonary:      Effort: Pulmonary effort is normal. No respiratory distress. Breath sounds: No stridor. No wheezing, rhonchi or rales.    Abdominal: General: Abdomen is flat. There is no distension. Tenderness: There is no right CVA tenderness, left CVA tenderness, guarding or rebound. Musculoskeletal:         General: No swelling, tenderness, deformity or signs of injury. Cervical back: No rigidity or tenderness. Right lower leg: No edema. Left lower leg: No edema. Lymphadenopathy:      Cervical: No cervical adenopathy. Skin:     General: Skin is warm. Capillary Refill: Capillary refill takes less than 2 seconds. Coloration: Skin is not jaundiced or pale. Findings: No bruising, erythema, lesion or rash. Neurological:      Mental Status: She is alert. Motor: Weakness present. Comments: Demonstrates right-sided facial droop, generalized weakness right greater than left with aphasia. Therefore neurologic exam is somewhat limited. Psychiatric:         Mood and Affect: Mood normal.        MDM     Amount and/or Complexity of Data Reviewed  Clinical lab tests: reviewed  Tests in the radiology section of CPT®: reviewed      ED Course as of 07/21/22 1921   Thu Jul 21, 2022   1112 Presents as a potential code stroke, patient had sudden onset of left-sided weakness, right-sided facial droop, and altered mental status starting at 10 AM today, reportedly witnessed by bystanders. Have activated a stroke. Will consider tPA and/or thrombectomy in consultation with our telestroke team. [BR]   1859 Patient has been evaluated by our telemetry neurologist, they are recommending thrombolytic therapy as there is no LVO and no evidence of bleeding on CT. Patient's blood pressure is well within limits, I discussed the risks with the patient and she expresses understanding that the is as high of it is a 10% risk of significant intracranial hemorrhage you from receiving this medication and she wishes to proceed with this therapy. [JK]   9105 Case discussed with our neurologist, Dr. Marcelle Castillo, who will be evaluating patient.  [QZ] ED Course User Index  [JK] Gonsalo Javier MD       Procedures

## 2022-07-21 NOTE — ED NOTES
Returned from [address] [place] [address], via stretcher, accompanied by [employer], in stable condition. Assisted to room #1 and positioned to comfort. Explanation of plan of care provided to the patient; patient verbalized understanding.

## 2022-07-21 NOTE — CONSULTS
Consult    Patient: Hina Bowers MRN: [id number]  SSN: [id number]    YOB: 1965  Age: 62 y.o. Sex: female      Subjective: Hina Bowers is a 62 y.o. female who was brought to the emergency room this morning because, according to her, she was not feeling right. She complains of some mild dizziness. Range of the emergency department records, she was complaining of right arm and right leg weakness. One of the nursing notes in the ED talks about right facial weakness and left sided weakness, however. She denied any significant or any headache and any speech difficulties. She has been evaluated as a code stroke and has received tenecteplase after an evaluation by teleneurology. Medical history is positive for poorly defined history of migraine, and in fact she also carries a diagnosis of hemiplegic migraine. She was seen here in the emergency department and admitted in 2016, when she received tPA for right-sided weakness symptoms, 2018 tPA for similar symptoms, and she was seen by Dr. Emilee Whitlock in July 2021 when she was felt to have a functional examination and was not treated with thrombolytics. On that occasion she also did not have a cranial MRI scan, but on the other occasions when she was treated with thrombolytics, she had a cranial MRI which has never shown evidence of infarction. Generally had CT angiography during each of these evaluations showed no large vessel occlusion. She generally does not have significant headache during these episodes. Says she has 2 or 3 migraines per week and has many years. Her daughter, in the room today, said that she has headaches which she calls migraines herself and started 2 of her children (the patient's grandchildren) she does not give a \"typical history\" of hemiplegic migraine which would consist of a motor or any other aura prior to the headache.   During one of her recent admissions, probably in 2018, she was felt to have sided weakness which gradually improved over 1 to 2 days. She has no cardiac history, no diabetes, and she is a non-smoker. She takes aspirin 81 mg and propranolol 40 mg 3 times daily. She has taken Topamax in the past as a preventive, but does not recall taking or being given any triptans or other abortive migraine medications. Past Medical History:   Diagnosis Date    Asthma     Back pain with radiation     Cardiac echocardiogram, ltd study 12/29/2016    EF 55-60%. No WMA. Right to left atrial septal shunt suggests PFO. Similar to study of 10/18/16. Cardiovascular LE venous duplex 10/18/2016    No DVT bilaterally. Contact dermatitis and other eczema, due to unspecified cause     CTS (carpal tunnel syndrome)     HSV-2 (herpes simplex virus 2) infection     Hypercholesterolemia     Hypothyroidism     Ill-defined condition     Vertigo    L4-L5 disc bulge     Leg swelling     Migraines     Positive PPD, treated     S/P lumbar fusion 1/13/2016    1. L4-5 bilateral hemilaminotomy, medial facetectomy, foraminotomy, diskectomy L4-5. 2. Transforaminal lumbar interbody fusion with PEEK cage and demineralized bone graft L4-L5 posterolateral fusion.  3. Segmental spinal instrumentation, DePuy Expedium type L4-L5. 01/13/2016 By Dr. Estela Dial     Sciatica     EMG '17 , mild sciatic EMG findings    Stroke ([facility] [address]     12/2016    Vertigo      Past Surgical History:   Procedure Laterality Date    HX GYN      partial hysterectomy due to abnormal pap    HX LUMBAR LAMINECTOMY  01-13-16    L4/5      Family History   Problem Relation Age of Onset    Diabetes Mother     Elevated Lipids Mother     Hypertension Mother     Cancer Father         pancreatic    Diabetes Sister     Hypertension Sister     Diabetes Brother     Diabetes Daughter     Hypertension Daughter      Social History     Tobacco Use    Smoking status: Never    Smokeless tobacco: Never   Substance Use Topics    Alcohol use: No      Current Facility-Administered Medications   Medication Dose Route Frequency Provider Last Rate Last Admin    sodium chloride (NS) flush 5-40 mL  5-40 mL IntraVENous Q8H La Rodriguez PA-C        sodium chloride (NS) flush 5-40 mL  5-40 mL IntraVENous PRN La Rodriguez PA-C        ondansetron (ZOFRAN ODT) tablet 4 mg  4 mg Oral Q8H PRN La Rodriguez PA-C        Or    ondansetron (ZOFRAN) injection 4 mg  4 mg IntraVENous Q6H PRN La Rodriguez PA-C   4 mg at 07/21/22 1246    lactated Ringers infusion  75 mL/hr IntraVENous CONTINUOUS Carletha Sakinseys, PA-C 75 mL/hr at 07/21/22 1252 75 mL/hr at 07/21/22 1252    sodium chloride (NS) flush 5-40 mL  5-40 mL IntraVENous Q8H La Rodriguez PA-TAISHA        sodium chloride (NS) flush 5-40 mL  5-40 mL IntraVENous PRN La Rodriguez PA-C   10 mL at 07/21/22 1328    dextrose 10% infusion 0-250 mL  0-250 mL IntraVENous PRN La Rodriguez PA-C        famotidine (PF) (PEPCID) 20 mg in 0.9% sodium chloride 10 mL injection  20 mg IntraVENous Q12H La Rodriguez PA-C   20 mg at 07/21/22 1323     Current Outpatient Medications   Medication Sig Dispense Refill    predniSONE (DELTASONE) 10 mg tablet       propranoloL (INDERAL) 40 mg tablet Take 1 Tablet by mouth three (3) times daily. 270 Tablet 2    furosemide (LASIX) 20 mg tablet Take 1 Tablet by mouth daily. Indications: high blood pressure 30 Tablet 0    aspirin delayed-release 81 mg tablet Take 81 mg by mouth daily. desloratadine (CLARINEX) 5 mg tablet       fluticasone propionate (FLONASE) 50 mcg/actuation nasal spray       omeprazole (PRILOSEC) 40 mg capsule       gabapentin (NEURONTIN) 300 mg capsule Take 300 mg by mouth nightly. atorvastatin (LIPITOR) 80 mg tablet Take 1 Tab by mouth nightly. 30 Tab 0    budesonide-formoterol (SYMBICORT) 160-4.5 mcg/actuation HFA inhaler Take 2 Puffs by inhalation two (2) times a day.      naloxone (NARCAN) 4 mg/actuation nasal spray Use 1 spray intranasally into 1 nostril.  Use a new Narcan nasal spray for subsequent doses and administer into alternating nostrils. May repeat every 2 to 3 minutes as needed. 1 Each 0    albuterol (PROVENTIL HFA, VENTOLIN HFA, PROAIR HFA) 90 mcg/actuation inhaler Take 1 Puff by inhalation every four (4) hours as needed for Wheezing. 1 Inhaler 0    FERROUS SULFATE PO Take 325 mg by mouth.      potassium chloride SR (K-TAB) 20 mEq tablet Take 20 mEq by mouth daily. levothyroxine (SYNTHROID) 50 mcg tablet TAKE 1 TABLET BY MOUTH EVERY DAY  3    cholecalciferol (Vitamin D3) 25 mcg (1,000 unit) cap Take 2,000 Units by mouth daily. Allergies   Allergen Reactions    Skelaxin [Metaxalone] Other (comments)     jittery    Tramadol Other (comments)     Halluctations       Review of Systems: See the HPI      Objective:     Vitals:    07/21/22 1215 07/21/22 1230 07/21/22 1245 07/21/22 1300   BP: 103/70 124/86 120/73 128/71   Pulse: 65 68 65 63   Resp: 12 15 17 15   Temp: 97.8 °F (36.6 °C)   98.7 °F (37.1 °C)   SpO2: 96% 96% 98% 98%   Weight:       Height:            Physical Exam: This is an obese -American woman in no apparent distress. She does have a somewhat worried affect. Her daughter was in the room with her. Mental status: Alert, normal speech    Cranial nerves: She appears to have a functional weakness of the right orbicularis oculi. When she attempts to close the right eye there is no Bell's phenomenon, but when she closes the left eye, there is a Bell's phenomenon. Observation of her face when she is instructed to close her eyes completely shows that there is no evidence of weakness. Extraocular movements were within normal limits. No evidence of any other facial weakness. Motor: Trace of right upper extremity drift, but there is no pronation at the same time.   There was some subjective evidence of decreased ability to hold the right leg extended and off the bed compared to the left leg, with the strength appears to be normal.  Plantar responses were mute on both sides. Sensory: There is no area of decreased sensation on either side. Assessment:     Hospital Problems  Date Reviewed: 4/21/2022            Codes Class Noted POA    Stroke [facility]) ICD-10-CM: I63.9  ICD-9-CM: 434.91  7/21/2022 Unknown         This is a rather complex picture, with TIA, stroke, a functional disorder, and hemiplegic migraine all in the differential diagnosis. I do not believe that I have ever seen a rick mahogany case of hemiplegic migraine, and I would say that her symptoms do not fit the \"usual pattern\" which is seen with hemiplegic migraine, in other words, a motor aura plus another aura prior to the onset of headache. Her history is quite vague, at least today, and this makes the evaluation more difficult. On the positive side, she has had several cranial MRI scans when she has had symptomatology and none have ever shown infarction. In terms of risk factors, she has never had a stroke or myocardial infarction, she does not smoke, and her blood pressure is only moderately elevated. Her LDL is not elevated. Her HgA1c is slightly high. Plan:     Since she has received tenecteplase, she will be admitted to the ICU as part of the protocol. Cranial MRI scan should be done tomorrow to look for any evidence of infarction. I would not change her aspirin dose which should be restarted after the tenecteplase according to the protocol.     Signed By: Sylvester Roberto MD     July 21, 2022

## 2022-07-22 ENCOUNTER — APPOINTMENT (OUTPATIENT)
Dept: NON INVASIVE DIAGNOSTICS | Age: 57
DRG: 061 | End: 2022-07-22
Attending: PHYSICIAN ASSISTANT
Payer: MEDICARE

## 2022-07-22 ENCOUNTER — APPOINTMENT (OUTPATIENT)
Dept: MRI IMAGING | Age: 57
DRG: 061 | End: 2022-07-22
Attending: PHYSICIAN ASSISTANT
Payer: MEDICARE

## 2022-07-22 LAB
ANION GAP SERPL CALC-SCNC: 5 MMOL/L (ref 3–18)
BASOPHILS # BLD: 0.1 K/UL (ref 0–0.1)
BASOPHILS NFR BLD: 1 % (ref 0–2)
BUN SERPL-MCNC: 11 MG/DL (ref 7–18)
BUN/CREAT SERPL: 12 (ref 12–20)
CALCIUM SERPL-MCNC: 9 MG/DL (ref 8.5–10.1)
CHLORIDE SERPL-SCNC: 107 MMOL/L (ref 100–111)
CO2 SERPL-SCNC: 29 MMOL/L (ref 21–32)
COVID-19 RAPID TEST, COVR: DETECTED
CREAT SERPL-MCNC: 0.89 MG/DL (ref 0.6–1.3)
DIFFERENTIAL METHOD BLD: ABNORMAL
EOSINOPHIL # BLD: 0.2 K/UL (ref 0–0.4)
EOSINOPHIL NFR BLD: 2 % (ref 0–5)
ERYTHROCYTE [DISTWIDTH] IN BLOOD BY AUTOMATED COUNT: 15.7 % (ref 11.6–14.5)
GLUCOSE BLD STRIP.AUTO-MCNC: 102 MG/DL (ref 70–110)
GLUCOSE SERPL-MCNC: 99 MG/DL (ref 74–99)
HCT VFR BLD AUTO: 37.1 % (ref 35–45)
HGB BLD-MCNC: 11.2 G/DL (ref 12–16)
IMM GRANULOCYTES # BLD AUTO: 0 K/UL (ref 0–0.04)
IMM GRANULOCYTES NFR BLD AUTO: 0 % (ref 0–0.5)
INR PPP: 1 (ref 0.8–1.2)
LYMPHOCYTES # BLD: 4 K/UL (ref 0.9–3.6)
LYMPHOCYTES NFR BLD: 54 % (ref 21–52)
MAGNESIUM SERPL-MCNC: 2.1 MG/DL (ref 1.6–2.6)
MCH RBC QN AUTO: 23 PG (ref 24–34)
MCHC RBC AUTO-ENTMCNC: 30.2 G/DL (ref 31–37)
MCV RBC AUTO: 76.3 FL (ref 78–100)
MONOCYTES # BLD: 0.9 K/UL (ref 0.05–1.2)
MONOCYTES NFR BLD: 12 % (ref 3–10)
NEUTS SEG # BLD: 2.4 K/UL (ref 1.8–8)
NEUTS SEG NFR BLD: 31 % (ref 40–73)
NRBC # BLD: 0 K/UL (ref 0–0.01)
NRBC BLD-RTO: 0 PER 100 WBC
PHOSPHATE SERPL-MCNC: 3.5 MG/DL (ref 2.5–4.9)
PLATELET # BLD AUTO: 305 K/UL (ref 135–420)
PMV BLD AUTO: 10.8 FL (ref 9.2–11.8)
POTASSIUM SERPL-SCNC: 3.9 MMOL/L (ref 3.5–5.5)
PROTHROMBIN TIME: 13.6 SEC (ref 11.5–15.2)
RBC # BLD AUTO: 4.86 M/UL (ref 4.2–5.3)
SODIUM SERPL-SCNC: 141 MMOL/L (ref 136–145)
SOURCE, COVRS: ABNORMAL
TROPONIN-HIGH SENSITIVITY: 4 NG/L (ref 0–54)
TSH SERPL DL<=0.05 MIU/L-ACNC: 1.3 UIU/ML (ref 0.36–3.74)
WBC # BLD AUTO: 7.5 K/UL (ref 4.6–13.2)

## 2022-07-22 PROCEDURE — 99233 SBSQ HOSP IP/OBS HIGH 50: CPT | Performed by: PSYCHIATRY & NEUROLOGY

## 2022-07-22 PROCEDURE — 99291 CRITICAL CARE FIRST HOUR: CPT | Performed by: INTERNAL MEDICINE

## 2022-07-22 PROCEDURE — 84484 ASSAY OF TROPONIN QUANT: CPT

## 2022-07-22 PROCEDURE — 74011250637 HC RX REV CODE- 250/637: Performed by: PHYSICIAN ASSISTANT

## 2022-07-22 PROCEDURE — 92526 ORAL FUNCTION THERAPY: CPT

## 2022-07-22 PROCEDURE — 92610 EVALUATE SWALLOWING FUNCTION: CPT

## 2022-07-22 PROCEDURE — 74011000250 HC RX REV CODE- 250: Performed by: PHYSICIAN ASSISTANT

## 2022-07-22 PROCEDURE — 74011250636 HC RX REV CODE- 250/636: Performed by: PHYSICIAN ASSISTANT

## 2022-07-22 PROCEDURE — 85025 COMPLETE CBC W/AUTO DIFF WBC: CPT

## 2022-07-22 PROCEDURE — 82962 GLUCOSE BLOOD TEST: CPT

## 2022-07-22 PROCEDURE — 65270000029 HC RM PRIVATE

## 2022-07-22 PROCEDURE — 85610 PROTHROMBIN TIME: CPT

## 2022-07-22 PROCEDURE — 70551 MRI BRAIN STEM W/O DYE: CPT

## 2022-07-22 PROCEDURE — 83735 ASSAY OF MAGNESIUM: CPT

## 2022-07-22 PROCEDURE — 74011250637 HC RX REV CODE- 250/637: Performed by: INTERNAL MEDICINE

## 2022-07-22 PROCEDURE — 84443 ASSAY THYROID STIM HORMONE: CPT

## 2022-07-22 PROCEDURE — 80048 BASIC METABOLIC PNL TOTAL CA: CPT

## 2022-07-22 PROCEDURE — 36415 COLL VENOUS BLD VENIPUNCTURE: CPT

## 2022-07-22 PROCEDURE — 94762 N-INVAS EAR/PLS OXIMTRY CONT: CPT

## 2022-07-22 PROCEDURE — 84100 ASSAY OF PHOSPHORUS: CPT

## 2022-07-22 PROCEDURE — 2709999900 HC NON-CHARGEABLE SUPPLY

## 2022-07-22 RX ORDER — ACETAMINOPHEN 325 MG/1
650 TABLET ORAL
Status: DISCONTINUED | OUTPATIENT
Start: 2022-07-22 | End: 2022-07-23 | Stop reason: HOSPADM

## 2022-07-22 RX ADMIN — ACETAMINOPHEN 650 MG: 325 TABLET, FILM COATED ORAL at 21:41

## 2022-07-22 RX ADMIN — SODIUM CHLORIDE, PRESERVATIVE FREE 10 ML: 5 INJECTION INTRAVENOUS at 06:14

## 2022-07-22 RX ADMIN — ONDANSETRON 4 MG: 4 TABLET, ORALLY DISINTEGRATING ORAL at 21:41

## 2022-07-22 RX ADMIN — FAMOTIDINE 20 MG: 10 INJECTION, SOLUTION INTRAVENOUS at 10:15

## 2022-07-22 RX ADMIN — ACETAMINOPHEN, ASPIRIN AND CAFFEINE 1 TABLET: 250; 250; 65 TABLET, FILM COATED ORAL at 21:41

## 2022-07-22 NOTE — PROGRESS NOTES
MRI Screening form needs to be filled out and faxed to [address] MRI can be scheduled. If unable to obtain information from pt, [facility] needs to be contacted.  If pt is claustro or will need pain meds, please have ordered in advance in order to facilitate exam.

## 2022-07-22 NOTE — ROUTINE PROCESS
Bedside and Verbal shift change report given to Aditya Paz RN (oncoming nurse) by German Escudero RN (offgoing nurse). Report included the following information SBAR, Kardex, and Recent Results.

## 2022-07-22 NOTE — PROGRESS NOTES
Problem: Dysphagia (Adult)  Goal: *Acute Goals and Plan of Care (Insert Text)  Description: Patient will:  1. Tolerate PO trials with 0 s/s overt distress in 4/5 trials  2. Utilize compensatory swallow strategies/maneuvers (decrease bite/sip, size/rate, alt. liq/sol) with min cues in 4/5 trials      Rec:     Regular with thin liquids- meds per preference  Aspiration precautions  HOB >45 during po intake, remain >30 for 30-45 minutes after po   Small bites/sips; alternate liquid/solid with slow feeding rate         Outcome: Progressing Towards Goal     SPEECH LANGUAGE PATHOLOGY BEDSIDE SWALLOW   EVALUATION & TREATMENT     Patient: Cassandra Lopez (58 y.o. female)  Date: 7/22/2022  Primary Diagnosis: Stroke [facility]) [I63.9]       Precautions: Aspiration  PLOF: per H&P    ASSESSMENT :  Based on the objective data described below, the patient presents with oropharyngeal swallow fxn essentially WFL. Pt tolerated thin liquids +/- straw and in successive swallows without any overt s/sx of aspiration. She demo'd functional oral prep and timely swallow with pudding. Rotary chew with adequate bolus control and thorough oral clearance achieved. Laryngeal elevation appeared functional/timely to palpation. Pt reports her speech has returned to baseline. VQ clear, speech > 95% intelligible, speaks fluently in conversation with ease. Recto continue Regular/ thin liquid diet with meds per preference, aspiration precautions, oral care TID. ST will continue to follow 1-2x to ensure diet safety/tolerance. TREATMENT :  Skilled therapy initiated; Educated pt on aspiration precautions and importance of compensatory swallow techniques to decrease aspiration risk (decrease rate of intake & sip/bite size, upright @HOB for all po intake and ~30 minutes after po), positioning, and s/sx of aspiration; verbalized comprehension. SLP to follow as indicated.      Patient will benefit from skilled intervention to address the above impairments. Patient's rehabilitation potential is considered to be Excellent  Factors which may influence rehabilitation potential include:   []            None noted  []            Mental ability/status  [x]            Medical condition  []            Home/family situation and support systems  []            Safety awareness  []            Pain tolerance/management  []            Other:      PLAN :  Recommendations and Planned Interventions:  See above   Frequency/Duration: Patient will be followed by speech-language pathology 1-2 times per day/3-5 days per week to address goals. Discharge Recommendations: To Be Determined     SUBJECTIVE:   Patient stated I'm not a pudding person. ..applesauce. OBJECTIVE:     Past Medical History:   Diagnosis Date    Asthma     Back pain with radiation     Cardiac echocardiogram, ltd study 12/29/2016    EF 55-60%. No WMA. Right to left atrial septal shunt suggests PFO. Similar to study of 10/18/16. Cardiovascular LE venous duplex 10/18/2016    No DVT bilaterally. Contact dermatitis and other eczema, due to unspecified cause     CTS (carpal tunnel syndrome)     HSV-2 (herpes simplex virus 2) infection     Hypercholesterolemia     Hypothyroidism     Ill-defined condition     Vertigo    L4-L5 disc bulge     Leg swelling     Migraines     Positive PPD, treated     S/P lumbar fusion 1/13/2016    1. L4-5 bilateral hemilaminotomy, medial facetectomy, foraminotomy, diskectomy L4-5. 2. Transforaminal lumbar interbody fusion with PEEK cage and demineralized bone graft L4-L5 posterolateral fusion.  3. Segmental spinal instrumentation, DePuy Expedium type L4-L5. 01/13/2016 By Dr. See Angle     Sciatica     EMG '17 , mild sciatic EMG findings    Stroke ([facility] [address]     12/2016    Vertigo      Past Surgical History:   Procedure Laterality Date    HX GYN      partial hysterectomy due to abnormal pap    HX LUMBAR LAMINECTOMY  01-13-16    L4/5     Home Situation:   [address] [facility] Environment: Apartment  One/Two Story Residence: Two story  Living Alone: No  Support Systems: Child(lj)  Patient Expects to be Discharged to[de-identified] Home  Current DME Used/Available at Home: None    Cognitive and Communication Status:  Neurologic State: Alert  Orientation Level: Oriented X4  Cognition: Follows commands  Perception: Appears intact  Perseveration: No perseveration noted  Safety/Judgement: Awareness of environment  Oral Assessment:  Oral Assessment  Labial: No impairment  Dentition: Natural  Oral Hygiene: fair  Lingual: No impairment  Velum: No impairment  Mandible: No impairment  P.O. Trials:  Patient Position: 70  Vocal quality prior to P.O.: No impairment  Consistency Presented: Thin liquid; Solid;Pudding  How Presented: Spoon;Straw;Successive swallows;Cup/sip     Bolus Acceptance: No impairment  Bolus Formation/Control: No impairment     Propulsion: No impairment  Oral Residue: None  Initiation of Swallow: No impairment  Laryngeal Elevation: Functional  Aspiration Signs/Symptoms: None  Pharyngeal Phase Characteristics: No impairment, issues, or problems   Effective Modifications: None  Cues for Modifications: None       Oral Phase Severity: No impairment  Pharyngeal Phase Severity : No impairment    PAIN:  Pain level pre-treatment: 3/10 headache  Pain level post-treatment: 3/10   Pain Intervention(s): Medication (see MAR); Rest, Ice, Repositioning   Response to intervention: Nurse notified, See doc flow    After treatment:   []            Patient left in no apparent distress sitting up in chair  [x]            Patient left in no apparent distress in bed  [x]            Call bell left within reach  [x]            Nursing notified  []            Family present  []            Caregiver present  []            Bed alarm activated    COMMUNICATION/EDUCATION:   [x]            Aspiration precautions; swallow safety; compensatory techniques.   [x]            Patient/family have participated as able in goal setting and plan of care. [x]            Patient/family agree to work toward stated goals and plan of care. []            Patient understands intent and goals of therapy; neutral about participation. []            Patient unable to participate in goal setting/plan of care; educ ongoing with interdisciplinary staff  [x]         Posted safety precautions in patient's room.     Thank you for this referral.  Summer Hooks, SLP  MA, CCC-SLP  Speech-Language Pathologist    Time Calculation: 23 mins  Evaluation Time: 8 minutes   Treatment Time: 15 minutes

## 2022-07-22 NOTE — PROGRESS NOTES
Transient R sided weakness, S/P tenecteplase    S:  Feels well except for a mild h/a, \"behind my eyes\"    O: No upper extremity drift. Normal speech. Cranial MRI pending    A: Functional disorder v. TIA    P:  I discussed this with her. I told her that anxiety/stress can sometimes cause physical symptomology, and she considered this possible. I told her that she has few risk factors and her imaging has shown no stroke risk increasing factors except for a positive bubble study in 2016. She wants to know what she can do to prevent these episodes. I would recommend a cardiac evaluation (need for monitoring, need for ANGEL?) and referral to a stroke specialist for further clarification. Stay on ASA 81. She seemed open to the idea of seeing a therapist to explore any stress-related factor. She knows that thrombolysis carries a low risk of hemorrhage.

## 2022-07-22 NOTE — ROUTINE PROCESS
TRANSFER - IN REPORT:    Verbal report received from CHUCK Manley(name) on [address] [facility]  being received from ICU(unit) for routine progression of care      Report consisted of patients Situation, Background, Assessment and   Recommendations(SBAR). Information from the following report(s) SBAR, Kardex, and Recent Results was reviewed with the receiving nurse. Opportunity for questions and clarification was provided. Assessment completed upon patients arrival to unit and care assumed.

## 2022-07-22 NOTE — PROGRESS NOTES
Hospitalist Progress Note    Patient: Anup Kwan MRN: [id number]  CSN: [id number]    YOB: 1965  Age: 62 y.o. Sex: female    DOA: 7/21/2022 LOS:  LOS: 1 day            Assessment/Plan     Active Problems:    Stroke ([address] (7/21/2022)    Right-sided weakness: Clinically resolved. S/p tPA. CT head and CTA of the head negative at this stage. MRI of the brain pending. Neurology following. Continue aspirin 81 mg daily. Plan Home in the next 1 to 2 days once MRI of results are reported. COVID 19 infection: placed in isolation. Asymptomatic.       49-year-old female presented to the emergency room with complaints of right-sided weakness along with facial droop and also having lightheadedness. Code stroke was called and patient had a CT of the chest that was negative and subsequently neurology recommended tPA patient was admitted to ICU along with neurology in consultation. Patient did reasonably well did have a good improvement in symptoms. CTA of the head did not reveal any stenosis or any clots. Neurology followed up on her and recommended patient have an MRI of the brain, etiology of her symptoms was felt to likely related to functional disorder versus TIA versus migraine headache   and patient is being moved to telemetry floor for ongoing treatment plans. Vital signs/Intake and Output:  Visit Vitals  /71   Pulse 78   Temp 98.1 °F (36.7 °C)   Resp 17   Ht 5' 6\" (1.676 m)   Wt 112 kg (247 lb)   SpO2 97%   BMI 39.87 kg/m²     Current Shift:  07/22 0701 - 07/22 1900  In: -   Out: 1000 [Urine:1000]  Last three shifts:  No intake/output data recorded.       Medications Reviewed      Labs: Results:       Chemistry Recent Labs     07/22/22  0045 07/21/22  1100   GLU 99 111*    140   K 3.9 4.3    107   CO2 29 30   BUN 11 12   CREA 0.89 1.12   CA 9.0 9.1   AGAP 5 3   BUCR 12 11*   AP  --  118*   TP  --  8.5*   ALB  --  3.8   GLOB  --  4.7*   AGRAT  --  0.8      CBC w/Diff Recent Labs     07/22/22  0045 07/21/22  1100   WBC 7.5 7.5   RBC 4.86 5.30   HGB 11.2* 12.2   HCT 37.1 39.7    372   GRANS 31* 34*   LYMPH 54* 53*   EOS 2 2      Cardiac Enzymes No results for input(s): CPK, CKND1, VITOR in the last 72 hours. No lab exists for component: CKRMB, TROIP   Coagulation Recent Labs     07/22/22  0045   PTP 13.6   INR 1.0       Lipid Panel Lab Results   Component Value Date/Time    Cholesterol, total 153 07/21/2021 02:19 AM    HDL Cholesterol 41 07/21/2021 02:19 AM    LDL, calculated 98.2 07/21/2021 02:19 AM    VLDL, calculated 13.8 07/21/2021 02:19 AM    Triglyceride 69 07/21/2021 02:19 AM    CHOL/HDL Ratio 3.7 07/21/2021 02:19 AM      BNP No results for input(s): BNPP in the last 72 hours.    Liver Enzymes Recent Labs     07/21/22  1100   TP 8.5*   ALB 3.8   *      Thyroid Studies Lab Results   Component Value Date/Time    TSH 1.30 07/22/2022 12:45 AM        Procedures/imaging: see electronic medical records for all procedures/Xrays and details which were not copied into this note but were reviewed prior to creation of Plan        July 22, 2022  Ailyn Avina MD.  [facility]  [phone]

## 2022-07-22 NOTE — PROGRESS NOTES
Reason for Admission:  Stroke [facility]) [I63.9]                 RUR Score:    10%            Plan for utilizing home health:    TBD                      Likelihood of Readmission:   LOW                         Transition of Care Plan:              Initial assessment completed with patient. Cognitive status of patient: oriented to time, place, person and situation. Face sheet information confirmed:  yes. The patient designates Daisy Cheema daughter ([phone]) to participate in her discharge plan and to receive any needed information. This patient lives in a apartment with daughter. Patient was able to navigate steps as needed. Prior to hospitalization, patient was considered to be independent with ADLs/IADLS : yes . Patient has a current ACP document on file: yes    The patient's daughter will be available to transport patient home upon discharge. The patient already has CPAP available in the home. Patient is not currently active with home health. Patient has not stayed in a skilled nursing facility or rehab. This patient is on dialysis :no    Freedom of choice signed: no.     Currently, the discharge plan is Home w/ family assistance    Patient is with [facility]. CM will continue to monitor and assist with transition of care needs. The patient states that she can obtain her medications from the pharmacy, and take her medications as directed. Patient's current insurance is [employer]       Care Management Interventions  PCP Verified by CM: Yes  Mode of Transport at Discharge: Self  Transition of Care Consult (CM Consult): Discharge Planning  Discharge Durable Medical Equipment: No  Physical Therapy Consult: No  Occupational Therapy Consult: No  Speech Therapy Consult: Yes  Support Systems: Child(lj)  Confirm Follow Up Transport: Self  Discharge Location  Patient Expects to be Discharged to[de-identified] Home with family assistance      FRANK Walker, RN  Pager # [phone]  Care Manager

## 2022-07-22 NOTE — PROGRESS NOTES
[facility]. Pulmonary, Critical Care, and Sleep Medicine    Name: Kori Lee MRN: [id number]   : 1965 Hospital: [address] [place] [address] Dr   Date: 2022  Admission Date: 2022     Chart and notes reviewed. Data reviewed. I have evaluated all findings. [x]I have reviewed the flowsheet and previous days notes. []The patient is unable to give any meaningful history or review of systems because the patient is:  []Intubated []Sedated   []Unresponsive      []The patient is critically ill on      []Mechanical ventilation []Pressors   []BiPAP []         Interval HPI: 62year old female with a past medical history of HTN, TIA x (self reported), Asthma, hypothyroidism, and hemiplegic migraines, admitted to the unit with acute ischemic stroke vs TIA vs migraine w/ aura. She is s/p tPA on 22. Neuro following. Pt was also found to be COVID (+). Subjective 22  Hospital Day: 2  Vent Day: N/A  Overnight events: No significant events noted overnight  Mentation/Activity: AO x4  Respiratory/ Secretions: stable  Hemodynamics: stable  Urine output, bowel: adequate  Diet: Regular with thin liquids  Need for procedures: none    -MRI brain today. ROS:Pertinent items are noted in HPI. Events and notes from last 24 hours reviewed.      Patient Active Problem List   Diagnosis Code    Asthma J45.909    Hyperlipidemia E78.5    Eczema L30.9    Back pain with radiation M54.9    Spinal stenosis, lumbar region, without neurogenic claudication M48.061    Lumbar stenosis M48.061    S/P lumbar fusion Z98.1    Acute right-sided low back pain with sciatica M54.40    Neurological symptoms R29.90    Ischemic stroke (HCC) I63.9    Migraines G43.909    Leg swelling M79.89    Hypothyroidism E03.9    CTS (carpal tunnel syndrome) G56.00    Hypercholesterolemia E78.00    Ill-defined condition R69    Sciatica M54.30    Sciatic neuropathy G57.00    Chronic midline low back pain with right-sided sciatica M54.41, G89.29    Encounter for long-term (current) use of high-risk medication Z79.899    Chronic pain syndrome G89.4    History of lumbar surgery Z98.890    Protrusion of lumbar intervertebral disc M51.26    Acute ischemic stroke (HCC) I63.9    Obesity, morbid (HCC) C17.58    Complicated migraine H91.817    Stroke ([address] I63.9       Vital Signs:  Visit Vitals  /78   Pulse 77   Temp 98.3 °F (36.8 °C)   Resp 19   Ht 5' 6\" (1.676 m)   Wt 112 kg (247 lb)   SpO2 100%   BMI 39.87 kg/m²       O2 Device: None (Room air)       Temp (24hrs), Av.2 °F (36.8 °C), Min:97.8 °F (36.6 °C), Max:98.7 °F (37.1 °C)       Intake/Output:   Last shift:      No intake/output data recorded. Last 3 shifts: No intake/output data recorded. No intake or output data in the 24 hours ending 22 1136       Current Facility-Administered Medications   Medication Dose Route Frequency    sodium chloride (NS) flush 5-40 mL  5-40 mL IntraVENous Q8H    lactated Ringers infusion  75 mL/hr IntraVENous CONTINUOUS    sodium chloride (NS) flush 5-40 mL  5-40 mL IntraVENous Q8H    famotidine (PF) (PEPCID) 20 mg in 0.9% sodium chloride 10 mL injection  20 mg IntraVENous Q12H         Telemetry: [x]Sinus []A-flutter []Paced    []A-fib []Multiple PVCs                  Physical Exam:      General: Alert, cooperative, no distress. Head: Normocephalic, without obvious abnormality, atraumatic. Eyes: Conjunctivae/corneas clear. PERRL,   Nose: Nares normal. Septum midline. Mucosa normal. No drainage or sinus tenderness. Throat: Lips, mucosa, and tongue normal. Teeth and gums normal.   Neck: Supple, symmetrical, trachea midline, no adenopathy, thyroid: no enlargment/tenderness/nodules, no carotid bruit and no JVD. Back:   Symmetric, no curvature. ROM normal.   Lungs:   Clear to auscultation bilaterally. Chest wall: No tenderness or deformity. Heart: Regular rate and rhythm, S1, S2 normal, no murmur, click, rub or gallop. Abdomen:   Obese. Soft, non-tender. Bowel sounds normal. No masses,  No organomegaly. Extremities: Extremities normal, atraumatic, no cyanosis or edema. - restraints   Pulses: 2+ and symmetric all extremities. Skin: Skin color, texture, turgor normal. No rashes or lesions   Neurologic:  (+)4/5 strength RUE and RLE, (+)5/5 strength LUE and LLE, sensation intact and symmetrical. (-)aphasia. DATA:  MAR reviewed and pertinent medications noted or modified as needed    Labs:  Recent Labs     07/22/22  0045 07/21/22  1100   WBC 7.5 7.5   HGB 11.2* 12.2   HCT 37.1 39.7    372     Recent Labs     07/22/22  0045 07/21/22  1100    140   K 3.9 4.3    107   CO2 29 30   GLU 99 111*   BUN 11 12   CREA 0.89 1.12   CA 9.0 9.1   MG 2.1 2.2   PHOS 3.5  --    ALB  --  3.8   ALT  --  49   INR 1.0  --      No results for input(s): PH, PCO2, PO2, HCO3, FIO2 in the last 72 hours. No results for input(s): FIO2I, IFO2, HCO3I, IHCO3, HCOPOC, PCO2I, PCOPOC, IPHI, PHI, PHPOC, PO2I, PO2POC in the last 72 hours. No lab exists for component: IPOC2    Imaging:  [x]   I have personally reviewed the patients radiographs and reports  XR Results (most recent):  XR Results (most recent):  Results from Hospital Encounter encounter on 11/10/19    XR CHEST PORT    Narrative  EXAM: CHEST ONE VIEW  portable 2301 hours    CLINICAL HISTORY/INDICATION: cough with upper respiratory infection x1 week,  shortness of breath    COMPARISON: Chest x-ray July 23, 2017. TECHNIQUE: One view obtained. FINDINGS:    The cardiac and mediastinal silhouette is normal. The lungs are clear. Pulmonary  vascularity is normal. The costophrenic angles are sharply defined. No bony  abnormalities are seen.     Impression  IMPRESSION:    Negative chest.     CT Results (most recent):  Results from Hospital Encounter encounter on 07/21/22    CTA HEAD NECK W CONT    Narrative  EXAM: CTA HEAD NECK W CONT    CLINICAL INDICATIONS: Stroke R facial falguni CARTER L weakness    TECHNIQUE: Thin section CT scan of the brain and neck performed during rapid IV  bolus contrast administration. These data were reconstructed for vascular  analysis. 3D post processed images, including surface shaded displays, were  produced for this exam on independent console, permanently archived and  interpreted. All CT scans at this facility are performed using dose optimization technique as  appropriate to a performed exam, to include automated exposure control,  adjustment of the MA and/or kV according to patient size (including appropriate  matching for site-specific examinations) or use of  iterative reconstruction  technique. COMPARISON: Prior CTA July 2021    CTA SOFT TISSUE ANALYSIS:  Lungs: Clear  Upper chest: Unremarkable  Neck: Redemonstration of 2 cm nodular morphology of the inferior aspect of the  left ear. Lymph nodes: No adenopathy  Orbits: Unremarkable  Paranasal sinuses: Minimal mucous retention cyst of the right maxillary sinus. Brain: No hemorrhage or mass effect. Bones: Unremarkable for age. CTA NECK VASCULAR ANALYSIS:    Aortic arch: Unremarkable    Innominate: Patent    Right Subclavian: Patent  Left Subclavian: Patent    Right carotid:  -CCA: Patent  -ECA: Patent  -ICA: Patent. Estimated no/0% stenosis of proximal ICA by NASCET criteria. Left carotid:  -CCA: Patent  -ECA: Patent  -ICA: Patent. Estimated no/0% stenosis of proximal ICA by NASCET criteria. Right vertebral: Patent    Left vertebral: Patent      CTA BRAIN VASCULAR ANALYSIS:    Right anterior circulation:  -ICA: Patent  -VINH: Patent  -MCA: Patent    Left anterior circulation:  -ICA: Patent  -VINH: Patent  -MCA: Patent    -ACOM: No focal finding  -PCOM: No focal finding    Posterior circulation:  -RVA: Patent  -LVA: Patent  -Basilar: Patent  -Right PCA: Patent  -Left PCA: Patent    Impression  1.  Patent intra- and extracranial brain arteries.  -No hemodynamically significant stenosis of cervical carotid arteries  -No acute intracranial large vessel occlusion  -Unchanged morphology of left pinna, with 2 cm nodular appearance of its  inferior margin perhaps \"cauliflower ear\" amenable to direct inspection. Relevant vascular findings preliminarily discussed by me with Dr. Charissa Gregory  7/21/2022 at 66 65 76 hours       07/14/18    ECHO ADULT COMPLETE 07/16/2018 7/16/2018    Interpretation Summary  · Patient had positive bubble study induced by valsalva maneuver on 12/29/2016. Bubble study not repeated today. · Estimated left ventricular ejection fraction is 61 - 65%. Normal left ventricular wall motion, no regional wall motion abnormality noted. Mild (grade 1) left ventricular diastolic dysfunction. · There is no evidence of pulmonary hypertension. · Mitral annular calcification. No significant regurgitation.     Signed by: Ross Funk MD on 7/16/2018  5:01 PM       IMPRESSION:   Acute ischemic stroke vs TIA- s/p TPA, CT head and CTA head/neck (-)  Hx of HTN  Hx of Hyperlipidemia  Hx of Asthma  Hx of TIA  Hx of hypothyroidism   Hx of hemiplegic migraine     Patient Active Problem List   Diagnosis Code    Asthma J45.909    Hyperlipidemia E78.5    Eczema L30.9    Back pain with radiation M54.9    Spinal stenosis, lumbar region, without neurogenic claudication M48.061    Lumbar stenosis M48.061    S/P lumbar fusion Z98.1    Acute right-sided low back pain with sciatica M54.40    Neurological symptoms R29.90    Ischemic stroke (HCC) I63.9    Migraines G43.909    Leg swelling M79.89    Hypothyroidism E03.9    CTS (carpal tunnel syndrome) G56.00    Hypercholesterolemia E78.00    Ill-defined condition R69    Sciatica M54.30    Sciatic neuropathy G57.00    Chronic midline low back pain with right-sided sciatica M54.41, G89.29    Encounter for long-term (current) use of high-risk medication Z79.899    Chronic pain syndrome G89.4    History of lumbar surgery Z98.890    Protrusion of lumbar intervertebral disc M51.26    Acute ischemic stroke (HCC) I63.9    Obesity, morbid (HCC) H08.13    Complicated migraine I56.978    Stroke ([facility] I63.9        RECOMMENDATIONS:   Neuro: Monitor neuro status with serial exams. MRI brain pending  Pulm: Supplemental O2 via NC PRN, titrate flow for goal SPO2> 90% Bronchodilators, steroids, pulmonary hygiene care, Aspiration precautions, Keep HOB >30 degrees  CVS :Actively titrate vasopressors aim MAP >65mmHg, Check cardiac panel, ECHO results. Fluids: LR @ 75/hr  GI: SUP, Trend LFTs, Zofran PRN for N/V, Diet  Renal:  Trend Renal indices, Strict Is/Os, Buchanan (-)  Hem/Onc: Monitor for s/o active bleeding. I/D:Trend WBCs and temperature curve. Endocrine: Q6 glucoses, SSI. Check TSH level  Metabolic:  Daily BMP, mag, phos. Trend lytes, replace as needed. Musc/Skin: no acute issues, wound care  Full Code     Best practice :    Glycemic control  Stress ulcer prophylaxis. Pepcid  DVT prophylaxis. SCDs  Need for Lines, buchanan assessed. Palliative care evaluation. Restraints not needed. This care involved high complexity decision making to assess, manipulate, and support vital system functions, to treat this degreee vital organ system failure and to prevent further life threatening deterioration of the patients condition  The services I provided to this patient were to treat and/or prevent clinically significant deterioration that could result in the failure of one or more body systems and/or organ systems due to respiratory distress, hypoxia, cardiac dysrhythmia.        Rosy Espitia PA-C   07/22/22  Pulmonary, Critical Care Medicine  [facility]

## 2022-07-23 ENCOUNTER — APPOINTMENT (OUTPATIENT)
Dept: NON INVASIVE DIAGNOSTICS | Age: 57
DRG: 061 | End: 2022-07-23
Attending: PHYSICIAN ASSISTANT
Payer: MEDICARE

## 2022-07-23 VITALS
WEIGHT: 248 LBS | RESPIRATION RATE: 18 BRPM | HEART RATE: 96 BPM | SYSTOLIC BLOOD PRESSURE: 127 MMHG | BODY MASS INDEX: 39.86 KG/M2 | HEIGHT: 66 IN | DIASTOLIC BLOOD PRESSURE: 86 MMHG | TEMPERATURE: 97.5 F | OXYGEN SATURATION: 97 %

## 2022-07-23 DIAGNOSIS — I63.9 ISCHEMIC STROKE (HCC): Primary | ICD-10-CM

## 2022-07-23 LAB
ANION GAP SERPL CALC-SCNC: 6 MMOL/L (ref 3–18)
BASOPHILS # BLD: 0.1 K/UL (ref 0–0.1)
BASOPHILS NFR BLD: 1 % (ref 0–2)
BUN SERPL-MCNC: 11 MG/DL (ref 7–18)
BUN/CREAT SERPL: 12 (ref 12–20)
CALCIUM SERPL-MCNC: 8.9 MG/DL (ref 8.5–10.1)
CHLORIDE SERPL-SCNC: 106 MMOL/L (ref 100–111)
CO2 SERPL-SCNC: 28 MMOL/L (ref 21–32)
CREAT SERPL-MCNC: 0.94 MG/DL (ref 0.6–1.3)
DIFFERENTIAL METHOD BLD: ABNORMAL
ECHO AO ROOT DIAM: 2.4 CM
ECHO AO ROOT INDEX: 1.1 CM/M2
ECHO LA DIAMETER INDEX: 1.55 CM/M2
ECHO LA DIAMETER: 3.4 CM
ECHO LA TO AORTIC ROOT RATIO: 1.42
ECHO LA VOL 2C: 33 ML (ref 22–52)
ECHO LA VOL 4C: 35 ML (ref 22–52)
ECHO LA VOLUME AREA LENGTH: 36 ML
ECHO LA VOLUME INDEX A2C: 15 ML/M2 (ref 16–34)
ECHO LA VOLUME INDEX A4C: 16 ML/M2 (ref 16–34)
ECHO LA VOLUME INDEX AREA LENGTH: 16 ML/M2 (ref 16–34)
ECHO LV FRACTIONAL SHORTENING: 44 % (ref 28–44)
ECHO LV INTERNAL DIMENSION DIASTOLE INDEX: 1.78 CM/M2
ECHO LV INTERNAL DIMENSION DIASTOLIC: 3.9 CM (ref 3.9–5.3)
ECHO LV INTERNAL DIMENSION SYSTOLIC INDEX: 1 CM/M2
ECHO LV INTERNAL DIMENSION SYSTOLIC: 2.2 CM
ECHO LV IVSD: 0.9 CM (ref 0.6–0.9)
ECHO LV MASS 2D: 122.1 G (ref 67–162)
ECHO LV MASS INDEX 2D: 55.8 G/M2 (ref 43–95)
ECHO LV POSTERIOR WALL DIASTOLIC: 1.1 CM (ref 0.6–0.9)
ECHO LV RELATIVE WALL THICKNESS RATIO: 0.56
ECHO RV FREE WALL PEAK S': 17 CM/S
ECHO RV TAPSE: 1.7 CM (ref 1.7–?)
EOSINOPHIL # BLD: 0.1 K/UL (ref 0–0.4)
EOSINOPHIL NFR BLD: 2 % (ref 0–5)
ERYTHROCYTE [DISTWIDTH] IN BLOOD BY AUTOMATED COUNT: 15.2 % (ref 11.6–14.5)
GLUCOSE SERPL-MCNC: 88 MG/DL (ref 74–99)
HCT VFR BLD AUTO: 37.7 % (ref 35–45)
HGB BLD-MCNC: 11.5 G/DL (ref 12–16)
IMM GRANULOCYTES # BLD AUTO: 0 K/UL (ref 0–0.04)
IMM GRANULOCYTES NFR BLD AUTO: 0 % (ref 0–0.5)
LYMPHOCYTES # BLD: 3.6 K/UL (ref 0.9–3.6)
LYMPHOCYTES NFR BLD: 53 % (ref 21–52)
MAGNESIUM SERPL-MCNC: 2.2 MG/DL (ref 1.6–2.6)
MCH RBC QN AUTO: 23 PG (ref 24–34)
MCHC RBC AUTO-ENTMCNC: 30.5 G/DL (ref 31–37)
MCV RBC AUTO: 75.4 FL (ref 78–100)
MONOCYTES # BLD: 0.7 K/UL (ref 0.05–1.2)
MONOCYTES NFR BLD: 10 % (ref 3–10)
NEUTS SEG # BLD: 2.3 K/UL (ref 1.8–8)
NEUTS SEG NFR BLD: 34 % (ref 40–73)
NRBC # BLD: 0 K/UL (ref 0–0.01)
NRBC BLD-RTO: 0 PER 100 WBC
PHOSPHATE SERPL-MCNC: 3.3 MG/DL (ref 2.5–4.9)
PLATELET # BLD AUTO: 318 K/UL (ref 135–420)
PMV BLD AUTO: 10.9 FL (ref 9.2–11.8)
POTASSIUM SERPL-SCNC: 3.7 MMOL/L (ref 3.5–5.5)
RBC # BLD AUTO: 5 M/UL (ref 4.2–5.3)
SODIUM SERPL-SCNC: 140 MMOL/L (ref 136–145)
WBC # BLD AUTO: 6.8 K/UL (ref 4.6–13.2)

## 2022-07-23 PROCEDURE — 84100 ASSAY OF PHOSPHORUS: CPT

## 2022-07-23 PROCEDURE — 2709999900 HC NON-CHARGEABLE SUPPLY

## 2022-07-23 PROCEDURE — 93308 TTE F-UP OR LMTD: CPT

## 2022-07-23 PROCEDURE — 99232 SBSQ HOSP IP/OBS MODERATE 35: CPT | Performed by: PSYCHIATRY & NEUROLOGY

## 2022-07-23 PROCEDURE — 36415 COLL VENOUS BLD VENIPUNCTURE: CPT

## 2022-07-23 PROCEDURE — 99239 HOSP IP/OBS DSCHRG MGMT >30: CPT | Performed by: HOSPITALIST

## 2022-07-23 PROCEDURE — 85025 COMPLETE CBC W/AUTO DIFF WBC: CPT

## 2022-07-23 PROCEDURE — 83735 ASSAY OF MAGNESIUM: CPT

## 2022-07-23 PROCEDURE — 80048 BASIC METABOLIC PNL TOTAL CA: CPT

## 2022-07-23 PROCEDURE — 99223 1ST HOSP IP/OBS HIGH 75: CPT | Performed by: INTERNAL MEDICINE

## 2022-07-23 NOTE — PROGRESS NOTES
Discussed with Neuro - pt is ok to go home at this time since her symptoms have resolved but she has received tPA multiple times in the last few years & obviously is a big concern for hemorrhage with it. Her CT Head, CTA Head & Neck & MRI are normal.  Cardiology consulted to r/o embolic source. Per Cardiology ok to discharge since Echo is Normal but will need Loop recorder set up as out pt. Discharge orders placed.

## 2022-07-23 NOTE — DISCHARGE SUMMARY
Hospitalist Discharge Summary    Patient: Tanmay Guzman MRN: [id number]  CSN: [id number]    YOB: 1965  Age: 62 y.o. Sex: female    DOA: 7/21/2022 LOS:  LOS: 2 days   Discharge Date:     Admission Diagnoses: Stroke [facility]) [I63.9]    Discharge Diagnoses:    TIA  History of COPD/asthma  History of hypothyroidism  History of hypertension  COVID-19 infection        Discharge Condition: Fair    Discharge To: Home    CODE STATUS: Full Code      PHYSICAL EXAM  Visit Vitals  /84   Pulse 85   Temp 98.2 °F (36.8 °C)   Resp 18   Ht 5' 6\" (1.676 m)   Wt 112.5 kg (248 lb)   SpO2 96%   BMI 40.03 kg/m²       General: Alert, cooperative, no acute distress    Lungs:  CTA Bilaterally. No Wheezing/Rhonchi/Rales. Heart:  Regular rate and Rhythm. Abdomen: Soft, Non distended, Non tender. + Bowel sounds. Extremities: No edema/ cyanosis/ clubbing  Neurologic:  AA oriented X 3. Moves all extremities. This patient has been seen and evaluated at the request of Dr. Tracy Mayfield for Acute ischemic stroke vs TIA.    07/21/22     Patient is a 62 y.o. female with a past medical history of HTN, TIA, Asthma, hypothyroidism, came in to the ED with complaints R sided weakness, facial droop, and aphasia starting around 10am today. Upon arrival to the ED, pt's VSS. Code S was activated, tele neuro consulted and pt received tPA. Labs were unremarkable. CT head and CTA head/neck (-). Dr. Yennifer Diaz was consulted as well. Upon my initial evaluation, pt is AO x4, hemodynamically stable, O2 sats 98% on RA, no acute distress. She states she has had a TIA x4 in the past and has received tPA before. She takes Aspirin outpt. Pt denies recent history of fever, chills, SOB, chest pain, nausea, vomiting, abdominal pain. Denies hx of MI, DVT, PE. Denies tobacco/drug/alcohol use.          Hospital Course:     59-year-old female presented to the emergency room with complaints of right-sided weakness along with facial droop and also having lightheadedness. Code stroke was called and patient had a CT of the chest that was negative and subsequently neurology recommended tPA patient was admitted to ICU along with neurology in consultation. Patient did reasonably well did have a good improvement in symptoms. CTA of the head did not reveal any stenosis or any clots. Neurology followed up on her and recommended patient have an MRI of the brain, etiology of her symptoms was felt to likely related to functional disorder versus TIA versus migraine headache   and patient is being moved to telemetry floor for ongoing treatment plans. MRI brain did not show any acute changes, patient is currently at baseline. Patient was evaluated by neurology and the recommendation was to get cardiology consult for loop recorder/outpatient monitoring to see if her problems are related to an embolic event. Cardiology consulted, advised that since echocardiogram was within normal limits, patient will be discharged home and they will follow-up with her as outpatient and will probably need a loop recorder. Coincidently, patient was also noted to be positive for COVID-19 infection however was asymptomatic and is advised to follow-up with PCP in 2 weeks. Discharge plan discussed with patient who verbalized understanding. Follow up Care: With PCP, cardiology and neurology in 2 weeks    Consults: Cardiology and Neurology    Significant Diagnostic Studies:     Imaging:  XR Results (most recent):  Results from Hospital Encounter encounter on 11/10/19    XR CHEST PORT    Narrative  EXAM: CHEST ONE VIEW  portable 2301 hours    CLINICAL HISTORY/INDICATION: cough with upper respiratory infection x1 week,  shortness of breath    COMPARISON: Chest x-ray July 23, 2017. TECHNIQUE: One view obtained. FINDINGS:    The cardiac and mediastinal silhouette is normal. The lungs are clear.  Pulmonary  vascularity is normal. The costophrenic angles are sharply defined. No bony  abnormalities are seen. Impression  IMPRESSION:    Negative chest.       CT Results (most recent):  Results from Hospital Encounter encounter on 07/21/22    CTA HEAD NECK W CONT    Narrative  EXAM: CTA HEAD NECK W CONT    CLINICAL INDICATIONS: Stroke R facial droop AMS, L weakness    TECHNIQUE: Thin section CT scan of the brain and neck performed during rapid IV  bolus contrast administration. These data were reconstructed for vascular  analysis. 3D post processed images, including surface shaded displays, were  produced for this exam on independent console, permanently archived and  interpreted. All CT scans at this facility are performed using dose optimization technique as  appropriate to a performed exam, to include automated exposure control,  adjustment of the MA and/or kV according to patient size (including appropriate  matching for site-specific examinations) or use of  iterative reconstruction  technique. COMPARISON: Prior CTA July 2021    CTA SOFT TISSUE ANALYSIS:  Lungs: Clear  Upper chest: Unremarkable  Neck: Redemonstration of 2 cm nodular morphology of the inferior aspect of the  left ear. Lymph nodes: No adenopathy  Orbits: Unremarkable  Paranasal sinuses: Minimal mucous retention cyst of the right maxillary sinus. Brain: No hemorrhage or mass effect. Bones: Unremarkable for age. CTA NECK VASCULAR ANALYSIS:    Aortic arch: Unremarkable    Innominate: Patent    Right Subclavian: Patent  Left Subclavian: Patent    Right carotid:  -CCA: Patent  -ECA: Patent  -ICA: Patent. Estimated no/0% stenosis of proximal ICA by NASCET criteria. Left carotid:  -CCA: Patent  -ECA: Patent  -ICA: Patent. Estimated no/0% stenosis of proximal ICA by NASCET criteria.     Right vertebral: Patent    Left vertebral: Patent      CTA BRAIN VASCULAR ANALYSIS:    Right anterior circulation:  -ICA: Patent  -VINH: Patent  -MCA: Patent    Left anterior circulation:  -ICA: Patent  -VINH: Patent  -MCA: Patent    -ACOM: No focal finding  -PCOM: No focal finding    Posterior circulation:  -RVA: Patent  -LVA: Patent  -Basilar: Patent  -Right PCA: Patent  -Left PCA: Patent    Impression  1. Patent intra- and extracranial brain arteries.  -No hemodynamically significant stenosis of cervical carotid arteries  -No acute intracranial large vessel occlusion  -Unchanged morphology of left pinna, with 2 cm nodular appearance of its  inferior margin perhaps \"cauliflower ear\" amenable to direct inspection. Relevant vascular findings preliminarily discussed by me with Dr. Maribel Clinton  7/21/2022 at 1137 hours      07/21/22    ECHO ADULT FOLLOW-UP OR LIMITED 07/23/2022 7/23/2022    Interpretation Summary  Formatting of this result is different from the original.      Limited echo per Covid protocol    Left Ventricle: Normal left ventricular systolic function with a visually estimated EF of 55 - 60%. Left ventricle size is normal. Mildly increased wall thickness. Normal wall motion. Normal right ventricular size and function    Insufficient TR to estimate pulmonary artery pressure    History of positive bubble study on previous echocardiogram    Signed by: Kristin Garcia MD on 7/23/2022 11:06 AM       MRI Results (most recent):  Results from [place] encounter on 07/21/22    MRI BRAIN WO CONT    Narrative  EXAM: MRI BRAIN WO CONT    CLINICAL INDICATION/HISTORY: History hypothyroidism now with sudden onset facial  droop generalized weakness and aphasia    TECHNIQUE: Multisequence multiplanar MR imaging acquired through the brain. Contrast used: None    COMPARISON: CT head, CTA July 21, 2022 MRI July 2018    FINDINGS:    Parenchyma: No acute infarction seen hemispheres appears symmetric    No evidence of any old cortical ischemic injury    Deep white matter plan no evidence of any chronic small vessel disease    No acute hemorrhage. No mass lesion.     CSF spaces: Ventricles and cisterns remain midline in position    IAC regions: Unremarkable    Parasellar region: Unremarkable    Vasculature: Appropriate flow voids within the major skull base vasculature. Cervicomedullary junction: Patent    Orbits: Unremarkable    Paranasal sinuses: Clear    Impression  Negative noncontrast MRI of the brain    Incidental note is made of intraparotid lymph nodes on the right and left side    Appear benign clinically insignificant        Procedures:     None       Current Discharge Medication List        CONTINUE these medications which have NOT CHANGED    Details   montelukast (SINGULAIR) 10 mg tablet Take 10 mg by mouth nightly. FLUoxetine (PROzac) 10 mg capsule Take 10 mg by mouth nightly. Indications: premenstrual disorder with a state of unhappiness      propranoloL (INDERAL) 40 mg tablet Take 1 Tablet by mouth three (3) times daily. Qty: 270 Tablet, Refills: 2      furosemide (LASIX) 20 mg tablet Take 1 Tablet by mouth daily. Indications: high blood pressure  Qty: 30 Tablet, Refills: 0      aspirin delayed-release 81 mg tablet Take 81 mg by mouth daily. desloratadine (CLARINEX) 5 mg tablet       omeprazole (PRILOSEC) 40 mg capsule       gabapentin (NEURONTIN) 300 mg capsule Take 300 mg by mouth nightly. atorvastatin (LIPITOR) 80 mg tablet Take 1 Tab by mouth nightly. Qty: 30 Tab, Refills: 0      budesonide-formoteroL (SYMBICORT) 160-4.5 mcg/actuation HFAA Take 2 Puffs by inhalation two (2) times a day. albuterol (PROVENTIL HFA, VENTOLIN HFA, PROAIR HFA) 90 mcg/actuation inhaler Take 1 Puff by inhalation every four (4) hours as needed for Wheezing. Qty: 1 Inhaler, Refills: 0      FERROUS SULFATE PO Take 325 mg by mouth. Associated Diagnoses: Intractable chronic migraine without aura and with status migrainosus; Neurological symptoms      potassium chloride SR (K-TAB) 20 mEq tablet Take 20 mEq by mouth daily.       levothyroxine (SYNTHROID) 50 mcg tablet TAKE 1 TABLET BY MOUTH EVERY DAY  Refills: 3 cholecalciferol (Vitamin D3) 25 mcg (1,000 unit) cap Take 2,000 Units by mouth daily. STOP taking these medications       diclofenac EC (VOLTAREN) 75 mg EC tablet Comments:   Reason for Stopping:         predniSONE (DELTASONE) 10 mg tablet Comments:   Reason for Stopping:         fluticasone propionate (FLONASE) 50 mcg/actuation nasal spray Comments:   Reason for Stopping:         naloxone (NARCAN) 4 mg/actuation nasal spray Comments:   Reason for Stopping:                 Activity: Activity as tolerated    Diet: Cardiac Diet    Wound Care: None needed      Presley Garcia MD  7/23/2022, 12:22 PM    Total time spent 33 mins  Disclaimer: Sections of this note are dictated using utilizing voice recognition software. Minor typographical errors may be present. If questions arise, please do not hesitate to contact me or call our department.

## 2022-07-23 NOTE — PROGRESS NOTES
Functional disorder versus TIA    S: She feels well    O: Her cranial MRI scan showed no evidence of acute infarction. A: I cannot rule out a TIA, nor can I rule out a functional disorder. P: We talked about this again.   Recommended that she see a cardiologist for their assessment of embolic risk and evaluation by a stroke specialist.

## 2022-07-23 NOTE — DISCHARGE INSTRUCTIONS
Patient armband removed and shredded  MyChart Activation    Thank you for requesting access to [employer]. Please follow the instructions below to securely access and download your online medical record. [employer] allows you to send messages to your doctor, view your test results, renew your prescriptions, schedule appointments, and more. How Do I Sign Up? In your internet browser, go to [email or web address]  Click on the First Time User? Click Here link in the Sign In box. You will be redirect to the New Member Sign Up page. Enter your [employer] Access Code exactly as it appears below. You will not need to use this code after youve completed the sign-up process. If you do not sign up before the expiration date, you must request a new code. [employer] Access Code: Activation code not generated  Current [employer] Status: Active (This is the date your [employer] access code will )    Enter the last four digits of your Social Security Number (xxxx) and Date of Birth (mm/dd/yyyy) as indicated and click Submit. You will be taken to the next sign-up page. Create a [employer] ID. This will be your [employer] login ID and cannot be changed, so think of one that is secure and easy to remember. Create a [employer] password. You can change your password at any time. Enter your Password Reset Question and Answer. This can be used at a later time if you forget your password. Enter your e-mail address. You will receive e-mail notification when new information is available in [address] Click Sign Up. You can now view and download portions of your medical record. Click the [place] link to download a portable copy of your medical information. Additional Information    If you have questions, please visit the Frequently Asked Questions section of the [employer] website at [email or web address] Remember, [employer] is NOT to be used for urgent needs. For medical emergencies, dial 911.     As part of the discharge instructions, medications already given today were discussed with the patient. The next dose due of all ordered meds was highlighted as part of the medication discharge instructions. Discussed with the patient the importance of taking medications as directed, as well as the side effects and adverse reactions to medications ordered. DISCHARGE SUMMARY from Nurse    PATIENT INSTRUCTIONS:    After general anesthesia or intravenous sedation, for 24 hours or while taking prescription Narcotics:  Limit your activities  Do not drive and operate hazardous machinery  Do not make important personal or business decisions  Do  not drink alcoholic beverages  If you have not urinated within 8 hours after discharge, please contact your surgeon on call. Report the following to your surgeon:  Excessive pain, swelling, redness or odor of or around the surgical area  Temperature over 100.5  Nausea and vomiting lasting longer than 4 hours or if unable to take medications  Any signs of decreased circulation or nerve impairment to extremity: change in color, persistent  numbness, tingling, coldness or increase pain  Any questions    What to do at Home:  Recommended activity: Activity as tolerated,     If you experience any of the following symptoms signs of stroke see below, please follow up with Emergency Department or Primary MD.    *  Please give a list of your current medications to your Primary Care Provider. *  Please update this list whenever your medications are discontinued, doses are      changed, or new medications (including over-the-counter products) are added. *  Please carry medication information at all times in case of emergency situations. These are general instructions for a healthy lifestyle:    No smoking/ No tobacco products/ Avoid exposure to second hand smoke  Surgeon General's Warning:  Quitting smoking now greatly reduces serious risk to your health.     Obesity, smoking, and sedentary lifestyle greatly increases your risk for illness    A healthy diet, regular physical exercise & weight monitoring are important for maintaining a healthy lifestyle    You may be retaining fluid if you have a history of heart failure or if you experience any of the following symptoms:  Weight gain of 3 pounds or more overnight or 5 pounds in a week, increased swelling in our hands or feet or shortness of breath while lying flat in bed. Please call your doctor as soon as you notice any of these symptoms; do not wait until your next office visit. The discharge information has been reviewed with the patient. The patient verbalized understanding. Discharge medications reviewed with the patient and appropriate educational materials and side effects teaching were provided.   ___________________________________________________________________________________________________________________________________

## 2022-07-23 NOTE — CONSULTS
[facility] - Consult Note    Consultation request by Dr. Sohan Menjivar for recurrent stroke    Date of  Admission: 7/21/2022 11:10 AM   Primary Care Physician:  Mariana Cochran MD     Assessment:     -Recurrent stroke/TIA, s/p TPA. H/o previous TIA/strokes x 3 per patient. No obvious cause but she does have COVID infection this admission. MRI shows no evidence of acute infarct this admission. Cannot rule out TIA or possible functional disorder as well per neurology.  -History of echocardiogram in 2016 with positive bubble study. No follow-up ANGEL.  -COVID-positive  -History of asthma  -History of hypertension  -History of dyslipidemia    No primary cardiologist     Plan:     I discussed with Dr. Sohan Menjivar and patient. Patient's symptoms have completely resolved and there is concern for recurrent TIA versus functional disorder. She does have small shunt as noted by echocardiogram back in 2016. Echocardiogram this admission shows normal function. Telemetry has been unremarkable. I will plan to set up outpatient event monitor for 30 days and see her in the office. If this is not revealing my plan would be to pursue ANGEL and possible subcutaneous loop recorder. All questions answered. History of Present Illness: This is a 62 y.o. female admitted for Stroke ([facility] [address] [I63.9]. Patient complains of:    Patient presented July 21 with right-sided weakness and facial droop. She also had some mild lightheadedness. No prodrome of chest pain shortness of breath or palpitations. She was nonverbal upon arrival to the ER and ultimately she was given tPA. She has been monitored in the ICU now transferred to the floor. Her symptoms have completely resolved and she is doing well. No new chest pain dyspnea PND orthopnea edema. [place]ley no neurologic symptoms at this point. She had been noted to be COVID-positive but no respiratory complaints. She has been told she had a hole in her heart.   She has no history of atrial fibrillation that she is aware of. No palpitations. No known hypercoagulable state. No family history of atrial arrhythmias or coagulopathy. No history of congestive heart failure or heart disease. Review of Symptoms:  Except as stated above include:  Constitutional:  Negative  Ears, nose, throat:  Negative  Respiratory:  negative  Cardiovascular:  negative  Gastrointestinal: negative  Genitourinary:  negative  Musculoskeletal:  Negative  Neurological:  Negative  Dermatological:  Negative  Hematological: Negative  Endocrinological: Negative  Allergy: Negative  Psychological:  Negative    A comprehensive review of systems was negative except for that written in the HPI. Past Medical History:     Past Medical History:   Diagnosis Date    Asthma     Back pain with radiation     Cardiac echocardiogram, ltd study 12/29/2016    EF 55-60%. No WMA. Right to left atrial septal shunt suggests PFO. Similar to study of 10/18/16. Cardiovascular LE venous duplex 10/18/2016    No DVT bilaterally. Contact dermatitis and other eczema, due to unspecified cause     CTS (carpal tunnel syndrome)     HSV-2 (herpes simplex virus 2) infection     Hypercholesterolemia     Hypothyroidism     Ill-defined condition     Vertigo    L4-L5 disc bulge     Leg swelling     Migraines     Positive PPD, treated     S/P lumbar fusion 1/13/2016    1. L4-5 bilateral hemilaminotomy, medial facetectomy, foraminotomy, diskectomy L4-5. 2. Transforaminal lumbar interbody fusion with PEEK cage and demineralized bone graft L4-L5 posterolateral fusion.  3. Segmental spinal instrumentation, DePuy Expedium type L4-L5. 01/13/2016 By Dr. Geovani Nxi     Sciatica     EMG '17 , mild sciatic EMG findings    Stroke ([facility] [address]     12/2016    Vertigo          Social History:     Social History     Socioeconomic History    Marital status:    Occupational History    Occupation: unemployed     Comment: long term disability   Tobacco Use Smoking status: Never    Smokeless tobacco: Never   Substance and Sexual Activity    Alcohol use: No    Drug use: No        Family History:     Family History   Problem Relation Age of Onset    Diabetes Mother     Elevated Lipids Mother     Hypertension Mother     Cancer Father         pancreatic    Diabetes Sister     Hypertension Sister     Diabetes Brother     Diabetes Daughter     Hypertension Daughter         Medications:      Allergies   Allergen Reactions    Skelaxin [Metaxalone] Other (comments)     jittery    Tramadol Other (comments)     Halluctations        Current Facility-Administered Medications   Medication Dose Route Frequency    acetaminophen (TYLENOL) tablet 650 mg  650 mg Oral Q6H PRN    aspirin-acetaminophen-caffeine (EXCEDRIN ES) per tablet 1 Tablet  1 Tablet Oral Q8H PRN    sodium chloride (NS) flush 5-40 mL  5-40 mL IntraVENous Q8H    sodium chloride (NS) flush 5-40 mL  5-40 mL IntraVENous PRN    ondansetron (ZOFRAN ODT) tablet 4 mg  4 mg Oral Q8H PRN    Or    ondansetron (ZOFRAN) injection 4 mg  4 mg IntraVENous Q6H PRN    lactated Ringers infusion  75 mL/hr IntraVENous CONTINUOUS    sodium chloride (NS) flush 5-40 mL  5-40 mL IntraVENous Q8H    sodium chloride (NS) flush 5-40 mL  5-40 mL IntraVENous PRN    dextrose 10% infusion 0-250 mL  0-250 mL IntraVENous PRN    famotidine (PF) (PEPCID) 20 mg in 0.9% sodium chloride 10 mL injection  20 mg IntraVENous Q12H         Physical Exam:   Visit Vitals  /84   Pulse 85   Temp 98.2 °F (36.8 °C)   Resp 18   Ht 5' 6\" (1.676 m)   Wt 112.5 kg (248 lb)   SpO2 96%   BMI 40.03 kg/m²     BP Readings from Last 3 Encounters:   07/23/22 121/84   04/21/22 128/84   09/21/21 118/72     Pulse Readings from Last 3 Encounters:   07/23/22 85   04/21/22 86   09/21/21 64     Wt Readings from Last 3 Encounters:   07/23/22 112.5 kg (248 lb)   04/21/22 111.7 kg (246 lb 3.2 oz)   09/21/21 108.6 kg (239 lb 6.4 oz)       General:  alert, cooperative, no distress, appears stated age  Neck:  nontender  Lungs:  clear to auscultation bilaterally  Heart:  regular rate and rhythm, S1, S2 normal, no murmur, click, rub or gallop  Abdomen:  abdomen is soft without significant tenderness, masses, organomegaly or guarding  Extremities:  extremities normal, atraumatic, no cyanosis or edema  Skin: Warm and dry. no hyperpigmentation, vitiligo, or suspicious lesions  Neuro: alert, oriented x3, affect appropriate, no focal neurological deficits, moves all extremities well, no involuntary movements, reflexes at knee and ankle intact  Psych: non focal     Data Review:     Recent Labs     07/23/22  0233 07/22/22  0045 07/21/22  1100   WBC 6.8 7.5 7.5   HGB 11.5* 11.2* 12.2   HCT 37.7 37.1 39.7    305 372     Recent Labs     07/23/22  0233 07/22/22  0045 07/21/22  1100    141 140   K 3.7 3.9 4.3    107 107   CO2 28 29 30   GLU 88 99 111*   BUN 11 11 12   CREA 0.94 0.89 1.12   CA 8.9 9.0 9.1   MG 2.2 2.1 2.2   PHOS 3.3 3.5  --    ALB  --   --  3.8   ALT  --   --  49   INR  --  1.0  --        Results for orders placed or performed during the hospital encounter of 07/21/22   EKG, 12 LEAD, INITIAL   Result Value Ref Range    Ventricular Rate 71 BPM    Atrial Rate 71 BPM    P-R Interval 176 ms    QRS Duration 84 ms    Q-T Interval 410 ms    QTC Calculation (Bezet) 445 ms    Calculated P Axis 47 degrees    Calculated R Axis 28 degrees    Calculated T Axis 39 degrees    Diagnosis       Normal sinus rhythm  Normal ECG  When compared with ECG of 20-JUL-2021 09:32,  No significant change was found  Confirmed by Nisa Lujan M.D., Wilhelm Greulich ([id number]) on 7/21/2022 8:19:46 PM     Results for orders placed or performed in visit on 01/20/17   AMB POC EKG ROUTINE W/ 12 LEADS, INTER & REP    Impression    See progress note.        All Cardiac Markers in the last 24 hours:  No results found for: CPK, CK, CKMMB, CKMB, RCK3, CKMBT, CKNDX, CKND1, VITOR, TROPT, TROIQ, MEET, TROPT, TNIPOC, BNP, BNPP    Last Lipid: Lab Results   Component Value Date/Time    Cholesterol, total 153 07/21/2021 02:19 AM    HDL Cholesterol 41 07/21/2021 02:19 AM    LDL, calculated 98.2 07/21/2021 02:19 AM    Triglyceride 69 07/21/2021 02:19 AM    CHOL/HDL Ratio 3.7 07/21/2021 02:19 AM       Signed By: Errol Osorio MD     July 23, 2022

## 2022-07-25 ENCOUNTER — PATIENT OUTREACH (OUTPATIENT)
Dept: CASE MANAGEMENT | Age: 57
End: 2022-07-25

## 2022-07-25 DIAGNOSIS — I63.9 CEREBROVASCULAR ACCIDENT (CVA), UNSPECIFIED MECHANISM (HCC): Primary | ICD-10-CM

## 2022-07-25 RX ORDER — AMITRIPTYLINE HYDROCHLORIDE 10 MG/1
10 TABLET, FILM COATED ORAL
COMMUNITY
Start: 2022-05-07 | End: 2022-09-15 | Stop reason: SDUPTHER

## 2022-07-25 RX ORDER — DICLOFENAC SODIUM 10 MG/G
GEL TOPICAL AS NEEDED
COMMUNITY
Start: 2022-07-08

## 2022-07-25 NOTE — PROGRESS NOTES
Care Transitions Initial Call    Call within 2 business days of discharge: Yes     Patient: Danisha Green Patient : 1965 MRN: [id number]    Last Discharge [address]       Date Complaint Diagnosis Description Type Department Provider    22 Extremity Weakness Acute ischemic stroke [facility]) [I63.9 (ICD-10-CM)] ED to Hosp-Admission (Discharged) (ADMIT) Taylor Mathias MD; Emely Mosquera. .. Patient was admitted to [facility] from 22 to 22 for possible TIA vs functional neurological d/o. Was this an external facility discharge? No Discharge Facility: [facility]    Challenges to be reviewed by the provider   Additional needs identified to be addressed with provider: no  none         Method of communication with provider : none    Discussed COVID-19 related testing which was available at this time. Test results were positive. And test positive 2 weeks prior. Patient informed of results, if available? yes     Advance Care Planning:   Does patient have an Advance Directive: not on file. Inpatient Readmission Risk score: Unplanned Readmit Risk Score: 9.6    Was this a readmission? no   Patient stated reason for the admission: Possible TIA or neurological condition    Patients top risk factors for readmission: medical condition-hx multiple TIAs    Interventions to address risk factors: Obtained and reviewed discharge summary and/or continuity of care documents, Education of patient/family/caregiver/guardian to support self-management-Reviewed BE FAST and importance of promptt medical treatment for stroke-like symptoms. Confirmed patient's understanding of plan of care to have cardiac monitor to r/o arrhythmia, and Assessment and support for treatment adherence and medication management-Patient contacted PCP office this morning to schedule an appt and was told they will call her back shortly. Patient states she was told Dr. Jennifer Colmenares office will call her to schedule an appt.  CTN provided patient with [phone] Cardiovascular Specialist's phone number. Patient states she tested positive for COVID-19 2 weeks prior to her admission and never had any s/s. Care Transition Nurse (CTN) contacted the patient by telephone to perform post hospital discharge assessment. Verified name and  with patient as identifiers. Provided introduction to self, and explanation of the CTN role. COVID-19 related symptoms reviewed with patient who verbalized the following symptoms: no new symptoms and no worsening symptoms      CTN reviewed discharge instructions, medical action plan and red flags with patient who verbalized understanding. Were discharge instructions available to patient? yes. Reviewed appropriate site of care based on symptoms and resources available to patient including: PCP, Specialist, When to call 911, and CTN . Patient given an opportunity to ask questions and does not have any further questions or concerns at this time. The patient agrees to contact the PCP office for questions related to their healthcare. Medication reconciliation was performed with patient, who verbalizes understanding of administration of home medications. Advised obtaining a 90-day supply of all daily and as-needed medications. Referral to Pharm D needed: no     Home Health/Outpatient orders at discharge: none    Durable Medical Equipment ordered at discharge: None      Was patient discharged with a pulse oximeter? no    Discussed follow-up appointments. If no appointment was previously scheduled, appointment scheduling offered: no. Is follow up appointment scheduled within 7 days of discharge?   [facility] .   REHABILITATION [facility] follow up appointment(s):   Future Appointments   Date Time Provider Renata Higginbotham   10/24/2022  9:00 AM Latonia Guillen NP [facility] [address] [facility] D  Non-[facility] follow up appointment(s): PCP Dr. Chauncey Lafleur TBROSALBA    Plan for follow-up call in 5-7 days based on severity of symptoms and risk factors. Plan for next call: symptom management-assess for red flags and follow up appointment-confirm scheduling of appts with PCP and cardiology  CTN provided contact information for future needs. Goals Addressed                   This Visit's Progress     Attends follow-up appointments as directed. Patient will schedule and attend recommended appointments. 07/25/22: Patient contacted PCP office to schedule appt. They will call her back. Patient states cardiologist Dr. Patric Nance office will contact her to schedule appt. Understands red flags post discharge. Patient will monitor for BE FAST symptoms and call 911 immediately for any s/s.    07/25/22: Patient reports she had dizziness followed by facial drooping and aphasia 30 min later PTA. Denies any s/s or deficits at this time.

## 2022-07-27 NOTE — PROGRESS NOTES
Physician Progress Note      Radha Javier  CSN #:                  [id number]  :                       1965  ADMIT DATE:       2022 11:10 AM  DISCH DATE:        2022 6:20 PM  RESPONDING  PROVIDER #:        Jaz Jaramillo MD          QUERY TEXT:    Pt admitted with right sided facial droop and rt sided weakness. Per DS, \"etiology of her symptoms was felt to likely related to functional disorder versus TIA versus migraine headache\" with listed Dx TIA and COVID-19. Noted asymptomatic from COVID 19, but patient had presented with neurological symptoms. Please clarify if this patient likely had: The medical record reflects the following:  Risk Factors: 64 y.o. female, HTN, h/o TIA negative MRI brain, hypercholesteremia, Migraine, lumbar stenosis with lumbar fusion, chronic back pain  Clinical Indicators:  COVID 19 positive   DC Diagnosis:  TIA, COVID-19 infection  Hospital Course:  CTA of the head did not reveal any stenosis or any clots. Neurology followed up on her and recommended patient have an MRI of the brain,?etiology of her symptoms was felt to likely related to functional disorder versus TIA versus migraine headache and patient is being moved to telemetry floor for ongoing treatment plans.   MRI brain did not show any acute changes, patient is currently at baseline  Coincidently, patient was also noted to be positive for COVID-19 infection however was asymptomatic  Treatment: tPA   , Neurology consult ,propranolol 20 mg 3 times a day, this can be increased for headache control to 80 mg 3 times a day if tolerated,-Continue aspirin 81 mg ,MRI,CTA and labs carefully monitored, isolation    Thank you  Jaswinder [facility] MIKE [email or web address]  Options provided:  -- Facial droop and weakness due to TIA,  related to active COVID 19 infection  -- Facial droop and weakness likely due TIA,  unrelated to COVID 19  -- Facial droop and weakness likely due to functional disorder versus TIA versus migraine headache,  unrelated to COVID 19  -- Facial droop and weakness likely due to functional disorder versus TIA versus migraine headache,  related to active COVID 19 infection  -- Other - I will add my own diagnosis  -- Disagree - Not applicable / Not valid  -- Disagree - Clinically unable to determine / Unknown  -- Refer to Clinical Documentation Reviewer    PROVIDER RESPONSE TEXT:    This patient had facial droop and weakness likely due to TIA, unrelated to COVID 19 . Query created by:  dAolph Gardner on 7/27/2022 9:37 AM      Electronically signed by:  Adali Trinidad MD [facility] MD 7/27/2022 2:03 PM

## 2022-08-01 ENCOUNTER — PATIENT OUTREACH (OUTPATIENT)
Dept: CASE MANAGEMENT | Age: 57
End: 2022-08-01

## 2022-08-01 NOTE — PROGRESS NOTES
Care Transitions Follow Up Call        Care Transitions Nurse attempted to contact patient to complete transitions of care follow up. Unable to reach. Left M requesting a return call. [facility] follow up appointment(s):   Future Appointments   Date Time Provider Renata Higginbotham   10/24/2022  9:00 AM Huey Hudson NP [facility]     Non-[facility] follow up appointment(s): PCP Dr. Arthur Hunt provided contact information for future needs. Plan for follow-up call in 5-7 days based on severity of symptoms and risk factors.   Plan for next call: symptom management-assess for red flags  and follow up appointment-confirm scheduling of appt with PCP and cardiology

## 2022-08-08 ENCOUNTER — PATIENT OUTREACH (OUTPATIENT)
Dept: CASE MANAGEMENT | Age: 57
End: 2022-08-08

## 2022-08-08 NOTE — PROGRESS NOTES
Care Transitions Follow Up Call    Challenges to be reviewed by the provider   Additional needs identified to be addressed with provider: no  none           Method of communication with provider : none    Care Transition Nurse (CTN) contacted the patient by telephone to follow up after admission on 22. Verified name and  with patient as identifiers. Patient reports still having episodes of dizziness, and describes feeling like the room is spinning. She says they feel like previous episodes of vertigo and she underwent the Epley maneuver previously, which was effective for about 2 months. She wakes up with a small headache or migraine about 4x/week, which resolves within about 30 min. Addressed changes since last contact:  PCP recommended follow up with ENT and cardiology  Follow up appointment completed? yes. Was follow up appointment scheduled within 7 days of discharge? yes. Advance Care Planning:   Does patient have an Advance Directive:   not on file    CTN reviewed discharge instructions, medical action plan and red flags with patient and discussed any barriers to care and/or understanding of plan of care after discharge. Discussed appropriate site of care based on symptoms and resources available to patient including: PCP and Specialist. The patient agrees to contact the PCP office for questions related to their healthcare. Patients top risk factors for readmission:   medical condition-hx multiple TIAs      Interventions to address risk factors: Assessment and support for treatment adherence and medication management-Pt asked her daughter to start checking her BP during the episodes of dizziness. She plans to call neurology to schedule an earlier appt if possible. She will call her PCP today to request a referral to ENT. She will call cardiology Dr. Claus Macdonald to schedule an appt.  CTN reviewed use of OTC dramamine vs Rx meclizine for possible management of vertigo and advised pt to discuss further with the pharmacist to ensure no drug-drug interactions or a provider to see if a Rx is warranted. [facility]   Future Appointments   Date Time Provider Renata Higginbotham   10/24/2022  9:00 AM Radha Foster NP [facility]     [facility] follow up appointment(s):   PCP Dr. Ilan Guerra on 8/10/22  ENT TBD    CTN provided contact information for future needs. Plan for follow-up call in 5-7 days based on severity of symptoms and risk factors. Plan for next call: symptom management-assess for red flags and follow up appointment-confirm if appts with ENT, cardiology, and neurology were scheduled       Goals Addressed                   This Visit's Progress     Attends follow-up appointments as directed. On track     Patient will schedule and attend recommended appointments. 07/25/22: Patient contacted PCP office to schedule appt. They will call her back. Patient states cardiologist Dr. Rex Branch office will contact her to schedule appt. 08/08/22  Attended appt with PCP Dr. Ilan Guerra on 7/27/22. Follow-up appt scheduled 8/10/22. Patient will call PCP to request referral to ENT. Patient will call cardiology to schedule appt with Dr. Laura Forrester. Patient will call neurologist to schedule earlier appt. Understands red flags post discharge. On track     Patient will monitor for BE FAST symptoms and call 911 immediately for any s/s.    07/25/22: Patient reports she had dizziness followed by facial drooping and aphasia 30 min later PTA. Denies any s/s or deficits at this time. 08/08/22  Patient reports recurring episodes of dizziness, which feels like vertigo. Denies any other s/s CVA/TIA. She will follow up with ENT and neurology.

## 2022-08-16 ENCOUNTER — PATIENT OUTREACH (OUTPATIENT)
Dept: CASE MANAGEMENT | Age: 57
End: 2022-08-16

## 2022-08-16 NOTE — PROGRESS NOTES
Care Transitions Follow Up Call    Challenges to be reviewed by the provider   Additional needs identified to be addressed with provider: no  none           Method of communication with provider : none    Care Transition Nurse (CTN) contacted the patient by telephone to follow up after admission on 22. Verified name and  with patient as identifiers. Addressed changes since last contact: medications- meclizine started for vertigo and prednisone started for her back pain  Appt with neurology scheduled to an earlier date, scheduled appt with cardiology  Follow up appointment completed? yes. Was follow up appointment scheduled within 7 days of discharge? yes. Advance Care Planning:   Does patient have an Advance Directive:  health care decision makers updated and patient has an AMD and was encouraged to provide a copy to a [address] [place] [address] provider for scanning into her record      Patients top risk factors for readmission: medical condition-hx multiple TIAs    Interventions to address risk factors: Assessment and support for treatment adherence and medication management-Vertigo symptoms have resolved since starting meclizine. She received a heart monitor and will wear it for 30 days. She is awaiting a chart for placement of the monitor. She scheduled an appt with cardiology and moved her appt with neurology up to an earlier date. Her PCP is assisting with scheduling an appt with ENT.     [facility] follow up appointment(s):   Future Appointments   Date Time Provider Renata Higginbotham   2022  3:00 PM Palmer Sosa MD [facility] BS AMB   9/15/2022 11:15 AM Aishwarya Ordoñez NP [facility]     [facility] follow up appointment(s): ENT-in process    CTN provided contact information for future needs. No further follow-up call indicated based on severity of symptoms and risk factors. Plan for next call:  n/a       Goals Addressed                   This Visit's Progress     Attends follow-up appointments as directed.    On track     Patient will schedule and attend recommended appointments. 07/25/22: Patient contacted PCP office to schedule appt. They will call her back. Patient states cardiologist Dr. Basim Montes office will contact her to schedule appt. 08/08/22  Attended appt with PCP Dr. Anibal Bull on 7/27/22. Follow-up appt scheduled 8/10/22. 8/16: Attended appt on 8/10. Patient will call PCP to request referral to ENT. 8/16: Appt in process. PCP assisting with scheduling. Patient will call cardiology to schedule appt with Dr. Ayan Paredes. 8/16: Appt scheduled on 9/8/22. Patient will call neurologist to schedule earlier appt. 8/16: Appt scheduled on 9/15/22. Understands red flags post discharge. On track     Patient will monitor for BE FAST symptoms and call 911 immediately for any s/s.    07/25/22: Patient reports she had dizziness followed by facial drooping and aphasia 30 min later PTA. Denies any s/s or deficits at this time. 08/08/22  Patient reports recurring episodes of dizziness, which feels like vertigo. Denies any other s/s CVA/TIA. She will follow up with ENT and neurology. 08/16/22  Vertigo/dizziness has resolved since starting meclizine.

## 2022-08-23 ENCOUNTER — PATIENT OUTREACH (OUTPATIENT)
Dept: CASE MANAGEMENT | Age: 57
End: 2022-08-23

## 2022-08-23 NOTE — PROGRESS NOTES
Patient has graduated from the Transitions of Care Coordination  program on 08/23/22. Patient/family has the ability to self-manage at this time Care management goals have been completed. Patient was not referred to the [facility] team for further management. Goals Addressed                   This Visit's Progress     COMPLETED: Attends follow-up appointments as directed. Patient will schedule and attend recommended appointments. 07/25/22: Patient contacted PCP office to schedule appt. They will call her back. Patient states cardiologist Dr. Rex Branch office will contact her to schedule appt. 08/08/22  Attended appt with PCP Dr. Ilan Guerra on 7/27/22. Follow-up appt scheduled 8/10/22. 8/16: Attended appt on 8/10. Patient will call PCP to request referral to ENT. 8/16: Appt in process. PCP assisting with scheduling. Patient will call cardiology to schedule appt with Dr. Laura Forrester. 8/16: Appt scheduled on 9/8/22. Patient will call neurologist to schedule earlier appt. 8/16: Appt scheduled on 9/15/22. COMPLETED: Understands red flags post discharge. Patient will monitor for BE FAST symptoms and call 911 immediately for any s/s.    07/25/22: Patient reports she had dizziness followed by facial drooping and aphasia 30 min later PTA. Denies any s/s or deficits at this time. 08/08/22  Patient reports recurring episodes of dizziness, which feels like vertigo. Denies any other s/s CVA/TIA. She will follow up with ENT and neurology. 08/16/22  Vertigo/dizziness has resolved since starting meclizine. Patient has Care Transition Nurse's contact information for any further questions, concerns, or needs.   Patients upcoming visits:    Future Appointments   Date Time Provider Renata Higginbotham   9/8/2022  3:00 PM Erwin Simon MD [facility] BS AMB   9/15/2022 11:15 AM Radha Foster NP [facility] BS AMB

## 2022-09-06 RX ORDER — AMITRIPTYLINE HYDROCHLORIDE 10 MG/1
TABLET, FILM COATED ORAL
Qty: 60 TABLET | OUTPATIENT
Start: 2022-09-06

## 2022-09-08 ENCOUNTER — OFFICE VISIT (OUTPATIENT)
Dept: CARDIOLOGY CLINIC | Age: 57
End: 2022-09-08
Payer: MEDICARE

## 2022-09-08 VITALS
HEART RATE: 76 BPM | BODY MASS INDEX: 40.5 KG/M2 | OXYGEN SATURATION: 99 % | HEIGHT: 66 IN | WEIGHT: 252 LBS | SYSTOLIC BLOOD PRESSURE: 126 MMHG | DIASTOLIC BLOOD PRESSURE: 80 MMHG

## 2022-09-08 DIAGNOSIS — G45.9 TIA (TRANSIENT ISCHEMIC ATTACK): Primary | ICD-10-CM

## 2022-09-08 DIAGNOSIS — E07.9 THYROID DISORDER: ICD-10-CM

## 2022-09-08 DIAGNOSIS — E78.5 HYPERLIPIDEMIA, UNSPECIFIED HYPERLIPIDEMIA TYPE: ICD-10-CM

## 2022-09-08 DIAGNOSIS — Z86.73 HISTORY OF COMPLETED STROKE: ICD-10-CM

## 2022-09-08 DIAGNOSIS — Q21.12 PFO (PATENT FORAMEN OVALE): ICD-10-CM

## 2022-09-08 PROCEDURE — G8417 CALC BMI ABV UP PARAM F/U: HCPCS | Performed by: INTERNAL MEDICINE

## 2022-09-08 PROCEDURE — G9899 SCRN MAM PERF RSLTS DOC: HCPCS | Performed by: INTERNAL MEDICINE

## 2022-09-08 PROCEDURE — 99215 OFFICE O/P EST HI 40 MIN: CPT | Performed by: INTERNAL MEDICINE

## 2022-09-08 PROCEDURE — 3017F COLORECTAL CA SCREEN DOC REV: CPT | Performed by: INTERNAL MEDICINE

## 2022-09-08 PROCEDURE — G8432 DEP SCR NOT DOC, RNG: HCPCS | Performed by: INTERNAL MEDICINE

## 2022-09-08 PROCEDURE — G8427 DOCREV CUR MEDS BY ELIG CLIN: HCPCS | Performed by: INTERNAL MEDICINE

## 2022-09-08 RX ORDER — SODIUM CHLORIDE 0.9 % (FLUSH) 0.9 %
5-40 SYRINGE (ML) INJECTION AS NEEDED
Status: CANCELLED | OUTPATIENT
Start: 2022-09-08

## 2022-09-08 RX ORDER — SODIUM CHLORIDE 0.9 % (FLUSH) 0.9 %
5-40 SYRINGE (ML) INJECTION EVERY 8 HOURS
Status: CANCELLED | OUTPATIENT
Start: 2022-09-08

## 2022-09-08 NOTE — PROGRESS NOTES
History of Present Illness:  62 YOF here for follow up. I saw her in the hospital in July. She presented July 21st with right sided weakness and facial droop that resolved. She was actually given TPA. This is her fourth stroke/TIA. MRI was unremarkable and symptoms resolved. No new chest pain, dyspnea, PND, orthopnea or edema. No history of palpitations. She was COVID positive, however, but no respiratory complaints. She has a remote history of PFO by bubble study in 2016. When I saw her in the hospital I was setting up an event monitor, but she had significant allergic reaction to the tape and could not complete. She is here to discuss options. Impression:  Hx of recurrent TIA/stroke with four total events, last July 2020 with transient right sided weakness, given TPA. Remote echo 2016 with bubble study. No follow up ANGEL. Hx of COVID positive July 2022. Hx of asthma. HTN. Dyslipidemia. Plan: At this point given her recurrent strokes I would like to arrange a subcutaneous loop recorder, as well as transesophageal echocardiogram.  If there is no atrial fibrillation, it may be reasonable to consider PFO closure. All questions answered. Past Medical History:   Diagnosis Date    Asthma     Back pain with radiation     Cardiac echocardiogram, ltd study 12/29/2016    EF 55-60%. No WMA. Right to left atrial septal shunt suggests PFO. Similar to study of 10/18/16. Cardiovascular LE venous duplex 10/18/2016    No DVT bilaterally. Contact dermatitis and other eczema, due to unspecified cause     CTS (carpal tunnel syndrome)     HSV-2 (herpes simplex virus 2) infection     Hypercholesterolemia     Hypothyroidism     Ill-defined condition     Vertigo    L4-L5 disc bulge     Leg swelling     Migraines     Positive PPD, treated     S/P lumbar fusion 1/13/2016    1.  L4-5 bilateral hemilaminotomy, medial facetectomy, foraminotomy, diskectomy L4-5. 2. Transforaminal lumbar interbody fusion with PEEK cage and demineralized bone graft L4-L5 posterolateral fusion. 3. Segmental spinal instrumentation, DePuy Expedium type L4-L5. 01/13/2016 By Dr. Iwona Yee     Sciatica     EMG '17 , mild sciatic EMG findings    Stroke ([facility] [address]     12/2016    Vertigo        Current Outpatient Medications   Medication Sig Dispense Refill    amitriptyline (ELAVIL) 10 mg tablet Take 10 mg by mouth nightly. diclofenac (VOLTAREN) 1 % gel as needed. montelukast (SINGULAIR) 10 mg tablet Take 10 mg by mouth nightly. FLUoxetine (PROzac) 10 mg capsule Take 10 mg by mouth nightly. Indications: premenstrual disorder with a state of unhappiness      propranoloL (INDERAL) 40 mg tablet Take 1 Tablet by mouth three (3) times daily. 270 Tablet 2    furosemide (LASIX) 20 mg tablet Take 1 Tablet by mouth daily. Indications: high blood pressure 30 Tablet 0    aspirin delayed-release 81 mg tablet Take 81 mg by mouth daily. desloratadine (CLARINEX) 5 mg tablet Take 5 mg by mouth in the morning. omeprazole (PRILOSEC) 40 mg capsule Take 40 mg by mouth in the morning.      gabapentin (NEURONTIN) 300 mg capsule Take 300 mg by mouth nightly. atorvastatin (LIPITOR) 80 mg tablet Take 1 Tab by mouth nightly. 30 Tab 0    budesonide-formoteroL (SYMBICORT) 160-4.5 mcg/actuation HFAA Take 2 Puffs by inhalation two (2) times a day. albuterol (PROVENTIL HFA, VENTOLIN HFA, PROAIR HFA) 90 mcg/actuation inhaler Take 1 Puff by inhalation every four (4) hours as needed for Wheezing. 1 Inhaler 0    FERROUS SULFATE PO Take 325 mg by mouth daily. potassium chloride SR (K-TAB) 20 mEq tablet Take 20 mEq by mouth daily. levothyroxine (SYNTHROID) 50 mcg tablet TAKE 1 TABLET BY MOUTH EVERY DAY  3    cholecalciferol (Vitamin D3) 25 mcg (1,000 unit) cap Take 1,000 Units by mouth daily. Social History   reports that she has never smoked. She has never used smokeless tobacco.   reports no history of alcohol use.     Family History  family history includes Cancer in her father; Diabetes in her brother, daughter, mother, and sister; Elevated Lipids in her mother; Hypertension in her daughter, mother, and sister. Review of Systems  Except as stated above include:  Constitutional: Negative for fever, chills and malaise/fatigue. HEENT: No congestion or recent URI. Gastrointestinal: No nausea, vomiting, abdominal pain, bloody stools. Pulmonary:  Negative except as stated above. Cardiac:  Negative except as stated above. Musculoskeletal: Negative except as stated above. Neurological:  No localized symptoms. Skin:  Negative except as stated above. Psych:  Negative except as stated above. Endocrine:  Negative except as stated above. PHYSICAL EXAM  BP Readings from Last 3 Encounters:   09/08/22 126/80   07/23/22 127/86   04/21/22 128/84     Pulse Readings from Last 3 Encounters:   09/08/22 76   07/23/22 96   04/21/22 86     Wt Readings from Last 3 Encounters:   09/08/22 114.3 kg (252 lb)   07/23/22 112.5 kg (248 lb)   04/21/22 111.7 kg (246 lb 3.2 oz)     General:   Well developed, well groomed. Head/Neck:   No obvious jugular venous distention     No obvious carotid pulsations. No evidence of xanthelasma. Lungs:   No respiratory distress. Clear bilaterally. Heart:  Regular rate and rhythm. Normal S1/S2. Palpation grossly normal.    No significant murmurs, rubs or gallops. Abdomen:   Non-acute abdomen. No obvious pulsations. Extremities:   Intact peripheral pulses. No significant edema. Neurological:   Alert and oriented to person, place, time. No focal neurological deficit visually. Skin:   No obvious rash    Blood Pressure Metric:  Monitor recommended and adjustments stated if needed.

## 2022-09-08 NOTE — PROGRESS NOTES
Cathryn Morrison presents today for   Chief Complaint   Patient presents with    Follow-up     4 - 6 week Aldo Foley preferred language for health care discussion is english/other. Is someone accompanying this pt? no    Is the patient using any DME equipment during [address]? no    Depression Screening:  3 most recent PHQ Screens 9/19/2019   PHQ Not Done -   Little interest or pleasure in doing things Not at all   Feeling down, depressed, irritable, or hopeless Not at all   Total Score PHQ 2 0   Trouble falling or staying asleep, or sleeping too much Nearly every day   Feeling tired or having little energy Nearly every day   Poor appetite, weight loss, or overeating Not at all   Feeling bad about yourself - or that you are a failure or have let yourself or your family down Not at all   Trouble concentrating on things such as school, work, reading, or watching TV Not at all   Moving or speaking so slowly that other people could have noticed; or the opposite being so fidgety that others notice Not at all   Thoughts of being better off dead, or hurting yourself in some way Not at all   PHQ 9 Score 6   How difficult have these problems made it for you to do your work, take care of your home and get along with others Not difficult at all       Learning Assessment:  Learning Assessment 3/15/2019   PRIMARY LEARNER Patient   HIGHEST LEVEL OF EDUCATION - PRIMARY LEARNER  [facility]5 Artemio  PRIMARY LEARNER -   CO-LEARNER CAREGIVER No   PRIMARY LANGUAGE ENGLISH   LEARNER PREFERENCE PRIMARY READING   ANSWERED BY patient   RELATIONSHIP SELF       Abuse Screening:  Abuse Screening Questionnaire 3/15/2019   Do you ever feel afraid of your partner? N   Are you in a relationship with someone who physically or mentally threatens you? N   Is it safe for you to go home? Y       Fall Risk  Fall Risk Assessment, last 12 mths 3/15/2019   Able to walk? Yes   Fall in past 12 months?  No       Pt currently taking Anticoagulant therapy? ASA 81mg every day     Coordination of Care:  1. Have you been to the ER, urgent care clinic since your last visit? Hospitalized since your last visit? 7/21 - 7/23 for TIA     2. Have you seen or consulted any other health care providers outside of the [address] [place] [address] since your last visit? Include any pap smears or colon screening.  no

## 2022-09-08 NOTE — PATIENT INSTRUCTIONS
[facility]   Patient  EP Instructions  [address] [place] [address], [address]                You are scheduled to have a ANGEL and Possible Loop Recorder on  September 16, 2022 , at 1145 am.     Please check in at 1015 am.     Please go to DR. JUANITO BURNETT and park in the outpatient parking lot that is located around to the back of the hospital and enter through the [facility]. Once you enter through the [facility] check in with the  there. The  will either give you directions or assist you in getting to the cath holding area. 3.  You are not to eat or drink anything after midnight the night before your procedure. Please continue to take your medications with a small sip of water on the morning of the procedure with the following exceptions: Hold Lasix the morning of your procedure. If you are diabetic, do not take your insulin/sugar pill the morning of the procedure. We encourage families to wait in the waiting room on the first floor while the procedure is being done. The Doctor will come out and talk with you as soon as the procedure is over. You will not be able to drive yourself home after your procedure is done. There is the possibility that you may spend the night in the hospital, depending on the results of the procedure. This will be determined after the procedure is done. 8.   If you or your family have any questions, please call our office Monday-Friday 8:30 am - 4:30 pm , at [phone], and ask to speak to one of the nurses.

## 2022-09-09 ENCOUNTER — TRANSCRIBE ORDER (OUTPATIENT)
Dept: CARDIAC CATH/INVASIVE PROCEDURES | Age: 57
End: 2022-09-09

## 2022-09-09 DIAGNOSIS — I63.9 CEREBROVASCULAR ACCIDENT (CVA), UNSPECIFIED MECHANISM (HCC): Primary | ICD-10-CM

## 2022-09-12 ENCOUNTER — HOSPITAL ENCOUNTER (OUTPATIENT)
Dept: PREADMISSION TESTING | Age: 57
Discharge: HOME | End: 2022-09-12
Payer: MEDICARE

## 2022-09-12 DIAGNOSIS — G45.9 TIA (TRANSIENT ISCHEMIC ATTACK): ICD-10-CM

## 2022-09-12 DIAGNOSIS — Z86.73 HISTORY OF COMPLETED STROKE: ICD-10-CM

## 2022-09-12 LAB
ALBUMIN SERPL-MCNC: 3.6 G/DL (ref 3.4–5)
ALBUMIN/GLOB SERPL: 1 {RATIO} (ref 0.8–1.7)
ALP SERPL-CCNC: 110 U/L (ref 45–117)
ALT SERPL-CCNC: 43 U/L (ref 13–56)
ANION GAP SERPL CALC-SCNC: 2 MMOL/L (ref 3–18)
AST SERPL-CCNC: 21 U/L (ref 10–38)
BASOPHILS # BLD: 0 K/UL (ref 0–0.1)
BASOPHILS NFR BLD: 1 % (ref 0–2)
BILIRUB SERPL-MCNC: 0.5 MG/DL (ref 0.2–1)
BUN SERPL-MCNC: 10 MG/DL (ref 7–18)
BUN/CREAT SERPL: 9 (ref 12–20)
CALCIUM SERPL-MCNC: 9.4 MG/DL (ref 8.5–10.1)
CHLORIDE SERPL-SCNC: 108 MMOL/L (ref 100–111)
CO2 SERPL-SCNC: 30 MMOL/L (ref 21–32)
CREAT SERPL-MCNC: 1.12 MG/DL (ref 0.6–1.3)
DIFFERENTIAL METHOD BLD: ABNORMAL
EOSINOPHIL # BLD: 0.1 K/UL (ref 0–0.4)
EOSINOPHIL NFR BLD: 2 % (ref 0–5)
ERYTHROCYTE [DISTWIDTH] IN BLOOD BY AUTOMATED COUNT: 15.3 % (ref 11.6–14.5)
GLOBULIN SER CALC-MCNC: 3.7 G/DL (ref 2–4)
GLUCOSE SERPL-MCNC: 130 MG/DL (ref 74–99)
HCT VFR BLD AUTO: 36.9 % (ref 35–45)
HGB BLD-MCNC: 11.3 G/DL (ref 12–16)
IMM GRANULOCYTES # BLD AUTO: 0 K/UL (ref 0–0.04)
IMM GRANULOCYTES NFR BLD AUTO: 0 % (ref 0–0.5)
INR PPP: 1 (ref 0.8–1.2)
LYMPHOCYTES # BLD: 3.1 K/UL (ref 0.9–3.6)
LYMPHOCYTES NFR BLD: 51 % (ref 21–52)
MCH RBC QN AUTO: 23.3 PG (ref 24–34)
MCHC RBC AUTO-ENTMCNC: 30.6 G/DL (ref 31–37)
MCV RBC AUTO: 76.1 FL (ref 78–100)
MONOCYTES # BLD: 0.5 K/UL (ref 0.05–1.2)
MONOCYTES NFR BLD: 9 % (ref 3–10)
NEUTS SEG # BLD: 2.2 K/UL (ref 1.8–8)
NEUTS SEG NFR BLD: 37 % (ref 40–73)
NRBC # BLD: 0 K/UL (ref 0–0.01)
NRBC BLD-RTO: 0 PER 100 WBC
PLATELET # BLD AUTO: 309 K/UL (ref 135–420)
PMV BLD AUTO: 10.7 FL (ref 9.2–11.8)
POTASSIUM SERPL-SCNC: 4.5 MMOL/L (ref 3.5–5.5)
PROT SERPL-MCNC: 7.3 G/DL (ref 6.4–8.2)
PROTHROMBIN TIME: 13.2 SEC (ref 11.5–15.2)
RBC # BLD AUTO: 4.85 M/UL (ref 4.2–5.3)
SODIUM SERPL-SCNC: 140 MMOL/L (ref 136–145)
WBC # BLD AUTO: 6 K/UL (ref 4.6–13.2)

## 2022-09-12 PROCEDURE — 36415 COLL VENOUS BLD VENIPUNCTURE: CPT

## 2022-09-12 PROCEDURE — 85610 PROTHROMBIN TIME: CPT

## 2022-09-12 PROCEDURE — 85025 COMPLETE CBC W/AUTO DIFF WBC: CPT

## 2022-09-12 PROCEDURE — 80053 COMPREHEN METABOLIC PANEL: CPT

## 2022-09-13 NOTE — H&P
Plan ANGEL and subcutaneous loop recorder implant as discussed in the office. History of Present Illness:  62 YOF here for follow up. I saw her in the hospital in July. She presented July 21st with right sided weakness and facial droop that resolved. She was actually given TPA. This is her fourth stroke/TIA. MRI was unremarkable and symptoms resolved. No new chest pain, dyspnea, PND, orthopnea or edema. No history of palpitations. She was COVID positive, however, but no respiratory complaints. She has a remote history of PFO by bubble study in 2016. When I saw her in the hospital I was setting up an event monitor, but she had significant allergic reaction to the tape and could not complete. She is here to discuss options. Impression:  Hx of recurrent TIA/stroke with four total events, last July 2020 with transient right sided weakness, given TPA. Remote echo 2016 with bubble study. No follow up ANGEL. Hx of COVID positive July 2022. Hx of asthma. HTN. Dyslipidemia. Plan: At this point given her recurrent strokes I would like to arrange a subcutaneous loop recorder, as well as transesophageal echocardiogram.  If there is no atrial fibrillation, it may be reasonable to consider PFO closure. All questions answered. Past Medical History:   Diagnosis Date    Asthma      Back pain with radiation      Cardiac echocardiogram, ltd study 12/29/2016     EF 55-60%. No WMA. Right to left atrial septal shunt suggests PFO. Similar to study of 10/18/16. Cardiovascular LE venous duplex 10/18/2016     No DVT bilaterally. Contact dermatitis and other eczema, due to unspecified cause      CTS (carpal tunnel syndrome)      HSV-2 (herpes simplex virus 2) infection      Hypercholesterolemia      Hypothyroidism      Ill-defined condition       Vertigo    L4-L5 disc bulge      Leg swelling      Migraines      Positive PPD, treated      S/P lumbar fusion 1/13/2016     1.  L4-5 bilateral hemilaminotomy, medial facetectomy, foraminotomy, diskectomy L4-5. 2. Transforaminal lumbar interbody fusion with PEEK cage and demineralized bone graft L4-L5 posterolateral fusion. 3. Segmental spinal instrumentation, DePuy Expedium type L4-L5. 01/13/2016 By Dr. Thang Rao     Sciatica       EMG '17 , mild sciatic EMG findings    Stroke ([facility] [address]       12/2016    Vertigo                  Current Outpatient Medications   Medication Sig Dispense Refill    amitriptyline (ELAVIL) 10 mg tablet Take 10 mg by mouth nightly. diclofenac (VOLTAREN) 1 % gel as needed. montelukast (SINGULAIR) 10 mg tablet Take 10 mg by mouth nightly. FLUoxetine (PROzac) 10 mg capsule Take 10 mg by mouth nightly. Indications: premenstrual disorder with a state of unhappiness        propranoloL (INDERAL) 40 mg tablet Take 1 Tablet by mouth three (3) times daily. 270 Tablet 2    furosemide (LASIX) 20 mg tablet Take 1 Tablet by mouth daily. Indications: high blood pressure 30 Tablet 0    aspirin delayed-release 81 mg tablet Take 81 mg by mouth daily. desloratadine (CLARINEX) 5 mg tablet Take 5 mg by mouth in the morning. omeprazole (PRILOSEC) 40 mg capsule Take 40 mg by mouth in the morning.        gabapentin (NEURONTIN) 300 mg capsule Take 300 mg by mouth nightly. atorvastatin (LIPITOR) 80 mg tablet Take 1 Tab by mouth nightly. 30 Tab 0    budesonide-formoteroL (SYMBICORT) 160-4.5 mcg/actuation HFAA Take 2 Puffs by inhalation two (2) times a day. albuterol (PROVENTIL HFA, VENTOLIN HFA, PROAIR HFA) 90 mcg/actuation inhaler Take 1 Puff by inhalation every four (4) hours as needed for Wheezing. 1 Inhaler 0    FERROUS SULFATE PO Take 325 mg by mouth daily. potassium chloride SR (K-TAB) 20 mEq tablet Take 20 mEq by mouth daily. levothyroxine (SYNTHROID) 50 mcg tablet TAKE 1 TABLET BY MOUTH EVERY DAY   3    cholecalciferol (Vitamin D3) 25 mcg (1,000 unit) cap Take 1,000 Units by mouth daily. Social History   reports that she has never smoked. She has never used smokeless tobacco.   reports no history of alcohol use. Family History  family history includes Cancer in her father; Diabetes in her brother, daughter, mother, and sister; Elevated Lipids in her mother; Hypertension in her daughter, mother, and sister. Review of Systems  Except as stated above include:  Constitutional: Negative for fever, chills and malaise/fatigue. HEENT: No congestion or recent URI. Gastrointestinal: No nausea, vomiting, abdominal pain, bloody stools. Pulmonary:  Negative except as stated above. Cardiac:  Negative except as stated above. Musculoskeletal: Negative except as stated above. Neurological:  No localized symptoms. Skin:  Negative except as stated above. Psych:  Negative except as stated above. Endocrine:  Negative except as stated above. PHYSICAL EXAM      BP Readings from Last 3 Encounters:   09/08/22 126/80   07/23/22 127/86   04/21/22 128/84          Pulse Readings from Last 3 Encounters:   09/08/22 76   07/23/22 96   04/21/22 86          Wt Readings from Last 3 Encounters:   09/08/22 114.3 kg (252 lb)   07/23/22 112.5 kg (248 lb)   04/21/22 111.7 kg (246 lb 3.2 oz)      General:          Well developed, well groomed. Head/Neck:     No obvious jugular venous distention                           No obvious carotid pulsations. No evidence of xanthelasma. Lungs:             No respiratory distress. Clear bilaterally. Heart:              Regular rate and rhythm. Normal S1/S2. Palpation grossly normal.                          No significant murmurs, rubs or gallops. Abdomen:        Non-acute abdomen. No obvious pulsations. Extremities:     Intact peripheral pulses. No significant edema. Neurological:   Alert and oriented to person, place, time. No focal neurological deficit visually. Skin:                No obvious rash     Blood Pressure Metric:  Monitor recommended and adjustments stated if needed.          Electronically signed by Gini Corcoran MD at 09/08/22 26 [id number]

## 2022-09-15 ENCOUNTER — OFFICE VISIT (OUTPATIENT)
Dept: NEUROLOGY | Age: 57
End: 2022-09-15
Payer: MEDICARE

## 2022-09-15 VITALS
RESPIRATION RATE: 18 BRPM | HEIGHT: 66 IN | HEART RATE: 65 BPM | WEIGHT: 252 LBS | DIASTOLIC BLOOD PRESSURE: 70 MMHG | BODY MASS INDEX: 40.5 KG/M2 | SYSTOLIC BLOOD PRESSURE: 118 MMHG

## 2022-09-15 DIAGNOSIS — F43.9 STRESS: ICD-10-CM

## 2022-09-15 DIAGNOSIS — G43.109 MIGRAINE WITH AURA AND WITHOUT STATUS MIGRAINOSUS, NOT INTRACTABLE: Primary | ICD-10-CM

## 2022-09-15 DIAGNOSIS — R53.1 RIGHT SIDED WEAKNESS: ICD-10-CM

## 2022-09-15 DIAGNOSIS — Z86.69 HISTORY OF SLEEP APNEA: ICD-10-CM

## 2022-09-15 DIAGNOSIS — R45.89 SYMPTOMS OF DEPRESSION: ICD-10-CM

## 2022-09-15 PROCEDURE — 99215 OFFICE O/P EST HI 40 MIN: CPT | Performed by: NURSE PRACTITIONER

## 2022-09-15 PROCEDURE — G8417 CALC BMI ABV UP PARAM F/U: HCPCS | Performed by: NURSE PRACTITIONER

## 2022-09-15 PROCEDURE — G8427 DOCREV CUR MEDS BY ELIG CLIN: HCPCS | Performed by: NURSE PRACTITIONER

## 2022-09-15 PROCEDURE — G9899 SCRN MAM PERF RSLTS DOC: HCPCS | Performed by: NURSE PRACTITIONER

## 2022-09-15 PROCEDURE — G8432 DEP SCR NOT DOC, RNG: HCPCS | Performed by: NURSE PRACTITIONER

## 2022-09-15 PROCEDURE — 3017F COLORECTAL CA SCREEN DOC REV: CPT | Performed by: NURSE PRACTITIONER

## 2022-09-15 RX ORDER — AMITRIPTYLINE HYDROCHLORIDE 25 MG/1
25 TABLET, FILM COATED ORAL
Qty: 90 TABLET | Refills: 2 | Status: SHIPPED | OUTPATIENT
Start: 2022-09-15

## 2022-09-15 RX ORDER — MECLIZINE HCL 12.5 MG 12.5 MG/1
TABLET ORAL
COMMUNITY
Start: 2022-09-08

## 2022-09-15 RX ORDER — PROPRANOLOL HYDROCHLORIDE 40 MG/1
40 TABLET ORAL 3 TIMES DAILY
Qty: 270 TABLET | Refills: 2 | Status: SHIPPED | OUTPATIENT
Start: 2022-09-15

## 2022-09-15 NOTE — PATIENT INSTRUCTIONS
Patient instructions:  -change amitriptyline to 25 mg nightly  -continue propranolol 40 mg three times daily  -neuropsychological evaluation  -call sleep specialist's office regarding restarting CPAP- [phone]  -follow up in 2-3 months

## 2022-09-15 NOTE — PROGRESS NOTES
[facility]  [address], [address], [facility], [address]  [address] [facility], [address]  Office:  [phone]  Fax: [phone]  Chief Complaint   Patient presents with    Follow-up       HPI: Cheryl Leahy presents in follow-up. She was last seen here on 4/21/2022. She reported no migraines since being on the propranolol 40 mg 3 times daily. Has not been on the amitriptyline 10 mg at night. The migraines have been controlled. She had the sleep study done and had the CPAP pressure increased. Endorses adhering to CPAP. She was doing well overall at that time. Denies any more lightheadedness or dizziness since the call in March and going on propranolol 40 mg 3 times daily but before that was on the long-acting 120 mg daily but ran out and the cost was very high. Reported new diagnosis of arthritis and was placed on prednisone. Continued on aspirin 81 mg daily. She had a recent hospitalization in July and was seen in the hospital by Dr. Servando Quesada in neurology consultation. It was noted that she was brought to the ER because she was not feeling right. Had complaint of some mild dizziness. In the ER she had complaint of right arm and right leg weakness. There was right facial weakness and left-sided weakness mentioned. She denied any significant headache or speech difficulties. A Code S was called and she received tenecteplase after evaluation with teleneurology. Impression included complex picture, with TIA, stroke, functional disorder, and hemiplegic migraine all in the differential diagnosis. MRI of the brain did not show acute infarct. It was noted that in the hospital she had a mild headache behind her eyes. She was recommended for a cardiac evaluation for monitoring, consideration for ANGEL. Continue aspirin 81 mg. She was open to the idea of seeing a therapist to explore any stress related factor. TIA or functional disorder could not be ruled out. She had seen the cardiologist regarding loop recorder recently. She presents today in follow-up. Reports she gets a feeling like pressure built up in her her head on the side which can be either side, not really pain but more like pressure. Endorses it feels like tension. Feels like a migraine is coming on but a migraine does not occur. Not throbbing. Not associated with sensitivity to lights or sounds or having nausea at that time. Can be sitting down each time. It lasts about 5 to 10 minutes. She does not take anything for it but occasionally Tylenol, but it can go away without even taking anything. It has been happening a couple days last week but it happened before that, not really sure how often or when it started, may have not been noticing. It happened in the office. She takes the propranolol immediate release 40 mg 3 times daily. She takes amitriptyline 10 mg at night. Unsure when she restarted it. She started fluoxetine for hot flashes in July and it helps. She is taking aspirin 81 mg and atorvastatin 80 mg. With the hospitalization, reports it was the same thing, the right-sided weakness. She will be getting a loop recorder placed tomorrow. BP in office is 118/70 with a pulse of 65. We discussed neurologist's impressions during the hospitalization. Reports she is open to anything I will give her an answer as to why is happening. She denies neck pain. She denies seizure activity. Denies memory problems. Denies other right-sided weakness episodes outside of the hospital visits. Before going into the hospital, she had not been using the CPAP so the sleep specialist office called and she reports she was told they dropped her. Reports she had COVID twice before the hospitalization, had it in May and June, and also tested positive during hospitalization in July. She was asymptomatic every time. She is not sure if she is able to go back to the sleep specialist office.   Endorses stress because she has been packing and she is going to have to move. They are closing that complex down. She is stressed about where she is going to go. Denies SI/HI. In terms of sleep, she reports she has been sleeping very poorly, sometimes cannot get to sleep til 3 or 4 AM.    Past Medical History:   Diagnosis Date    Asthma     Back pain with radiation     Cardiac echocardiogram, ltd study 12/29/2016    EF 55-60%. No WMA. Right to left atrial septal shunt suggests PFO. Similar to study of 10/18/16. Cardiovascular LE venous duplex 10/18/2016    No DVT bilaterally. Contact dermatitis and other eczema, due to unspecified cause     CTS (carpal tunnel syndrome)     HSV-2 (herpes simplex virus 2) infection     Hypercholesterolemia     Hypothyroidism     Ill-defined condition     Vertigo    L4-L5 disc bulge     Leg swelling     Migraines     Positive PPD, treated     S/P lumbar fusion 1/13/2016    1. L4-5 bilateral hemilaminotomy, medial facetectomy, foraminotomy, diskectomy L4-5. 2. Transforaminal lumbar interbody fusion with PEEK cage and demineralized bone graft L4-L5 posterolateral fusion. 3. Segmental spinal instrumentation, DePuy Expedium type L4-L5. 01/13/2016 By Dr. Estella Allen     Sciatica     EMG '17 , mild sciatic EMG findings    Stroke ([facility] [address]     12/2016    Vertigo        Past Surgical History:   Procedure Laterality Date    HX GYN      partial hysterectomy due to abnormal pap    HX LUMBAR LAMINECTOMY  01-13-16    L4/5       Current Outpatient Medications   Medication Sig Dispense Refill    meclizine (ANTIVERT) 12.5 mg tablet TAKE 1 TABLET BY MOUTH TWICE A DAY FOR DIZZINESS      propranoloL (INDERAL) 40 mg tablet Take 1 Tablet by mouth three (3) times daily. 270 Tablet 2    amitriptyline (ELAVIL) 25 mg tablet Take 1 Tablet by mouth nightly. Indications: migraine prevention 90 Tablet 2    diclofenac (VOLTAREN) 1 % gel as needed. montelukast (SINGULAIR) 10 mg tablet Take 10 mg by mouth nightly. FLUoxetine (PROzac) 10 mg capsule Take 10 mg by mouth nightly. Indications: premenstrual disorder with a state of unhappiness      furosemide (LASIX) 20 mg tablet Take 1 Tablet by mouth daily. Indications: high blood pressure 30 Tablet 0    aspirin delayed-release 81 mg tablet Take 81 mg by mouth daily. desloratadine (CLARINEX) 5 mg tablet Take 5 mg by mouth in the morning. omeprazole (PRILOSEC) 40 mg capsule Take 40 mg by mouth in the morning.      gabapentin (NEURONTIN) 300 mg capsule Take 300 mg by mouth nightly. atorvastatin (LIPITOR) 80 mg tablet Take 1 Tab by mouth nightly. 30 Tab 0    budesonide-formoteroL (SYMBICORT) 160-4.5 mcg/actuation HFAA Take 2 Puffs by inhalation two (2) times a day. albuterol (PROVENTIL HFA, VENTOLIN HFA, PROAIR HFA) 90 mcg/actuation inhaler Take 1 Puff by inhalation every four (4) hours as needed for Wheezing. 1 Inhaler 0    FERROUS SULFATE PO Take 325 mg by mouth daily. potassium chloride SR (K-TAB) 20 mEq tablet Take 20 mEq by mouth daily. levothyroxine (SYNTHROID) 50 mcg tablet TAKE 1 TABLET BY MOUTH EVERY DAY  3    cholecalciferol (Vitamin D3) 25 mcg (1,000 unit) cap Take 1,000 Units by mouth daily.           Allergies   Allergen Reactions    Skelaxin [Metaxalone] Other (comments)     jittery    Tramadol Other (comments)     Halluctations       Social History     Tobacco Use    Smoking status: Never    Smokeless tobacco: Never   Substance Use Topics    Alcohol use: No    Drug use: No       Family History   Problem Relation Age of Onset    Diabetes Mother     Elevated Lipids Mother     Hypertension Mother     Cancer Father         pancreatic    Diabetes Sister     Hypertension Sister     Diabetes Brother     Diabetes Daughter     Hypertension Daughter        Review of Systems:  GENERAL: Denies fever or fatigue  CARDIAC: No CP or SOB  PULMONARY: No cough or SOB  MUSCULOSKELETAL: No new joint pain  NEURO: SEE HPI    Physical Examination:  Visit Vitals  /70   Pulse 65   Resp 18   Ht 5' 6\" (1.676 m)   Wt 114.3 kg (252 lb)   BMI 40.67 kg/m²       Alert, in NAD. Heart is regular. Oriented x3. Speech is fluent. Speech clear. EOMs are full, PERRL, VFFTC, no nystagmus. No facial asymmetry. Tongue is midline. Strength and tone are normal.   weakly with right hand compared to left simultaneously but better with encouragement and performed individually. No drift of the bilateral upper extremities. Fine finger movements symmetrical. FNF intact bilaterally. DTRs +2, gait symmetric and steady. Impression/Plan:  Is a 59-year-old right-handed female who presents in follow-up for migraines. She has had several hospital presentations for right-sided weakness (Dec. 2016, July 2018 and received iv TPA, July 2021, and July 2022 and received iv tenecteplase). MRI studies have been negative for acute infarct. She has received IV thrombolytic on more than one occasion. The most recent hospitalization was in July and she received tenecteplase at that time. MRI was without acute findings. She had presented with right-sided weakness at that time. No associated headache but per documentation later note indicates mild headache behind the eyes. She is on a regimen of propranolol 40 mg 3 times daily. She has been back on Elavil 10 mg nightly. We reviewed the impressions from the neurologist during hospitalization. It was noted that they had a discussion regarding the etiology and while we should continue stroke prevention measures, a functional disorder cannot be ruled out; as anxiety and stress can sometimes cause physical symptomatology and she endorsed this could be possible. She endorses stress and symptoms of depression and anxiety surrounding her move. She is interested in anything to help figure out what is going on. She has been sleeping poorly.   Will refer for neuropsychological evaluation due to symptoms of depression and anxiety, as well as possible functional disorder. We will continue the propranolol at the current dose which has helped for migraine preventative but will avoid adjustment today due to her BP and pulse. Will increase the amitriptyline to 25 mg nightly to help with headaches and sleep. We discussed nonpharmacologic measures for headache prevention such as good sleep, healthy diet, adequate hydration, and exercise. She endorses her diet has been okay and drinking plenty of water. She is in PT and does her home exercises daily and goes on walks. Advised to follow up with her sleep specialist office and provided phone number as a reminder so that she can hopefully resume CPAP. We will plan to have next visit with Dr. Diane Phelan in 2 to 3 months for any other considerations; can see me sooner or call if needed. Diagnoses and all orders for this visit:    1. Migraine with aura and without status migrainosus, not intractable  -     propranoloL (INDERAL) 40 mg tablet; Take 1 Tablet by mouth three (3) times daily. -     amitriptyline (ELAVIL) 25 mg tablet; Take 1 Tablet by mouth nightly. Indications: migraine prevention    2. Right sided weakness  -     REFERRAL TO NEUROPSYCHOLOGY    3. History of sleep apnea    4. Symptoms of depression  -     REFERRAL TO NEUROPSYCHOLOGY    5. Stress  -     REFERRAL TO NEUROPSYCHOLOGY    Total time 40 minutes with 25 minutes spent in counseling. Signed By: Jose F Bradshaw NP        PLEASE NOTE:   Portions of this document may have been produced using voice recognition software. Unrecognized errors in transcription may be present.

## 2022-09-16 ENCOUNTER — ANESTHESIA (OUTPATIENT)
Dept: CARDIAC CATH/INVASIVE PROCEDURES | Age: 57
End: 2022-09-16
Payer: MEDICARE

## 2022-09-16 ENCOUNTER — ANESTHESIA EVENT (OUTPATIENT)
Dept: CARDIAC CATH/INVASIVE PROCEDURES | Age: 57
End: 2022-09-16
Payer: MEDICARE

## 2022-09-16 ENCOUNTER — HOSPITAL ENCOUNTER (OUTPATIENT)
Dept: NON INVASIVE DIAGNOSTICS | Age: 57
Setting detail: OUTPATIENT SURGERY
Discharge: HOME | End: 2022-09-16
Payer: MEDICARE

## 2022-09-16 ENCOUNTER — HOSPITAL ENCOUNTER (OUTPATIENT)
Age: 57
Setting detail: OUTPATIENT SURGERY
Discharge: HOME | End: 2022-09-16
Attending: INTERNAL MEDICINE | Admitting: INTERNAL MEDICINE
Payer: MEDICARE

## 2022-09-16 VITALS
HEART RATE: 67 BPM | RESPIRATION RATE: 18 BRPM | OXYGEN SATURATION: 99 % | SYSTOLIC BLOOD PRESSURE: 126 MMHG | DIASTOLIC BLOOD PRESSURE: 71 MMHG

## 2022-09-16 VITALS
RESPIRATION RATE: 18 BRPM | SYSTOLIC BLOOD PRESSURE: 145 MMHG | DIASTOLIC BLOOD PRESSURE: 93 MMHG | BODY MASS INDEX: 40.5 KG/M2 | WEIGHT: 252 LBS | HEART RATE: 77 BPM | OXYGEN SATURATION: 100 % | HEIGHT: 66 IN

## 2022-09-16 DIAGNOSIS — I48.91 ATRIAL FIBRILLATION, UNSPECIFIED TYPE (HCC): ICD-10-CM

## 2022-09-16 DIAGNOSIS — I63.9 CEREBROVASCULAR ACCIDENT (CVA), UNSPECIFIED MECHANISM (HCC): ICD-10-CM

## 2022-09-16 PROCEDURE — 76060000031 HC ANESTHESIA FIRST 0.5 HR: Performed by: INTERNAL MEDICINE

## 2022-09-16 PROCEDURE — 93325 DOPPLER ECHO COLOR FLOW MAPG: CPT

## 2022-09-16 PROCEDURE — C1764 EVENT RECORDER, CARDIAC: HCPCS | Performed by: INTERNAL MEDICINE

## 2022-09-16 PROCEDURE — 74011250636 HC RX REV CODE- 250/636: Performed by: ANESTHESIOLOGY

## 2022-09-16 PROCEDURE — 77030028698 HC BLD TISS PLSM MEDT -D: Performed by: INTERNAL MEDICINE

## 2022-09-16 PROCEDURE — 77030019580 HC CBL PACE MEDT -B: Performed by: INTERNAL MEDICINE

## 2022-09-16 PROCEDURE — 01922 ANES N-INVAS IMG/RADJ THER: CPT | Performed by: ANESTHESIOLOGY

## 2022-09-16 PROCEDURE — 77030018729 HC ELECTRD DEFIB PAD CARD -B: Performed by: INTERNAL MEDICINE

## 2022-09-16 PROCEDURE — 33285 INSJ SUBQ CAR RHYTHM MNTR: CPT | Performed by: INTERNAL MEDICINE

## 2022-09-16 PROCEDURE — 74011000250 HC RX REV CODE- 250: Performed by: ANESTHESIOLOGY

## 2022-09-16 PROCEDURE — 77030031139 HC SUT VCRL2 J&J -A: Performed by: INTERNAL MEDICINE

## 2022-09-16 DEVICE — MONITOR CRD 1.4 CC 3.4 GM INSERTABLE LINQ: Type: IMPLANTABLE DEVICE | Status: FUNCTIONAL

## 2022-09-16 RX ORDER — LIDOCAINE HYDROCHLORIDE 20 MG/ML
INJECTION, SOLUTION EPIDURAL; INFILTRATION; INTRACAUDAL; PERINEURAL AS NEEDED
Status: DISCONTINUED | OUTPATIENT
Start: 2022-09-16 | End: 2022-09-16 | Stop reason: HOSPADM

## 2022-09-16 RX ORDER — PROPOFOL 10 MG/ML
INJECTION, EMULSION INTRAVENOUS AS NEEDED
Status: DISCONTINUED | OUTPATIENT
Start: 2022-09-16 | End: 2022-09-16 | Stop reason: HOSPADM

## 2022-09-16 RX ORDER — CEFAZOLIN SODIUM 1 G/3ML
INJECTION, POWDER, FOR SOLUTION INTRAMUSCULAR; INTRAVENOUS AS NEEDED
Status: DISCONTINUED | OUTPATIENT
Start: 2022-09-16 | End: 2022-09-16 | Stop reason: HOSPADM

## 2022-09-16 RX ADMIN — PROPOFOL 100 MG: 10 INJECTION, EMULSION INTRAVENOUS at 13:23

## 2022-09-16 RX ADMIN — LIDOCAINE HYDROCHLORIDE 80 MG: 20 INJECTION, SOLUTION EPIDURAL; INFILTRATION; INTRACAUDAL; PERINEURAL at 13:23

## 2022-09-16 RX ADMIN — CEFAZOLIN SODIUM 2 G: 1 INJECTION, POWDER, FOR SOLUTION INTRAMUSCULAR; INTRAVENOUS at 13:28

## 2022-09-16 NOTE — PROGRESS NOTES
1030  Cath holding summary     Patient escorted to cath holding from waiting area ambulatory, alert and oriented x 4, voicing no complaints. Changed into gown and placed on monitor. NPO since MN. Lab results, med rec and H&P reviewed on chart. PIV x 1 inserted without difficulty. Family to bedside.

## 2022-09-16 NOTE — PROGRESS NOTES
1510  AVS Discharge instructions reviewed with patient and copy given to patient. All questions answered. Patient verbalized understanding to all discharge instructions. PIV removed. Procedural site within normal limits. No hematoma or bleeding noted from procedural and PIV site. No pain noted at discharge. Patient discharged with support person in stable condition. Escorted out to vehicle for transport home.

## 2022-09-16 NOTE — DISCHARGE INSTRUCTIONS
DISCHARGE SUMMARY from Nurse    PATIENT INSTRUCTIONS:    After general anesthesia or intravenous sedation, for 24 hours or while taking prescription Narcotics:  Limit your activities  Do not drive and operate hazardous machinery  Do not make important personal or business decisions  Do  not drink alcoholic beverages  If you have not urinated within 8 hours after discharge, please contact your surgeon on call. Report the following to your surgeon:  Excessive pain, swelling, redness or odor of or around the surgical area  Temperature over 100.5  Nausea and vomiting lasting longer than 4 hours or if unable to take medications  Any signs of decreased circulation or nerve impairment to extremity: change in color, persistent  numbness, tingling, coldness or increase pain  Any questions    What to do at Home:  Recommended activity: Activity as tolerated and no driving for today    If you experience any of the following symptoms such as dizziness, weakness, shortness of breath please follow up with Dr. Aracely Burris. *  Please give a list of your current medications to your Primary Care Provider. *  Please update this list whenever your medications are discontinued, doses are      changed, or new medications (including over-the-counter products) are added. *  Please carry medication information at all times in case of emergency situations. These are general instructions for a healthy lifestyle:    No smoking/ No tobacco products/ Avoid exposure to second hand smoke  Surgeon General's Warning:  Quitting smoking now greatly reduces serious risk to your health.     Obesity, smoking, and sedentary lifestyle greatly increases your risk for illness    A healthy diet, regular physical exercise & weight monitoring are important for maintaining a healthy lifestyle    You may be retaining fluid if you have a history of heart failure or if you experience any of the following symptoms:  Weight gain of 3 pounds or more overnight or 5 pounds in a week, increased swelling in our hands or feet or shortness of breath while lying flat in bed. Please call your doctor as soon as you notice any of these symptoms; do not wait until your next office visit. The discharge information has been reviewed with the patient. The patient verbalized understanding. Discharge medications reviewed with the patient and appropriate educational materials and side effects teaching were provided.   ___________________________________________________________________________________________________________________________________

## 2022-09-16 NOTE — ANESTHESIA POSTPROCEDURE EVALUATION
Procedure(s):  LOOP RECORDER INSERT.     MAC    Anesthesia Post Evaluation      Multimodal analgesia: multimodal analgesia used between 6 hours prior to anesthesia start to PACU discharge  Patient location during evaluation: bedside  Patient participation: complete - patient participated  Level of consciousness: awake  Pain score: 0  Pain management: adequate  Airway patency: patent  Anesthetic complications: no  Cardiovascular status: stable  Respiratory status: acceptable  Hydration status: acceptable  Post anesthesia nausea and vomiting:  none  Final Post Anesthesia Temperature Assessment:  Normothermia (36.0-37.5 degrees C)      INITIAL Post-op Vital signs:   Vitals Value Taken Time   /74 09/16/22 1339   Temp     Pulse 76 09/16/22 1339   Resp 15 09/16/22 1339   SpO2 100 % 09/16/22 1339

## 2022-09-16 NOTE — Clinical Note
TRANSFER - IN REPORT:     Verbal report received from: Britta Salamanca. Report consisted of patient's Situation, Background, Assessment and   Recommendations(SBAR). Opportunity for questions and clarification was provided. Assessment completed upon patient's arrival to unit and care assumed.

## 2022-09-16 NOTE — ANESTHESIA PREPROCEDURE EVALUATION
Relevant Problems   No relevant active problems       Anesthetic History   No history of anesthetic complications            Review of Systems / Medical History  Patient summary reviewed and pertinent labs reviewed    Pulmonary  Within defined limits          Asthma        Neuro/Psych   Within defined limits    CVA       Cardiovascular  Within defined limits  Hypertension              Exercise tolerance: >4 METS     GI/Hepatic/Renal  Within defined limits              Endo/Other  Within defined limits    Hypothyroidism       Other Findings              Physical Exam    Airway  Mallampati: III  TM Distance: 4 - 6 cm  Neck ROM: normal range of motion   Mouth opening: Normal     Cardiovascular  Regular rate and rhythm,  S1 and S2 normal,  no murmur, click, rub, or gallop  Rhythm: regular  Rate: normal         Dental    Dentition: Lower dentition intact and Upper dentition intact     Pulmonary  Breath sounds clear to auscultation               Abdominal  GI exam deferred       Other Findings            Anesthetic Plan    ASA: 3  Anesthesia type: MAC          Induction: Intravenous  Anesthetic plan and risks discussed with: Patient

## 2022-09-16 NOTE — PROGRESS NOTES
Anesthesia name:  Dr. Shonda Prasad    Anesthesia is present for case. Refer to anesthesia log for vitals.

## 2022-09-19 ENCOUNTER — TELEPHONE (OUTPATIENT)
Dept: NEUROLOGY | Age: 57
End: 2022-09-19

## 2022-09-19 DIAGNOSIS — R45.89 SYMPTOMS OF DEPRESSION: ICD-10-CM

## 2022-09-19 DIAGNOSIS — R53.1 RIGHT SIDED WEAKNESS: Primary | ICD-10-CM

## 2022-09-19 DIAGNOSIS — F43.9 STRESS: ICD-10-CM

## 2022-09-19 NOTE — TELEPHONE ENCOUNTER
[address] [facility] [place] from neuro psych of [place] called stating they do not except [employer]. Please advise.

## 2022-09-21 ENCOUNTER — TELEPHONE (OUTPATIENT)
Dept: NEUROLOGY | Age: 57
End: 2022-09-21

## 2022-12-08 ENCOUNTER — OFFICE VISIT (OUTPATIENT)
Dept: NEUROLOGY | Age: 57
End: 2022-12-08
Payer: MEDICARE

## 2022-12-08 VITALS
SYSTOLIC BLOOD PRESSURE: 128 MMHG | RESPIRATION RATE: 20 BRPM | WEIGHT: 255.6 LBS | OXYGEN SATURATION: 96 % | HEART RATE: 80 BPM | DIASTOLIC BLOOD PRESSURE: 82 MMHG | BODY MASS INDEX: 41.08 KG/M2 | HEIGHT: 66 IN

## 2022-12-08 DIAGNOSIS — G45.9 TIA (TRANSIENT ISCHEMIC ATTACK): Primary | ICD-10-CM

## 2022-12-08 DIAGNOSIS — G43.009 MIGRAINE WITHOUT AURA AND WITHOUT STATUS MIGRAINOSUS, NOT INTRACTABLE: ICD-10-CM

## 2022-12-08 RX ORDER — ATORVASTATIN CALCIUM 40 MG/1
TABLET, FILM COATED ORAL
COMMUNITY
Start: 2022-11-28

## 2022-12-08 NOTE — PROGRESS NOTES
Padmini Schwab is a 62 y.o. female . presents for No chief complaint on file. 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 She is following this clinic by NISA Wiggins. Last seen in this clinic in September 2022. She is currently taking propranolol 40 mg p.o. 3 times daily and Elavil 25 mg p.o. nightly. Headaches are well controlled. It seems that she has a combination of both migraine and tension type headache. Do not have both headache types over the past 2 months. Taking her medications properly. In July 2022, patient was admitted to [facility] for sudden right-sided body weakness; he was given tenecteplase in the emergency room. CT angiography of the head and neck was unremarkable. MRI did not show acute stroke. Transesophageal echo was subsequently drained by cardiology which was also unremarkable. She has a loop recorder currently. Is on aspirin and atorvastatin 40 mg p.o. per day. She had 3 previous similar episodes; some associated with migraine-like headaches. Complex migraine versus hemiplegic migraine were considered. The possibility of functional neurologic disorder was considered during her last admission. During her last visit, patient was referred for neuropsychological evaluation but the facility was not accepting her insurance. Claims she has mild weakness of the right side currently. No sensory symptoms. No problems with speech. No problem swallowing. No diplopia. Some stress and anxiety. No depression. Review of Systems   Constitutional:  Negative for chills, fever and weight loss. HENT:  Negative for hearing loss and tinnitus. Eyes:  Negative for blurred vision and double vision. Respiratory:  Negative for cough and shortness of breath. Cardiovascular:  Positive for leg swelling. Negative for chest pain. Gastrointestinal:  Positive for heartburn. Negative for nausea and vomiting. Genitourinary:  Negative for dysuria, frequency and urgency. Musculoskeletal:  Positive for back pain and joint pain (knees, hips, left elbow and left shoulder). Negative for neck pain. Skin:  Negative for itching and rash. Neurological:  Positive for dizziness, focal weakness (mild on the left side) and headaches (better now). Negative for tingling, tremors, sensory change, speech change and seizures. Psychiatric/Behavioral:  Negative for depression. The patient is nervous/anxious. The patient does not have insomnia. Past Medical History:   Diagnosis Date    Asthma     Back pain with radiation     Cardiac echocardiogram, ltd study 12/29/2016    EF 55-60%. No WMA. Right to left atrial septal shunt suggests PFO. Similar to study of 10/18/16. Cardiovascular LE venous duplex 10/18/2016    No DVT bilaterally. Contact dermatitis and other eczema, due to unspecified cause     CTS (carpal tunnel syndrome)     HSV-2 (herpes simplex virus 2) infection     Hypercholesterolemia     Hypothyroidism     Ill-defined condition     Vertigo    L4-L5 disc bulge     Leg swelling     Migraines     Positive PPD, treated     S/P lumbar fusion 1/13/2016    1. L4-5 bilateral hemilaminotomy, medial facetectomy, foraminotomy, diskectomy L4-5. 2. Transforaminal lumbar interbody fusion with PEEK cage and demineralized bone graft L4-L5 posterolateral fusion.  3. Segmental spinal instrumentation, DePuy Expedium type L4-L5. 01/13/2016 By Dr. Leroy Redding     Sciatica     EMG '17 , mild sciatic EMG findings    Stroke ([facility] [address]     12/2016    Vertigo        Past Surgical History:   Procedure Laterality Date    HX GYN      partial hysterectomy due to abnormal pap    HX LUMBAR LAMINECTOMY  01-13-16    L4/5        Family History   Problem Relation Age of Onset    Diabetes Mother     Elevated Lipids Mother     Hypertension Mother     Cancer Father         pancreatic    Diabetes Sister     Hypertension Sister     Diabetes Brother Diabetes Daughter     Hypertension Daughter         Social History     Socioeconomic History    Marital status:      Spouse name: Not on file    Number of children: Not on file    Years of education: Not on file    Highest education level: Not on file   Occupational History    Occupation: unemployed     Comment: long term disability   Tobacco Use    Smoking status: Never    Smokeless tobacco: Never   Substance and Sexual Activity    Alcohol use: No    Drug use: No    Sexual activity: Not on file     Comment: Hysterectomy   Other Topics Concern    Not on file   Social History Narrative    Not on file     Social Determinants of Health     Financial Resource Strain: Not on file   Food Insecurity: Not on file   Transportation Needs: Not on file   Physical Activity: Not on file   Stress: Not on file   Social Connections: Not on file   Intimate Partner Violence: Not on file   Housing Stability: Not on file        Allergies   Allergen Reactions    Skelaxin [Metaxalone] Other (comments)     jittery    Tramadol Other (comments)     Halluctations         Current Outpatient Medications   Medication Sig Dispense Refill    meclizine (ANTIVERT) 12.5 mg tablet TAKE 1 TABLET BY MOUTH TWICE A DAY FOR DIZZINESS      propranoloL (INDERAL) 40 mg tablet Take 1 Tablet by mouth three (3) times daily. 270 Tablet 2    amitriptyline (ELAVIL) 25 mg tablet Take 1 Tablet by mouth nightly. Indications: migraine prevention 90 Tablet 2    diclofenac (VOLTAREN) 1 % gel as needed. (Patient not taking: Reported on 9/16/2022)      montelukast (SINGULAIR) 10 mg tablet Take 10 mg by mouth nightly. FLUoxetine (PROzac) 10 mg capsule Take 10 mg by mouth nightly. Indications: premenstrual disorder with a state of unhappiness      furosemide (LASIX) 20 mg tablet Take 1 Tablet by mouth daily. Indications: high blood pressure 30 Tablet 0    aspirin delayed-release 81 mg tablet Take 81 mg by mouth daily.       desloratadine (CLARINEX) 5 mg tablet Take 5 mg by mouth in the morning. omeprazole (PRILOSEC) 40 mg capsule Take 40 mg by mouth in the morning.      gabapentin (NEURONTIN) 300 mg capsule Take 300 mg by mouth nightly. atorvastatin (LIPITOR) 80 mg tablet Take 1 Tab by mouth nightly. 30 Tab 0    budesonide-formoteroL (SYMBICORT) 160-4.5 mcg/actuation HFAA Take 2 Puffs by inhalation two (2) times a day. albuterol (PROVENTIL HFA, VENTOLIN HFA, PROAIR HFA) 90 mcg/actuation inhaler Take 1 Puff by inhalation every four (4) hours as needed for Wheezing. 1 Inhaler 0    FERROUS SULFATE PO Take 325 mg by mouth daily. potassium chloride SR (K-TAB) 20 mEq tablet Take 20 mEq by mouth daily. levothyroxine (SYNTHROID) 50 mcg tablet TAKE 1 TABLET BY MOUTH EVERY DAY  3    cholecalciferol (Vitamin D3) 25 mcg (1,000 unit) cap Take 1,000 Units by mouth daily. Physical Exam  Constitutional:       Appearance: Normal appearance. HENT:      Head: Normocephalic and atraumatic. Mouth/Throat:      Mouth: Mucous membranes are moist.      Pharynx: Oropharynx is clear. No oropharyngeal exudate. Eyes:      Extraocular Movements: Extraocular movements intact. Pupils: Pupils are equal, round, and reactive to light. Pulmonary:      Effort: Pulmonary effort is normal. No respiratory distress. Musculoskeletal:         General: Normal range of motion. Cervical back: Normal range of motion and neck supple. Right lower leg: No edema. Left lower leg: No edema. Neurological:      Mental Status: She is alert. Comments: Mental status: Awake, alert, oriented , follows simple and complex commands, no neglect, no extinction.   Speech and languge: fluent, coherent, and comprehension intact  CN: VFF, EOMI, PERRLA, face sensation intact , no facial asymmetry noted, palate elevation symmetric bilat, SS+SCM 5/5 bilat, tongue midline  Motor: no pronator drift, tone normal throughout, strength 5/5 throughout  Sensory: intact to light touch and PP  throughout  Coordination: FNF, HS accurate w/o dysmetria  DTR: 2+ throughout. Gait: Normal.           Hospital Outpatient Visit on 09/12/2022   Component Date Value Ref Range Status    Sodium 09/12/2022 140  136 - 145 mmol/L Final    Potassium 09/12/2022 4.5  3.5 - 5.5 mmol/L Final    Chloride 09/12/2022 108  100 - 111 mmol/L Final    CO2 09/12/2022 30  21 - 32 mmol/L Final    Anion gap 09/12/2022 2 (A)  3.0 - 18 mmol/L Final    Glucose 09/12/2022 130 (A)  74 - 99 mg/dL Final    BUN 09/12/2022 10  7.0 - 18 MG/DL Final    Creatinine 09/12/2022 1.12  0.6 - 1.3 MG/DL Final    BUN/Creatinine ratio 09/12/2022 9 (A)  12 - 20   Final    GFR est AA 09/12/2022 >60  >60 ml/min/1.73m2 Final    GFR est non-AA 09/12/2022 50 (A)  >60 ml/min/1.73m2 Final    Comment: (NOTE)  Estimated GFR is calculated using the Modification of Diet in Renal   Disease (MDRD) Study equation, reported for both  Americans   (GFRAA) and non- Americans (GFRNA), and normalized to 1.73m2   body surface area. The physician must decide which value applies to   the patient. The MDRD study equation should only be used in   individuals age 25 or older. It has not been validated for the   following: pregnant women, patients with serious comorbid conditions,   or on certain medications, or persons with extremes of body size,   muscle mass, or nutritional status. Calcium 09/12/2022 9.4  8.5 - 10.1 MG/DL Final    Bilirubin, total 09/12/2022 0.5  0.2 - 1.0 MG/DL Final    ALT (SGPT) 09/12/2022 43  13 - 56 U/L Final    AST (SGOT) 09/12/2022 21  10 - 38 U/L Final    Alk.  phosphatase 09/12/2022 110  45 - 117 U/L Final    Protein, total 09/12/2022 7.3  6.4 - 8.2 g/dL Final    Albumin 09/12/2022 3.6  3.4 - 5.0 g/dL Final    Globulin 09/12/2022 3.7  2.0 - 4.0 g/dL Final    A-G Ratio 09/12/2022 1.0  0.8 - 1.7   Final    WBC 09/12/2022 6.0  4.6 - 13.2 K/uL Final    RBC 09/12/2022 4.85  4.20 - 5.30 M/uL Final    HGB 09/12/2022 11.3 (A)  12.0 - 16.0 g/dL Final    HCT 09/12/2022 36.9  35.0 - 45.0 % Final    MCV 09/12/2022 76.1 (A)  78.0 - 100.0 FL Final    MCH 09/12/2022 23.3 (A)  24.0 - 34.0 PG Final    MCHC 09/12/2022 30.6 (A)  31.0 - 37.0 g/dL Final    RDW 09/12/2022 15.3 (A)  11.6 - 14.5 % Final    PLATELET 02/24/1218 295  135 - 420 K/uL Final    MPV 09/12/2022 10.7  9.2 - 11.8 FL Final    NRBC 09/12/2022 0.0  0  WBC Final    ABSOLUTE NRBC 09/12/2022 0.00  0.00 - 0.01 K/uL Final    NEUTROPHILS 09/12/2022 37 (A)  40 - 73 % Final    LYMPHOCYTES 09/12/2022 51  21 - 52 % Final    MONOCYTES 09/12/2022 9  3 - 10 % Final    EOSINOPHILS 09/12/2022 2  0 - 5 % Final    BASOPHILS 09/12/2022 1  0 - 2 % Final    IMMATURE GRANULOCYTES 09/12/2022 0  0.0 - 0.5 % Final    ABS. NEUTROPHILS 09/12/2022 2.2  1.8 - 8.0 K/UL Final    ABS. LYMPHOCYTES 09/12/2022 3.1  0.9 - 3.6 K/UL Final    ABS. MONOCYTES 09/12/2022 0.5  0.05 - 1.2 K/UL Final    ABS. EOSINOPHILS 09/12/2022 0.1  0.0 - 0.4 K/UL Final    ABS. BASOPHILS 09/12/2022 0.0  0.0 - 0.1 K/UL Final    ABS. IMM. GRANS. 09/12/2022 0.0  0.00 - 0.04 K/UL Final    DF 09/12/2022 AUTOMATED    Final    Prothrombin time 09/12/2022 13.2  11.5 - 15.2 sec Final    INR 09/12/2022 1.0  0.8 - 1.2   Final    Comment:            INR Therapeutic Ranges         (on stable oral anticoagulant):     INDICATION                INR  DVT/PE/Atrial Fib          2.0-3.0  MI/Mechanical Heart Valve  2.5-3.5               ICD-10-CM ICD-9-CM    1. TIA (transient ischemic attack)  G45.9 435.9       2. Migraine without aura and without status migrainosus, not intractable  G43.009 346.10         A 62years old male patient here for follow-up of migraine headache and TIA-like episodes. Patient had about 4 episodes of transient right-sided body weakness. Had received tPA and tenecteplase and 2 of her episodes. Somewhere associated with headache. 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   During her last admission, possibility of functional neurologic disorder was considered. She said the weakness resolved and 25 minutes which is unusual for hemiplegic migraine where the motor symptoms might persist for hours and few days. MRI of the brain during her July admission did not show any acute stroke. CT angiography of the head and neck did not show any LVO. Patient had transesophageal echo which was unremarkable. Loop recorder was inserted by cardiology. Her migraine headache is well controlled with propranolol 40 mg p.o. 3 times daily and Elavil 25 milligrams p.o. nightly; will continue with the same dose of both medications. Will avoid triptans for acute attacks as her episodes could be TIAs. Since headaches are mild at this time, can take Tylenol as needed for acute attacks. Discussed side effects including the need to follow her blood pressure and heart rate. Also discussed the need to watch her weight. Nonmedical options discussed include regular sleep, regular exercise, and regular eating pattern. We will see her again in 3 months time.

## 2022-12-21 ENCOUNTER — OFFICE VISIT (OUTPATIENT)
Dept: CARDIOLOGY CLINIC | Age: 57
End: 2022-12-21
Payer: MEDICARE

## 2022-12-21 VITALS
WEIGHT: 253 LBS | BODY MASS INDEX: 40.66 KG/M2 | SYSTOLIC BLOOD PRESSURE: 122 MMHG | HEART RATE: 80 BPM | OXYGEN SATURATION: 98 % | HEIGHT: 66 IN | DIASTOLIC BLOOD PRESSURE: 84 MMHG

## 2022-12-21 DIAGNOSIS — I63.9 CEREBROVASCULAR ACCIDENT (CVA), UNSPECIFIED MECHANISM (HCC): Primary | ICD-10-CM

## 2022-12-21 DIAGNOSIS — E07.9 THYROID DISORDER: ICD-10-CM

## 2022-12-21 DIAGNOSIS — G45.9 TIA (TRANSIENT ISCHEMIC ATTACK): ICD-10-CM

## 2022-12-21 DIAGNOSIS — Z95.818 STATUS POST PLACEMENT OF IMPLANTABLE LOOP RECORDER: ICD-10-CM

## 2022-12-21 NOTE — PROGRESS NOTES
Dictation on: 12/21/2022 10:32 AM by: Kamala Holt       Past Medical History:   Diagnosis Date    Asthma     Back pain with radiation     Cardiac echocardiogram, [facility] study 12/29/2016    EF 55-60%. No WMA. Right to left atrial septal shunt suggests PFO. Similar to study of 10/18/16. Cardiovascular LE venous duplex 10/18/2016    No DVT bilaterally. Contact dermatitis and other eczema, due to unspecified cause     CTS (carpal tunnel syndrome)     HSV-2 (herpes simplex virus 2) infection     Hypercholesterolemia     Hypothyroidism     Ill-defined condition     Vertigo    L4-L5 disc bulge     Leg swelling     Migraines     Positive PPD, treated     S/P lumbar fusion 1/13/2016    1. L4-5 bilateral hemilaminotomy, medial facetectomy, foraminotomy, diskectomy L4-5. 2. Transforaminal lumbar interbody fusion with PEEK cage and demineralized bone graft L4-L5 posterolateral fusion. 3. Segmental spinal instrumentation, DePuy Expedium type L4-L5. 01/13/2016 By Dr. Shayla Lopez     Sciatica     EMG '17 , mild sciatic EMG findings    Stroke ([facility] [address]     12/2016    Vertigo        Current Outpatient Medications   Medication Sig Dispense Refill    atorvastatin (LIPITOR) 40 mg tablet       meclizine (ANTIVERT) 12.5 mg tablet TAKE 1 TABLET BY MOUTH TWICE A DAY FOR DIZZINESS      propranoloL (INDERAL) 40 mg tablet Take 1 Tablet by mouth three (3) times daily. 270 Tablet 2    amitriptyline (ELAVIL) 25 mg tablet Take 1 Tablet by mouth nightly. Indications: migraine prevention 90 Tablet 2    diclofenac (VOLTAREN) 1 % gel as needed. montelukast (SINGULAIR) 10 mg tablet Take 10 mg by mouth nightly. FLUoxetine (PROzac) 10 mg capsule Take 10 mg by mouth nightly. Indications: premenstrual disorder with a state of unhappiness      furosemide (LASIX) 20 mg tablet Take 1 Tablet by mouth daily. Indications: high blood pressure 30 Tablet 0    aspirin delayed-release 81 mg tablet Take 81 mg by mouth daily.       desloratadine (CLARINEX) 5 mg tablet Take 5 mg by mouth in the morning. omeprazole (PRILOSEC) 40 mg capsule Take 40 mg by mouth in the morning.      gabapentin (NEURONTIN) 300 mg capsule Take 300 mg by mouth nightly. budesonide-formoteroL (SYMBICORT) 160-4.5 mcg/actuation HFAA Take 2 Puffs by inhalation two (2) times a day. albuterol (PROVENTIL HFA, VENTOLIN HFA, PROAIR HFA) 90 mcg/actuation inhaler Take 1 Puff by inhalation every four (4) hours as needed for Wheezing. 1 Inhaler 0    FERROUS SULFATE PO Take 325 mg by mouth daily. potassium chloride SR (K-TAB) 20 mEq tablet Take 20 mEq by mouth daily. levothyroxine (SYNTHROID) 50 mcg tablet TAKE 1 TABLET BY MOUTH EVERY DAY  3    cholecalciferol (Vitamin D3) 25 mcg (1,000 unit) cap Take 1,000 Units by mouth daily. Social History   reports that she has never smoked. She has never used smokeless tobacco.   reports no history of alcohol use. Family History  family history includes Cancer in her father; Diabetes in her brother, daughter, mother, and sister; Elevated Lipids in her mother; Hypertension in her daughter, mother, and sister. Review of Systems  Except as stated above include:  Constitutional: Negative for fever, chills and malaise/fatigue. HEENT: No congestion or recent URI. Gastrointestinal: No nausea, vomiting, abdominal pain, bloody stools. Pulmonary:  Negative except as stated above. Cardiac:  Negative except as stated above. Musculoskeletal: Negative except as stated above. Neurological:  No localized symptoms. Skin:  Negative except as stated above. Psych:  Negative except as stated above. Endocrine:  Negative except as stated above.     PHYSICAL EXAM  BP Readings from Last 3 Encounters:   12/21/22 122/84   12/08/22 128/82   09/16/22 (!) 145/93     Pulse Readings from Last 3 Encounters:   12/21/22 80   12/08/22 80   09/16/22 77     Wt Readings from Last 3 Encounters:   12/21/22 114.8 kg (253 lb)   12/08/22 115.9 kg (255 lb 9.6 oz)   09/16/22 114.3 kg (252 lb)     General:   Well developed, well groomed. Head/Neck:   No obvious jugular venous distention     No obvious carotid pulsations. No evidence of xanthelasma. Lungs:   No respiratory distress. Clear bilaterally. Heart:  Regular rate and rhythm. Normal S1/S2. Palpation grossly normal.    No significant murmurs, rubs or gallops. Abdomen:   Non-acute abdomen. No obvious pulsations. Extremities:   Intact peripheral pulses. No significant edema. Neurological:   Alert and oriented to person, place, time. No focal neurological deficit visually. Skin:   No obvious rash    Blood Pressure Metric:  Monitor recommended and adjustments stated if needed.

## 2022-12-21 NOTE — PROGRESS NOTES
History of Present Illness:  62 YOF here for follow up. She had loop recorder implant in September. Rare palpitations. No new chest pain, dyspnea, PND, orthopnea or edema. Impression:  History of recurrent TIA/stroke with total of four events, last July 2020 and July 2021. S/P subcutaneous loop recorder September 2022 with no recent events at least for the past two months. History of remote echocardiogram with positive bubble study. COVID positive July 2022. History of asthma. HTN. Dyslipidemia. Plan:  She has subcutaneous loop recorder in place, and although I do not have the transmission from today, I am waiting to download, from November prior, there were no events. She had a ANGEL without obvious shunt in September. Continue to monitor, including control blood pressure. I have asked her to monitor with a blood pressure logbook. I will tentatively see back in six months.

## 2022-12-21 NOTE — PROGRESS NOTES
Emanuel Amado presents today for No chief complaint on file. Emanuel Amado preferred language for health care discussion is english/other. Is someone accompanying this pt? no    Is the patient using any DME equipment during [address]? no    Depression Screening:  3 most recent PHQ Screens 12/21/2022   PHQ Not Done -   Little interest or pleasure in doing things Not at all   Feeling down, depressed, irritable, or hopeless Not at all   Total Score PHQ 2 0   Trouble falling or staying asleep, or sleeping too much -   Feeling tired or having little energy -   Poor appetite, weight loss, or overeating -   Feeling bad about yourself - or that you are a failure or have let yourself or your family down -   Trouble concentrating on things such as school, work, reading, or watching TV -   Moving or speaking so slowly that other people could have noticed; or the opposite being so fidgety that others notice -   Thoughts of being better off dead, or hurting yourself in some way -   PHQ 9 Score -   How difficult have these problems made it for you to do your work, take care of your home and get along with others -       Learning Assessment:  Learning Assessment 3/15/2019   PRIMARY LEARNER Patient   HIGHEST LEVEL OF EDUCATION - PRIMARY LEARNER  [id number]  PRIMARY LEARNER -   CO-LEARNER CAREGIVER No   PRIMARY LANGUAGE ENGLISH   LEARNER PREFERENCE PRIMARY READING   ANSWERED BY patient   RELATIONSHIP SELF       Abuse Screening:  Abuse Screening Questionnaire 12/21/2022   Do you ever feel afraid of your partner? N   Are you in a relationship with someone who physically or mentally threatens you? N   Is it safe for you to go home? Y       Fall Risk  Fall Risk Assessment, last 12 mths 3/15/2019   Able to walk? Yes   Fall in past 12 months? No           Pt currently taking Anticoagulant therapy? Eliquis 5 mg 2x daily     Pt currently taking Antiplatelet therapy ? ASA 81 mg 1x daily       Coordination of Care:  1. Have you been to the ER, urgent care clinic since your last visit? Hospitalized since your last visit? Yes     2. Have you seen or consulted any other health care providers outside of the [address] [place] [address] since your last visit? Include any pap smears or colon screening.  no

## 2023-03-10 ENCOUNTER — OFFICE VISIT (OUTPATIENT)
Age: 58
End: 2023-03-10

## 2023-03-10 VITALS
SYSTOLIC BLOOD PRESSURE: 120 MMHG | RESPIRATION RATE: 20 BRPM | DIASTOLIC BLOOD PRESSURE: 82 MMHG | BODY MASS INDEX: 41.33 KG/M2 | WEIGHT: 257.2 LBS | HEART RATE: 74 BPM | OXYGEN SATURATION: 96 % | HEIGHT: 66 IN | TEMPERATURE: 98.7 F

## 2023-03-10 DIAGNOSIS — G45.9 TIA (TRANSIENT ISCHEMIC ATTACK): ICD-10-CM

## 2023-03-10 DIAGNOSIS — G43.009 MIGRAINE WITHOUT AURA, NOT INTRACTABLE, WITHOUT STATUS MIGRAINOSUS: Primary | ICD-10-CM

## 2023-03-10 NOTE — PROGRESS NOTES
A 62years old female patient here for follow-up of headache and TIA-like spells. Last in the clinic in December 2022. He claims that her headaches are better. No severe headaches currently. But she wakes up in the morning with headache mostly behind her eyes. It lasts for half to 20 minutes. Mildly congested; no discharge from her nose. No nausea or vomiting. Mild. No episodes of extremity weakness. She complains of difficulty stretching the left upper extremity at the elbow; might have pain. She is currently taking propranolol 40 mg p.o. 3 times daily and amitriptyline 25 mg p.o. nightly. She is complaining of weight gain. 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 She is following this clinic by KEISHA Villalba. Last seen in this clinic in September 2022. She is currently taking propranolol 40 mg p.o. 3 times daily and Elavil 25 mg p.o. nightly. Headaches are well controlled. It seems that she has a combination of both migraine and tension type headache. Do not have both headache types over the past 2 months. Taking her medications properly. In July 2022, patient was admitted to [facility] for sudden right-sided body weakness; he was given tenecteplase in the emergency room. CT angiography of the head and neck was unremarkable. MRI did not show acute stroke. Transesophageal echo was subsequently drained by cardiology which was also unremarkable. She has a loop recorder currently. Is on aspirin and atorvastatin 40 mg p.o. per day. She had 3 previous similar episodes; some associated with migraine-like headaches. Complex migraine versus hemiplegic migraine were considered. The possibility of functional neurologic disorder was considered during her last admission. During her last visit, patient was referred for neuropsychological evaluation but the facility was not accepting her insurance. Claims she has mild weakness of the right side currently. No sensory symptoms. No problems with speech. No problem swallowing. No diplopia. Some stress and anxiety. No depression. Review of Systems   Constitutional:  Negative for chills, fever and weight loss. HENT:  Negative for hearing loss and tinnitus. Eyes:  Negative for blurred vision and double vision. Respiratory:  Negative for cough and shortness of breath. Cardiovascular:  Positive for leg swelling(mild). Negative for chest pain. Gastrointestinal:  Positive for heartburn. Negative for nausea and vomiting. Genitourinary:  Negative for dysuria, frequency and urgency. Musculoskeletal:  Positive for back pain and joint pain (knees, hips, left elbow and left shoulder). Negative for neck pain. Skin:  Negative for itching and rash. Neurological:  Positive for dizziness,and headaches (better now). No weakness. Negative for tingling, tremors, sensory change, speech change and seizures. Psychiatric/Behavioral:  Negative for depression. The patient is nervous/anxious. The patient does not have insomnia. Past Medical History:   Diagnosis Date    Asthma     Back pain with radiation     Cardiac echocardiogram, ltd study 12/29/2016    EF 55-60%. No WMA. Right to left atrial septal shunt suggests PFO. Similar to study of 10/18/16. Cardiovascular LE venous duplex 10/18/2016    No DVT bilaterally. Contact dermatitis and other eczema, due to unspecified cause     CTS (carpal tunnel syndrome)     HSV-2 (herpes simplex virus 2) infection     Hypercholesterolemia     Hypothyroidism     Ill-defined condition     Vertigo    L4-L5 disc bulge     Leg swelling     Migraines     Positive PPD, treated     S/P lumbar fusion 1/13/2016    1. L4-5 bilateral hemilaminotomy, medial facetectomy, foraminotomy, diskectomy L4-5. 2. Transforaminal lumbar interbody fusion with PEEK cage and demineralized bone graft L4-L5 posterolateral fusion.  3. Segmental spinal instrumentation, DePuy Expedium type L4-L5. 01/13/2016 By Dr. Gloria Canales     Sciatica     EMG '17 , mild sciatic EMG findings    Stroke ([facility] [address]     12/2016    Vertigo        Past Surgical History:   Procedure Laterality Date    HX GYN      partial hysterectomy due to abnormal pap    HX LUMBAR LAMINECTOMY  01-13-16    L4/5        Family History   Problem Relation Age of Onset    Diabetes Mother     Elevated Lipids Mother     Hypertension Mother     Cancer Father         pancreatic    Diabetes Sister     Hypertension Sister     Diabetes Brother     Diabetes Daughter     Hypertension Daughter         Social History     Socioeconomic History    Marital status:      Spouse name: Not on file    Number of children: Not on file    Years of education: Not on file    Highest education level: Not on file   Occupational History    Occupation: unemployed     Comment: long term disability   Tobacco Use    Smoking status: Never    Smokeless tobacco: Never   Substance and Sexual Activity    Alcohol use: No    Drug use: No    Sexual activity: Not on file     Comment: Hysterectomy   Other Topics Concern    Not on file   Social History Narrative    Not on file     Social Determinants of Health     Financial Resource Strain: Not on file   Food Insecurity: Not on file   Transportation Needs: Not on file   Physical Activity: Not on file   Stress: Not on file   Social Connections: Not on file   Intimate Partner Violence: Not on file   Housing Stability: Not on file        Allergies   Allergen Reactions    Skelaxin [Metaxalone] Other (comments)     jittery    Tramadol Other (comments)     Halluctations         Current Outpatient Medications   Medication Sig Dispense Refill    meclizine (ANTIVERT) 12.5 mg tablet TAKE 1 TABLET BY MOUTH TWICE A DAY FOR DIZZINESS      propranoloL (INDERAL) 40 mg tablet Take 1 Tablet by mouth three (3) times daily.  270 Tablet 2    amitriptyline (ELAVIL) 25 mg tablet Take 1 Tablet by mouth nightly. Indications: migraine prevention 90 Tablet 2    diclofenac (VOLTAREN) 1 % gel as needed. (Patient not taking: Reported on 9/16/2022)      montelukast (SINGULAIR) 10 mg tablet Take 10 mg by mouth nightly. FLUoxetine (PROzac) 10 mg capsule Take 10 mg by mouth nightly. Indications: premenstrual disorder with a state of unhappiness      furosemide (LASIX) 20 mg tablet Take 1 Tablet by mouth daily. Indications: high blood pressure 30 Tablet 0    aspirin delayed-release 81 mg tablet Take 81 mg by mouth daily. desloratadine (CLARINEX) 5 mg tablet Take 5 mg by mouth in the morning. omeprazole (PRILOSEC) 40 mg capsule Take 40 mg by mouth in the morning.      gabapentin (NEURONTIN) 300 mg capsule Take 300 mg by mouth nightly. atorvastatin (LIPITOR) 80 mg tablet Take 1 Tab by mouth nightly. 30 Tab 0    budesonide-formoteroL (SYMBICORT) 160-4.5 mcg/actuation HFAA Take 2 Puffs by inhalation two (2) times a day. albuterol (PROVENTIL HFA, VENTOLIN HFA, PROAIR HFA) 90 mcg/actuation inhaler Take 1 Puff by inhalation every four (4) hours as needed for Wheezing. 1 Inhaler 0    FERROUS SULFATE PO Take 325 mg by mouth daily. potassium chloride SR (K-TAB) 20 mEq tablet Take 20 mEq by mouth daily. levothyroxine (SYNTHROID) 50 mcg tablet TAKE 1 TABLET BY MOUTH EVERY DAY  3    cholecalciferol (Vitamin D3) 25 mcg (1,000 unit) cap Take 1,000 Units by mouth daily. Physical Exam  Constitutional:       Appearance: Normal appearance. HENT:      Head: Normocephalic and atraumatic. Mouth/Throat:      Mouth: Mucous membranes are moist.      Pharynx: Oropharynx is clear. No oropharyngeal exudate. Eyes:      Extraocular Movements: Extraocular movements intact. Pupils: Pupils are equal, round, and reactive to light. Pulmonary:      Effort: Pulmonary effort is normal. No respiratory distress. Musculoskeletal:         General: Normal range of motion.       Cervical back: Normal range of motion and neck supple. Right lower leg: No edema. Left lower leg: No edema. Neurological:   Mental status: Awake, alert, oriented , follows simple and complex commands, no neglect, no extinction. Speech and languge: fluent, coherent, and comprehension intact  CN: VFF, EOMI, PERRLA, face sensation intact , no facial asymmetry noted, palate elevation symmetric bilat, SS+SCM 5/5 bilat, tongue midline  Motor: no pronator drift, tone normal throughout, strength 5/5 throughout  Sensory: intact to light touch and PP  throughout  Coordination: FNF, HS accurate w/o dysmetria  DTR: 2+ throughout. Gait: Normal.           Hospital Outpatient Visit on 09/12/2022   Component Date Value Ref Range Status    Sodium 09/12/2022 140  136 - 145 mmol/L Final    Potassium 09/12/2022 4.5  3.5 - 5.5 mmol/L Final    Chloride 09/12/2022 108  100 - 111 mmol/L Final    CO2 09/12/2022 30  21 - 32 mmol/L Final    Anion gap 09/12/2022 2 (A)  3.0 - 18 mmol/L Final    Glucose 09/12/2022 130 (A)  74 - 99 mg/dL Final    BUN 09/12/2022 10  7.0 - 18 MG/DL Final    Creatinine 09/12/2022 1.12  0.6 - 1.3 MG/DL Final    BUN/Creatinine ratio 09/12/2022 9 (A)  12 - 20   Final    GFR est AA 09/12/2022 >60  >60 ml/min/1.73m2 Final    GFR est non-AA 09/12/2022 50 (A)  >60 ml/min/1.73m2 Final    Comment: (NOTE)  Estimated GFR is calculated using the Modification of Diet in Renal   Disease (MDRD) Study equation, reported for both  Americans   (GFRAA) and non- Americans (GFRNA), and normalized to 1.73m2   body surface area. The physician must decide which value applies to   the patient. The MDRD study equation should only be used in   individuals age 25 or older. It has not been validated for the   following: pregnant women, patients with serious comorbid conditions,   or on certain medications, or persons with extremes of body size,   muscle mass, or nutritional status.       Calcium 09/12/2022 9.4  8.5 - 10.1 MG/DL Final Bilirubin, total 09/12/2022 0.5  0.2 - 1.0 MG/DL Final    ALT (SGPT) 09/12/2022 43  13 - 56 U/L Final    AST (SGOT) 09/12/2022 21  10 - 38 U/L Final    Alk. phosphatase 09/12/2022 110  45 - 117 U/L Final    Protein, total 09/12/2022 7.3  6.4 - 8.2 g/dL Final    Albumin 09/12/2022 3.6  3.4 - 5.0 g/dL Final    Globulin 09/12/2022 3.7  2.0 - 4.0 g/dL Final    A-G Ratio 09/12/2022 1.0  0.8 - 1.7   Final    WBC 09/12/2022 6.0  4.6 - 13.2 K/uL Final    RBC 09/12/2022 4.85  4.20 - 5.30 M/uL Final    HGB 09/12/2022 11.3 (A)  12.0 - 16.0 g/dL Final    HCT 09/12/2022 36.9  35.0 - 45.0 % Final    MCV 09/12/2022 76.1 (A)  78.0 - 100.0 FL Final    MCH 09/12/2022 23.3 (A)  24.0 - 34.0 PG Final    MCHC 09/12/2022 30.6 (A)  31.0 - 37.0 g/dL Final    RDW 09/12/2022 15.3 (A)  11.6 - 14.5 % Final    PLATELET 83/91/8333 464  135 - 420 K/uL Final    MPV 09/12/2022 10.7  9.2 - 11.8 FL Final    NRBC 09/12/2022 0.0  0  WBC Final    ABSOLUTE NRBC 09/12/2022 0.00  0.00 - 0.01 K/uL Final    NEUTROPHILS 09/12/2022 37 (A)  40 - 73 % Final    LYMPHOCYTES 09/12/2022 51  21 - 52 % Final    MONOCYTES 09/12/2022 9  3 - 10 % Final    EOSINOPHILS 09/12/2022 2  0 - 5 % Final    BASOPHILS 09/12/2022 1  0 - 2 % Final    IMMATURE GRANULOCYTES 09/12/2022 0  0.0 - 0.5 % Final    ABS. NEUTROPHILS 09/12/2022 2.2  1.8 - 8.0 K/UL Final    ABS. LYMPHOCYTES 09/12/2022 3.1  0.9 - 3.6 K/UL Final    ABS. MONOCYTES 09/12/2022 0.5  0.05 - 1.2 K/UL Final    ABS. EOSINOPHILS 09/12/2022 0.1  0.0 - 0.4 K/UL Final    ABS. BASOPHILS 09/12/2022 0.0  0.0 - 0.1 K/UL Final    ABS. IMM. GRANS. 09/12/2022 0.0  0.00 - 0.04 K/UL Final    DF 09/12/2022 AUTOMATED    Final    Prothrombin time 09/12/2022 13.2  11.5 - 15.2 sec Final    INR 09/12/2022 1.0  0.8 - 1.2   Final    Comment:            INR Therapeutic Ranges         (on stable oral anticoagulant):     INDICATION                INR  DVT/PE/Atrial Fib          2.0-3.0  MI/Mechanical Heart Valve  2.5-3.5         1. Migraine without aura, not intractable, without status migrainosus    2. TIA (transient ischemic attack)    A 62years old female patient here for follow-up of migraine headache and previous TIA. Headache is much better currently. No severe migraine-like headaches recently. She has mild headaches in the morning; with some congestion. Last for about 20 minutes. She will continue with the propranolol 40 mg p.o. 3 times daily. Since headaches are mild and do not have the severe migraine-like headaches, have told her to take amitriptyline 25 mg p.o. every other day for about 2 weeks; if no worsening headache, she will stop it. No recent TIA-like spells. She has aspirin and atorvastatin. We will see her again in 4 months time.

## 2023-03-29 ENCOUNTER — PROCEDURE VISIT (OUTPATIENT)
Age: 58
End: 2023-03-29
Payer: MEDICARE

## 2023-03-29 DIAGNOSIS — G45.9 TRANSIENT CEREBRAL ISCHEMIC ATTACK, UNSPECIFIED: Primary | ICD-10-CM

## 2023-03-29 DIAGNOSIS — Z95.818 PRESENCE OF OTHER CARDIAC IMPLANTS AND GRAFTS: ICD-10-CM

## 2023-03-29 DIAGNOSIS — I48.91 UNSPECIFIED ATRIAL FIBRILLATION (HCC): ICD-10-CM

## 2023-05-02 PROCEDURE — 93298 REM INTERROG DEV EVAL SCRMS: CPT | Performed by: INTERNAL MEDICINE

## 2023-07-13 ENCOUNTER — HOSPITAL ENCOUNTER (OUTPATIENT)
Facility: HOSPITAL | Age: 58
Discharge: HOME | End: 2023-07-13
Payer: MEDICARE

## 2023-07-13 ENCOUNTER — OFFICE VISIT (OUTPATIENT)
Age: 58
End: 2023-07-13
Payer: MEDICARE

## 2023-07-13 VITALS
HEART RATE: 78 BPM | OXYGEN SATURATION: 98 % | BODY MASS INDEX: 40.11 KG/M2 | RESPIRATION RATE: 22 BRPM | SYSTOLIC BLOOD PRESSURE: 122 MMHG | HEIGHT: 66 IN | DIASTOLIC BLOOD PRESSURE: 88 MMHG | WEIGHT: 249.6 LBS | TEMPERATURE: 98.2 F

## 2023-07-13 DIAGNOSIS — G43.009 MIGRAINE WITHOUT AURA, NOT INTRACTABLE, WITHOUT STATUS MIGRAINOSUS: Primary | ICD-10-CM

## 2023-07-13 LAB — ERYTHROCYTE [SEDIMENTATION RATE] IN BLOOD: 24 MM/HR (ref 0–30)

## 2023-07-13 PROCEDURE — 85652 RBC SED RATE AUTOMATED: CPT

## 2023-07-13 PROCEDURE — 1036F TOBACCO NON-USER: CPT | Performed by: STUDENT IN AN ORGANIZED HEALTH CARE EDUCATION/TRAINING PROGRAM

## 2023-07-13 PROCEDURE — G8427 DOCREV CUR MEDS BY ELIG CLIN: HCPCS | Performed by: STUDENT IN AN ORGANIZED HEALTH CARE EDUCATION/TRAINING PROGRAM

## 2023-07-13 PROCEDURE — 36415 COLL VENOUS BLD VENIPUNCTURE: CPT

## 2023-07-13 PROCEDURE — 99214 OFFICE O/P EST MOD 30 MIN: CPT | Performed by: STUDENT IN AN ORGANIZED HEALTH CARE EDUCATION/TRAINING PROGRAM

## 2023-07-13 PROCEDURE — 3017F COLORECTAL CA SCREEN DOC REV: CPT | Performed by: STUDENT IN AN ORGANIZED HEALTH CARE EDUCATION/TRAINING PROGRAM

## 2023-07-13 PROCEDURE — G8417 CALC BMI ABV UP PARAM F/U: HCPCS | Performed by: STUDENT IN AN ORGANIZED HEALTH CARE EDUCATION/TRAINING PROGRAM

## 2023-07-13 RX ORDER — IPRATROPIUM BROMIDE AND ALBUTEROL SULFATE 2.5; .5 MG/3ML; MG/3ML
SOLUTION RESPIRATORY (INHALATION)
COMMUNITY
Start: 2023-05-30

## 2023-07-13 RX ORDER — FLUTICASONE PROPIONATE 50 MCG
SPRAY, SUSPENSION (ML) NASAL
COMMUNITY
Start: 2023-04-30

## 2023-07-13 RX ORDER — PROPRANOLOL HYDROCHLORIDE 120 MG/1
120 CAPSULE, EXTENDED RELEASE ORAL DAILY
Qty: 90 CAPSULE | Refills: 2 | Status: SHIPPED | OUTPATIENT
Start: 2023-07-13 | End: 2023-10-11

## 2023-07-13 NOTE — PROGRESS NOTES
of motion and neck supple. Right lower leg: No edema. Left lower leg: No edema. Neurological:   Mental status: Awake, alert, oriented , follows simple and complex commands, no neglect, no extinction. Speech and languge: fluent, coherent, and comprehension intact  CN: VFF, EOMI, PERRLA, face sensation intact , no facial asymmetry noted, palate elevation symmetric bilat, SS+SCM 5/5 bilat, tongue midline  Motor: no pronator drift, tone normal throughout, strength 5/5 throughout  Sensory: intact to light touch and PP  throughout  Coordination: FNF, HS accurate w/o dysmetria  DTR: 2+ throughout. Gait: Normal.         Assessment:  1. Migraine without aura, not intractable, without status migrainosus  - Sedimentation Rate; Future  - propranolol (INDERAL LA) 120 MG extended release capsule; Take 1 capsule by mouth daily  Dispense: 90 capsule; Refill: 3    A 62years old female patient here for follow-up of migraine headache. No severe migraine like headaches recently. She is currently taking propranolol 40 mg p.o. twice daily; previous dose was 40 mg p.o. 3 times daily. Since has some difficulty taking frequent dosing, will change the propranolol to extended release/long-acting form. We will start propranolol LA 80 mg p.o. per day. Discussed side effects. We will follow her blood pressure and heart rate. Over the past 2 weeks, patient has right temporal area pain. Appears tender to palpation. No other features of polymyalgia rheumatica. We will get ESR; if high will do temporal artery Doppler. We will see her again in 6 months time.

## 2023-09-28 ENCOUNTER — HOSPITAL ENCOUNTER (OUTPATIENT)
Facility: HOSPITAL | Age: 58
Setting detail: RECURRING SERIES
End: 2023-09-28
Payer: MEDICARE

## 2023-09-28 PROCEDURE — 97162 PT EVAL MOD COMPLEX 30 MIN: CPT

## 2023-09-28 NOTE — PROGRESS NOTES
PHYSICAL / OCCUPATIONAL THERAPY - DAILY TREATMENT NOTE (updated )    Patient Name: Candida Cabrera    Date: 2023    : 1965  Insurance: Payor: Keya Neumann / Plan: Marybel Zepeda HMO / Product Type: *No Product type* /      Patient  verified Yes     Visit #   Current / Total 1 10   Time   In / Out 1052 1130   Pain   In / Out 6 6   Subjective Functional Status/Changes: See POC     TREATMENT AREA =  Pain in right knee [M25.561]    OBJECTIVE    38 min [x]Eval - untimed                         Therapeutic Procedures: Tx Min Billable or 1:1 Min (if diff from Tx Min) Procedure, Rationale, Specifics     MC BC Totals Reminder: bill using total billable min of TIMED therapeutic procedures (example: do not include dry needle or estim unattended, both untimed codes, in totals to left)  8-22 min = 1 unit; 23-37 min = 2 units; 38-52 min = 3 units; 53-67 min = 4 units; 68-82 min = 5 units   Total Total     [x]  Patient Education billed concurrently with other procedures   [x] Review HEP    [] Progressed/Changed HEP, detail:    [] Other detail:       Objective Information/Functional Measures/Assessment    See POC    Patient will continue to benefit from skilled PT / OT services to modify and progress therapeutic interventions, analyze and address functional mobility deficits, analyze and address ROM deficits, analyze and address strength deficits, analyze and address soft tissue restrictions, analyze and cue for proper movement patterns, analyze and modify for postural abnormalities, analyze and address imbalance/dizziness, and instruct in home and community integration to address functional deficits and attain remaining goals. Progress toward goals / Updated goals:  []  See Progress Note/Recertification    Short Term Goals: To be accomplished in 2 weeks    Pt will be able to report a 8/10 pain rating at worst to improve patient's ability to tolerate prolonged functional activities at home.

## 2023-09-28 NOTE — PROGRESS NOTES
[facility] - [address] [place], [address] [facility]  [phone] fax    Plan of Care / Statement of Necessity for Physical Therapy Services     Patient Name: Jadon Edmonds : 1965   Medical   Diagnosis: Pain in right knee [M25.561] Treatment Diagnosis: M25.561  RIGHT KNEE PAIN       Onset Date: 23 Payor :  Payor: John Reynolds / Plan: Nissa Garter PLUS HMO / Product Type: *No Product type* /    Referral Source: Jules Infante MD [facility]): 2023   Prior Hospitalization: See medical history Provider #: [id number]   Prior Level of Function: Functionally independent, ambulating with no limp, able to negotiate stairs reciprocally. Comorbidities: See Chart Review     Subjective Info:     Current Current Pain: 6/10     Best Pain:  4/10     Worst Pain:  10/10  Constant/Intermittent: Constant  Progression since onset?: No change  Pain Location/Description: Top of her right knee (above knee cap) then wrapping around her back knee. Current Symptoms: Achy & sharp pain in the front of the knee; denies any numbness or tingling. Current Mobility: Currently walking with no cane,   Aggravating/Relieving factors: Stairs aggravates her knee, prolonged walking/sitting; worse when intiating movement a little better with movement. ANUPAMA: insidious onset, started hurting around mid-august. Reports no history of new pain prior. Previous Treatment: Had PT for her lower back. Activity/Participation Limitations: Home chores affected, pain with driving, difficulty when walking around the store; unable to walk 2 miles a day prior to her back surgery (2016). 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 PLOF: Functionally independent. Home- Environment: 2-story house with rails (13 steps) & lives with Dtr. Walk-in/Tub shower but has been using walk-in shower.    Past Med Hx/Surgical Hx: Chart review  Allergies: Chart review  Work: On disability, not followin Therapeutic Exercise, 17554 Neuromuscular Re-Education, 50904 Manual Therapy, 90848 Therapeutic Activity, 68370 Self Care/Home Management, 32660 Electrical Stim unattended, and 78287 Gait Training  Patient / Family readiness to learn indicated by: asking questions, trying to perform skills, interest, return verbalization , and return demonstration   Persons(s) to be included in education: patient (P)  Barriers to Learning/Limitations: none  Patient Goal (s): \"to get back to ushering at [employer] without pain\"  Patient Self Reported Health Status: fair  Rehabilitation Potential: fair    Short Term Goals: To be accomplished in 2 weeks    Pt will be able to report a 8/10 pain rating at worst to improve patient's ability to tolerate prolonged functional activities at home. Eval:10/10    Pt will be independent with HEP to facilitate carry-over of functional gains made in PT at home & community. Eval: Established at [facility]    Long Term Goals: To be accomplished in 10 treatments    Pt will be able to improve strength with right knee extension/flexion to at least 5-/5 to improve patient's ability to negotiate stairs at home & community. Eval: Right Extension/Flexion = 4-/5    2. Pt will improve FOTO score to 61 to demonstrate improvement in patient's ability to perform unrestricted ADLs/IADLs at home & community. Eval: 49    3. Pt will be able to improve right hip abduction and extension to at least 4+/5 to improve her ability to squat at home. Eval: Right Hip abduction = 4-/5; Right hip extension = 3+/5    4. 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 5. Pt will be able to improve her right knee AROM to at least 2 - 115 to improve her knee mechanics during ambulation at home/community.    Eval: 5 - 110

## 2023-10-11 ENCOUNTER — APPOINTMENT (OUTPATIENT)
Facility: HOSPITAL | Age: 58
End: 2023-10-11
Payer: MEDICARE

## 2023-10-20 ENCOUNTER — HOSPITAL ENCOUNTER (OUTPATIENT)
Facility: HOSPITAL | Age: 58
Setting detail: RECURRING SERIES
End: 2023-10-20
Payer: MEDICARE

## 2023-10-30 ENCOUNTER — HOSPITAL ENCOUNTER (OUTPATIENT)
Facility: HOSPITAL | Age: 58
Setting detail: RECURRING SERIES
Discharge: HOME | End: 2023-11-02
Payer: MEDICARE

## 2023-10-30 PROCEDURE — 97535 SELF CARE MNGMENT TRAINING: CPT

## 2023-10-30 PROCEDURE — 97530 THERAPEUTIC ACTIVITIES: CPT

## 2023-10-30 PROCEDURE — 97110 THERAPEUTIC EXERCISES: CPT

## 2023-10-30 NOTE — PROGRESS NOTES
PHYSICAL / OCCUPATIONAL THERAPY - DAILY TREATMENT NOTE (updated )    Patient Name: Francine Quinteros    Date: 10/30/2023    : 1965  Insurance: Payor: Jamari Cornelius / Plan: Carmenza SZYMANSKI HMO / Product Type: *No Product type* /      Patient  verified Yes     Visit #   Current / Total 2 10   Time   In / Out 400 450   Pain   In / Out 4 0   Subjective Functional Status/Changes: Pt stating that her knee feels a bit better than during her eval but not where she wants it to be. Difficulty scheduling PT F/U this past month due to family situations which have improved. TREATMENT AREA =  Pain in right knee [M25.561]    OBJECTIVE    Modalities Rationale:     decrease inflammation and decrease pain to improve patient's ability to progress to PLOF and address remaining functional goals. min [] Estim Unattended, type/location:                                      []  w/ice    []  w/heat    min [] Estim Attended, type/location:                                     []  w/US     []  w/ice    []  w/heat    []  TENS insruct      min []  Mechanical Traction: type/lbs                   []  pro   []  sup   []  int   []  cont    []  before manual    []  after manual    min []  Ultrasound, settings/location:     10 min  unbill [x]  Ice     []  Heat    location/position: Supine with ice at right knee. min []  Paraffin,  details:     min []  Vasopneumatic Device, press/temp:     min []  Thomas Arguelles / Darrin Myesha: If using vaso (only need to measure limb vaso being performed on)      pre-treatment girth :       post-treatment girth :       measured at (landmark location) :      min []  Other:    Skin assessment post-treatment:   Intact      Therapeutic Procedures:     Tx Min Billable or 1:1 Min (if diff from Tx Min) Procedure, Rationale, Specifics   [phone] [id number] Therapeutic Activity (timed):  use of dynamic activities replicating functional movements to increase ROM, strength, coordination, balance, and proprioception in
PT services. She reports some improvement going up/down stair reciprocally & with standing but continues to be a problem at home/community. She states that bending/squatting, prolonged standing/walking, standing up from the chair continues to be difficult. Despite lapse in care she demonstrates some improvement objectively such as improved quad strength & better pain control since initial eval. Pt would benefit from continue skilled PT services 2 times a week for another 10 visits to improve her functional deficits to improve her quality of life. If you have any questions/comments please contact us directly at [phone]. Thank you for allowing us to assist in the care of your patient. Certification Period: 10/30/23 - 11/28/23  Reporting Period (date from last assessment to current assessment): 9/28/23 - 10/30/23    Chandrika Ramirez [facility]       10/30/2023       4:43 PM      ___ I have read the above report and request that my patient continue as recommended.   ___ I have read the above report and request that my patient continue therapy with the following changes/special instructions: ________________________________________________   ___ I have read the above report and request that my patient be discharged from therapy. Physician's Signature:_________________________   DATE:_________   TIME:________                           Estee Dennis MD    ** Signature, Date and Time must be completed for valid certification **  Please sign and [facility] to [facility] or you may fax the signed copy to [phone]  Thank you.

## 2023-11-02 ENCOUNTER — APPOINTMENT (OUTPATIENT)
Facility: HOSPITAL | Age: 58
End: 2023-11-02
Payer: MEDICARE

## 2023-11-03 ENCOUNTER — HOSPITAL ENCOUNTER (OUTPATIENT)
Facility: HOSPITAL | Age: 58
Setting detail: RECURRING SERIES
Discharge: HOME | End: 2023-11-06
Payer: MEDICARE

## 2023-11-03 PROCEDURE — 97110 THERAPEUTIC EXERCISES: CPT

## 2023-11-03 PROCEDURE — 97530 THERAPEUTIC ACTIVITIES: CPT

## 2023-11-03 PROCEDURE — 97112 NEUROMUSCULAR REEDUCATION: CPT

## 2023-11-03 NOTE — PROGRESS NOTES
PHYSICAL / OCCUPATIONAL THERAPY - DAILY TREATMENT NOTE (updated )    Patient Name: Alena Merrill    Date: 11/3/2023    : 1965  Insurance: Payor: Heidi Rene / Plan: Alida SZYMANSKI HMO / Product Type: *No Product type* /      Patient  verified Yes     Visit #   Current / Total 1 10   Time   In / Out 12:10 12:51   Pain   In / Out 3 1   Subjective Functional Status/Changes: \"Doing better\"     TREATMENT AREA =  Pain in right knee [M25.561]    OBJECTIVE     Therapeutic Procedures: Tx Min Billable or 1:1 Min (if diff from Tx Min) Procedure, Rationale, Specifics   23  58681 Therapeutic Exercise (timed):  increase ROM, strength, coordination, balance, and proprioception to improve patient's ability to progress to PLOF and address remaining functional goals. (see flow sheet as applicable)     Details if applicable:       10  05765 Neuromuscular Re-Education (timed):  improve balance, coordination, kinesthetic sense, posture, core stability and proprioception to improve patient's ability to develop conscious control of individual muscles and awareness of position of extremities in order to progress to PLOF and address remaining functional goals. (see flow sheet as applicable)     Details if applicable:     8  50712 Therapeutic Activity (timed):  use of dynamic activities replicating functional movements to increase ROM, strength, coordination, balance, and proprioception in order to improve patient's ability to progress to PLOF and address remaining functional goals.   (see flow sheet as applicable)     Details if applicable:     39  [facility] Totals Reminder: bill using total billable min of TIMED therapeutic procedures (example: do not include dry needle or estim unattended, both untimed codes, in totals to left)  8-22 min = 1 unit; 23-37 min = 2 units; 38-52 min = 3 units; 53-67 min = 4 units; 68-82 min = 5 units   Total Total     [x]  Patient Education billed concurrently with other procedures   [x]

## 2023-11-07 ENCOUNTER — HOSPITAL ENCOUNTER (OUTPATIENT)
Facility: HOSPITAL | Age: 58
Setting detail: RECURRING SERIES
Discharge: HOME | End: 2023-11-10
Payer: MEDICARE

## 2023-11-07 PROCEDURE — 97112 NEUROMUSCULAR REEDUCATION: CPT

## 2023-11-07 PROCEDURE — 97530 THERAPEUTIC ACTIVITIES: CPT

## 2023-11-07 PROCEDURE — 97110 THERAPEUTIC EXERCISES: CPT

## 2023-11-07 NOTE — PROGRESS NOTES
billed concurrently with other procedures   [x] Review HEP    [] Progressed/Changed HEP, detail:    [] Other detail:       Objective Information/Functional Measures/Assessment    Pt was able to tolerate progression of exercises. Initiated squatting, required verbal cues to increase right LE WB in order to perform symmetrically. Progressed resistance on clamshells and prevented pelvic rotations on abduction, resulting in glut med     Patient will continue to benefit from skilled PT / OT services to modify and progress therapeutic interventions, analyze and address functional mobility deficits, analyze and address ROM deficits, analyze and address strength deficits, analyze and address soft tissue restrictions, analyze and cue for proper movement patterns, analyze and modify for postural abnormalities, analyze and address imbalance/dizziness, and instruct in home and community integration to address functional deficits and attain remaining goals. Progress toward goals / Updated goals:  []  See Progress Note/Recertification    Pt will be able to report a 6/10 pain rating at worst to improve patient's ability to tolerate prolonged functional activities at home. Recert Status: updated, 8/10 at worst      2. Pt will be independent with HEP to facilitate carry-over of functional gains made in PT at home & community. Recert Status Met, once a day everyday will update when appropriate     3. Pt will be able to improve strength with right knee extension/flexion to at least 5-/5 to improve patient's ability to negotiate stairs at home & community. Eval: Right Extension/Flexion = 4-/5              Recert Status knee extension = 4/5; knee flexion = 4-/5     4. Pt will improve FOTO score to 61 to demonstrate improvement in patient's ability to perform unrestricted ADLs/IADLs at home & community. Recert Status: progressing, 55     5.  Pt will be able to improve right hip

## 2023-11-29 ENCOUNTER — HOSPITAL ENCOUNTER (OUTPATIENT)
Facility: HOSPITAL | Age: 58
Setting detail: RECURRING SERIES
Discharge: HOME | End: 2023-12-02
Payer: MEDICARE

## 2023-11-29 PROCEDURE — 97110 THERAPEUTIC EXERCISES: CPT

## 2023-11-29 PROCEDURE — 97530 THERAPEUTIC ACTIVITIES: CPT

## 2023-11-29 NOTE — PROGRESS NOTES
Physical Therapy Discharge Instructions      [facility] - [address] [place] [address], [id number] [place] [address]  [phone] fax      Patient: Quintin Dumont  : 1965      Continue Home Exercise Program 1-2 times per day       Continue with    [x] Ice  as needed      [x] Heat           Follow up with MD:     [] Upon completion of therapy     [x] As needed      Recommendations:     [x]   Return to activity with home program    []   Return to activity with the following modifications:       []Post Rehab Program    []Join Independent aquatic program     [x]Return to/join local gym      Josselyn Toledo, CARLEEN  2023 4:35 PM
Status: not met, 5 - 90 degrees AROM regressed due lapse in care. Improving to 105 degrees after heel slide exercises.    MET; 2-123 deg    Functional Gains: walking/standing tolerance, stairs, exercise, ADLs, pain  Functional Deficits: none to report  % improvement: 100%  Pain   Average: 0/10       Best: 0/10     Worst: 0/10  Patient Goal: \"get back on my usher board at [facility]  - Discharge due to : Nannette Ferrera PTA, [facility]    11/29/2023    8:24 AM    Future Appointments   Date Time Provider [address]   11/29/2023  4:10 PM [facility]   12/1/2023  2:10 PM [facility]

## 2023-12-02 ENCOUNTER — HOSPITAL ENCOUNTER (INPATIENT)
Facility: HOSPITAL | Age: 58
LOS: 1 days | Discharge: HOME | DRG: 103 | End: 2023-12-03
Attending: EMERGENCY MEDICINE | Admitting: HOSPITALIST
Payer: MEDICARE

## 2023-12-02 ENCOUNTER — APPOINTMENT (OUTPATIENT)
Facility: HOSPITAL | Age: 58
DRG: 103 | End: 2023-12-02
Payer: MEDICARE

## 2023-12-02 DIAGNOSIS — G43.409 HEMIPLEGIC MIGRAINE WITHOUT STATUS MIGRAINOSUS, NOT INTRACTABLE: Primary | ICD-10-CM

## 2023-12-02 DIAGNOSIS — R29.90 NEUROLOGICAL SYMPTOMS: ICD-10-CM

## 2023-12-02 PROBLEM — F32.A DEPRESSION: Status: ACTIVE | Noted: 2023-12-02

## 2023-12-02 PROBLEM — E66.01 OBESITY, MORBID (HCC): Status: ACTIVE | Noted: 2018-08-27

## 2023-12-02 PROBLEM — G43.109 COMPLICATED MIGRAINE: Status: ACTIVE | Noted: 2021-07-21

## 2023-12-02 LAB
ALBUMIN SERPL-MCNC: 4.1 G/DL (ref 3.4–5)
ALBUMIN/GLOB SERPL: 1 (ref 0.8–1.7)
ALP SERPL-CCNC: 139 U/L (ref 45–117)
ALT SERPL-CCNC: 30 U/L (ref 13–56)
ANION GAP SERPL CALC-SCNC: 3 MMOL/L (ref 3–18)
AST SERPL-CCNC: 18 U/L (ref 10–38)
BASOPHILS # BLD: 0.1 K/UL (ref 0–0.1)
BASOPHILS NFR BLD: 1 % (ref 0–2)
BILIRUB SERPL-MCNC: 0.7 MG/DL (ref 0.2–1)
BUN SERPL-MCNC: 12 MG/DL (ref 7–18)
BUN/CREAT SERPL: 11 (ref 12–20)
CALCIUM SERPL-MCNC: 10.2 MG/DL (ref 8.5–10.1)
CHLORIDE SERPL-SCNC: 106 MMOL/L (ref 100–111)
CO2 SERPL-SCNC: 29 MMOL/L (ref 21–32)
CREAT SERPL-MCNC: 1.1 MG/DL (ref 0.6–1.3)
DIFFERENTIAL METHOD BLD: ABNORMAL
EOSINOPHIL # BLD: 0.4 K/UL (ref 0–0.4)
EOSINOPHIL NFR BLD: 4 % (ref 0–5)
ERYTHROCYTE [DISTWIDTH] IN BLOOD BY AUTOMATED COUNT: 14.8 % (ref 11.6–14.5)
EST. AVERAGE GLUCOSE BLD GHB EST-MCNC: 117 MG/DL
GLOBULIN SER CALC-MCNC: 4.2 G/DL (ref 2–4)
GLUCOSE BLD STRIP.AUTO-MCNC: 112 MG/DL (ref 70–110)
GLUCOSE SERPL-MCNC: 125 MG/DL (ref 74–99)
HBA1C MFR BLD: 5.7 % (ref 4.2–5.6)
HCT VFR BLD AUTO: 42.1 % (ref 35–45)
HGB BLD-MCNC: 12.9 G/DL (ref 12–16)
IMM GRANULOCYTES # BLD AUTO: 0 K/UL (ref 0–0.04)
IMM GRANULOCYTES NFR BLD AUTO: 0 % (ref 0–0.5)
INR PPP: 0.9 (ref 0.9–1.1)
LYMPHOCYTES # BLD: 6.4 K/UL (ref 0.9–3.6)
LYMPHOCYTES NFR BLD: 69 % (ref 21–52)
MCH RBC QN AUTO: 23 PG (ref 24–34)
MCHC RBC AUTO-ENTMCNC: 30.6 G/DL (ref 31–37)
MCV RBC AUTO: 75 FL (ref 78–100)
MONOCYTES # BLD: 0.2 K/UL (ref 0.05–1.2)
MONOCYTES NFR BLD: 2 % (ref 3–10)
NEUTS SEG # BLD: 2.2 K/UL (ref 1.8–8)
NEUTS SEG NFR BLD: 24 % (ref 40–73)
NRBC # BLD: 0 K/UL (ref 0–0.01)
NRBC BLD-RTO: 0 PER 100 WBC
PLATELET # BLD AUTO: 378 K/UL (ref 135–420)
PLATELET COMMENT: ABNORMAL
PMV BLD AUTO: 11 FL (ref 9.2–11.8)
POTASSIUM SERPL-SCNC: 4.5 MMOL/L (ref 3.5–5.5)
PROT SERPL-MCNC: 8.3 G/DL (ref 6.4–8.2)
PROTHROMBIN TIME: 12.4 SEC (ref 11.9–14.7)
RBC # BLD AUTO: 5.61 M/UL (ref 4.2–5.3)
RBC MORPH BLD: ABNORMAL
SODIUM SERPL-SCNC: 138 MMOL/L (ref 136–145)
TROPONIN I SERPL HS-MCNC: 3 NG/L (ref 0–54)
WBC # BLD AUTO: 9.3 K/UL (ref 4.6–13.2)

## 2023-12-02 PROCEDURE — 84484 ASSAY OF TROPONIN QUANT: CPT

## 2023-12-02 PROCEDURE — 85610 PROTHROMBIN TIME: CPT

## 2023-12-02 PROCEDURE — 70450 CT HEAD/BRAIN W/O DYE: CPT

## 2023-12-02 PROCEDURE — 99285 EMERGENCY DEPT VISIT HI MDM: CPT

## 2023-12-02 PROCEDURE — 82962 GLUCOSE BLOOD TEST: CPT

## 2023-12-02 PROCEDURE — 70551 MRI BRAIN STEM W/O DYE: CPT

## 2023-12-02 PROCEDURE — 85025 COMPLETE CBC W/AUTO DIFF WBC: CPT

## 2023-12-02 PROCEDURE — 6370000000 HC RX 637 (ALT 250 FOR IP)

## 2023-12-02 PROCEDURE — 96374 THER/PROPH/DIAG INJ IV PUSH: CPT

## 2023-12-02 PROCEDURE — 1100000003 HC PRIVATE W/ TELEMETRY

## 2023-12-02 PROCEDURE — 2580000003 HC RX 258

## 2023-12-02 PROCEDURE — 96375 TX/PRO/DX INJ NEW DRUG ADDON: CPT

## 2023-12-02 PROCEDURE — 93005 ELECTROCARDIOGRAM TRACING: CPT

## 2023-12-02 PROCEDURE — 80053 COMPREHEN METABOLIC PANEL: CPT

## 2023-12-02 PROCEDURE — 99222 1ST HOSP IP/OBS MODERATE 55: CPT

## 2023-12-02 PROCEDURE — 6360000002 HC RX W HCPCS

## 2023-12-02 PROCEDURE — 6360000004 HC RX CONTRAST MEDICATION

## 2023-12-02 PROCEDURE — 92610 EVALUATE SWALLOWING FUNCTION: CPT

## 2023-12-02 PROCEDURE — 83036 HEMOGLOBIN GLYCOSYLATED A1C: CPT

## 2023-12-02 PROCEDURE — 70498 CT ANGIOGRAPHY NECK: CPT

## 2023-12-02 RX ORDER — PANTOPRAZOLE SODIUM 40 MG/1
40 TABLET, DELAYED RELEASE ORAL
Status: DISCONTINUED | OUTPATIENT
Start: 2023-12-03 | End: 2023-12-03 | Stop reason: HOSPADM

## 2023-12-02 RX ORDER — ASPIRIN 81 MG/1
81 TABLET, CHEWABLE ORAL DAILY
Status: DISCONTINUED | OUTPATIENT
Start: 2023-12-02 | End: 2023-12-03 | Stop reason: HOSPADM

## 2023-12-02 RX ORDER — GABAPENTIN 300 MG/1
300 CAPSULE ORAL 2 TIMES DAILY
Status: DISCONTINUED | OUTPATIENT
Start: 2023-12-02 | End: 2023-12-03 | Stop reason: HOSPADM

## 2023-12-02 RX ORDER — LEVOTHYROXINE SODIUM 0.05 MG/1
50 TABLET ORAL DAILY
Status: DISCONTINUED | OUTPATIENT
Start: 2023-12-03 | End: 2023-12-03 | Stop reason: HOSPADM

## 2023-12-02 RX ORDER — SENNOSIDES A AND B 8.6 MG/1
1 TABLET, FILM COATED ORAL DAILY PRN
Status: DISCONTINUED | OUTPATIENT
Start: 2023-12-02 | End: 2023-12-03 | Stop reason: HOSPADM

## 2023-12-02 RX ORDER — ATORVASTATIN CALCIUM 40 MG/1
80 TABLET, FILM COATED ORAL NIGHTLY
Status: DISCONTINUED | OUTPATIENT
Start: 2023-12-02 | End: 2023-12-03 | Stop reason: HOSPADM

## 2023-12-02 RX ORDER — BUTALBITAL, ACETAMINOPHEN AND CAFFEINE 300; 40; 50 MG/1; MG/1; MG/1
1 CAPSULE ORAL EVERY 4 HOURS PRN
Status: DISCONTINUED | OUTPATIENT
Start: 2023-12-02 | End: 2023-12-03 | Stop reason: HOSPADM

## 2023-12-02 RX ORDER — SODIUM CHLORIDE, SODIUM LACTATE, POTASSIUM CHLORIDE, AND CALCIUM CHLORIDE .6; .31; .03; .02 G/100ML; G/100ML; G/100ML; G/100ML
1000 INJECTION, SOLUTION INTRAVENOUS ONCE
Status: COMPLETED | OUTPATIENT
Start: 2023-12-02 | End: 2023-12-02

## 2023-12-02 RX ORDER — DIPHENHYDRAMINE HYDROCHLORIDE 50 MG/ML
25 INJECTION INTRAMUSCULAR; INTRAVENOUS
Status: COMPLETED | OUTPATIENT
Start: 2023-12-02 | End: 2023-12-02

## 2023-12-02 RX ORDER — PROPRANOLOL HCL 60 MG
120 CAPSULE, EXTENDED RELEASE 24HR ORAL DAILY
Status: DISCONTINUED | OUTPATIENT
Start: 2023-12-03 | End: 2023-12-03 | Stop reason: HOSPADM

## 2023-12-02 RX ORDER — SODIUM CHLORIDE 9 MG/ML
INJECTION, SOLUTION INTRAVENOUS CONTINUOUS
Status: DISPENSED | OUTPATIENT
Start: 2023-12-02 | End: 2023-12-03

## 2023-12-02 RX ORDER — METFORMIN HYDROCHLORIDE 500 MG/1
TABLET, EXTENDED RELEASE ORAL
COMMUNITY
Start: 2023-11-08

## 2023-12-02 RX ORDER — ASPIRIN 300 MG/1
300 SUPPOSITORY RECTAL DAILY
Status: DISCONTINUED | OUTPATIENT
Start: 2023-12-02 | End: 2023-12-03 | Stop reason: HOSPADM

## 2023-12-02 RX ORDER — INSULIN LISPRO 100 [IU]/ML
0-8 INJECTION, SOLUTION INTRAVENOUS; SUBCUTANEOUS
Status: DISCONTINUED | OUTPATIENT
Start: 2023-12-02 | End: 2023-12-03 | Stop reason: HOSPADM

## 2023-12-02 RX ORDER — KETOROLAC TROMETHAMINE 15 MG/ML
15 INJECTION, SOLUTION INTRAMUSCULAR; INTRAVENOUS ONCE
Status: COMPLETED | OUTPATIENT
Start: 2023-12-02 | End: 2023-12-02

## 2023-12-02 RX ORDER — ONDANSETRON 2 MG/ML
4 INJECTION INTRAMUSCULAR; INTRAVENOUS EVERY 6 HOURS PRN
Status: DISCONTINUED | OUTPATIENT
Start: 2023-12-02 | End: 2023-12-03 | Stop reason: HOSPADM

## 2023-12-02 RX ORDER — DEXTROSE MONOHYDRATE 100 MG/ML
INJECTION, SOLUTION INTRAVENOUS CONTINUOUS PRN
Status: DISCONTINUED | OUTPATIENT
Start: 2023-12-02 | End: 2023-12-03 | Stop reason: HOSPADM

## 2023-12-02 RX ORDER — ONDANSETRON 4 MG/1
4 TABLET, ORALLY DISINTEGRATING ORAL EVERY 8 HOURS PRN
Status: DISCONTINUED | OUTPATIENT
Start: 2023-12-02 | End: 2023-12-03 | Stop reason: HOSPADM

## 2023-12-02 RX ORDER — FLUOXETINE 10 MG/1
10 CAPSULE ORAL DAILY
Status: DISCONTINUED | OUTPATIENT
Start: 2023-12-03 | End: 2023-12-03 | Stop reason: HOSPADM

## 2023-12-02 RX ORDER — LABETALOL HYDROCHLORIDE 5 MG/ML
5 INJECTION, SOLUTION INTRAVENOUS EVERY 10 MIN PRN
Status: DISCONTINUED | OUTPATIENT
Start: 2023-12-02 | End: 2023-12-03 | Stop reason: HOSPADM

## 2023-12-02 RX ORDER — PROCHLORPERAZINE EDISYLATE 5 MG/ML
10 INJECTION INTRAMUSCULAR; INTRAVENOUS
Status: COMPLETED | OUTPATIENT
Start: 2023-12-02 | End: 2023-12-02

## 2023-12-02 RX ADMIN — SODIUM CHLORIDE, POTASSIUM CHLORIDE, SODIUM LACTATE AND CALCIUM CHLORIDE 1000 ML: 600; 310; 30; 20 INJECTION, SOLUTION INTRAVENOUS at 15:33

## 2023-12-02 RX ADMIN — KETOROLAC TROMETHAMINE 15 MG: 15 INJECTION, SOLUTION INTRAMUSCULAR; INTRAVENOUS at 15:31

## 2023-12-02 RX ADMIN — ASPIRIN 81 MG: 81 TABLET, CHEWABLE ORAL at 19:01

## 2023-12-02 RX ADMIN — DIPHENHYDRAMINE HYDROCHLORIDE 25 MG: 50 INJECTION INTRAMUSCULAR; INTRAVENOUS at 15:32

## 2023-12-02 RX ADMIN — SODIUM CHLORIDE: 9 INJECTION, SOLUTION INTRAVENOUS at 19:09

## 2023-12-02 RX ADMIN — ATORVASTATIN CALCIUM 80 MG: 40 TABLET, FILM COATED ORAL at 21:02

## 2023-12-02 RX ADMIN — GABAPENTIN 300 MG: 300 CAPSULE ORAL at 21:02

## 2023-12-02 RX ADMIN — PROCHLORPERAZINE EDISYLATE 10 MG: 5 INJECTION INTRAMUSCULAR; INTRAVENOUS at 15:32

## 2023-12-02 RX ADMIN — IOPAMIDOL 160 ML: 755 INJECTION, SOLUTION INTRAVENOUS at 15:11

## 2023-12-02 ASSESSMENT — PAIN - FUNCTIONAL ASSESSMENT
PAIN_FUNCTIONAL_ASSESSMENT: 0-10
PAIN_FUNCTIONAL_ASSESSMENT: 0-10
PAIN_FUNCTIONAL_ASSESSMENT: NONE - DENIES PAIN

## 2023-12-02 ASSESSMENT — PAIN SCALES - GENERAL
PAINLEVEL_OUTOF10: 3
PAINLEVEL_OUTOF10: 0
PAINLEVEL_OUTOF10: 10

## 2023-12-02 ASSESSMENT — PAIN DESCRIPTION - LOCATION: LOCATION: HEAD

## 2023-12-02 NOTE — CONSULTS
outpatient familial hemiplegic migraine - [id number] gene- testing    Thanks for this consult. I spent 55 minutes with the patient in face-to-face consultation, of which greater than 50% was spent in counseling and coordination of care as described above. Will follow results of MRI testing.     Signed By: Mariana Nair MD     December 2, 2023 No

## 2023-12-02 NOTE — ED PROVIDER NOTES
Emergency Department Treatment Report    Patient: Escobar Verdugo Age: 62 y.o. Sex: female    YOB: 1965 Admit Date: 12/2/2023 PCP: Elodia Lundborg, MD   MRN: [id number]  CSN: [id number]     Room: [facility] Time Dictated: 10:39 AM        Chief Complaint   Chief Complaint   Patient presents with    Facial Droop    Extremity Weakness       History of Present Illness   Escobar Verdugo is a 62 y.o. female with past medical history including Migraine, CVA, hypertension who presents to the ED with the chief complaint of right-sided weakness. Per EMS they were called to the grocery store and that family there. Patient with last known well of 1400 was at the grocery store when she developed sudden onset right-sided lower facial droop with additional right-sided hemiparesis and was noted to be aphasic. stroke alert was called patient was taken to [address]. Review of Systems   Review of Systems   Reason unable to perform ROS: Aphasic. Past Medical/Surgical History     Past Medical History:   Diagnosis Date    Abnormal echocardiogram 12/29/2016    EF 55-60%. No WMA. Right to left atrial septal shunt suggests PFO. Similar to study of 10/18/16. Asthma     Back pain with radiation     Contact dermatitis and other eczema, due to unspecified cause     CTS (carpal tunnel syndrome)     DVT (deep venous thrombosis) ([address]) 10/18/2016    No DVT bilaterally. HSV-2 (herpes simplex virus 2) infection     Hypercholesterolemia     Hypothyroidism     Ill-defined condition     Vertigo    L4-L5 disc bulge     Leg swelling     Migraines     Positive PPD, treated     S/P lumbar fusion 1/13/2016    1. L4-5 bilateral hemilaminotomy, medial facetectomy, foraminotomy, diskectomy L4-5. 2. Transforaminal lumbar interbody fusion with PEEK cage and demineralized bone graft L4-L5 posterolateral fusion.  3. Segmental spinal instrumentation, DePuy Expedium type L4-L5. 01/13/2016 By Dr. Santiago Irene     Sciatica     EMG '17 , mild

## 2023-12-02 NOTE — H&P
Hemiplegic migraine without status migrainosus, not intractable 12/2/2023 Yes    Hyperlipidemia 12/2/2023 Yes    Obesity 12/2/2023 Yes    Overview Signed 3/19/2022 12:17 AM by Sixto, Convprob     Vertigo           Obesity, morbid ([address]) 12/2/2023 Yes    Hypothyroidism 39/9/6164 Yes    Complicated migraine 63/1/3565 Yes    Depression 12/2/2023 Yes     Assessment:  Complicated hemiplegic migraine versus less likely acute TIA-patient has history of migraines with right-sided weakness. CT head negative. CTA preliminary without LVO  History of hemiplegic migraine  History of CVA  Asthma, not in exacerbation  Hypertension  Hyperlipidemia  Hypothyroidism  Obesity  Depression       Plan:  - MRI brain  - Cardiac monitoring  - ASA/Statin  - Permissive HTN <220/110; PRN labetalol  - ST/PT/OT  - Lipid panel, a1c, TSH  - IV fluids -normal saline 100 mL/h  - SSI  - Fiorcet PRN for migraines and con't home propranolol   - Resume appropriate home meds- synthroid, Prilosec, Prozac, gabapentin, propranolol   - Neurology consulted-appreciate assistance      Anticipated Discharge: 2 days     DVT Prophylaxis:  []Lovenox  []Hep SQ  [x]SCDs  []Coumadin []DOAC  []On Heparin gtt     I have personally reviewed all pertinent labs, films and EKGs that have officially resulted. I reviewed available electronic documentation outlining the initial presentation as well as the emergency room physician's encounter.     Time spent reviewing records, independently interpreting results, obtaining history from patient or caregiver, performing physical exam, ordering tests and medications, communicating with specialists, documenting in the chart, and coordinating overall care is  >55 minutes     Jacques Rinne, FNP-PACO  [address] [place] [address]  Office:  [phone]

## 2023-12-02 NOTE — ED TRIAGE NOTES
Pt brought in via EMS for stroke like symptoms including right sided facial droop and right sided weakness since 1400. Per ems patient has a history of strokes, however, no resulting deficits. Patient is currently displaying signs of aphasia and slurring of words. Pt. Taken to CT with EMS after provider assessment.

## 2023-12-03 VITALS
TEMPERATURE: 97.9 F | OXYGEN SATURATION: 99 % | HEART RATE: 72 BPM | SYSTOLIC BLOOD PRESSURE: 141 MMHG | BODY MASS INDEX: 39.29 KG/M2 | WEIGHT: 244.49 LBS | DIASTOLIC BLOOD PRESSURE: 83 MMHG | HEIGHT: 66 IN | RESPIRATION RATE: 18 BRPM

## 2023-12-03 LAB
ANION GAP SERPL CALC-SCNC: 2 MMOL/L (ref 3–18)
BUN SERPL-MCNC: 11 MG/DL (ref 7–18)
BUN/CREAT SERPL: 11 (ref 12–20)
CALCIUM SERPL-MCNC: 9.4 MG/DL (ref 8.5–10.1)
CHLORIDE SERPL-SCNC: 108 MMOL/L (ref 100–111)
CHOLEST SERPL-MCNC: 148 MG/DL
CO2 SERPL-SCNC: 30 MMOL/L (ref 21–32)
CREAT SERPL-MCNC: 0.97 MG/DL (ref 0.6–1.3)
EKG ATRIAL RATE: 76 BPM
EKG DIAGNOSIS: NORMAL
EKG P AXIS: 59 DEGREES
EKG P-R INTERVAL: 182 MS
EKG Q-T INTERVAL: 406 MS
EKG QRS DURATION: 78 MS
EKG QTC CALCULATION (BAZETT): 456 MS
EKG R AXIS: 20 DEGREES
EKG T AXIS: 40 DEGREES
EKG VENTRICULAR RATE: 76 BPM
ERYTHROCYTE [DISTWIDTH] IN BLOOD BY AUTOMATED COUNT: 14.8 % (ref 11.6–14.5)
EST. AVERAGE GLUCOSE BLD GHB EST-MCNC: 114 MG/DL
GLUCOSE BLD STRIP.AUTO-MCNC: 103 MG/DL (ref 70–110)
GLUCOSE BLD STRIP.AUTO-MCNC: 117 MG/DL (ref 70–110)
GLUCOSE SERPL-MCNC: 100 MG/DL (ref 74–99)
HBA1C MFR BLD: 5.6 % (ref 4.2–5.6)
HCT VFR BLD AUTO: 36.6 % (ref 35–45)
HDLC SERPL-MCNC: 39 MG/DL (ref 40–60)
HDLC SERPL: 3.8 (ref 0–5)
HGB BLD-MCNC: 11.2 G/DL (ref 12–16)
LDLC SERPL CALC-MCNC: 88.6 MG/DL (ref 0–100)
LIPID PANEL: ABNORMAL
MCH RBC QN AUTO: 23.3 PG (ref 24–34)
MCHC RBC AUTO-ENTMCNC: 30.6 G/DL (ref 31–37)
MCV RBC AUTO: 76.3 FL (ref 78–100)
NRBC # BLD: 0 K/UL (ref 0–0.01)
NRBC BLD-RTO: 0 PER 100 WBC
PLATELET # BLD AUTO: 299 K/UL (ref 135–420)
PMV BLD AUTO: 11 FL (ref 9.2–11.8)
POTASSIUM SERPL-SCNC: 4.4 MMOL/L (ref 3.5–5.5)
RBC # BLD AUTO: 4.8 M/UL (ref 4.2–5.3)
SODIUM SERPL-SCNC: 140 MMOL/L (ref 136–145)
TRIGL SERPL-MCNC: 102 MG/DL
TSH SERPL DL<=0.05 MIU/L-ACNC: 2.38 UIU/ML (ref 0.36–3.74)
VLDLC SERPL CALC-MCNC: 20.4 MG/DL
WBC # BLD AUTO: 7.6 K/UL (ref 4.6–13.2)

## 2023-12-03 PROCEDURE — 84443 ASSAY THYROID STIM HORMONE: CPT

## 2023-12-03 PROCEDURE — 6370000000 HC RX 637 (ALT 250 FOR IP)

## 2023-12-03 PROCEDURE — 82962 GLUCOSE BLOOD TEST: CPT

## 2023-12-03 PROCEDURE — 85027 COMPLETE CBC AUTOMATED: CPT

## 2023-12-03 PROCEDURE — 36415 COLL VENOUS BLD VENIPUNCTURE: CPT

## 2023-12-03 PROCEDURE — 80048 BASIC METABOLIC PNL TOTAL CA: CPT

## 2023-12-03 PROCEDURE — 83036 HEMOGLOBIN GLYCOSYLATED A1C: CPT

## 2023-12-03 PROCEDURE — 80061 LIPID PANEL: CPT

## 2023-12-03 PROCEDURE — 93010 ELECTROCARDIOGRAM REPORT: CPT | Performed by: INTERNAL MEDICINE

## 2023-12-03 RX ORDER — BUTALBITAL, ACETAMINOPHEN AND CAFFEINE 300; 40; 50 MG/1; MG/1; MG/1
1 CAPSULE ORAL EVERY 6 HOURS PRN
Qty: 20 CAPSULE | Refills: 0 | Status: SHIPPED | OUTPATIENT
Start: 2023-12-03

## 2023-12-03 RX ADMIN — PANTOPRAZOLE SODIUM 40 MG: 40 TABLET, DELAYED RELEASE ORAL at 06:26

## 2023-12-03 RX ADMIN — FLUOXETINE HYDROCHLORIDE 10 MG: 10 CAPSULE ORAL at 08:46

## 2023-12-03 RX ADMIN — LEVOTHYROXINE SODIUM 50 MCG: 0.05 TABLET ORAL at 08:46

## 2023-12-03 RX ADMIN — PROPRANOLOL HYDROCHLORIDE 120 MG: 60 CAPSULE, EXTENDED RELEASE ORAL at 08:46

## 2023-12-03 RX ADMIN — GABAPENTIN 300 MG: 300 CAPSULE ORAL at 08:46

## 2023-12-03 RX ADMIN — ASPIRIN 81 MG: 81 TABLET, CHEWABLE ORAL at 08:46

## 2023-12-03 ASSESSMENT — PAIN SCALES - GENERAL
PAINLEVEL_OUTOF10: 0
PAINLEVEL_OUTOF10: 0

## 2023-12-03 NOTE — ED NOTES
OT came  pt passed swallow screening , pt given dinner tray , pt tolerated well      Mariam Brasher  12/02/23 0961

## 2023-12-03 NOTE — ED NOTES
TRANSFER - OUT REPORT:    Verbal report given to Flora Crocker on [address]  being transferred to 5 for routine progression of patient care       Report consisted of patient's Situation, Background, Assessment and   Recommendations(SBAR). Information from the following report(s) Nurse Handoff Report, ED Encounter Summary, ED SBAR, Intake/Output, MAR, Recent Results, and Med Rec Status was reviewed with the receiving nurse. [place] Assessment:    Presents to emergency department  because of falls (Syncope, seizure, or loss of consciousness): No  Age > 70: No  Altered Mental Status, Intoxication with alcohol or substance confusion (Disorientation, impaired judgment, poor safety awaremess, or inability to follow instructions): No  Impaired Mobility: Ambulates or transfers with assistive devices or assistance; Unable to ambulate or transer.: No             Lines:   Peripheral IV 12/02/23 Left Antecubital (Active)        Opportunity for questions and clarification was provided.       Patient transported with:  [place]  12/02/23 4368

## 2023-12-03 NOTE — PROGRESS NOTES
Pt discharged home. IV taken out, belongings returned. Discharge papers given and explained. Pt left ambulatory with family.

## 2023-12-03 NOTE — CARE COORDINATION
Dr. Theopolis Kawasaki let CM know that patient will be discharging today, no needs. CM spoke with patient, updated on discharge order for today. No CM needs. Patient said her daughter will be transporting patient home today for discharge.              Arturo Meyer, FREDI  Case Management [phone]

## 2023-12-03 NOTE — DISCHARGE SUMMARY
Discharge Summary    Patient: Carmel Emerson               Sex: female          DOA: 12/2/2023         YOB: 1965      Age:  62 y.o.        LOS:  LOS: 1 day                Admit Date: 12/2/2023    Discharge Date: 12/3/2023    Admission Diagnoses: Neurological symptoms [R29.90]  Hemiplegic migraine without status migrainosus, not intractable [G43.409]    Reason for Admission:    62 y. o.female pmh HTN, asthma, hypothyroidism, recurrent TIAs who is being seen for evaluation of R face and дмитрий-body weakness beginning this afternoon around 1400PM.     Patient presented circa 14:49PM with <1 hr of acute-onset R side facial droop and weakness since 1400PM. Patient was walking to a store when developed acute onset dizziness along with right facial droop, dysarthria and R sided (leg and arm) weakness. Denied any triggering factors, although has had increased household financial stress. Denied recent TBI nor infection. No recent med changes. No vision cuts. No AMS nor LOC. Currently with frontal, throbbing HA with photo and phonophobia. Says this HA occurs 1x/week for several hours.     Discharge Condition: Good    Hospital Course:     Consults:    Treatment Team: Attending Provider: Susan Lee MD; Hospitalist: Susan Lee MD; Consulting Physician: Shankar Iraheta MD; Registered Nurse: Nikita Richard RN; Physical Therapist: Ginger Ashley PT; Occupational Therapist: Kathia Sawyer OT    Procedures    Significant Diagnostic Studies:     Discharge Medications:     Current Discharge Medication List        START taking these medications    Details   butalbital-APAP-caffeine -40 MG CAPS per capsule Take 1 capsule by mouth every 6 hours as needed for Migraine  Qty: 20 capsule, Refills: 0      verapamil (CALAN SR) 120 MG extended release tablet Take 1 tablet by mouth daily  Qty: 30 tablet, Refills: 0           CONTINUE these medications which have NOT CHANGED    Details   metFORMIN (GLUCOPHAGE-XR) 500

## 2023-12-03 NOTE — PROGRESS NOTES
Physical Therapy    PT order received and chart reviewed. Spoke with pt at bedside who endorses no mobility concerns. Has been getting up ad jey. Reports resolved head ache and right sided symptoms. Will sign off.  Thank you for this referral. Afsaneh Vo, PT, DPT

## 2023-12-03 NOTE — ED NOTES
Bedside report given to [facility]. No further questions.      Vu Wilkins RN  12/02/23 2094

## 2023-12-03 NOTE — PROGRESS NOTES
Occupational Therapy  OT order received and chart reviewed. Spoke with patient in her room and she declined need for skilled OT at this time. Patient is modified independent with basic self care tasks and functional mobility. She has assistance/support at home. No skilled OT indicated at this time; will complete the order.      Thank you for the referral.   Ed Anthony OTD, OTR/L

## 2023-12-03 NOTE — CARE COORDINATION
D/C order noted for today. Orders reviewed. No needs identified at this time. CM spoke with patient, patient said her daughter will be transporting patient home today for discharge. CM remains available if needed.          Krista Shah -[phone]

## 2023-12-07 NOTE — PROGRESS NOTES
Physician Progress Note      Manuel Quinteros  [facility] #:                  [id number]  :                       1965  ADMIT DATE:       2023 2:43 PM  [facility] DATE:        12/3/2023 6:13 PM  RESPONDING  PROVIDER #:        Ifeanyi Kelsey MD          QUERY TEXT:    Pt admitted  with acute onset right-sided upper and lower extremity weakness   with right facial hemiparesis, dysarthria and aphasia  . Pt noted to have   documented in H+P Complicated hemiplegic migraine versus less likely acute TIA   . If possible, please document in progress notes and discharge summary if you   are evaluating and /or treating any of the following: The medical record reflects the following:    Risk Factors: to the ED 6 x in the last 7 years for similar complaints and   received tpa 2-3 times. All previous MRIs have been negative. Upon arrival to   the ED code stroke was initiated and taken to CT head which resulted to be   neg. acute bleed. CTA without LVO. Teleneurology was consulted who believes   this is secondary to migraines and recommends treating patient for migraine   and admission for observ and MRI. Clinical Indicators: H+P-Complicated hemiplegic migraine versus less likely   acute TIA-patient has history of migraines with right-sided weakness. + Right   sided weakness, + dysarthria, + right facial hemiparesis . CT head negative. CTA preliminary without LVO ,MRI  -Neuro- pt admitted for TIA workup (since still w/ sxns and within 24hrs   of onset)- however will not administer TNK nor is she a thrombectomy   candidate. Suspicion high for hemiplegic migraine, other ddx:   functional/anxiety- mediated disease. No recent med changes. No vision cuts. No AMS nor LOC. Currently with frontal, throbbing HA with photo and   phonophobia. Says this HA occurs 1x/week for several hours. - ED MD PN Also with some speech difficulty/aphasia.     Downward drift in the right upper and right lower

## 2023-12-14 ENCOUNTER — PROCEDURE VISIT (OUTPATIENT)
Age: 58
End: 2023-12-14
Payer: MEDICARE

## 2023-12-14 DIAGNOSIS — Z95.818 PRESENCE OF OTHER CARDIAC IMPLANTS AND GRAFTS: Primary | ICD-10-CM

## 2023-12-14 DIAGNOSIS — I63.9 CEREBRAL INFARCTION, UNSPECIFIED (HCC): ICD-10-CM

## 2024-01-01 PROCEDURE — 93298 REM INTERROG DEV EVAL SCRMS: CPT | Performed by: INTERNAL MEDICINE

## 2024-01-17 ENCOUNTER — PROCEDURE VISIT (OUTPATIENT)
Age: 59
End: 2024-01-17

## 2024-01-17 DIAGNOSIS — I63.9 CEREBRAL INFARCTION, UNSPECIFIED (HCC): ICD-10-CM

## 2024-01-17 DIAGNOSIS — Z95.818 PRESENCE OF OTHER CARDIAC IMPLANTS AND GRAFTS: Primary | ICD-10-CM

## 2024-02-20 DIAGNOSIS — G43.009 MIGRAINE WITHOUT AURA, NOT INTRACTABLE, WITHOUT STATUS MIGRAINOSUS: ICD-10-CM

## 2024-02-22 RX ORDER — PROPRANOLOL HYDROCHLORIDE 120 MG/1
120 CAPSULE, EXTENDED RELEASE ORAL DAILY
Qty: 30 CAPSULE | Refills: 0 | Status: SHIPPED | OUTPATIENT
Start: 2024-02-22 | End: 2024-05-22

## 2024-03-23 DIAGNOSIS — G43.009 MIGRAINE WITHOUT AURA, NOT INTRACTABLE, WITHOUT STATUS MIGRAINOSUS: ICD-10-CM

## 2024-03-25 DIAGNOSIS — G43.009 MIGRAINE WITHOUT AURA, NOT INTRACTABLE, WITHOUT STATUS MIGRAINOSUS: ICD-10-CM

## 2024-03-26 RX ORDER — PROPRANOLOL HYDROCHLORIDE 120 MG/1
120 CAPSULE, EXTENDED RELEASE ORAL DAILY
Qty: 30 CAPSULE | Refills: 0 | Status: SHIPPED | OUTPATIENT
Start: 2024-03-26 | End: 2024-06-24

## 2024-04-17 DIAGNOSIS — G43.009 MIGRAINE WITHOUT AURA, NOT INTRACTABLE, WITHOUT STATUS MIGRAINOSUS: ICD-10-CM

## 2024-04-20 RX ORDER — PROPRANOLOL HYDROCHLORIDE 120 MG/1
120 CAPSULE, EXTENDED RELEASE ORAL DAILY
Qty: 30 CAPSULE | Refills: 1 | Status: SHIPPED | OUTPATIENT
Start: 2024-04-20

## 2024-05-22 RX ORDER — PROPRANOLOL HYDROCHLORIDE 120 MG/1
120 CAPSULE, EXTENDED RELEASE ORAL DAILY
Qty: 30 CAPSULE | Refills: 11 | OUTPATIENT
Start: 2024-05-22

## 2024-06-20 DIAGNOSIS — G43.009 MIGRAINE WITHOUT AURA, NOT INTRACTABLE, WITHOUT STATUS MIGRAINOSUS: ICD-10-CM

## 2024-06-29 RX ORDER — PROPRANOLOL HYDROCHLORIDE 120 MG/1
120 CAPSULE, EXTENDED RELEASE ORAL DAILY
Qty: 60 CAPSULE | Refills: 0 | Status: SHIPPED | OUTPATIENT
Start: 2024-06-29

## 2024-11-30 NOTE — TELEPHONE ENCOUNTER
----- Message from Dana Kamara sent at 9/10/2020 11:16 AM CDT -----  Contact: [facility] of the [facility]  Erin Sahu  MRN: [id number]  : 1929  PCP: Junior Godinez  Home Phone      [phone]  Work Phone      Not on file.  Mobile          Not on file.      MESSAGE:     Calling to find out if it is necessary to attend appointment next week since she was just in recently for hospital follow up.  Patient is having problems with transportation. Please call to advise.      Phone: [phone]    
Appt R/S to 10/14/20 since the patient is having transportation issues. LM for nurse to advise patient.   
Called and spoke with patient. She has been okay for the most part, but wakes up most mornings with a headache. It is not real bad but it feels like she might get a migraine. She usually feels better when getting up, taking her medications, eating. Some days she gets a headache in the afternoon. She takes the Inderal  mg daily in the morning before breakfast.  Reports not sleeping at night. She cannot get to sleep until 3 in the morning. She does not nap. She had the first visit with the sleep specialist and she will have the sleep study on the 27th. We discussed the consideration of holding off on adjustments until after the sleep evaluation versus increasing Inderal LA to 160 mg daily, and she would like to keep the Inderal at the current dose for now. She would like to keep the next appointment that we have scheduled.
I spoke with pt and let her know what the above message stat. Pt stated no side effects and she do have a bp cuff but her daughter has it right now. Pt also stated she would rather have an increase on her medication.
Patient called office wishing to inform NISA Bedolla that her migraines have gotten worse and her medication is not helping. She said NISA Hudson spoke with her about possibly increasing the dosage.
Please advise
Orthopedic

## 2025-01-08 ENCOUNTER — TRANSCRIBE ORDERS (OUTPATIENT)
Facility: HOSPITAL | Age: 60
End: 2025-01-08

## 2025-01-08 ENCOUNTER — HOSPITAL ENCOUNTER (OUTPATIENT)
Facility: HOSPITAL | Age: 60
Discharge: HOME | End: 2025-01-11
Payer: MEDICARE

## 2025-01-08 DIAGNOSIS — M54.10 RADICULOPATHY, UNSPECIFIED SPINAL REGION: ICD-10-CM

## 2025-01-08 DIAGNOSIS — M54.10 RADICULOPATHY, UNSPECIFIED SPINAL REGION: Primary | ICD-10-CM

## 2025-01-08 PROCEDURE — 73562 X-RAY EXAM OF KNEE 3: CPT

## 2025-01-08 PROCEDURE — 72110 X-RAY EXAM L-2 SPINE 4/>VWS: CPT

## 2025-02-09 SDOH — HEALTH STABILITY: PHYSICAL HEALTH: ON AVERAGE, HOW MANY MINUTES DO YOU ENGAGE IN EXERCISE AT THIS LEVEL?: 20 MIN

## 2025-02-09 SDOH — HEALTH STABILITY: PHYSICAL HEALTH: ON AVERAGE, HOW MANY DAYS PER WEEK DO YOU ENGAGE IN MODERATE TO STRENUOUS EXERCISE (LIKE A BRISK WALK)?: 2 DAYS

## 2025-02-12 ENCOUNTER — OFFICE VISIT (OUTPATIENT)
Age: 60
End: 2025-02-12
Payer: MEDICARE

## 2025-02-12 VITALS — BODY MASS INDEX: 38.74 KG/M2 | WEIGHT: 240 LBS

## 2025-02-12 DIAGNOSIS — Z98.1 HISTORY OF LUMBAR FUSION: ICD-10-CM

## 2025-02-12 DIAGNOSIS — M99.01 CERVICAL SOMATIC DYSFUNCTION: ICD-10-CM

## 2025-02-12 DIAGNOSIS — M99.06 LOWER LIMB REGION SOMATIC DYSFUNCTION: ICD-10-CM

## 2025-02-12 DIAGNOSIS — M99.08 RIB CAGE REGION SOMATIC DYSFUNCTION: ICD-10-CM

## 2025-02-12 DIAGNOSIS — M99.07 UPPER EXTREMITY SOMATIC DYSFUNCTION: ICD-10-CM

## 2025-02-12 DIAGNOSIS — M99.02 THORACIC REGION SOMATIC DYSFUNCTION: ICD-10-CM

## 2025-02-12 DIAGNOSIS — G57.01 PIRIFORMIS SYNDROME, RIGHT: ICD-10-CM

## 2025-02-12 DIAGNOSIS — M51.362 DEGENERATION OF INTERVERTEBRAL DISC OF LUMBAR REGION WITH DISCOGENIC BACK PAIN AND LOWER EXTREMITY PAIN: Primary | ICD-10-CM

## 2025-02-12 DIAGNOSIS — M99.03 SOMATIC DYSFUNCTION OF LUMBAR REGION: ICD-10-CM

## 2025-02-12 DIAGNOSIS — M99.05 PELVIC REGION SOMATIC DYSFUNCTION: ICD-10-CM

## 2025-02-12 DIAGNOSIS — M22.2X2 PATELLOFEMORAL DISORDER, LEFT: ICD-10-CM

## 2025-02-12 DIAGNOSIS — M99.04 SACRAL REGION SOMATIC DYSFUNCTION: ICD-10-CM

## 2025-02-12 DIAGNOSIS — M99.09 SOMATIC DYSFUNCTION OF ABDOMINAL REGION: ICD-10-CM

## 2025-02-12 PROCEDURE — G8427 DOCREV CUR MEDS BY ELIG CLIN: HCPCS | Performed by: FAMILY MEDICINE

## 2025-02-12 PROCEDURE — 3017F COLORECTAL CA SCREEN DOC REV: CPT | Performed by: FAMILY MEDICINE

## 2025-02-12 PROCEDURE — 98929 OSTEOPATH MANJ 9-10 REGIONS: CPT | Performed by: FAMILY MEDICINE

## 2025-02-12 PROCEDURE — 1036F TOBACCO NON-USER: CPT | Performed by: FAMILY MEDICINE

## 2025-02-12 PROCEDURE — 99204 OFFICE O/P NEW MOD 45 MIN: CPT | Performed by: FAMILY MEDICINE

## 2025-02-12 PROCEDURE — G8417 CALC BMI ABV UP PARAM F/U: HCPCS | Performed by: FAMILY MEDICINE

## 2025-02-12 NOTE — PROGRESS NOTES
HISTORY OF PRESENT ILLNESS    Georgia Collazo 1965 is a 59 y.o. year old female comes in today as new patient for: back pain right sciatica, left knee    Patients symptoms have been present for years but bad the last 3 months.  Pain level 7/10 lower and anterior left knee. It has worsened with walk, stairs.  Patient has tried:  Prednisone taper, Gabapentin 300mg 2/day from PCP and ice without benefit.  It is described as pain as above without injury. L4-5 fusion 2016, unsure surgeon. No PT in years and no HEP aside from Silver Sneakers.    IMAGING: XR lumbar 1/8/2025  IMPRESSION:  Status post ALIF and posterior fixation and fusion at L4-5. There is a very  subtle anterolisthesis at L4-5.    XR bilateral knees 1/8/2025  IMPRESSION:  No significant bony abnormality is seen in either knee.    Past Surgical History:   Procedure Laterality Date    GYN      partial hysterectomy due to abnormal pap    LUMBAR LAMINECTOMY  01-13-16    L4/5     Social History     Socioeconomic History    Marital status:      Spouse name: None    Number of children: None    Years of education: None    Highest education level: None   Tobacco Use    Smoking status: Never     Passive exposure: Never    Smokeless tobacco: Never   Substance and Sexual Activity    Alcohol use: No    Drug use: No     Social Determinants of Health     Food Insecurity: No Food Insecurity (12/2/2023)    Hunger Vital Sign     Worried About Running Out of Food in the Last Year: Never true     Ran Out of Food in the Last Year: Never true   Transportation Needs: No Transportation Needs (12/2/2023)    PRAPARE - Transportation     Lack of Transportation (Medical): No     Lack of Transportation (Non-Medical): No   Physical Activity: Insufficiently Active (2/9/2025)    Exercise Vital Sign     Days of Exercise per Week: 2 days     Minutes of Exercise per Session: 20 min   Housing Stability: Low Risk  (12/2/2023)    Housing Stability Vital Sign     Unable to Pay for

## 2025-02-12 NOTE — PATIENT INSTRUCTIONS
Either scan this QR code on your smartphone:        Or search YouTube for my channel:    Dr. MAINE Willson    Low back/Piriformis      Try ankle weight (start 1 lb.) or use heavy boot/shoe or a resistance bad tied to chair leg at ankle of injured knee and extend knee as far as possible and hold 1 second. Work up to 3 sets of 15 reps 4+ times/week.

## 2025-03-04 ENCOUNTER — HOSPITAL ENCOUNTER (OUTPATIENT)
Facility: HOSPITAL | Age: 60
Setting detail: RECURRING SERIES
Discharge: HOME | End: 2025-03-07
Payer: MEDICARE

## 2025-03-04 PROCEDURE — 97162 PT EVAL MOD COMPLEX 30 MIN: CPT

## 2025-03-04 NOTE — PROGRESS NOTES
PT DAILY TREATMENT NOTE/LUMBAR EVAL     Patient Name: Georgia Collazo    Date: 3/4/2025    : 1965  Insurance: Payor: [employer] MEDICARE / Plan: [employer] GOLD PLUS HMO / Product Type: *No Product type* /      Patient  verified yes     Visit #   Current / Total 1 24   Time   In / Out 9:40 10:20   Pain   In / Out 4 4   Subjective Functional Status/Changes: See Subjective Summary     Treatment Area: Other low back pain [M54.59]  Pain in left knee [M25.562]  SUBJECTIVE    Subjective functional status/changes:     Chief Complaint: Right knee pain and low back pain   History/Mechanism of Injury: gradual worsening   Current Symptoms/Deficits: impaired ambulation, poor activity tolerance, required assistance from daughter  Pain-  Current: 4/10     Worst: 7/10   Best: 4/10  Previous Treatment/Compliance: previous bouts of PT for low back pain  Mobility Devices: none reported  PMHx/Surgical Hx: spinal fusion of L5     Diagnostic Tests: [] Lab work [] X-rays    [] CT [] MRI     [] Other:  Results:    Work Hx: Not working  Living Situation: 14 steps in home; lives with daughter  Household Modifications: none reported  Hobbies: none reported  PLOF: Modified independent   Pt Goals: \"be able to walk without feeling like my knee is going to buckle from under me\"    General Health:  Red Flags Indicated? [] Yes    [x] No  [] Yes [x] No Recent weight change (If yes, due to dieting? [] Yes  [] No)   [] Yes [x] No Weakness in legs during walking  [] Yes [x] No Unremitting pain at night  [] Yes [x] No Abdominal pain or problems  [] Yes [x] No Rectal bleeding  [] Yes [x] No Feet more cold or painful in cold weather  [] Yes [] No Menstrual irregularities  [] Yes [x] No Blood or pain with urination  [] Yes [x] No Dysfunction of bowel or bladder  [] Yes [x] No Recent illness within past 3 weeks (i.e, cold, flu)  [] Yes [x] No Numbness/tingling in buttock/genitalia region    OBJECTIVE/EXAMINATION    20 min [x]Eval        - untimed 
  Status at last note/certification:     Right (/5) Left (/5)   Knee   Extension 4+ 4              Flexion 4+ 4   2. Pt to ascend/descend 12 standard steps with a reciprocal pattern, utilizing 1 UE support, and reporting no increased pain to demonstrate improved strength and mobility required for the home/community.   Status at last note/certification: step-to with bilateral UE support and increased left knee pain   3. Pt to lay in full supine position on plinth, reporting no increased pain to bilateral anterior or posterior hips to demonstrate improved SIJ mobility and muscle length required for pain free ADL.   Status at last note/certification: pt unable to tolerate full supine due to severe pain to right posterior hip  4.  Pt to improve SHAVON score to 40% or better to demonstrate improved functional mobility and quality of life.   Status at last note/certification: SHAVON: 56%   5. Pt to improve LEFS score to 50/80 pts or better demonstrate improved functional mobility and quality of life.  Status at last note/certification: LEFS: 29/80pts     Frequency / Duration: Patient would benefit from skilled PT 1-2 times per week for 24 VISITS as needed in this certification period.  Goals will be assigned and reassessed every 10 visits/ 30 days per Medicare guidelines.     Patient/ Caregiver education and instruction: Diagnosis, prognosis, self care, activity modification, and exercises [x]  Plan of care has been reviewed with PTA    Certification Period: 03/04/2025 - 06/02/2025    Shivani Reid, PT       3/4/2025       2:01 PM  ===================================================================

## 2025-03-11 ENCOUNTER — TELEPHONE (OUTPATIENT)
Age: 60
End: 2025-03-11

## 2025-03-11 NOTE — TELEPHONE ENCOUNTER
Mercedes rick ([facility]) called to request the patient's office notes from appt on 02/12/2025.    Fax: [phone]  Attn: Beatriz VELEZ

## 2025-03-12 ENCOUNTER — HOSPITAL ENCOUNTER (OUTPATIENT)
Facility: HOSPITAL | Age: 60
Setting detail: RECURRING SERIES
Discharge: HOME | End: 2025-03-15
Payer: MEDICARE

## 2025-03-12 PROCEDURE — 97112 NEUROMUSCULAR REEDUCATION: CPT

## 2025-03-12 PROCEDURE — 97530 THERAPEUTIC ACTIVITIES: CPT

## 2025-03-12 PROCEDURE — 97110 THERAPEUTIC EXERCISES: CPT

## 2025-03-12 NOTE — PROGRESS NOTES
PHYSICAL / OCCUPATIONAL THERAPY - DAILY TREATMENT NOTE    Patient Name: Georgia Collazo    Date: 3/12/2025    : 1965  Insurance: Payor: [employer] MEDICARE / Plan: [employer] GOLD PLUS HMO / Product Type: *No Product type* /      Patient  verified Yes     Visit #   Current / Total 2 24   Time   In / Out 9:47 10:42   Pain   In / Out 3 3   Subjective Functional Status/Changes: \"Things are better - I've been doing my exercises\"     TREATMENT AREA =  Other low back pain  Other intervertebral disc degeneration, lumbar region with discogenic back pain and lower extremity pain  Lesion of sciatic nerve, right lower limb  Patellofemoral disorders, left knee    OBJECTIVE    Modalities Rationale:     decrease pain to improve patient's ability to progress to PLOF and address remaining functional goals.     min [] Estim Unattended, type/location:                                      []  w/ice    []  w/heat    min [] Estim Attended, type/location:                                     []  w/US     []  w/ice    []  w/heat    []  TENS insruct      min []  Mechanical Traction: type/lbs                   []  pro   []  sup   []  int   []  cont    []  before manual    []  after manual    min []  Ultrasound, settings/location:     10 min  unbill [x]  Ice     [x]  Heat    location/position: Reclined/ heat to low back and ice to left knee    min []  Paraffin,  details:     min []  Vasopneumatic Device, press/temp:     min []  Whirlpool / Fluido:    If using vaso (only need to measure limb vaso being performed on)      pre-treatment girth :       post-treatment girth :       measured at (landmark location) :      min []  Other:    Skin assessment post-treatment:   Intact      Therapeutic Procedures:    Tx Min Billable or 1:1 Min (if diff from Tx Min) Procedure, Rationale, Specifics   [id number] Therapeutic Exercise (timed):  increase ROM, strength, coordination, balance, and proprioception to improve patient's ability to progress to PLOF

## 2025-03-19 ENCOUNTER — HOSPITAL ENCOUNTER (OUTPATIENT)
Facility: HOSPITAL | Age: 60
Setting detail: RECURRING SERIES
End: 2025-03-19
Payer: MEDICARE

## 2025-03-21 ENCOUNTER — HOSPITAL ENCOUNTER (OUTPATIENT)
Facility: HOSPITAL | Age: 60
Setting detail: RECURRING SERIES
Discharge: HOME | End: 2025-03-24
Payer: MEDICARE

## 2025-03-21 PROCEDURE — 97110 THERAPEUTIC EXERCISES: CPT

## 2025-03-21 PROCEDURE — 97530 THERAPEUTIC ACTIVITIES: CPT

## 2025-03-21 PROCEDURE — 97112 NEUROMUSCULAR REEDUCATION: CPT

## 2025-03-21 NOTE — PROGRESS NOTES
utilizing 1 UE support, and reporting no increased pain to demonstrate improved strength and mobility required for the home/community.   Status at last note/certification: step-to with bilateral UE support and increased left knee pain   3. Pt to lay in full supine position on plinth, reporting no increased pain to bilateral anterior or posterior hips to demonstrate improved SIJ mobility and muscle length required for pain free ADL.   Status at last note/certification: pt unable to tolerate full supine due to severe pain to right posterior hip  4.  Pt to improve SHAVON score to 40% or better to demonstrate improved functional mobility and quality of life.   Status at last note/certification: SHAVON: 56%   5. Pt to improve LEFS score to 50/80 pts or better demonstrate improved functional mobility and quality of life.  Status at last note/certification: LEFS: 29/80pts     Next PN/ RC due 04/03/2025   Auth due (visit number/ date) pending    PLAN  - Continue Plan of Care    Ethan Schaeffer PTA    3/21/2025    12:40 PM  If an interpreting service was utilized for treatment of this patient, the contents of this document represent the material reviewed with the patient via the .     Future Appointments   Date Time Provider Department Center   3/26/2025  9:40 AM Ant Willson DO [facility]   3/28/2025  9:40 AM ETHAN SCHAEFFER [id number]

## 2025-03-27 ENCOUNTER — APPOINTMENT (OUTPATIENT)
Facility: HOSPITAL | Age: 60
End: 2025-03-27
Payer: MEDICARE

## 2025-03-28 ENCOUNTER — APPOINTMENT (OUTPATIENT)
Facility: HOSPITAL | Age: 60
End: 2025-03-28
Payer: MEDICARE

## 2025-04-06 DIAGNOSIS — G57.01 PIRIFORMIS SYNDROME, RIGHT: ICD-10-CM

## 2025-04-06 DIAGNOSIS — M51.362 DEGENERATION OF INTERVERTEBRAL DISC OF LUMBAR REGION WITH DISCOGENIC BACK PAIN AND LOWER EXTREMITY PAIN: ICD-10-CM

## 2025-04-06 DIAGNOSIS — Z98.1 HISTORY OF LUMBAR FUSION: ICD-10-CM

## 2025-04-06 DIAGNOSIS — M22.2X2 PATELLOFEMORAL DISORDER, LEFT: ICD-10-CM

## 2025-04-14 ENCOUNTER — HOSPITAL ENCOUNTER (OUTPATIENT)
Facility: HOSPITAL | Age: 60
Discharge: HOME | End: 2025-04-17
Payer: MEDICARE

## 2025-04-14 ENCOUNTER — TRANSCRIBE ORDERS (OUTPATIENT)
Facility: HOSPITAL | Age: 60
End: 2025-04-14

## 2025-04-14 DIAGNOSIS — J45.50 SEVERE PERSISTENT ASTHMA WITHOUT COMPLICATION (HCC): Primary | ICD-10-CM

## 2025-04-14 DIAGNOSIS — J45.50 SEVERE PERSISTENT ASTHMA WITHOUT COMPLICATION (HCC): ICD-10-CM

## 2025-04-14 LAB
BASOPHILS # BLD: 0.08 K/UL (ref 0–0.1)
BASOPHILS NFR BLD: 1 % (ref 0–2)
DIFFERENTIAL METHOD BLD: ABNORMAL
EOSINOPHIL # BLD: 0.1 K/UL (ref 0–0.4)
EOSINOPHIL NFR BLD: 1.3 % (ref 0–5)
ERYTHROCYTE [DISTWIDTH] IN BLOOD BY AUTOMATED COUNT: 14.9 % (ref 11.6–14.5)
HCT VFR BLD AUTO: 38.6 % (ref 35–45)
HGB BLD-MCNC: 11.7 G/DL (ref 12–16)
IMM GRANULOCYTES # BLD AUTO: 0.02 K/UL (ref 0–0.04)
IMM GRANULOCYTES NFR BLD AUTO: 0.3 % (ref 0–0.5)
LYMPHOCYTES # BLD: 3.98 K/UL (ref 0.9–3.6)
LYMPHOCYTES NFR BLD: 51 % (ref 21–52)
MCH RBC QN AUTO: 23.4 PG (ref 24–34)
MCHC RBC AUTO-ENTMCNC: 30.3 G/DL (ref 31–37)
MCV RBC AUTO: 77 FL (ref 78–100)
MONOCYTES # BLD: 0.69 K/UL (ref 0.05–1.2)
MONOCYTES NFR BLD: 8.8 % (ref 3–10)
NEUTS SEG # BLD: 2.93 K/UL (ref 1.8–8)
NEUTS SEG NFR BLD: 37.6 % (ref 40–73)
NRBC # BLD: 0 K/UL (ref 0–0.01)
NRBC BLD-RTO: 0 PER 100 WBC
PLATELET # BLD AUTO: 342 K/UL (ref 135–420)
PMV BLD AUTO: 11.3 FL (ref 9.2–11.8)
RBC # BLD AUTO: 5.01 M/UL (ref 4.2–5.3)
WBC # BLD AUTO: 7.8 K/UL (ref 4.6–13.2)

## 2025-04-14 PROCEDURE — 82785 ASSAY OF IGE: CPT

## 2025-04-14 PROCEDURE — 71046 X-RAY EXAM CHEST 2 VIEWS: CPT

## 2025-04-14 PROCEDURE — 86003 ALLG SPEC IGE CRUDE XTRC EA: CPT

## 2025-04-14 PROCEDURE — 85025 COMPLETE CBC W/AUTO DIFF WBC: CPT

## 2025-04-14 PROCEDURE — 36415 COLL VENOUS BLD VENIPUNCTURE: CPT

## 2025-04-21 LAB
A ALTERNATA IGE QN: <0.1 KU/L
A FUMIGATUS IGE QN: <0.1 KU/L
AMER ROACH IGE QN: 1.92 KU/L
AMER SYCAMORE IGE QN: <0.1 KU/L
BAHIA GRASS IGE QN: <0.1 KU/L
BERMUDA GRASS IGE QN: <0.1 KU/L
BOXELDER IGE QN: 1.18 KU/L
C HERBARUM IGE QN: <0.1 KU/L
CAT DANDER IGG QN: <0.1 KU/L
COMMON RAGWEED IGE QN: <0.1 KU/L
D FARINAE IGE QN: 0.21 KU/L
D PTERONYSS IGE QN: 0.31 KU/L
DEPRECATED IGE QN: <0.1 KU/L
DOG DANDER IGE QN: <0.1 KU/L
ENGL PLANTAIN IGE QN: <0.1 KU/L
IGE SERPL-ACNC: 137 IU/ML (ref 6–495)
JOHNSON GRASS IGE QN: <0.1 KU/L
Lab: ABNORMAL
M RACEMOSUS IGE QN: <0.1 KU/L
MT JUNIPER IGE QN: 5.27 KU/L
MUGWORT IGE QN: <0.1 KU/L
NETTLE IGE QN: <0.1 KU/L
P NOTATUM IGE QN: <0.1 KU/L
S BOTRYOSUM IGE QN: <0.1 KU/L
SHEEP SORREL IGE QN: <0.1 KU/L
SWEET GUM IGE QN: <0.1 KU/L
TIMOTHY IGE QN: <0.1 KU/L
WHITE BIRCH IGE QN: <0.1 KU/L
WHITE ELM IGG QN: <0.1 KU/L
WHITE HICKORY IGE QN: <0.1 KU/L
WHITE MULBERRY IGE QN: <0.1 KU/L
WHITE OAK IGE QN: <0.1 KU/L

## 2025-05-11 DIAGNOSIS — M22.2X2 PATELLOFEMORAL DISORDER, LEFT: ICD-10-CM

## 2025-05-11 DIAGNOSIS — M51.362 DEGENERATION OF INTERVERTEBRAL DISC OF LUMBAR REGION WITH DISCOGENIC BACK PAIN AND LOWER EXTREMITY PAIN: ICD-10-CM

## 2025-05-11 DIAGNOSIS — G57.01 PIRIFORMIS SYNDROME, RIGHT: ICD-10-CM

## 2025-05-11 DIAGNOSIS — Z98.1 HISTORY OF LUMBAR FUSION: ICD-10-CM

## 2025-05-12 DIAGNOSIS — M22.2X2 PATELLOFEMORAL DISORDER, LEFT: ICD-10-CM

## 2025-05-12 DIAGNOSIS — M51.362 DEGENERATION OF INTERVERTEBRAL DISC OF LUMBAR REGION WITH DISCOGENIC BACK PAIN AND LOWER EXTREMITY PAIN: ICD-10-CM

## 2025-05-12 DIAGNOSIS — G57.01 PIRIFORMIS SYNDROME, RIGHT: ICD-10-CM

## 2025-05-12 DIAGNOSIS — Z98.1 HISTORY OF LUMBAR FUSION: ICD-10-CM

## 2025-06-01 ENCOUNTER — TRANSCRIBE ORDERS (OUTPATIENT)
Dept: ADMINISTRATIVE | Age: 60
End: 2025-06-01

## 2025-06-01 DIAGNOSIS — Z12.31 BREAST CANCER SCREENING BY MAMMOGRAM: Primary | ICD-10-CM

## 2025-06-01 DIAGNOSIS — Z78.0 POSTMENOPAUSAL: Primary | ICD-10-CM

## 2025-06-06 DIAGNOSIS — G57.01 PIRIFORMIS SYNDROME, RIGHT: ICD-10-CM

## 2025-06-06 DIAGNOSIS — M22.2X2 PATELLOFEMORAL DISORDER, LEFT: ICD-10-CM

## 2025-06-06 DIAGNOSIS — Z98.1 HISTORY OF LUMBAR FUSION: ICD-10-CM

## 2025-06-06 DIAGNOSIS — M51.362 DEGENERATION OF INTERVERTEBRAL DISC OF LUMBAR REGION WITH DISCOGENIC BACK PAIN AND LOWER EXTREMITY PAIN: ICD-10-CM

## 2025-06-25 DIAGNOSIS — M22.2X2 PATELLOFEMORAL DISORDER, LEFT: ICD-10-CM

## 2025-06-25 DIAGNOSIS — Z98.1 HISTORY OF LUMBAR FUSION: ICD-10-CM

## 2025-06-25 DIAGNOSIS — M51.362 DEGENERATION OF INTERVERTEBRAL DISC OF LUMBAR REGION WITH DISCOGENIC BACK PAIN AND LOWER EXTREMITY PAIN: ICD-10-CM

## 2025-06-25 DIAGNOSIS — G57.01 PIRIFORMIS SYNDROME, RIGHT: ICD-10-CM

## 2025-08-03 PROBLEM — Z86.73 HISTORY OF STROKE: Status: ACTIVE | Noted: 2022-07-21

## 2025-09-03 ENCOUNTER — HOSPITAL ENCOUNTER (OUTPATIENT)
Facility: HOSPITAL | Age: 60
Discharge: HOME | End: 2025-09-06
Payer: MEDICARE

## 2025-09-03 ENCOUNTER — TRANSCRIBE ORDERS (OUTPATIENT)
Facility: HOSPITAL | Age: 60
End: 2025-09-03

## 2025-09-03 DIAGNOSIS — M25.571 PAIN IN RIGHT ANKLE AND JOINTS OF RIGHT FOOT: Primary | ICD-10-CM

## 2025-09-03 DIAGNOSIS — M25.571 PAIN IN RIGHT ANKLE AND JOINTS OF RIGHT FOOT: ICD-10-CM

## 2025-09-03 PROCEDURE — 73610 X-RAY EXAM OF ANKLE: CPT

## (undated) DEVICE — CUFF BLD PRESSURE MONITORING LNG AD 23-33 CM 1 TUBE MY CUF

## (undated) DEVICE — TRAY MYEL SFTY +

## (undated) DEVICE — DRAPE SURG W25XL50IN E OPN CIR BND BG

## (undated) DEVICE — DRAPE TWL SURG 16X26IN BLU ORB04] ALLCARE INC]

## (undated) DEVICE — DRAPE STRL ANGIO W/ 2 FLD COLLCTN PCH 86X135 218X343 CM 2

## (undated) DEVICE — SYR 10ML CTRL LR LCK NSAF LF --

## (undated) DEVICE — (D)BNDG ADHESIVE FABRIC 3/4X3 -- DISC BY MFR USE ITEM 357960

## (undated) DEVICE — TRAY SUPP STD NO DRUG W EXTENSION SET

## (undated) DEVICE — LIMB HOLDER, WRIST/ANKLE: Brand: DEROYAL

## (undated) DEVICE — CABLE PACE ALGTR CLP SAF 12FT --

## (undated) DEVICE — Device

## (undated) DEVICE — 3M™ IOBAN™ 2 ANTIMICROBIAL INCISE DRAPE 6640EZ: Brand: IOBAN™ 2

## (undated) DEVICE — SUTURE ABSORBABLE BRAIDED 2-0 CT-1 27 IN UD VICRYL J259H

## (undated) DEVICE — (D)SYR 10ML 1/5ML GRAD NSAF -- PKGING CHANGE USE ITEM 338027

## (undated) DEVICE — KENDALL RADIOLUCENT FOAM MONITORING ELECTRODE RECTANGULAR SHAPE: Brand: KENDALL

## (undated) DEVICE — REM POLYHESIVE ADULT PATIENT RETURN ELECTRODE: Brand: VALLEYLAB

## (undated) DEVICE — NEEDLE SPNL 22GA L3.5IN BLK HUB S STL REG WALL FIT STYL W/

## (undated) DEVICE — COVADERM: Brand: DEROYAL

## (undated) DEVICE — MEDIA CONTRAST 10ML 200MG/ML 41%

## (undated) DEVICE — MIRAGE SWIFT II PILLOW LGE: Brand: MIRAGE SWIFT II

## (undated) DEVICE — PLASMABLADE PS200-040 4.0: Brand: PLASMABLADE™

## (undated) DEVICE — MEDI-TRACE CADENCE ADULT, DEFIBRILLATION ELECTRODE -RTS  (10 PR/PK) - PHYSIO-CONTROL: Brand: MEDI-TRACE CADENCE